# Patient Record
Sex: MALE | Race: WHITE | NOT HISPANIC OR LATINO | Employment: UNEMPLOYED | URBAN - NONMETROPOLITAN AREA
[De-identification: names, ages, dates, MRNs, and addresses within clinical notes are randomized per-mention and may not be internally consistent; named-entity substitution may affect disease eponyms.]

---

## 2017-02-23 ENCOUNTER — HOSPITAL ENCOUNTER (OUTPATIENT)
Dept: RADIOLOGY | Facility: OTHER | Age: 62
End: 2017-02-23
Attending: FAMILY MEDICINE

## 2017-02-23 ENCOUNTER — OFFICE VISIT - GICH (OUTPATIENT)
Dept: FAMILY MEDICINE | Facility: OTHER | Age: 62
End: 2017-02-23

## 2017-02-23 ENCOUNTER — HISTORY (OUTPATIENT)
Dept: FAMILY MEDICINE | Facility: OTHER | Age: 62
End: 2017-02-23

## 2017-02-23 DIAGNOSIS — M79.10 MYALGIA: ICD-10-CM

## 2017-02-24 ENCOUNTER — HOSPITAL ENCOUNTER (OUTPATIENT)
Dept: PHYSICAL THERAPY | Facility: OTHER | Age: 62
Setting detail: THERAPIES SERIES
End: 2017-02-24
Attending: FAMILY MEDICINE

## 2017-02-24 DIAGNOSIS — M79.10 MYALGIA: ICD-10-CM

## 2017-02-27 ENCOUNTER — HOSPITAL ENCOUNTER (OUTPATIENT)
Dept: PHYSICAL THERAPY | Facility: OTHER | Age: 62
Setting detail: THERAPIES SERIES
End: 2017-02-27
Attending: FAMILY MEDICINE

## 2017-03-01 ENCOUNTER — HOSPITAL ENCOUNTER (OUTPATIENT)
Dept: PHYSICAL THERAPY | Facility: OTHER | Age: 62
Setting detail: THERAPIES SERIES
End: 2017-03-01
Attending: FAMILY MEDICINE

## 2017-03-06 ENCOUNTER — HOSPITAL ENCOUNTER (OUTPATIENT)
Dept: PHYSICAL THERAPY | Facility: OTHER | Age: 62
Setting detail: THERAPIES SERIES
End: 2017-03-06
Attending: FAMILY MEDICINE

## 2017-03-08 ENCOUNTER — HOSPITAL ENCOUNTER (OUTPATIENT)
Dept: PHYSICAL THERAPY | Facility: OTHER | Age: 62
Setting detail: THERAPIES SERIES
End: 2017-03-08
Attending: FAMILY MEDICINE

## 2017-03-15 ENCOUNTER — HOSPITAL ENCOUNTER (OUTPATIENT)
Dept: PHYSICAL THERAPY | Facility: OTHER | Age: 62
Setting detail: THERAPIES SERIES
End: 2017-03-15

## 2017-03-22 ENCOUNTER — HOSPITAL ENCOUNTER (OUTPATIENT)
Dept: PHYSICAL THERAPY | Facility: OTHER | Age: 62
Setting detail: THERAPIES SERIES
End: 2017-03-22
Attending: FAMILY MEDICINE

## 2017-06-20 ENCOUNTER — HISTORY (OUTPATIENT)
Dept: FAMILY MEDICINE | Facility: OTHER | Age: 62
End: 2017-06-20

## 2017-06-20 ENCOUNTER — OFFICE VISIT - GICH (OUTPATIENT)
Dept: FAMILY MEDICINE | Facility: OTHER | Age: 62
End: 2017-06-20

## 2017-06-20 DIAGNOSIS — I73.9 PERIPHERAL VASCULAR DISEASE (H): ICD-10-CM

## 2017-06-20 LAB
ABSOLUTE BASOPHILS - HISTORICAL: 0.1 THOU/CU MM
ABSOLUTE EOSINOPHILS - HISTORICAL: 0.3 THOU/CU MM
ABSOLUTE IMMATURE GRANULOCYTES(METAS,MYELOS,PROS) - HISTORICAL: 0 THOU/CU MM
ABSOLUTE LYMPHOCYTES - HISTORICAL: 2.1 THOU/CU MM (ref 0.9–2.9)
ABSOLUTE MONOCYTES - HISTORICAL: 0.7 THOU/CU MM
ABSOLUTE NEUTROPHILS - HISTORICAL: 5 THOU/CU MM (ref 1.7–7)
ANION GAP - HISTORICAL: 9 (ref 5–18)
BASOPHILS # BLD AUTO: 0.9 %
BUN SERPL-MCNC: 14 MG/DL (ref 7–25)
BUN/CREAT RATIO - HISTORICAL: 15
CALCIUM SERPL-MCNC: 9.4 MG/DL (ref 8.6–10.3)
CHLORIDE SERPLBLD-SCNC: 100 MMOL/L (ref 98–107)
CO2 SERPL-SCNC: 25 MMOL/L (ref 21–31)
CREAT SERPL-MCNC: 0.91 MG/DL (ref 0.7–1.3)
EOSINOPHIL NFR BLD AUTO: 3.3 %
ERYTHROCYTE [DISTWIDTH] IN BLOOD BY AUTOMATED COUNT: 13.3 % (ref 11.5–15.5)
GFR IF NOT AFRICAN AMERICAN - HISTORICAL: >60 ML/MIN/1.73M2
GLUCOSE SERPL-MCNC: 85 MG/DL (ref 70–105)
HCT VFR BLD AUTO: 44.9 % (ref 37–53)
HEMOGLOBIN: 15.8 G/DL (ref 13.5–17.5)
IMMATURE GRANULOCYTES(METAS,MYELOS,PROS) - HISTORICAL: 0.4 %
LYMPHOCYTES NFR BLD AUTO: 26 % (ref 20–44)
MCH RBC QN AUTO: 32.1 PG (ref 26–34)
MCHC RBC AUTO-ENTMCNC: 35.2 G/DL (ref 32–36)
MCV RBC AUTO: 91 FL (ref 80–100)
MONOCYTES NFR BLD AUTO: 8.2 %
NEUTROPHILS NFR BLD AUTO: 61.2 % (ref 42–72)
PLATELET # BLD AUTO: 203 THOU/CU MM (ref 140–440)
PMV BLD: 9.2 FL (ref 6.5–11)
POTASSIUM SERPL-SCNC: 3.8 MMOL/L (ref 3.5–5.1)
RED BLOOD COUNT - HISTORICAL: 4.92 MIL/CU MM (ref 4.3–5.9)
SODIUM SERPL-SCNC: 134 MMOL/L (ref 133–143)
WHITE BLOOD COUNT - HISTORICAL: 8.2 THOU/CU MM (ref 4.5–11)

## 2017-06-21 ENCOUNTER — AMBULATORY - GICH (OUTPATIENT)
Dept: FAMILY MEDICINE | Facility: OTHER | Age: 62
End: 2017-06-21

## 2017-06-21 DIAGNOSIS — I73.9 PERIPHERAL VASCULAR DISEASE (H): ICD-10-CM

## 2017-06-23 ENCOUNTER — AMBULATORY - GICH (OUTPATIENT)
Dept: SCHEDULING | Facility: OTHER | Age: 62
End: 2017-06-23

## 2017-06-30 ENCOUNTER — HISTORY (OUTPATIENT)
Dept: FAMILY MEDICINE | Facility: OTHER | Age: 62
End: 2017-06-30

## 2017-06-30 ENCOUNTER — OFFICE VISIT - GICH (OUTPATIENT)
Dept: FAMILY MEDICINE | Facility: OTHER | Age: 62
End: 2017-06-30

## 2017-06-30 DIAGNOSIS — I74.3 EMBOLISM AND THROMBOSIS OF ARTERY OF LOWER EXTREMITY (H): ICD-10-CM

## 2017-06-30 DIAGNOSIS — M99.81 OTHER BIOMECHANICAL LESIONS OF CERVICAL REGION (CODE): ICD-10-CM

## 2017-12-27 NOTE — PROGRESS NOTES
Patient Information     Patient Name MRN Sex Modesto Salcido [id number] Male 1955      Progress Notes by Miguelangel Ghosh MD at 2017  2:45 PM     Author:  Miguelangel Ghosh MD Service:  (none) Author Type:  Physician     Filed:  2017  7:58 AM Encounter Date:  2017 Status:  Signed     :  Miguelangel Ghosh MD (Physician)            .

## 2017-12-27 NOTE — PROGRESS NOTES
"Patient Information     Patient Name MRN Sex Modesto Salcido [id number] Male 1955      Progress Notes by Ha Keating MD at 2017  9:15 AM     Author:  Ha Keating MD Service:  (none) Author Type:  Physician     Filed:  2017  9:38 AM Encounter Date:  2017 Status:  Signed     :  Ha Keating MD (Physician)            SUBJECTIVE:    Modesto Lehman is a 61 y.o. male who presents for follow up pad     HPI    He had a thrombosis in the right lower extremity previous bypass.  [place]  Had not taken his medications with him.  Was having increased pain with doing gardening once he got back home.  Right leg pain, identical to the last time he had an occlusion.  Had an evaluation, then an angiogram with 24 hours of clot dissolving. Is now on plavix for 3 months then aspirin daily.  Procedure was 1 week ago.  Right foot now is back to baseline.  No pain, is not cold.  He has again stopped smoking.    The back pain is about the same on lumbar area, but getting worse in the thoracic spine.  Has been seeing a chiropractor who \"put a rib back\" for him.  This helps a little.    No Known Allergies,   Current Outpatient Prescriptions on File Prior to Visit       Medication  Sig Dispense Refill     aspirin 81 mg tablet Take 81 mg by mouth once daily.       cyclobenzaprine (FLEXERIL) 10 mg tablet Take 1 tablet by mouth 3 times daily. 90 tablet 3     ibuprofen (ADVIL; MOTRIN) 800 mg tablet Take 1 tablet by mouth 3 times daily with meals. 90 tablet 11     simvastatin (ZOCOR) 40 mg tablet Take 1 tablet by mouth once daily. 90 tablet 3     No current facility-administered medications on file prior to visit.    ,   Also now on plavix 75 mg daily    Past Medical History:     Diagnosis  Date     Allergy to pain medication     No reported medication allergies       Anesthesia     No  problems with anaesthesia      Chronic low back pain  "    pain with bilateral leg and disc injuries.      Former smoker      quite 2 days ago      Hx pneumonia      Hyperlipidemia      Transfusion history     No blood transfusions      and   Past Surgical History:      Procedure  Laterality Date     ANESTHESIA ALERT      No  problems with anaesthesia       ANGIOPLASTY      Right leg stenting and bypass, Left also       PREMALIG/BENIGN SKIN LESION EXCISION       removed from chest       WISDOM TEETH EXTRACTION      recently removed         REVIEW OF SYSTEMS:  Review of Systems   Constitutional: Negative for chills and fever.   Respiratory: Negative for cough and shortness of breath.    Cardiovascular: Negative for chest pain, palpitations and leg swelling.   Musculoskeletal: Positive for back pain.   Neurological: Positive for tingling.       OBJECTIVE:  /80  Pulse 80  Temp 97.8  F (36.6  C) (Tympanic)  Wt 82.6 kg (182 lb)  BMI 26.06 kg/m2    EXAM:   Physical Exam   Constitutional: He is oriented to person, place, and time and well-developed, well-nourished, and in no distress. No distress.   HENT:   Head: Normocephalic and atraumatic.   Cardiovascular: Normal rate, regular rhythm and normal heart sounds.  Exam reveals no gallop and no friction rub.    No murmur heard.  Pulmonary/Chest: Effort normal. No respiratory distress. He has no wheezes. He has no rales.   Musculoskeletal:   palpable right posterior tibialis pulse.  Normal. Moderate ecchymosis at right medial epicondyle area.   Neurological: He is alert and oriented to person, place, and time.   Skin: He is not diaphoretic.   Psychiatric: Memory, affect and judgment normal.     No thoracic spine pain on palpation.  ASSESSMENT/PLAN:    ICD-10-CM    1. 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 Neural foraminal stenosis of cervical spine M99.81         Plan:  He is back to baseline and doing well regarding the thrombosis.  Suspect the prolonged car ride played a role as  well as not being on his medications.  Encouraged him to remain off the tobacco.  Regarding the back, the treatment for this is activity, tylenol as needed, intermittent chiro manipulations.    Ha Keating MD ....................  6/30/2017   9:37 AM

## 2017-12-29 NOTE — H&P
Patient Information     Patient Name MRN Sex Modesto Salcido [id number] Male 1955      H&P by Miguelangel Ghosh MD at 2017  2:45 PM     Author:  Miguelangel Ghosh MD Service:  (none) Author Type:  Physician     Filed:  2017  7:58 AM Encounter Date:  2017 Status:  Signed     :  Miguelangel Ghosh MD (Physician)            ----------------- PREOPERATIVE EXAM ------------------  2017    SUBJECTIVE:  Modesto Lehman is a 61 y.o. male here for preop.    I was asked to see Modesto Lehman by Dr. HAMM at [facility] for a preoperative history and physical.      Date of Surgery: 17  Type of Surgery: Vascular surgery RLE  Surgeon: Dr HAMM   Hospital:  [facility]    HPI:  Has history of PAD with previous surgery in lower extremities and now has recurrent blockage just found yesterday in left groin area. He has continued to smoke but states he is now done.   Patient has no personal nor FH of life threatening anesthesia complications. Patient denies unusual bleeding tendencies. No recent history of cough, fever,cold, sweats, chills.       Patient Active Problem List      Diagnosis Date Noted     Neural foraminal stenosis of cervical spine 2016     Plantar wart of left foot 2014     SINUSITIS, ACUTE 2012     TENDINITIS, ELBOW 2012     BAKER'S CYST 2012     PERIPHERAL VASCULAR DISEASE WITH CLAUDICATION 2011     BACK PAIN 2011     SPINAL STENOSIS, LUMBAR 2010     FH DIABETES      HYPERCHOLESTEROLEMIA 10/19/2010     COLONIC POLYPS, ADENOMATOUS 2010     TOBACCO USER 2009     OBESITY 2005     DEGENERATIVE DISC DISEASE, LUMBOSACRAL SPINE 1997     HYPERLIPIDEMIA, MIXED 1997       Past Medical History:     Diagnosis  Date     Allergy to pain medication     No reported medication allergies       Anesthesia     No  problems with anaesthesia      Chronic low back pain     pain with bilateral leg and disc  injuries.      Former smoker      quite 2 days ago      Hx pneumonia      Hyperlipidemia      Transfusion history     No blood transfusions         Past Surgical History:      Procedure  Laterality Date     ANESTHESIA ALERT      No  problems with anaesthesia       ANGIOPLASTY      Right leg stenting and bypass, Left also       PREMALIG/BENIGN SKIN LESION EXCISION       removed from chest       WISDOM TEETH EXTRACTION      recently removed         Current Outpatient Prescriptions       Medication  Sig Dispense Refill     aspirin 81 mg tablet Take 81 mg by mouth once daily.       cyclobenzaprine (FLEXERIL) 10 mg tablet Take 1 tablet by mouth 3 times daily. 90 tablet 3     ibuprofen (ADVIL; MOTRIN) 800 mg tablet Take 1 tablet by mouth 3 times daily with meals. 90 tablet 11     simvastatin (ZOCOR) 40 mg tablet Take 1 tablet by mouth once daily. 90 tablet 3     No current facility-administered medications for this visit.      Medications have been reviewed by me and are current to the best of my knowledge and ability.      Allergies:  No Known Allergies    Latex allergy  no    Td up to date:  2016    Family History       Problem   Relation Age of Onset     Diabetes  Mother      Heart Disease  Mother      Other  Father      COPD, CD       Alcohol/Drug  Other       No hx of chemical dependency.        Diabetes  Other      Heart Disease  Other      Heart Disease  Brother      Other  Brother      killed by drunk  at age 19.            Social History       Substance Use Topics         Smoking status:   Current Every Day Smoker     Packs/day:  0.25     Types:  Cigars     Start date:  11/4/2009     Smokeless tobacco:   Never Used      Comment: quitting today, 6/20/17      Alcohol use   No         ROS:    surgical:  patient denies previous complications from prior surgeries including but not limited to prolonged bleeding, anesthesia complications, dysrhythmias, surgical wound infections, or prolonged hospital stay.    Hx  "of blood transfusions:  NO        -------------------------------------------------------------    PHYSICAL EXAM:  /70  Pulse 79  Temp 97.4  F (36.3  C) (Temporal)  Ht 1.78 m (5' 10.08\")  Wt 83 kg (183 lb)  SpO2 96%  BMI 26.2 kg/m2    PHYSICAL EXAMINATION  EXAM:   General Appearance: Pleasant, alert, appropriate appearance for age. No acute distress  Head Exam: Normal. Normocephalic, atraumatic.  Ear Exam: Normal TM's bilaterally. Normal auditory canals and external ears. Non-tender.  OroPharynx Exam: Dental hygiene adequate. Normal buccal mucosa. Normal pharynx.  Neck Exam: Supple, no masses or nodes.  Chest/Respiratory Exam: Normal chest wall and respirations. Clear to auscultation.  Cardiovascular Exam: Regular rate and rhythm. S1, S2, no murmur, click, gallop, or rubs.  Gastrointestinal Exam: Soft, nontender, no abnormal masses or organomegaly.  Lymphatic Exam: Non-palpable nodes in neck, clavicular, axillary, or inguinal regions.  Foot Exam: has normal left DORSALIS PEDIS pulse but absent left and absent posterior tib pulses bilat  Neurologic Exam: Nonfocal;  normal gross motor movement, tone, and coordination. No tremor.  Psychiatric Exam: Alert and oriented, appropriate affect.      ASSESSMENT/PLAN:    ICD-10-CM    1. PAD (peripheral artery disease) () I73.9          LABS:   Normal CBC and bmp      EKG:  Normal sinus rhythm and nonspecific ST-T changes  ---------------------------------------------------------------    ASSESSEMNT AND PLAN:  1.  Preoperative history and physical   consults:  none    For above listed surgery and anesthesia:     - Patient is moderate   risk for perioperative complications.      PRE OP RECOMMENDATIONS:  Discontinue ASA 5 days prior to reduce bleeding risk and Discontinue NSAIDS 5 days prior to procedure to reduce bleeding risk      Miguelangel Ghosh MD, MD..................6/20/2017 2:45 PM          "

## 2017-12-30 NOTE — NURSING NOTE
Patient Information     Patient Name MRN Sex Modesto Salcido [id number] Male 1955      Nursing Note by Francesca Roach at 2017  9:15 AM     Author:  Francesca Roach Service:  (none) Author Type:  (none)     Filed:  2017  9:22 AM Encounter Date:  2017 Status:  Signed     :  Francesca Roach            Patient is here to follow up from recent hospital stay at [facility]  Francesca Roach LPN .........................2017  9:14 AM

## 2017-12-30 NOTE — NURSING NOTE
Patient Information     Patient Name MRN Sex Modesto Salcido [id number] Male 1955      Nursing Note by Freda Ledesma at 2017  2:45 PM     Author:  Freda Ledesma Service:  (none) Author Type:  (none)     Filed:  2017  2:43 PM Encounter Date:  2017 Status:  Signed     :  Freda Ledesma            This patient presents today for a Preoperative exam for this procedure: Right iliofemoral reconstruction   Date of Surgery:  and    Surgeon:  Dr. Kaur  Facility:  [facility]  Fax:  [phone]

## 2018-01-03 NOTE — PROGRESS NOTES
"Patient Information     Patient Name MRN Sex Modesto Salcido [id number] Male 1955      Progress Notes by Aries Quiros PT at 3/8/2017  9:48 AM     Author:  Aries Quiros PT Service:  (none) Author Type:  PT- Physical Therapist     Filed:  3/8/2017 10:32 AM Date of Service:  3/8/2017  9:48 AM Status:  Signed     :  Aries Quiros PT (PT- Physical Therapist)            [facility]  Outpatient PT - Daily Note    Date of Service: 3/8/2017     Visit 5/10    Patient Name: Modesto Lehman   YOB: 1955   Referring MD/Provider: Dr. Keating  Medical and Treatment Diagnosis: Pain of rhomboid muscle   PT Treatment Diagnosis: Pain, muscle tightness, thoracic segmental dysfunction postural dysfunction  Insurance: [employer]  Start of Service: 17  Certification Dates: Start of Service: 17  Medicare/MA Re-Cert Due: 17           Subjective      Patient reports he had a chiropractic adjustment which helped symptoms some.  States the muscles in the mid and upper back are sore today.          Rates pain: pain at rest 4/10.  Pain increases \"way past 10\".    Describes pain: sharp, achy, throbbing   Locates pain: right rhomboid     Current functional limitations: sleeping, reaching, shoveling, carrying wood, \"everything is limited\"    Prior Level:  Minimal to no difficulty completing the above functional activities.           Objective        Today's Intervention:      STM/MFR x 25 minutes to the right and left rhomboid, bilateral upper trapezius and levator scapulae.      IFC x 15 minutes, cross pattern of electrodes to the bilateral rhomboid region.  Intensity = 7 to 9      Home Exercise Program:            Assessment    Therapist Assessment / Clinical Impression:  Signs and symptoms consistent with rhomboid muscle strain and thoracic segmental dysfunction.  The patient is appropriate for physical therapy to address their pain, functional limitations, and physical " impairments.      Functional Impairment(s): See subjective on initial evaluation and Functional Assessment / Summary Report from [facility].    Physical Impairment(s):  Pain, muscle tightness, thoracic segmental dysfunction postural dysfunction    Patient Specific Functional and Pain Scales (PSFS):    Clinician Instructions: Complete after the history and before the exam.    Initial Assessment: We want to know what 3 activities in your life you are unable to perform, or are having the most difficulty performing, as a result of your chief problem. Please list and score at least 3 activities that you are unable to perform, or having the most difficulty performing, because of your chief problem.   Patient Specific Activity Scoring Scheme (score one number for each activity):   Activity Score (0-10)  0= Unable to perform activity  10= Able to perform activity at same level as before injury or problem   1. Sleeping  1/10   2.  Carrying wood for the fire place 0/10   3. Sweeping  0/10   4.     5.     Totals:  1/30 = 3% ability which relates to 97% impairment    Patient verbally states that they understand that the information they have provided above is current and complete to the best of their knowledge.    Patient Specific Functional Scale Modifier Scale Conversion: (patient's modifier that correlates with pt's score on PSFS): 1-CM (90% Impaired).      G codes and Modifier taken from pt completing a PSFS: completed at initial evaluation   Initial Primary G Code and Modifier:    Per the Patient's intake and/or assessment the Primary G Code is: Mobility .   The Patient's Impairment, Limitation or Restriction Modifier would be best described as: CM - 80% - 100% Impairment.     Goal Primary G Code and Modifier:    The Patient's G Code Goal would be: Mobility    The Patient's Impairment, Limitation or Restriction Modifier goal would be best described as: CK - 40% - 60% Impairment.         Goals:  Functional Short Term  Goals (4 weeks):   Repot 50% reduction in mid thoracic pain at rest  Report 25% improvement in sleep at night  Report ability to carry a small load of wood to his fireplace with 50% less pain.  Report 25% decrease in pain with sweeping    Functional Long Term Goals (8 weeks):   Develop independent management.  Repot 75% or greater reduction in mid thoracic pain at rest  Report 75% or greater improvement in sleep at night  Report ability to carry a small load of wood to his fireplace with 75% less pain.  Report 75% or greater decrease in pain with sweeping      Patient participated in goal selection and understand(s) the plan of care: Yes   Patient Potential for Achieving Desired Outcome: Fair      Response to Intervention:  Good response to treatment.            Plan    Treatment Plan:      Frequency:   16 visits    Duration of Treatment: 8 weeks    Planned Interventions:    Home Exercise Program development  Therapeutic Exercise (ROM & Strengthening)  Therapeutic Activities  Neuromuscular Re-education  Manual Therapy  Ultrasound  E-Stim  Gait Training  Hot Packs / Cold Pack Modalities  Vasopneumatic Compression with Cold Therapy ( Game Ready )      Plan for next visit:  Progress exercises as symptoms allow.  Manual therapy and modalities as indicated.

## 2018-01-03 NOTE — INITIAL ASSESSMENTS
"Patient Information     Patient Name MRN Sex Modesto Salcido [id number] Male 1955      Initial Assessments by Aries Quiros PT at 2017  1:14 PM     Author:  Aries Quiros PT Service:  (none) Author Type:  PT- Physical Therapist     Filed:  2017  2:44 PM Date of Service:  2017  1:14 PM Status:  Signed     :  Aries Quiros PT (PT- Physical Therapist)            [facility]  Outpatient PT - Initial Evaluation  Spine Eval    Date of Service: 2017     Visit 1/10    Patient Name: Modesto Lehman   YOB: 1955   Referring MD/Provider: Dr. Keating  Medical and Treatment Diagnosis: Pain of rhomboid muscle   PT Treatment Diagnosis: Pain, muscle tightness, thoracic segmental dysfunction postural dysfunction  Insurance: Crude Area  Start of Service: 17  Certification Dates: Start of Service: 17  Medicare/MA Re-Cert Due: 17     Precautions:  None   Cognition:  Oriented to Person, Place, and Time.     Were cultural / age or other special adaptations needed? No      Patient is a vulnerable adult: No      Patient is aware of diagnosis: Yes      Risks and benefits explained: Yes    Subjective      History:   Pain in the right rhomboid region has been getting worse for the past year. Pain will radiate around to the anterior ribcage.  He has difficulty sleeping due to pain.  He can only lie down for 15 minutes.  He sleeps 2-4 hours a night.  Pain limits his daily activity.  He reports pressure on the right rhomboids helps decrease pain some.  He has a history of thoracic back pain since he was a child when he fell off a roof and landed on the thoracic spine.  States in the past pain would flare up but hen get better.  Now pain persists and he cannot find a way to decrease pain. He also has a history of neck pain with a \"pinched nerve in the neck\"  He has tingling and numbness in the right hand.      Rates pain: pain at rest 5-6/10.  Pain " "increases \"way past 10\".    Describes pain: sharp, achy, throbbing   Locates pain: right rhomboid     Current functional limitations: sleeping, reaching, shoveling, carrying wood, \"everything is limited\"    Prior Level:  Minimal to no difficulty completing the above functional activities.     Past Medical History      Diagnosis   Date     Allergy to pain medication       No reported medication allergies       Anesthesia       No  problems with anaesthesia      Chronic low back pain       pain with bilateral leg and disc injuries.      Former smoker        quite 2 days ago      Hx pneumonia       Hyperlipidemia       Transfusion history       No blood transfusions       Past Surgical History       Procedure   Laterality Date     Premalig/benign skin lesion excision         removed from chest       [place] teeth extraction        recently removed       Anesthesia alert        No  problems with anaesthesia       Angioplasty        Right leg stenting and bypass, Left also           Occupation:  Retired    Previous Treatment:    Pain Meds / Anti-inflammatory Meds - Yes   Physical Therapy - 6 months ago  Injections - No   Surgery - No   Pain Clinic - No    Diagnostics:  Reviewed (see chart)   Current Medications:  Reviewed (see chart)    Drug Allergies:  Reviewed (see chart)  ?   Latex Allergy:  No         Objective    Items left blank indicate that the test was inappropriate or not meaningful at the time of evaluation and therefore not performed.      Palpation: Pain and muscle tightness noted over the right rhomboid.  Pain over the right T4 region. Decreased thoracic segmental mobility with P/A glide from T2-T6      Today's Intervention:      STM/MFR x 15 minutes to the right rhomboid region     IFC x 15 minutes, cross pattern of electrodes to the bilateral rhomboid region.        Home Exercise Program:            Assessment    Therapist Assessment / Clinical Impression:  Signs and symptoms consistent with rhomboid " muscle strain and thoracic segmental dysfunction.  The patient is appropriate for physical therapy to address their pain, functional limitations, and physical impairments.      Functional Impairment(s): See subjective on initial evaluation and Functional Assessment / Summary Report from [facility].    Physical Impairment(s):  Pain, muscle tightness, thoracic segmental dysfunction postural dysfunction    Patient Specific Functional and Pain Scales (PSFS):    Clinician Instructions: Complete after the history and before the exam.    Initial Assessment: We want to know what 3 activities in your life you are unable to perform, or are having the most difficulty performing, as a result of your chief problem. Please list and score at least 3 activities that you are unable to perform, or having the most difficulty performing, because of your chief problem.   Patient Specific Activity Scoring Scheme (score one number for each activity):   Activity Score (0-10)  0= Unable to perform activity  10= Able to perform activity at same level as before injury or problem   1. Sleeping  1/10   2.  Carrying wood for the fire place 0/10   3. Sweeping  0/10   4.     5.     Totals:  1/30 = 3% ability which relates to 97% impairment    Patient verbally states that they understand that the information they have provided above is current and complete to the best of their knowledge.    Patient Specific Functional Scale Modifier Scale Conversion: (patient's modifier that correlates with pt's score on PSFS): 1-CM (90% Impaired).    G codes and Modifier taken from pt completing a PSFS: completed at initial evaluation   Initial Primary G Code and Modifier:    Per the Patient's intake and/or assessment the Primary G Code is: Mobility .   The Patient's Impairment, Limitation or Restriction Modifier would be best described as: CM - 80% - 100% Impairment.     Goal Primary G Code and Modifier:    The Patient's G Code Goal would be: Mobility    The  Patient's Impairment, Limitation or Restriction Modifier goal would be best described as: CK - 40% - 60% Impairment.         Goals:  Functional Short Term Goals (4 weeks):   Repot 50% reduction in mid thoracic pain at rest  Report 25% improvement in sleep at night  Report ability to carry a small load of wood to his fireplace with 50% less pain.  Report 25% decrease in pain with sweeping    Functional Long Term Goals (8 weeks):   Develop independent management.  Repot 75% or greater reduction in mid thoracic pain at rest  Report 75% or greater improvement in sleep at night  Report ability to carry a small load of wood to his fireplace with 75% less pain.  Report 75% or greater decrease in pain with sweeping      Patient participated in goal selection and understand(s) the plan of care: Yes   Patient Potential for Achieving Desired Outcome: Fair      Response to Intervention:  Good response to treatment.      Plan    Treatment Plan:      Frequency:   16 visits    Duration of Treatment: 8 weeks    Planned Interventions:    Home Exercise Program development  Therapeutic Exercise (ROM & Strengthening)  Therapeutic Activities  Neuromuscular Re-education  Manual Therapy  Ultrasound  E-Stim  Gait Training  Hot Packs / Cold Pack Modalities  Vasopneumatic Compression with Cold Therapy ( Game Ready )      Plan for next visit:  Progress exercises as symptoms allow.  Manual therapy and modalities as indicated.     Thank you for your referral to [facility].  Please call with any questions, concerns or comments.  [phone]    The signature, of the referring medical provider, on this document indicates certification of the above prescribed plan of care and is medically necessary.

## 2018-01-03 NOTE — PROGRESS NOTES
"Patient Information     Patient Name MRN Sex Modesto Salcido [id number] Male 1955      Progress Notes by Aries Quiros PT at 3/1/2017  9:48 AM     Author:  Aries Quiros PT  Service:  (none) Author Type:  PT- Physical Therapist     Filed:  3/1/2017 10:26 AM  Date of Service:  3/1/2017  9:48 AM Status:  Addendum     :  Aries Quiros PT (PT- Physical Therapist)        Related Notes: Original Note by Aries Quiros PT (PT- Physical Therapist) filed at 3/1/2017 10:26 AM            [facility]  Outpatient PT - Daily Note    Date of Service: 3/1/2017     Visit 3/10    Patient Name: Modesto Lehman   YOB: 1955   Referring MD/Provider: Dr. Keating  Medical and Treatment Diagnosis: Pain of rhomboid muscle   PT Treatment Diagnosis: Pain, muscle tightness, thoracic segmental dysfunction postural dysfunction  Insurance: [facility]  Start of Service: 17  Certification Dates: Start of Service: 17  Medicare/[place] Re-Cert Due: 17         Subjective      Patient reports he has increased pain and difficulty sleeping the past few night due to pain.  He reports these episode are common and have been going on for a long time      Rates pain: pain at rest 4/10.  Pain increases \"way past 10\".    Describes pain: sharp, achy, throbbing   Locates pain: right rhomboid     Current functional limitations: sleeping, reaching, shoveling, carrying wood, \"everything is limited\"    Prior Level:  Minimal to no difficulty completing the above functional activities.           Objective          Palpation: Pain and muscle tightness noted over the right and left rhomboid, bilateral upper trapezius and levator scapulae.  Pain over the right T4 region. Decreased thoracic segmental mobility with P/A glide from T2-T6      Today's Intervention:      STM/MFR x 20 minutes to the right and left rhomboid, bilateral upper trapezius and levator scapulae.    IFC x 15 minutes, cross pattern of " electrodes to the bilateral rhomboid region.  Intensity = 7 to 8.4      Home Exercise Program:            Assessment    Therapist Assessment / Clinical Impression:  Signs and symptoms consistent with rhomboid muscle strain and thoracic segmental dysfunction.  The patient is appropriate for physical therapy to address their pain, functional limitations, and physical impairments.      Functional Impairment(s): See subjective on initial evaluation and Functional Assessment / Summary Report from [facility].    Physical Impairment(s):  Pain, muscle tightness, thoracic segmental dysfunction postural dysfunction    Patient Specific Functional and Pain Scales (PSFS):    Clinician Instructions: Complete after the history and before the exam.    Initial Assessment: We want to know what 3 activities in your life you are unable to perform, or are having the most difficulty performing, as a result of your chief problem. Please list and score at least 3 activities that you are unable to perform, or having the most difficulty performing, because of your chief problem.   Patient Specific Activity Scoring Scheme (score one number for each activity):   Activity Score (0-10)  0= Unable to perform activity  10= Able to perform activity at same level as before injury or problem   1. Sleeping  1/10   2.  Carrying wood for the fire place 0/10   3. Sweeping  0/10   4.     5.     Totals:  1/30 = 3% ability which relates to 97% impairment    Patient verbally states that they understand that the information they have provided above is current and complete to the best of their knowledge.    Patient Specific Functional Scale Modifier Scale Conversion: (patient's modifier that correlates with pt's score on PSFS): 1-CM (90% Impaired).      G codes and Modifier taken from pt completing a PSFS: completed at initial evaluation   Initial Primary G Code and Modifier:    Per the Patient's intake and/or assessment the Primary G Code is: Mobility .   The  Patient's Impairment, Limitation or Restriction Modifier would be best described as: CM - 80% - 100% Impairment.     Goal Primary G Code and Modifier:    The Patient's G Code Goal would be: Mobility    The Patient's Impairment, Limitation or Restriction Modifier goal would be best described as: CK - 40% - 60% Impairment.         Goals:  Functional Short Term Goals (4 weeks):   Repot 50% reduction in mid thoracic pain at rest  Report 25% improvement in sleep at night  Report ability to carry a small load of wood to his fireplace with 50% less pain.  Report 25% decrease in pain with sweeping    Functional Long Term Goals (8 weeks):   Develop independent management.  Repot 75% or greater reduction in mid thoracic pain at rest  Report 75% or greater improvement in sleep at night  Report ability to carry a small load of wood to his fireplace with 75% less pain.  Report 75% or greater decrease in pain with sweeping      Patient participated in goal selection and understand(s) the plan of care: Yes   Patient Potential for Achieving Desired Outcome: Fair      Response to Intervention:  Good response to treatment.            Plan    Treatment Plan:      Frequency:   16 visits    Duration of Treatment: 8 weeks    Planned Interventions:    Home Exercise Program development  Therapeutic Exercise (ROM & Strengthening)  Therapeutic Activities  Neuromuscular Re-education  Manual Therapy  Ultrasound  E-Stim  Gait Training  Hot Packs / Cold Pack Modalities  Vasopneumatic Compression with Cold Therapy ( Game Ready )      Plan for next visit:  Progress exercises as symptoms allow.  Manual therapy and modalities as indicated.

## 2018-01-03 NOTE — PROGRESS NOTES
"Patient Information     Patient Name MRN Sex Modesto Salcido [id number] Male 1955      Progress Notes by Aries Quiros PT at 3/6/2017  9:47 AM     Author:  Aries Quiros PT Service:  (none) Author Type:  PT- Physical Therapist     Filed:  3/6/2017 10:26 AM Date of Service:  3/6/2017  9:47 AM Status:  Signed     :  Aries Quiros PT (PT- Physical Therapist)            [facility]  Outpatient PT - Daily Note    Date of Service: 3/6/2017     Visit 4/10    Patient Name: Modesto Lehman   YOB: 1955   Referring MD/Provider: Dr. Keating  Medical and Treatment Diagnosis: Pain of rhomboid muscle   PT Treatment Diagnosis: Pain, muscle tightness, thoracic segmental dysfunction postural dysfunction  Insurance: [employer]  Start of Service: 17  Certification Dates: Start of Service: 17  Medicare/MA Re-Cert Due: 17         Subjective      Patient reports he has had increased symptoms since he slipped getting into his truck.  States he is having difficulty sleeping at night.  States he experiences flare ups like this at times.  Carrying bags of groceries into his home last night flared up symptoms significantly to the point where he had to rest before putting the groceries away.     Patient plans to set up some chiropractic treatments with Dr. Denver this week.       Rates pain: pain at rest 4/10.  Pain increases \"way past 10\".    Describes pain: sharp, achy, throbbing   Locates pain: right rhomboid     Current functional limitations: sleeping, reaching, shoveling, carrying wood, \"everything is limited\"    Prior Level:  Minimal to no difficulty completing the above functional activities.           Objective        Today's Intervention:      STM/MFR x 20 minutes to the right and left rhomboid, bilateral upper trapezius and levator scapulae.  Grade 3-4 central P/A mobs to T2-T8    IFC x 15 minutes, cross pattern of electrodes to the bilateral rhomboid region. "  Intensity = 7 to 9      Home Exercise Program:            Assessment    Therapist Assessment / Clinical Impression:  Signs and symptoms consistent with rhomboid muscle strain and thoracic segmental dysfunction.  The patient is appropriate for physical therapy to address their pain, functional limitations, and physical impairments.      Functional Impairment(s): See subjective on initial evaluation and Functional Assessment / Summary Report from [facility].    Physical Impairment(s):  Pain, muscle tightness, thoracic segmental dysfunction postural dysfunction    Patient Specific Functional and Pain Scales (PSFS):    Clinician Instructions: Complete after the history and before the exam.    Initial Assessment: We want to know what 3 activities in your life you are unable to perform, or are having the most difficulty performing, as a result of your chief problem. Please list and score at least 3 activities that you are unable to perform, or having the most difficulty performing, because of your chief problem.   Patient Specific Activity Scoring Scheme (score one number for each activity):   Activity Score (0-10)  0= Unable to perform activity  10= Able to perform activity at same level as before injury or problem   1. Sleeping  1/10   2.  Carrying wood for the fire place 0/10   3. Sweeping  0/10   4.     5.     Totals:  1/30 = 3% ability which relates to 97% impairment    Patient verbally states that they understand that the information they have provided above is current and complete to the best of their knowledge.    Patient Specific Functional Scale Modifier Scale Conversion: (patient's modifier that correlates with pt's score on PSFS): 1-CM (90% Impaired).      G codes and Modifier taken from pt completing a PSFS: completed at initial evaluation   Initial Primary G Code and Modifier:    Per the Patient's intake and/or assessment the Primary G Code is: Mobility .   The Patient's Impairment, Limitation or Restriction  Modifier would be best described as: CM - 80% - 100% Impairment.     Goal Primary G Code and Modifier:    The Patient's G Code Goal would be: Mobility    The Patient's Impairment, Limitation or Restriction Modifier goal would be best described as: CK - 40% - 60% Impairment.         Goals:  Functional Short Term Goals (4 weeks):   Repot 50% reduction in mid thoracic pain at rest  Report 25% improvement in sleep at night  Report ability to carry a small load of wood to his fireplace with 50% less pain.  Report 25% decrease in pain with sweeping    Functional Long Term Goals (8 weeks):   Develop independent management.  Repot 75% or greater reduction in mid thoracic pain at rest  Report 75% or greater improvement in sleep at night  Report ability to carry a small load of wood to his fireplace with 75% less pain.  Report 75% or greater decrease in pain with sweeping      Patient participated in goal selection and understand(s) the plan of care: Yes   Patient Potential for Achieving Desired Outcome: Fair      Response to Intervention:  Good response to treatment.            Plan    Treatment Plan:      Frequency:   16 visits    Duration of Treatment: 8 weeks    Planned Interventions:    Home Exercise Program development  Therapeutic Exercise (ROM & Strengthening)  Therapeutic Activities  Neuromuscular Re-education  Manual Therapy  Ultrasound  E-Stim  Gait Training  Hot Packs / Cold Pack Modalities  Vasopneumatic Compression with Cold Therapy ( Game Ready )      Plan for next visit:  Progress exercises as symptoms allow.  Manual therapy and modalities as indicated.

## 2018-01-03 NOTE — PROGRESS NOTES
"Patient Information     Patient Name MRN Sex Modesto Salcido [id number] Male 1955      Progress Notes by Aries Quiros PT at 3/22/2017  9:17 AM     Author:  Aries Quiros PT Service:  (none) Author Type:  PT- Physical Therapist     Filed:  3/22/2017  9:39 AM Date of Service:  3/22/2017  9:17 AM Status:  Signed     :  Aries Quiros PT (PT- Physical Therapist)            [facility]  Outpatient PT - Daily Note    Date of Service: 3/22/2017     Visit 7/10    Patient Name: Modesto Lehman   YOB: 1955   Referring MD/Provider: Dr. Keating  Medical and Treatment Diagnosis: Pain of rhomboid muscle   PT Treatment Diagnosis: Pain, muscle tightness, thoracic segmental dysfunction postural dysfunction  Insurance: [employer]  Start of Service: 17  Certification Dates: Start of Service: 17  Medicare/MA Re-Cert Due: 17           Subjective        Patient reports he had one decent night of sleep since last Friday.   He has a few nights that were bad due to pain.           Rates pain: pain at rest 5/10.  Pain increases to \"way past 10\".    Describes pain: sharp, achy, throbbing   Locates pain: right rhomboid     Current functional limitations: sleeping, reaching, shoveling, carrying wood, \"everything is limited\"    Prior Level:  Minimal to no difficulty completing the above functional activities.           Objective        Today's Intervention:      Exercise:  -sidelying reach and roll     STM/MFR x 15 minutes to the right and left rhomboid, bilateral upper trapezius and levator scapulae.      IFC x 15 minutes, cross pattern of electrodes to the bilateral rhomboid region.  Intensity = 9      Home Exercise Program:            Assessment    Therapist Assessment / Clinical Impression:  Signs and symptoms consistent with rhomboid muscle strain and thoracic segmental dysfunction.  The patient is appropriate for physical therapy to address their pain, functional " limitations, and physical impairments.      Functional Impairment(s): See subjective on initial evaluation and Functional Assessment / Summary Report from [facility].    Physical Impairment(s):  Pain, muscle tightness, thoracic segmental dysfunction postural dysfunction    Patient Specific Functional and Pain Scales (PSFS):    Clinician Instructions: Complete after the history and before the exam.    Initial Assessment: We want to know what 3 activities in your life you are unable to perform, or are having the most difficulty performing, as a result of your chief problem. Please list and score at least 3 activities that you are unable to perform, or having the most difficulty performing, because of your chief problem.   Patient Specific Activity Scoring Scheme (score one number for each activity):   Activity Score (0-10)  0= Unable to perform activity  10= Able to perform activity at same level as before injury or problem   1. Sleeping  1/10   2.  Carrying wood for the fire place 0/10   3. Sweeping  0/10   4.     5.     Totals:  1/30 = 3% ability which relates to 97% impairment    Patient verbally states that they understand that the information they have provided above is current and complete to the best of their knowledge.    Patient Specific Functional Scale Modifier Scale Conversion: (patient's modifier that correlates with pt's score on PSFS): 1-CM (90% Impaired).      G codes and Modifier taken from pt completing a PSFS: completed at initial evaluation   Initial Primary G Code and Modifier:    Per the Patient's intake and/or assessment the Primary G Code is: Mobility .   The Patient's Impairment, Limitation or Restriction Modifier would be best described as: CM - 80% - 100% Impairment.     Goal Primary G Code and Modifier:    The Patient's G Code Goal would be: Mobility    The Patient's Impairment, Limitation or Restriction Modifier goal would be best described as: CK - 40% - 60% Impairment.          Goals:  Functional Short Term Goals (4 weeks):   Repot 50% reduction in mid thoracic pain at rest  Report 25% improvement in sleep at night  Report ability to carry a small load of wood to his fireplace with 50% less pain.  Report 25% decrease in pain with sweeping    Functional Long Term Goals (8 weeks):   Develop independent management.  Repot 75% or greater reduction in mid thoracic pain at rest  Report 75% or greater improvement in sleep at night  Report ability to carry a small load of wood to his fireplace with 75% less pain.  Report 75% or greater decrease in pain with sweeping      Patient participated in goal selection and understand(s) the plan of care: Yes   Patient Potential for Achieving Desired Outcome: Fair      Response to Intervention:  Good response to treatment.            Plan    Treatment Plan:      Frequency:   16 visits    Duration of Treatment: 8 weeks    Planned Interventions:    Home Exercise Program development  Therapeutic Exercise (ROM & Strengthening)  Therapeutic Activities  Neuromuscular Re-education  Manual Therapy  Ultrasound  E-Stim  Gait Training  Hot Packs / Cold Pack Modalities  Vasopneumatic Compression with Cold Therapy ( Game Ready )      Plan for next visit:  Progress exercises as symptoms allow.  Manual therapy and modalities as indicated.

## 2018-01-03 NOTE — PROGRESS NOTES
"Patient Information     Patient Name MRN Sex Modesto Salcido [id number] Male 1955      Progress Notes by Ha Keating MD at 2017 10:30 AM     Author:  Ha Keating MD Service:  (none) Author Type:  Physician     Filed:  2017 11:18 AM Encounter Date:  2017 Status:  Signed     :  Ha Keating MD (Physician)            SUBJECTIVE:    Modesto Lehman is a 61 y.o. male who presents for back pain    HPI    Pain is getting worse.  Up 20 times a night.  At the most will sleep 2 hours at a time.  If he lays for more than 15 minutes, the pain will flare. A t times will be so severe he will \"go into a full blown panic\".  The Ibuprofen is giving his significant side effects so he has stopped.  Pain is along right medial scapula, radiates anteriorly into the chest at times.  Tried to shovel snow, had to rest every 5 minutes.  No longer notes hand and arm tingling.    No Known Allergies,   Current Outpatient Prescriptions on File Prior to Visit       Medication  Sig Dispense Refill     aspirin 81 mg tablet Take 81 mg by mouth once daily.       cyclobenzaprine (FLEXERIL) 10 mg tablet Take 1 tablet by mouth 3 times daily. 90 tablet 3     ibuprofen (ADVIL; MOTRIN) 800 mg tablet Take 1 tablet by mouth 3 times daily with meals. 90 tablet 11     simvastatin (ZOCOR) 40 mg tablet Take 1 tablet by mouth once daily. 90 tablet 3     No current facility-administered medications on file prior to visit.    ,   Past Medical History      Diagnosis   Date     Allergy to pain medication       No reported medication allergies       Anesthesia       No  problems with anaesthesia      Chronic low back pain       pain with bilateral leg and disc injuries.      Former smoker        quite 2 days ago      Hx pneumonia       Hyperlipidemia       Transfusion history       No blood transfusions      and   Past Surgical History       Procedure   Laterality Date     Premalig/benign skin lesion excision    "      removed from chest       [place] teeth extraction        recently removed       Anesthesia alert        No  problems with anaesthesia       Angioplasty        Right leg stenting and bypass, Left also         REVIEW OF SYSTEMS:  Review of Systems   Constitutional: Negative for chills and fever.   Respiratory: Negative for cough and shortness of breath.    Musculoskeletal: Positive for back pain. Negative for neck pain.   Neurological: Negative for tingling.       OBJECTIVE:  /68  Resp 16  Wt 80.9 kg (178 lb 6.4 oz)  BMI 25.6 kg/m2    EXAM:   Physical Exam   Constitutional: He is oriented to person, place, and time and well-developed, well-nourished, and in no distress. No distress.   Pulmonary/Chest: Effort normal. No respiratory distress. He has no wheezes. He has no rales.   Musculoskeletal:   t-spine is without scoliosis.  No midline tenderness on palpation.  Moderate spasm on right rhomboid, pain along medial scapular border.   Neurological: He is alert and oriented to person, place, and time.   Skin: He is not diaphoretic.     X-ray shows moderate to advanced ddd at the upper thoracic spine    ASSESSMENT/PLAN:    ICD-10-CM    1. Pain of rhomboid muscle M79.1 AMB CONSULT TO PHYSICAL THERAPY      amitriptyline (ELAVIL) 25 mg tablet        Plan:  I really would like him to do physical therapy.  He says moving his arms flares it and he is a bit afraid to do this.  Given the significant sleep troubles he has, will try Elavil at 25 mg at bedtime.  Follow up as needed.      Ha Keating MD ....................  2/23/2017   11:00 AM

## 2018-01-03 NOTE — PROGRESS NOTES
"Patient Information     Patient Name MRN Sex Modesto Salcido [id number] Male 1955      Progress Notes by Aries Quiros PT at 3/15/2017  8:12 AM     Author:  Aries Quiros PT Service:  (none) Author Type:  PT- Physical Therapist     Filed:  3/15/2017  8:49 AM Date of Service:  3/15/2017  8:12 AM Status:  Signed     :  Aries Quiros PT (PT- Physical Therapist)            [facility]  Outpatient PT - Daily Note    Date of Service: 3/15/2017     Visit 6/10    Patient Name: Modesto Lehman   YOB: 1955   Referring MD/Provider: Dr. Keating  Medical and Treatment Diagnosis: Pain of rhomboid muscle   PT Treatment Diagnosis: Pain, muscle tightness, thoracic segmental dysfunction postural dysfunction  Insurance: [employer]  Start of Service: 17  Certification Dates: Start of Service: 17  Medicare/[place] Re-Cert Due: 17           Subjective        Patient reports he has noted some improvement in symptoms.          Rates pain: pain at rest 4/10.  Pain increases to \"way past 10\".    Describes pain: sharp, achy, throbbing   Locates pain: right rhomboid     Current functional limitations: sleeping, reaching, shoveling, carrying wood, \"everything is limited\"    Prior Level:  Minimal to no difficulty completing the above functional activities.           Objective        Today's Intervention:      Exercise:  -sidelying reach and roll     STM/MFR x 25 minutes to the right and left rhomboid, bilateral upper trapezius and levator scapulae.      IFC x 15 minutes, cross pattern of electrodes to the bilateral rhomboid region.  Intensity = 7 to 9      Home Exercise Program:            Assessment    Therapist Assessment / Clinical Impression:  Signs and symptoms consistent with rhomboid muscle strain and thoracic segmental dysfunction.  The patient is appropriate for physical therapy to address their pain, functional limitations, and physical impairments.      Functional " Impairment(s): See subjective on initial evaluation and Functional Assessment / Summary Report from [facility].    Physical Impairment(s):  Pain, muscle tightness, thoracic segmental dysfunction postural dysfunction    Patient Specific Functional and Pain Scales (PSFS):    Clinician Instructions: Complete after the history and before the exam.    Initial Assessment: We want to know what 3 activities in your life you are unable to perform, or are having the most difficulty performing, as a result of your chief problem. Please list and score at least 3 activities that you are unable to perform, or having the most difficulty performing, because of your chief problem.   Patient Specific Activity Scoring Scheme (score one number for each activity):   Activity Score (0-10)  0= Unable to perform activity  10= Able to perform activity at same level as before injury or problem   1. Sleeping  1/10   2.  Carrying wood for the fire place 0/10   3. Sweeping  0/10   4.     5.     Totals:  1/30 = 3% ability which relates to 97% impairment    Patient verbally states that they understand that the information they have provided above is current and complete to the best of their knowledge.    Patient Specific Functional Scale Modifier Scale Conversion: (patient's modifier that correlates with pt's score on PSFS): 1-CM (90% Impaired).      G codes and Modifier taken from pt completing a PSFS: completed at initial evaluation   Initial Primary G Code and Modifier:    Per the Patient's intake and/or assessment the Primary G Code is: Mobility .   The Patient's Impairment, Limitation or Restriction Modifier would be best described as: CM - 80% - 100% Impairment.     Goal Primary G Code and Modifier:    The Patient's G Code Goal would be: Mobility    The Patient's Impairment, Limitation or Restriction Modifier goal would be best described as: CK - 40% - 60% Impairment.         Goals:  Functional Short Term Goals (4 weeks):   Repot 50%  reduction in mid thoracic pain at rest  Report 25% improvement in sleep at night  Report ability to carry a small load of wood to his fireplace with 50% less pain.  Report 25% decrease in pain with sweeping    Functional Long Term Goals (8 weeks):   Develop independent management.  Repot 75% or greater reduction in mid thoracic pain at rest  Report 75% or greater improvement in sleep at night  Report ability to carry a small load of wood to his fireplace with 75% less pain.  Report 75% or greater decrease in pain with sweeping      Patient participated in goal selection and understand(s) the plan of care: Yes   Patient Potential for Achieving Desired Outcome: Fair      Response to Intervention:  Good response to treatment.            Plan    Treatment Plan:      Frequency:   16 visits    Duration of Treatment: 8 weeks    Planned Interventions:    Home Exercise Program development  Therapeutic Exercise (ROM & Strengthening)  Therapeutic Activities  Neuromuscular Re-education  Manual Therapy  Ultrasound  E-Stim  Gait Training  Hot Packs / Cold Pack Modalities  Vasopneumatic Compression with Cold Therapy ( Game Ready )      Plan for next visit:  Progress exercises as symptoms allow.  Manual therapy and modalities as indicated.

## 2018-01-03 NOTE — PROGRESS NOTES
"Patient Information     Patient Name MRN Sex Modesto Salcido [id number] Male 1955      Progress Notes by Aries Quiros PT at 2017  9:49 AM     Author:  Aries Quiros PT Service:  (none) Author Type:  PT- Physical Therapist     Filed:  2017 10:18 AM Date of Service:  2017  9:49 AM Status:  Signed     :  Aries Quiros PT (PT- Physical Therapist)            [facility]  Outpatient PT - Daily Note    Date of Service: 2017     Visit 2/10    Patient Name: Modesto Lehman   YOB: 1955   Referring MD/Provider: Dr. Keating  Medical and Treatment Diagnosis: Pain of rhomboid muscle   PT Treatment Diagnosis: Pain, muscle tightness, thoracic segmental dysfunction postural dysfunction  Insurance: [employer]  Start of Service: 17  Certification Dates: Start of Service: 17  Medicare/MA Re-Cert Due: 17         Subjective      Patient reports benefit of last treatment session.  States pain was \"more mellow\" this past weekend.        Rates pain: pain at rest 4/10.  Pain increases \"way past 10\".    Describes pain: sharp, achy, throbbing   Locates pain: right rhomboid     Current functional limitations: sleeping, reaching, shoveling, carrying wood, \"everything is limited\"    Prior Level:  Minimal to no difficulty completing the above functional activities.           Objective          Palpation: Pain and muscle tightness noted over the right rhomboid.  Pain over the right T4 region. Decreased thoracic segmental mobility with P/A glide from T2-T6      Today's Intervention:      STM/MFR x 15 minutes to the right rhomboid region     IFC x 15 minutes, cross pattern of electrodes to the bilateral rhomboid region.  Intensity = 7 to 8.4      Home Exercise Program:            Assessment    Therapist Assessment / Clinical Impression:  Signs and symptoms consistent with rhomboid muscle strain and thoracic segmental dysfunction.  The patient is " appropriate for physical therapy to address their pain, functional limitations, and physical impairments.      Functional Impairment(s): See subjective on initial evaluation and Functional Assessment / Summary Report from [facility].    Physical Impairment(s):  Pain, muscle tightness, thoracic segmental dysfunction postural dysfunction    Patient Specific Functional and Pain Scales (PSFS):    Clinician Instructions: Complete after the history and before the exam.    Initial Assessment: We want to know what 3 activities in your life you are unable to perform, or are having the most difficulty performing, as a result of your chief problem. Please list and score at least 3 activities that you are unable to perform, or having the most difficulty performing, because of your chief problem.   Patient Specific Activity Scoring Scheme (score one number for each activity):   Activity Score (0-10)  0= Unable to perform activity  10= Able to perform activity at same level as before injury or problem   1. Sleeping  1/10   2.  Carrying wood for the fire place 0/10   3. Sweeping  0/10   4.     5.     Totals:  1/30 = 3% ability which relates to 97% impairment    Patient verbally states that they understand that the information they have provided above is current and complete to the best of their knowledge.    Patient Specific Functional Scale Modifier Scale Conversion: (patient's modifier that correlates with pt's score on PSFS): 1-CM (90% Impaired).      G codes and Modifier taken from pt completing a PSFS: completed at initial evaluation   Initial Primary G Code and Modifier:    Per the Patient's intake and/or assessment the Primary G Code is: Mobility .   The Patient's Impairment, Limitation or Restriction Modifier would be best described as: CM - 80% - 100% Impairment.     Goal Primary G Code and Modifier:    The Patient's G Code Goal would be: Mobility    The Patient's Impairment, Limitation or Restriction Modifier goal  would be best described as: CK - 40% - 60% Impairment.         Goals:  Functional Short Term Goals (4 weeks):   Repot 50% reduction in mid thoracic pain at rest  Report 25% improvement in sleep at night  Report ability to carry a small load of wood to his fireplace with 50% less pain.  Report 25% decrease in pain with sweeping    Functional Long Term Goals (8 weeks):   Develop independent management.  Repot 75% or greater reduction in mid thoracic pain at rest  Report 75% or greater improvement in sleep at night  Report ability to carry a small load of wood to his fireplace with 75% less pain.  Report 75% or greater decrease in pain with sweeping      Patient participated in goal selection and understand(s) the plan of care: Yes   Patient Potential for Achieving Desired Outcome: Fair      Response to Intervention:  Good response to treatment.            Plan    Treatment Plan:      Frequency:   16 visits    Duration of Treatment: 8 weeks    Planned Interventions:    Home Exercise Program development  Therapeutic Exercise (ROM & Strengthening)  Therapeutic Activities  Neuromuscular Re-education  Manual Therapy  Ultrasound  E-Stim  Gait Training  Hot Packs / Cold Pack Modalities  Vasopneumatic Compression with Cold Therapy ( Game Ready )      Plan for next visit:  Progress exercises as symptoms allow.  Manual therapy and modalities as indicated.

## 2018-01-05 ENCOUNTER — COMMUNICATION - GICH (OUTPATIENT)
Dept: FAMILY MEDICINE | Facility: OTHER | Age: 63
End: 2018-01-05

## 2018-01-05 DIAGNOSIS — M51.379 OTHER INTERVERTEBRAL DISC DEGENERATION, LUMBOSACRAL REGION: ICD-10-CM

## 2018-01-18 ENCOUNTER — COMMUNICATION - GICH (OUTPATIENT)
Dept: FAMILY MEDICINE | Facility: OTHER | Age: 63
End: 2018-01-18

## 2018-01-18 DIAGNOSIS — E78.2 MIXED HYPERLIPIDEMIA: ICD-10-CM

## 2018-01-22 ENCOUNTER — HISTORY (OUTPATIENT)
Dept: FAMILY MEDICINE | Facility: OTHER | Age: 63
End: 2018-01-22

## 2018-01-22 ENCOUNTER — COMMUNICATION - GICH (OUTPATIENT)
Dept: FAMILY MEDICINE | Facility: OTHER | Age: 63
End: 2018-01-22

## 2018-01-22 ENCOUNTER — OFFICE VISIT - GICH (OUTPATIENT)
Dept: FAMILY MEDICINE | Facility: OTHER | Age: 63
End: 2018-01-22

## 2018-01-22 ENCOUNTER — HOSPITAL ENCOUNTER (OUTPATIENT)
Dept: RADIOLOGY | Facility: OTHER | Age: 63
End: 2018-01-22
Attending: FAMILY MEDICINE

## 2018-01-22 ENCOUNTER — AMBULATORY - GICH (OUTPATIENT)
Dept: FAMILY MEDICINE | Facility: OTHER | Age: 63
End: 2018-01-22

## 2018-01-22 DIAGNOSIS — M79.605 PAIN OF LEFT LEG: ICD-10-CM

## 2018-01-26 VITALS
DIASTOLIC BLOOD PRESSURE: 68 MMHG | RESPIRATION RATE: 16 BRPM | WEIGHT: 178.4 LBS | BODY MASS INDEX: 25.6 KG/M2 | SYSTOLIC BLOOD PRESSURE: 126 MMHG

## 2018-01-26 VITALS
BODY MASS INDEX: 26.2 KG/M2 | BODY MASS INDEX: 26.11 KG/M2 | HEIGHT: 70 IN | TEMPERATURE: 97.4 F | DIASTOLIC BLOOD PRESSURE: 80 MMHG | TEMPERATURE: 97.8 F | OXYGEN SATURATION: 96 % | SYSTOLIC BLOOD PRESSURE: 122 MMHG | WEIGHT: 183 LBS | HEART RATE: 80 BPM | SYSTOLIC BLOOD PRESSURE: 130 MMHG | DIASTOLIC BLOOD PRESSURE: 70 MMHG | HEART RATE: 79 BPM | WEIGHT: 182 LBS

## 2018-01-30 ENCOUNTER — DOCUMENTATION ONLY (OUTPATIENT)
Dept: FAMILY MEDICINE | Facility: OTHER | Age: 63
End: 2018-01-30

## 2018-01-30 PROBLEM — Z83.3 FAMILY HISTORY OF DIABETES MELLITUS: Status: ACTIVE | Noted: 2018-01-30

## 2018-01-30 RX ORDER — IBUPROFEN 800 MG/1
800 TABLET, FILM COATED ORAL 3 TIMES DAILY PRN
COMMUNITY
Start: 2018-01-09 | End: 2019-12-05

## 2018-01-30 RX ORDER — CYCLOBENZAPRINE HCL 10 MG
10 TABLET ORAL 3 TIMES DAILY
COMMUNITY
Start: 2016-11-04 | End: 2019-12-05

## 2018-01-30 RX ORDER — SIMVASTATIN 40 MG
40 TABLET ORAL DAILY
COMMUNITY
Start: 2016-11-04 | End: 2018-07-25

## 2018-01-30 RX ORDER — CLOPIDOGREL BISULFATE 75 MG/1
75 TABLET ORAL DAILY
COMMUNITY
Start: 2017-06-25 | End: 2018-08-08

## 2018-02-06 ENCOUNTER — OFFICE VISIT - GICH (OUTPATIENT)
Dept: FAMILY MEDICINE | Facility: OTHER | Age: 63
End: 2018-02-06

## 2018-02-06 ENCOUNTER — HISTORY (OUTPATIENT)
Dept: FAMILY MEDICINE | Facility: OTHER | Age: 63
End: 2018-02-06

## 2018-02-06 ENCOUNTER — HOSPITAL ENCOUNTER (OUTPATIENT)
Dept: RADIOLOGY | Facility: OTHER | Age: 63
End: 2018-02-06
Attending: FAMILY MEDICINE

## 2018-02-06 DIAGNOSIS — M41.9 SCOLIOSIS: ICD-10-CM

## 2018-02-07 ENCOUNTER — HOSPITAL ENCOUNTER (OUTPATIENT)
Dept: RADIOLOGY | Facility: OTHER | Age: 63
End: 2018-02-07
Attending: FAMILY MEDICINE

## 2018-02-07 DIAGNOSIS — M41.9 SCOLIOSIS: ICD-10-CM

## 2018-02-09 VITALS
SYSTOLIC BLOOD PRESSURE: 126 MMHG | DIASTOLIC BLOOD PRESSURE: 80 MMHG | BODY MASS INDEX: 27.77 KG/M2 | WEIGHT: 194 LBS | HEART RATE: 76 BPM | HEIGHT: 70 IN

## 2018-02-09 VITALS
BODY MASS INDEX: 27.72 KG/M2 | RESPIRATION RATE: 16 BRPM | WEIGHT: 193.6 LBS | SYSTOLIC BLOOD PRESSURE: 124 MMHG | DIASTOLIC BLOOD PRESSURE: 70 MMHG

## 2018-02-12 NOTE — TELEPHONE ENCOUNTER
Patient Information     Patient Name MRN Sex Modesto Salcido [id number] Male 1955      Telephone Encounter by Rabia Watson RN at 2018  8:28 AM     Author:  Rabia Watson RN Service:  (none) Author Type:  NURS- Registered Nurse     Filed:  2018  8:32 AM Encounter Date:  2018 Status:  Signed     :  Rabia Watson RN (NURS- Registered Nurse)            ,  Pt is requesting a refill of ibuprofen last filled 2016.  Per last office visit 2017 pt was to use Tylenol as needed with no mention of ibuprofen.  Please advise or sign if appropriate.  Medication beny'd up per request.    Office visit in the past 12 months or per provider note.    Last visit with NANCY BAUER was on: 2017 in [facility]  Next visit with NANCY BAUER is on: No future appointment listed with this provider  Next visit with Family Practice is on: No future appointment listed in this department    Lab test requirements:  Annual creatinine.  CREATININE (mg/dL)    Date Value   2017 0.91       Max refill for 12 months from last office visit or per provider note.

## 2018-02-13 NOTE — NURSING NOTE
Patient Information     Patient Name MRN Sex Modesto Salcido [id number] Male 1955      Nursing Note by Karlie Chong at 2018  2:00 PM     Author:  Karlie Chong Service:  (none) Author Type:  (none)     Filed:  2018  2:14 PM Encounter Date:  2018 Status:  Signed     :  Karlie Chong            Patient has had left leg pain and bruising . Thinks it may be a vein.  Karlie Chong LPN ....................2018  1:55 PM

## 2018-02-13 NOTE — NURSING NOTE
Patient Information     Patient Name MRN Sex Modesto Salcido [id number] Male 1955      Nursing Note by Lucille Person at 2018  3:15 PM     Author:  Lucille Person Service:  (none) Author Type:  (none)     Filed:  2018  3:18 PM Encounter Date:  2018 Status:  Signed     :  Lucille Person            Coming in for f/u on his back, not sleeping for the last two hernández, would like to get a referral for the astrid Person ....................  2018   3:10 PM

## 2018-02-13 NOTE — TELEPHONE ENCOUNTER
Patient Information     Patient Name MRN Sex Modesto Salcido [id number] Male 1955      Telephone Encounter by Karlie Chong at 2018  9:00 AM     Author:  Karlie Chong Service:  (none) Author Type:  (none)     Filed:  2018  9:00 AM Encounter Date:  2018 Status:  Signed     :  Karlie Chong            Gave results .  Karlie Chong LPN ....................2018  9:00 AM
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
## 2018-02-13 NOTE — PROGRESS NOTES
Patient Information     Patient Name MRN Sex Modesto Salcido [id number] Male 1955      Progress Notes by Laurie Campos MD at 2018  2:00 PM     Author:  Laurie Campos MD Service:  (none) Author Type:  Physician     Filed:  2018  5:37 PM Encounter Date:  2018 Status:  Signed     :  Laurie Campos MD (Physician)            SUBJECTIVE:    Modesto Lehman is a 62 y.o. male who presents for left leg pain and bruising.  Has had bilateral bypasses.  Had a clot in an artery in his leg last summer.  Had this removed.  Quit smoking last summer.  Has noticed pain in his left medial knee level.  Also has noted some bruising in his left medial leg.  Doesn't remember hitting hit at all.  No swelling in his leg.      HPI  I personally reviewed medications/allergies/history listed below:      No Known Allergies,   Family History       Problem   Relation Age of Onset     Diabetes  Mother      Heart Disease  Mother      Other  Father      COPD, CD       Alcohol/Drug  Other       No hx of chemical dependency.        Diabetes  Other      Heart Disease  Other      Heart Disease  Brother      Other  Brother      killed by drunk  at age 19.     ,   Current Outpatient Prescriptions on File Prior to Visit       Medication  Sig Dispense Refill     aspirin 81 mg tablet Take 81 mg by mouth once daily.       clopidogrel (PLAVIX) 75 mg tablet Take 75 mg by mouth.       cyclobenzaprine (FLEXERIL) 10 mg tablet Take 1 tablet by mouth 3 times daily. 90 tablet 3     ibuprofen (ADVIL; MOTRIN) 800 mg tablet TAKE 1 TABLET BY MOUTH 3 TIMES DAILY WITH MEALS. 90 tablet 5     simvastatin (ZOCOR) 40 mg tablet TAKE 1 TABLET BY MOUTH ONCE DAILY. 90 tablet 1     No current facility-administered medications on file prior to visit.    ,   Past Medical History:     Diagnosis  Date     Allergy to pain medication     No reported medication allergies       Anesthesia     No  problems with  anaesthesia      Chronic low back pain     pain with bilateral leg and disc injuries.      Former smoker      quite 2 days ago      Hx pneumonia      Hyperlipidemia      Transfusion history     No blood transfusions     ,   Patient Active Problem List     Diagnosis  Code     SPINAL STENOSIS, LUMBAR M48.061     HYPERCHOLESTEROLEMIA E78.00     COLONIC POLYPS, ADENOMATOUS D12.6     DEGENERATIVE DISC DISEASE, LUMBOSACRAL SPINE M51.37     HYPERLIPIDEMIA, MIXED E78.2     TOBACCO USER F17.200     OBESITY E66.9     FH DIABETES Z83.3     PERIPHERAL VASCULAR DISEASE WITH CLAUDICATION I73.89     BACK PAIN M54.9     BAKER'S CYST M71.20     TENDINITIS, ELBOW M65.80     SINUSITIS, ACUTE J01.90     Plantar wart of left foot B07.0     Neural foraminal stenosis of cervical spine M99.81    and   Past Surgical History:      Procedure  Laterality Date     ANESTHESIA ALERT      No  problems with anaesthesia       ANGIOPLASTY      Right leg stenting and bypass, Left also       PREMALIG/BENIGN SKIN LESION EXCISION       removed from chest       WISDOM TEETH EXTRACTION      recently removed       Social History     Social History        Marital status:       Spouse name: N/A     Number of children:  N/A     Years of education:  N/A     Occupational History      Not on file.     Social History Main Topics         Smoking status:   Former Smoker     Packs/day:  0.25     Types:  Cigars     Start date:  11/4/2009     Smokeless tobacco:   Never Used      Comment: quit 6/20/17      Alcohol use   No     Drug use:   Not on file     Sexual activity:   Not on file     Other Topics  Concern     Not on file      Social History Narrative     Self employed as .   with 3 adult children.      No hx of chemical dependency.    Patient is a former smoker.     Alcohol Use - yes occ          REVIEW OF SYSTEMS:  Review of Systems   Respiratory: Negative for cough, shortness of breath and wheezing.    Cardiovascular: Positive for leg  "swelling. Negative for chest pain.   Musculoskeletal: Positive for back pain and neck pain.       OBJECTIVE:  /80 (Cuff Site: Right Arm, Position: Sitting, Cuff Size: Adult Large)  Pulse 76  Ht 1.778 m (5' 10\")  Wt 88 kg (194 lb)  BMI 27.84 kg/m2    EXAM:   Physical Exam   Constitutional: He is well-developed, well-nourished, and in no distress.   HENT:   Head: Normocephalic.   Eyes: Pupils are equal, round, and reactive to light.   Neck: Normal range of motion. Neck supple.   Cardiovascular: Normal rate, regular rhythm and normal heart sounds.    No murmur heard.  Pulmonary/Chest: Effort normal and breath sounds normal. No respiratory distress. He has no wheezes. He has no rales.   Musculoskeletal: He exhibits no edema.   Left lower extremity: Palpable dorsalis pedis and posterior tibialis pulses. He does have a bruise in his medial calf. He does have visible varicosities. There is no warmth. No appreciable edema of leg currently. Homans is negative. He does have scars on his leg from previous surgical procedures.   Psychiatric: Affect normal.   PHQ Depression Screen     Over the last 2 weeks, how often have you been bothered by any of the following problems?  1. Little interest or pleasure in doing things: 0 - Not at all  2. Feeling down, depressed, or hopeless: 0 - Not at all                                                 I personally reviewed results with patient as listed below:  Exam: US VENOUS LOWER EXTREMITY LEFT     History: Left leg pain     Technique: Routine ultrasound of the left lower extremity.     Findings: There is no deep venous thrombosis. Veins are normally compressible along their entire course and no filling defect is seen. Greater saphenous vein is surgically absent.     There is a history of arterial surgery for arterial occlusion.            Impression: No DVT.     Electronically Signed By: Marissa Palmer M.D. on 1/22/2018 3:26 PM    ASSESSMENT/PLAN:    ICD-10-CM    1. Left " leg pain M79.605 CANCELED: US VENOUS LOWER EXTREMITY LEFT      CANCELED: US VENOUS LOWER EXTREMITY LIMITED LEFT        Plan:    1. Venous ultrasound was negative for DVT. He does have a bruise on his medial leg. Suspect that he somehow bumped it to get this bruise, which is probably causing the pain. The bruise has drifted down his leg from where he first noticed it. Recommend symptomatic care at this time. Reassurance given. Follow-up as needed.  Laurie Campos MD

## 2018-02-13 NOTE — PROGRESS NOTES
Patient Information     Patient Name MRN Sex Modesto Salcido [id number] Male 1955      Progress Notes by Darleen Molina at 2018  1:53 PM     Author:  Darleen Molina Service:  (none) Author Type:  Other Clinical Staff     Filed:  2018  1:53 PM Date of Service:  2018  1:53 PM Status:  Signed     :  Darleen Molina (Other Clinical Staff)            Falls Risk Criteria:    Age 65 and older or under age 4        Sensory deficits    Poor vision    Use of ambulatory aides    Impaired judgment    Unable to walk independently    Meets High Risk criteria for falls:  no

## 2018-02-13 NOTE — TELEPHONE ENCOUNTER
Patient Information     Patient Name MRN Sex Modesto Salcido [id number] Male 1955      Telephone Encounter by Rabia Watson RN at 2018  4:47 PM     Author:  Rabia Watson RN Service:  (none) Author Type:  NURS- Registered Nurse     Filed:  2018  4:48 PM Encounter Date:  2018 Status:  Signed     :  Rabia Watson RN (NURS- Registered Nurse)            Statins    Office visit in the past 12 months.    Last visit with NANCY BAUER was on: 2017 in [facility]  Next visit with NANCY BAUER is on: 2018 in [facility]  Next visit with Family Practice is on: 2018 in [facility]    Lab testing requirements:  Lipids annually.  Repeat lipids 6-8 weeks after dosage or drug change.    Last Lipids:  Chol: 188    2016  T    2016  HDL:   40    2016  LDL:  68    2016  LDL DIRECT:  No results found in past 5 years    .    Concommitant use of fibrates and statins-If it is an addition to the medication list, review note and/or discuss with provider.  If already on medication list, refill.    Max refills 12 months from last office visit.

## 2018-06-22 ENCOUNTER — TRANSFERRED RECORDS (OUTPATIENT)
Dept: HEALTH INFORMATION MANAGEMENT | Facility: OTHER | Age: 63
End: 2018-06-22

## 2018-07-24 NOTE — PROGRESS NOTES
Patient Information     Patient Name  Modesto Lehman MRN  [id number] Sex  Male   1955      Letter by Miguelangel Ghosh MD at      Author:  Miguelangel Ghosh MD Service:  (none) Author Type:  (none)    Filed:   Encounter Date:  2017 Status:  (Other)           Modesto Lehman  [address]  [facility] [address]          2017    Dear Mr. Lehman:    Your preoperative labs were normal.  Miguelangel Ghosh MD ....................  2017   7:59 AM     Results for orders placed or performed in visit on 17       BASIC METABOLIC PANEL       Result  Value Ref Range Status    SODIUM 134 133 - 143 mmol/L Final    POTASSIUM 3.8 3.5 - 5.1 mmol/L Final    CHLORIDE 100 98 - 107 mmol/L Final    CO2,TOTAL 25 21 - 31 mmol/L Final    ANION GAP 9 5 - 18                 Final    GLUCOSE 85 70 - 105 mg/dL Final    CALCIUM 9.4 8.6 - 10.3 mg/dL Final    BUN 14 7 - 25 mg/dL Final    CREATININE 0.91 0.70 - 1.30 mg/dL Final    BUN/CREAT RATIO           15                 Final    GFR if African American >60 >60 ml/min/1.73m2 Final    GFR if not African American >60 >60 ml/min/1.73m2 Final   CBC WITH AUTO DIFFERENTIAL       Result  Value Ref Range Status    WHITE BLOOD COUNT         8.2 4.5 - 11.0 thou/cu mm Final    RED BLOOD COUNT           4.92 4.30 - 5.90 mil/cu mm Final    HEMOGLOBIN                15.8 13.5 - 17.5 g/dL Final    HEMATOCRIT                44.9 37.0 - 53.0 % Final    MCV                       91 80 - 100 fL Final    MCH                       32.1 26.0 - 34.0 pg Final    MCHC                      35.2 32.0 - 36.0 g/dL Final    RDW                       13.3 11.5 - 15.5 % Final    PLATELET COUNT            203 140 - 440 thou/cu mm Final    MPV                       9.2 6.5 - 11.0 fL Final    NEUTROPHILS               61.2 42.0 - 72.0 % Final    LYMPHOCYTES               26.0 20.0 - 44.0 % Final    MONOCYTES                 8.2 <12.0 % Final    EOSINOPHILS               3.3 <8.0 % Final     BASOPHILS                 0.9 <3.0 % Final    IMMATURE GRANULOCYTES(METAS,MYELOS,PROS) 0.4 % Final    ABSOLUTE NEUTROPHILS      5.0 1.7 - 7.0 thou/cu mm Final    ABSOLUTE LYMPHOCYTES      2.1 0.9 - 2.9 thou/cu mm Final    ABSOLUTE MONOCYTES        0.7 <0.9 thou/cu mm Final    ABSOLUTE EOSINOPHILS      0.3 <0.5 thou/cu mm Final    ABSOLUTE BASOPHILS        0.1 <0.3 thou/cu mm Final    ABSOLUTE IMMATURE GRANULOCYTES(METAS,MYELOS,PROS) 0.0 <=0.3 thou/cu mm Final

## 2018-07-25 DIAGNOSIS — E78.2 HYPERLIPIDEMIA, MIXED: Primary | ICD-10-CM

## 2018-07-25 NOTE — LETTER
July 27, 2018      Modesto Lehman  [address]   [facility] [address]        Dear Modesto,   This letter is to remind you that you are due for annual labs that relate to your medications.     A LIMITED refill of your requested medicaion has been called into your pharmacy. Additional refills require you to complete this appointment.    Please call the clinic at [phone] to schedule your lab only appointment.    If you should require additional refills before your scheduled appointment, please contact your pharmacy and we will refill your medication until the date of your appointment.      Thank you for choosing [facility] and [facility] for your health care needs.    Sincerely,    Refill RN  [facility]          Sincerely,        Ha Keating MD

## 2018-07-27 RX ORDER — SIMVASTATIN 40 MG
TABLET ORAL
Qty: 90 TABLET | Refills: 0 | Status: SHIPPED | OUTPATIENT
Start: 2018-07-27 | End: 2018-08-08

## 2018-07-27 NOTE — TELEPHONE ENCOUNTER
Medication is being filled for 1 time refill only due to:  Future labs ordered : Pt due for lipids.   .Letter sent  Rabia Watson RN.............................7/27/2018 12:37 PM

## 2018-08-08 ENCOUNTER — OFFICE VISIT (OUTPATIENT)
Dept: FAMILY MEDICINE | Facility: OTHER | Age: 63
End: 2018-08-08
Attending: FAMILY MEDICINE
Payer: COMMERCIAL

## 2018-08-08 VITALS
WEIGHT: 194 LBS | BODY MASS INDEX: 27.84 KG/M2 | DIASTOLIC BLOOD PRESSURE: 80 MMHG | SYSTOLIC BLOOD PRESSURE: 120 MMHG | HEART RATE: 76 BPM

## 2018-08-08 DIAGNOSIS — E78.2 HYPERLIPIDEMIA, MIXED: ICD-10-CM

## 2018-08-08 DIAGNOSIS — L23.7 CONTACT DERMATITIS DUE TO POISON IVY: ICD-10-CM

## 2018-08-08 DIAGNOSIS — I73.9 PERIPHERAL VASCULAR DISEASE (H): Primary | ICD-10-CM

## 2018-08-08 LAB
CHOLEST SERPL-MCNC: 141 MG/DL
HDLC SERPL-MCNC: 48 MG/DL (ref 23–92)
LDLC SERPL CALC-MCNC: 74 MG/DL
NONHDLC SERPL-MCNC: 93 MG/DL
TRIGL SERPL-MCNC: 94 MG/DL

## 2018-08-08 PROCEDURE — 36415 COLL VENOUS BLD VENIPUNCTURE: CPT | Performed by: FAMILY MEDICINE

## 2018-08-08 PROCEDURE — 80061 LIPID PANEL: CPT | Performed by: FAMILY MEDICINE

## 2018-08-08 PROCEDURE — 99214 OFFICE O/P EST MOD 30 MIN: CPT | Performed by: FAMILY MEDICINE

## 2018-08-08 RX ORDER — ATORVASTATIN CALCIUM 40 MG/1
40 TABLET, FILM COATED ORAL DAILY
Qty: 90 TABLET | Refills: 3 | Status: SHIPPED | OUTPATIENT
Start: 2018-08-08 | End: 2019-08-18

## 2018-08-08 RX ORDER — TRIAMCINOLONE ACETONIDE 1 MG/G
CREAM TOPICAL
Qty: 30 G | Refills: 3 | Status: SHIPPED | OUTPATIENT
Start: 2018-08-08 | End: 2019-12-05

## 2018-08-08 ASSESSMENT — ENCOUNTER SYMPTOMS
FEVER: 0
BACK PAIN: 1
FATIGUE: 0
SHORTNESS OF BREATH: 0

## 2018-08-08 NOTE — NURSING NOTE
"Chief Complaint   Patient presents with     Derm Problem     rash on legs     Results     labs done today       Initial /80  Pulse 76  Wt 194 lb (88 kg)  BMI 27.84 kg/m2 Estimated body mass index is 27.84 kg/(m^2) as calculated from the following:    Height as of 1/22/18: 5' 10\" (1.778 m).    Weight as of this encounter: 194 lb (88 kg).  Medication Reconciliation: complete    Ashlyn Barber LPN    "

## 2018-08-08 NOTE — MR AVS SNAPSHOT
After Visit Summary   8/8/2018    Modesto Lehman    MRN: [id number]           Patient Information     Date Of Birth          1955        Visit Information        Provider Department      8/8/2018 8:30 AM Ha Keating MD [facility]        Today's Diagnoses     Peripheral vascular disease (H)    -  1    Contact dermatitis due to poison ivy           Follow-ups after your visit        Who to contact     If you have questions or need follow up information about today's clinic visit or your schedule please contact [facility] directly at [phone].  Normal or non-critical lab and imaging results will be communicated to you by MyChart, letter or phone within 4 business days after the clinic has received the results. If you do not hear from us within 7 days, please contact the clinic through MyChart or phone. If you have a critical or abnormal lab result, we will notify you by phone as soon as possible.  Submit refill requests through [employer] or call your pharmacy and they will forward the refill request to us. Please allow 3 business days for your refill to be completed.          Additional Information About Your Visit        Care EveryWhere ID     This is your Care EveryWhere ID. This could be used by other organizations to access your [facility] medical records  [id number]        Your Vitals Were     Pulse BMI (Body Mass Index)                76 27.84 kg/m2           Blood Pressure from Last 3 Encounters:   08/08/18 120/80   02/06/18 124/70   01/22/18 126/80    Weight from Last 3 Encounters:   08/08/18 194 lb (88 kg)   02/06/18 193 lb 9.6 oz (87.8 kg)   01/22/18 194 lb (88 kg)              Today, you had the following     No orders found for display         Today's Medication Changes          These changes are accurate as of 8/8/18 11:59 PM.  If you have any questions, ask your nurse or doctor.               Start taking these medicines.         Dose/Directions    atorvastatin 40 MG tablet   Commonly known as:  LIPITOR   Used for:  Peripheral vascular disease (H)   Started by:  Ha Keating MD        Dose:  40 mg   Take 1 tablet (40 mg) by mouth daily   Quantity:  90 tablet   Refills:  3       triamcinolone 0.1 % cream   Commonly known as:  KENALOG   Used for:  Contact dermatitis due to poison ivy   Started by:  Ha Keating MD        Apply sparingly to affected area three times daily for 14 days.   Quantity:  30 g   Refills:  3         These medicines have changed or have updated prescriptions.        Dose/Directions    ibuprofen 800 MG tablet   Commonly known as:  ADVIL/MOTRIN   This may have changed:  Another medication with the same name was removed. Continue taking this medication, and follow the directions you see here.   Changed by:  Ha Keating MD        Dose:  800 mg   Take 800 mg by mouth 3 times daily as needed TAKE 1 TABLET BY MOUTH 3 TIMES DAILY WITH MEALS.   Refills:  0         Stop taking these medicines if you haven't already. Please contact your care team if you have questions.     clopidogrel 75 MG tablet   Commonly known as:  PLAVIX   Stopped by:  Ha Keating MD           simvastatin 40 MG tablet   Commonly known as:  ZOCOR   Stopped by:  Ha Keating MD                Where to get your medicines      These medications were sent to [facility] - [place], [place] - [address]  [address], [facility] [address]     Phone:  [phone]     atorvastatin 40 MG tablet    triamcinolone 0.1 % cream                Primary Care Provider Office Phone # Fax #    Ha Keating [phone]       [address] [facility] [address]        Equal Access to Services     [facility]: Hadii linda lee hadasho Soezequiel, waaxda luqadaha, qaybta kaalmacoy don, oj kinsey. So [facility] [phone].    ATENCIÓN: song Cedeno thompson  disposición servicios gratuitos de asistencia lingüística. Tejas zaragoza [phone].    We comply with applicable federal civil rights laws and [place] laws. We do not discriminate on the basis of race, color, national origin, age, disability, sex, sexual orientation, or gender identity.            Thank you!     Thank you for choosing [facility] AND [facility]  for your care. Our goal is always to provide you with excellent care. Hearing back from our patients is one way we can continue to improve our services. Please take a few minutes to complete the written survey that you may receive in the mail after your visit with us. Thank you!             Your Updated Medication List - Protect others around you: Learn how to safely use, store and throw away your medicines at www.disposemymeds.org.          This list is accurate as of 8/8/18 11:59 PM.  Always use your most recent med list.                   Brand Name Dispense Instructions for use Diagnosis    aspirin 81 MG tablet      Take 81 mg by mouth daily Take 81 mg by mouth once daily.        atorvastatin 40 MG tablet    LIPITOR    90 tablet    Take 1 tablet (40 mg) by mouth daily    Peripheral vascular disease (H)       cyclobenzaprine 10 MG tablet    FLEXERIL     Take 10 mg by mouth 3 times daily Take 1 tablet by mouth 3 times daily        ibuprofen 800 MG tablet    ADVIL/MOTRIN     Take 800 mg by mouth 3 times daily as needed TAKE 1 TABLET BY MOUTH 3 TIMES DAILY WITH MEALS.        triamcinolone 0.1 % cream    KENALOG    30 g    Apply sparingly to affected area three times daily for 14 days.    Contact dermatitis due to poison ivy

## 2018-08-08 NOTE — NURSING NOTE
Patient comes in to the clinic today to evaluate a rash on his legs and to follow up on his labs done this morning.

## 2018-12-28 ENCOUNTER — TRANSFERRED RECORDS (OUTPATIENT)
Dept: HEALTH INFORMATION MANAGEMENT | Facility: OTHER | Age: 63
End: 2018-12-28

## 2019-04-19 ENCOUNTER — OFFICE VISIT (OUTPATIENT)
Dept: FAMILY MEDICINE | Facility: OTHER | Age: 64
End: 2019-04-19
Attending: NURSE PRACTITIONER
Payer: COMMERCIAL

## 2019-04-19 ENCOUNTER — NURSE TRIAGE (OUTPATIENT)
Dept: FAMILY MEDICINE | Facility: OTHER | Age: 64
End: 2019-04-19

## 2019-04-19 VITALS
BODY MASS INDEX: 28.73 KG/M2 | SYSTOLIC BLOOD PRESSURE: 130 MMHG | RESPIRATION RATE: 22 BRPM | DIASTOLIC BLOOD PRESSURE: 80 MMHG | HEART RATE: 76 BPM | TEMPERATURE: 96.9 F | HEIGHT: 69 IN | WEIGHT: 194 LBS
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
## 2019-04-19 PROCEDURE — 99213 OFFICE O/P EST LOW 20 MIN: CPT | Performed by: NURSE PRACTITIONER

## 2019-04-19 PROCEDURE — G0463 HOSPITAL OUTPT CLINIC VISIT: HCPCS | Performed by: NURSE PRACTITIONER

## 2019-04-19 RX ORDER — DOXYCYCLINE HYCLATE 100 MG
200 TABLET ORAL DAILY
Qty: 2 TABLET | Refills: 0 | Status: SHIPPED | OUTPATIENT
Start: 2019-04-19 | End: 2019-04-20

## 2019-04-19 ASSESSMENT — PAIN SCALES - GENERAL: PAINLEVEL: SEVERE PAIN (6)

## 2019-04-19 ASSESSMENT — MIFFLIN-ST. JEOR: SCORE: 1667.42

## 2019-04-19 NOTE — NURSING NOTE
"Chief Complaint   Patient presents with     Insect Bites     left side of torso       Initial /80 (BP Location: Right arm, Patient Position: Sitting, Cuff Size: Adult Large)   Pulse 76   Temp 96.9  F (36.1  C) (Tympanic)   Resp 22   Ht 1.74 m (5' 8.5\")   Wt 88 kg (194 lb)   BMI 29.07 kg/m   Estimated body mass index is 29.07 kg/m  as calculated from the following:    Height as of this encounter: 1.74 m (5' 8.5\").    Weight as of this encounter: 88 kg (194 lb).  Medication Reconciliation: complete    Karlie Chong LPN  "

## 2019-04-19 NOTE — TELEPHONE ENCOUNTER
Pt c/o tick bite-embedded head    Pt noticed tick on left side of rib cage.  Attempted to remove this morning with a tweezers but states body of tick  from the head and pt states head is still embedded despite best efforts to remove with alcohol and tweezers.  Pt believes that tick is a deer tick by small size and color. Unsure of how long tick was attached.    Noted in [facility]-pt administered Td/Tdap 11/4/2016    Pt states that tick bite is encompassed by reddened area approx the size of a quarter and is sore to touch.  Pt denies headache, fever, red streaks at this time.    As there are no available appts in clinic at this time, advised pt to present to  for assessment. Pt verbalized understanding and in agreement with plan.    Bindu Porter RN  ....................  4/19/2019   12:52 PM      Reason for Disposition    Can't remove tick's head that was broken off in the skin (after trying Care Advice)    Protocols used: TICK BITE-ADULT-AH

## 2019-06-28 ENCOUNTER — TRANSFERRED RECORDS (OUTPATIENT)
Dept: HEALTH INFORMATION MANAGEMENT | Facility: OTHER | Age: 64
End: 2019-06-28

## 2019-08-18 DIAGNOSIS — I73.9 PERIPHERAL VASCULAR DISEASE (H): ICD-10-CM

## 2019-08-18 NOTE — LETTER
August 21, 2019      Modesto Lehman  [address]   [facility] [address]        A refill request was received from your pharmacy for atorvastatin.    Additional refills require an office visit with Dr. Keating for annual review and labs.    Please call [phone] to schedule appointment.      Sincerely,      Refill Nurse

## 2019-08-21 RX ORDER — ATORVASTATIN CALCIUM 40 MG/1
TABLET, FILM COATED ORAL
Qty: 90 TABLET | Refills: 0 | Status: SHIPPED | OUTPATIENT
Start: 2019-08-21 | End: 2019-12-05

## 2019-08-21 NOTE — TELEPHONE ENCOUNTER
Prescription approved per [facility].  LOV and labs 8/8/18    Patient due for annual review and labs.    Letter sent.    Raisa Rinaldi RN on 8/21/2019 at 2:32 PM

## 2019-12-05 ENCOUNTER — OFFICE VISIT (OUTPATIENT)
Dept: FAMILY MEDICINE | Facility: OTHER | Age: 64
End: 2019-12-05
Attending: FAMILY MEDICINE
Payer: COMMERCIAL

## 2019-12-05 VITALS
HEART RATE: 80 BPM | BODY MASS INDEX: 28.64 KG/M2 | SYSTOLIC BLOOD PRESSURE: 138 MMHG | DIASTOLIC BLOOD PRESSURE: 68 MMHG | HEIGHT: 69 IN | WEIGHT: 193.4 LBS | RESPIRATION RATE: 20 BRPM | TEMPERATURE: 97.5 F

## 2019-12-05 DIAGNOSIS — E78.00 HYPERCHOLESTEROLEMIA: Primary | ICD-10-CM

## 2019-12-05 DIAGNOSIS — Z12.5 SCREENING FOR PROSTATE CANCER: ICD-10-CM

## 2019-12-05 DIAGNOSIS — M48.061 SPINAL STENOSIS OF LUMBAR REGION WITHOUT NEUROGENIC CLAUDICATION: ICD-10-CM

## 2019-12-05 DIAGNOSIS — I73.9 PERIPHERAL VASCULAR DISEASE (H): ICD-10-CM

## 2019-12-05 DIAGNOSIS — M51.379 DEGENERATION OF LUMBAR OR LUMBOSACRAL INTERVERTEBRAL DISC: ICD-10-CM

## 2019-12-05 LAB
ANION GAP SERPL CALCULATED.3IONS-SCNC: 6 MMOL/L (ref 3–14)
BUN SERPL-MCNC: 15 MG/DL (ref 7–25)
CALCIUM SERPL-MCNC: 8.9 MG/DL (ref 8.6–10.3)
CHLORIDE SERPL-SCNC: 102 MMOL/L (ref 98–107)
CHOLEST SERPL-MCNC: 120 MG/DL
CO2 SERPL-SCNC: 29 MMOL/L (ref 21–31)
CREAT SERPL-MCNC: 0.94 MG/DL (ref 0.7–1.3)
GFR SERPL CREATININE-BSD FRML MDRD: 81 ML/MIN/{1.73_M2}
GLUCOSE SERPL-MCNC: 123 MG/DL (ref 70–105)
HDLC SERPL-MCNC: 42 MG/DL (ref 23–92)
LDLC SERPL CALC-MCNC: 54 MG/DL
NONHDLC SERPL-MCNC: 78 MG/DL
POTASSIUM SERPL-SCNC: 3.7 MMOL/L (ref 3.5–5.1)
PSA SERPL-ACNC: 1.26 NG/ML
SODIUM SERPL-SCNC: 137 MMOL/L (ref 134–144)
TRIGL SERPL-MCNC: 118 MG/DL

## 2019-12-05 PROCEDURE — G0103 PSA SCREENING: HCPCS | Mod: ZL | Performed by: FAMILY MEDICINE

## 2019-12-05 PROCEDURE — 80048 BASIC METABOLIC PNL TOTAL CA: CPT | Mod: ZL | Performed by: FAMILY MEDICINE

## 2019-12-05 PROCEDURE — 99214 OFFICE O/P EST MOD 30 MIN: CPT | Performed by: FAMILY MEDICINE

## 2019-12-05 PROCEDURE — 80061 LIPID PANEL: CPT | Mod: ZL | Performed by: FAMILY MEDICINE

## 2019-12-05 PROCEDURE — G0463 HOSPITAL OUTPT CLINIC VISIT: HCPCS

## 2019-12-05 PROCEDURE — 36415 COLL VENOUS BLD VENIPUNCTURE: CPT | Mod: ZL | Performed by: FAMILY MEDICINE

## 2019-12-05 RX ORDER — ATORVASTATIN CALCIUM 40 MG/1
40 TABLET, FILM COATED ORAL DAILY
Qty: 90 TABLET | Refills: 3 | Status: SHIPPED | OUTPATIENT
Start: 2019-12-05 | End: 2020-12-10

## 2019-12-05 RX ORDER — IBUPROFEN 800 MG/1
800 TABLET, FILM COATED ORAL 3 TIMES DAILY PRN
Qty: 100 TABLET | Refills: 5 | Status: SHIPPED | OUTPATIENT
Start: 2019-12-05 | End: 2023-02-27

## 2019-12-05 RX ORDER — CYCLOBENZAPRINE HCL 10 MG
10 TABLET ORAL 3 TIMES DAILY PRN
Qty: 30 TABLET | Refills: 1 | Status: SHIPPED | OUTPATIENT
Start: 2019-12-05 | End: 2023-02-27

## 2019-12-05 ASSESSMENT — ANXIETY QUESTIONNAIRES
5. BEING SO RESTLESS THAT IT IS HARD TO SIT STILL: NOT AT ALL
7. FEELING AFRAID AS IF SOMETHING AWFUL MIGHT HAPPEN: NOT AT ALL
1. FEELING NERVOUS, ANXIOUS, OR ON EDGE: NOT AT ALL
3. WORRYING TOO MUCH ABOUT DIFFERENT THINGS: NOT AT ALL
GAD7 TOTAL SCORE: 0
2. NOT BEING ABLE TO STOP OR CONTROL WORRYING: NOT AT ALL
6. BECOMING EASILY ANNOYED OR IRRITABLE: NOT AT ALL

## 2019-12-05 ASSESSMENT — PAIN SCALES - GENERAL: PAINLEVEL: MILD PAIN (3)

## 2019-12-05 ASSESSMENT — PATIENT HEALTH QUESTIONNAIRE - PHQ9
5. POOR APPETITE OR OVEREATING: NOT AT ALL
SUM OF ALL RESPONSES TO PHQ QUESTIONS 1-9: 0

## 2019-12-05 ASSESSMENT — MIFFLIN-ST. JEOR: SCORE: 1659.7

## 2019-12-05 NOTE — LETTER
December 7, 2019      Modesto Lehman     [facility] [address]        Dear ,    We are writing to inform you of your test results.    Your test results fall within the expected range(s) or remain unchanged from previous results.  Please continue with current treatment plan.    Resulted Orders   PSA Screen GH   Result Value Ref Range    PSA Screen 1.258 <3.100 ng/mL      Comment:      The DXI Access PSAS WHO assay is a two site immunoenzymatic assay. Assay   values obtained with different assay methods cannot be used interchangeably   due to differences in assay methods and reagent specificity.     Lipid Panel   Result Value Ref Range    Cholesterol 120 <200 mg/dL    Triglycerides 118 <150 mg/dL    HDL Cholesterol 42 23 - 92 mg/dL    LDL Cholesterol Calculated 54 <100 mg/dL      Comment:      Desirable:       <100 mg/dl    Non HDL Cholesterol 78 <130 mg/dL   Basic Metabolic Panel   Result Value Ref Range    Sodium 137 134 - 144 mmol/L    Potassium 3.7 3.5 - 5.1 mmol/L    Chloride 102 98 - 107 mmol/L    Carbon Dioxide 29 21 - 31 mmol/L    Anion Gap 6 3 - 14 mmol/L    Glucose 123 (H) 70 - 105 mg/dL    Urea Nitrogen 15 7 - 25 mg/dL    Creatinine 0.94 0.70 - 1.30 mg/dL    GFR Estimate 81 >60 mL/min/[1.73_m2]    GFR Estimate If Black >90 >60 mL/min/[1.73_m2]    Calcium 8.9 8.6 - 10.3 mg/dL       If you have any questions or concerns, please call the clinic at the number listed above.       Sincerely,        Suimt Montanez MD

## 2019-12-06 ASSESSMENT — ANXIETY QUESTIONNAIRES: GAD7 TOTAL SCORE: 0

## 2019-12-07 ASSESSMENT — ENCOUNTER SYMPTOMS
DIFFICULTY URINATING: 0
ARTHRALGIAS: 1
PSYCHIATRIC NEGATIVE: 1
BACK PAIN: 1
FEVER: 0
DIZZINESS: 0
CHILLS: 0
RESPIRATORY NEGATIVE: 1
EYES NEGATIVE: 1
FREQUENCY: 0

## 2019-12-07 NOTE — PROGRESS NOTES
SUBJECTIVE:   Modesto Lehman is a 64 year old male who presents to clinic today for the following health issues: Medication refill    Patient has a history of spinal stenosis and arrives here for medication refill.  Patient also is in need of Lipitor for high cholesterol.  He has been under good control in the past.  Also requests prostate cancer screening.  He is aware that there is a high risk of false positives.  Patient denies any significant weakness going down the lower extremities.  There is no changes in bowel or bladder habits.  Patient is up-to-date on his tetanus but is in need of shingles vaccination.  This was discussed with him.  Currently no other specific concerns.        Patient Active Problem List    Diagnosis Date Noted     Family history of diabetes mellitus 01/30/2018     Priority: Medium     Neural foraminal stenosis of cervical spine 05/24/2016     Priority: Medium     Plantar wart of left foot 04/02/2014     Priority: Medium     Other synovitis and tenosynovitis 08/02/2012     Priority: Medium     Denis's cyst 02/29/2012     Priority: Medium     Peripheral vascular disease (H) 12/05/2011     Priority: Medium     Backache 11/07/2011     Priority: Medium     Spinal stenosis, lumbar 12/21/2010     Priority: Medium     DJD (degenerative joint disease) 12/08/2010     Priority: Medium     Hypercholesterolemia 10/19/2010     Priority: Medium     Benign neoplasm of colon 09/06/2010     Priority: Medium     Tobacco user 09/28/2009     Priority: Medium     Obesity 11/02/2005     Priority: Medium     Degeneration of lumbar or lumbosacral intervertebral disc 12/16/1997     Priority: Medium     Past Medical History:   Diagnosis Date     Allergy status to analgesic agent     No reported medication allergies     Anesthesia of skin     No  problems with anaesthesia     History of recurrent pneumonia     No Comments Provided     Hyperlipidemia     No Comments Provided     Low back pain     pain with  "bilateral leg and disc injuries.     Personal history of nicotine dependence     quite 2 days ago     Personal history of other medical treatment (CODE)     No blood transfusions      Past Surgical History:   Procedure Laterality Date     ANGIOPLASTY      Right leg stenting and bypass, Left also     COLONOSCOPY  08/23/2010    Q 10yrs.     EXTRACTION(S) DENTAL      recently removed     OTHER SURGICAL HISTORY      PTM154,PREMALIG/BENIGN SKIN LESION EXCISION, removed from chest     OTHER SURGICAL HISTORY      208489,ANESTHESIA ALERT,No  problems with anaesthesia     Social History     Social History Narrative    Self employed as .   with 3 adult children.    No hx of chemical dependency.  Patient is a former smoker.   Alcohol Use - yes occ     Current Outpatient Medications   Medication Sig Dispense Refill     aspirin 81 MG tablet Take 81 mg by mouth daily Take 81 mg by mouth once daily.       atorvastatin (LIPITOR) 40 MG tablet Take 1 tablet (40 mg) by mouth daily 90 tablet 3     cyclobenzaprine (FLEXERIL) 10 MG tablet Take 1 tablet (10 mg) by mouth 3 times daily as needed for muscle spasms Take 1 tablet by mouth 3 times daily 30 tablet 1     ibuprofen (ADVIL/MOTRIN) 800 MG tablet Take 1 tablet (800 mg) by mouth 3 times daily as needed TAKE 1 TABLET BY MOUTH 3 TIMES DAILY WITH MEALS. 100 tablet 5     No Known Allergies    Review of Systems   Constitutional: Negative for chills and fever.   Eyes: Negative.    Respiratory: Negative.    Genitourinary: Negative.  Negative for difficulty urinating and frequency.   Musculoskeletal: Positive for arthralgias and back pain.   Neurological: Negative for dizziness.   Psychiatric/Behavioral: Negative.         OBJECTIVE:     /68   Pulse 80   Temp 97.5  F (36.4  C)   Resp 20   Ht 1.74 m (5' 8.5\")   Wt 87.7 kg (193 lb 6.4 oz)   BMI 28.98 kg/m    Body mass index is 28.98 kg/m .  Physical Exam    Diagnostic Test Results:  Results for orders placed or " performed in visit on 12/05/19   PSA Screen      Status: None   Result Value Ref Range    PSA Screen 1.258 <3.100 ng/mL   Lipid Panel     Status: None   Result Value Ref Range    Cholesterol 120 <200 mg/dL    Triglycerides 118 <150 mg/dL    HDL Cholesterol 42 23 - 92 mg/dL    LDL Cholesterol Calculated 54 <100 mg/dL    Non HDL Cholesterol 78 <130 mg/dL   Basic Metabolic Panel     Status: Abnormal   Result Value Ref Range    Sodium 137 134 - 144 mmol/L    Potassium 3.7 3.5 - 5.1 mmol/L    Chloride 102 98 - 107 mmol/L    Carbon Dioxide 29 21 - 31 mmol/L    Anion Gap 6 3 - 14 mmol/L    Glucose 123 (H) 70 - 105 mg/dL    Urea Nitrogen 15 7 - 25 mg/dL    Creatinine 0.94 0.70 - 1.30 mg/dL    GFR Estimate 81 >60 mL/min/[1.73_m2]    GFR Estimate If Black >90 >60 mL/min/[1.73_m2]    Calcium 8.9 8.6 - 10.3 mg/dL       ASSESSMENT/PLAN:             2. Hypercholesterolemia  Refill Lipitor.  Labs are satisfactory.  Continue on Lipitor.  - Basic Metabolic Panel; Future  - Lipid Panel; Future  - Lipid Panel  - Basic Metabolic Panel    3. Spinal stenosis of lumbar region without neurogenic claudication  Medication refilled.  Currently under good control with Advil as needed.  He reports he does not take it on a regular basis.  His renal function is satisfactory  - ibuprofen (ADVIL/MOTRIN) 800 MG tablet; Take 1 tablet (800 mg) by mouth 3 times daily as needed TAKE 1 TABLET BY MOUTH 3 TIMES DAILY WITH MEALS.  Dispense: 100 tablet; Refill: 5    4. Degeneration of lumbar or lumbosacral intervertebral disc  Refill.  Patient indicates muscle spasms occur about 2-3 times a month where he  - cyclobenzaprine (FLEXERIL) 10 MG tablet; Take 1 tablet (10 mg) by mouth 3 times daily as needed for muscle spasms Take 1 tablet by mouth 3 times daily  Dispense: 30 tablet; Refill: 1    5. Screening for prostate cancer  Satisfactory PSA.  Patient declined digital exam.  Labs will be sent.  - PSA Screen ; Future  - PSA Screen       Sumit Montanez,  MD [facility]

## 2020-06-26 ENCOUNTER — OFFICE VISIT (OUTPATIENT)
Dept: FAMILY MEDICINE | Facility: OTHER | Age: 65
End: 2020-06-26
Attending: PHYSICIAN ASSISTANT
Payer: COMMERCIAL

## 2020-06-26 VITALS
BODY MASS INDEX: 29.67 KG/M2 | OXYGEN SATURATION: 98 % | WEIGHT: 198 LBS | HEART RATE: 70 BPM | DIASTOLIC BLOOD PRESSURE: 70 MMHG | RESPIRATION RATE: 16 BRPM | TEMPERATURE: 98.4 F | SYSTOLIC BLOOD PRESSURE: 142 MMHG

## 2020-06-26 DIAGNOSIS — A69.20 LYME DISEASE: Primary | ICD-10-CM

## 2020-06-26 PROCEDURE — 99213 OFFICE O/P EST LOW 20 MIN: CPT | Performed by: PHYSICIAN ASSISTANT

## 2020-06-26 PROCEDURE — G0463 HOSPITAL OUTPT CLINIC VISIT: HCPCS

## 2020-06-26 RX ORDER — DOXYCYCLINE 100 MG/1
100 CAPSULE ORAL 2 TIMES DAILY
Qty: 42 CAPSULE | Refills: 0 | Status: SHIPPED | OUTPATIENT
Start: 2020-06-26 | End: 2020-07-13

## 2020-06-26 ASSESSMENT — PAIN SCALES - GENERAL: PAINLEVEL: MILD PAIN (3)

## 2020-06-26 NOTE — NURSING NOTE
Chief Complaint   Patient presents with     Derm Problem     right arm      Right arm redness. Warm to touch. Was out working in woods. Possible spider or wood tick bite.     Medication Reconciliation: complete    Brisa Chang LPN

## 2020-06-26 NOTE — PROGRESS NOTES
SUBJECTIVE:   Modesto Lehman is a 64 year old male here for evaluation of a possible tick bite as well as swelling erythema and warmth in the upper right arm and lower right abdomen..    HPI  Patient reports 2 to 3 days ago he noticed what appeared to be a boil with surrounding erythema under the elastic waistband in his lower right abdominal quadrant.  Within the last 48 hours he noticed increased right redness and a circular pattern in the lower right quadrant.  Patient states subsequently he noticed a swelling in a circular pattern under his right arm.  He also complains of general malaise, night sweats, and chills.  He denies any fevers or GI upset.  He has had doxycycline for Lyme disease in the past and tolerated the medication well.  No history of liver disease.  Range of motion of his neck is normal.    Allergies:  No Known Allergies    Review of Systems   As above otherwise ROS is unremarkable.     OBJECTIVE:   BP (!) 142/70 (BP Location: Right arm, Patient Position: Sitting, Cuff Size: Adult Regular)   Pulse 70   Temp 98.4  F (36.9  C) (Temporal)   Resp 16   Wt 89.8 kg (198 lb)   SpO2 98%   BMI 29.67 kg/m      Physical Exam  General Appearance: Pleasant, alert, appropriate appearance for age and circumstances, no acute distress  Head: Normocephalic, atraumatic  Eyes: PERRL, EOMI  Lungs: Normal chest wall and respirations. Clear to auscultation, no wheezes, rales, rhonchi, or stridor  Cardiovascular: Regular rate and rhythm. S1 and S2 audible, no murmurs, clicks, rubs, or gallops  Skin: homogenous erythematous patch in a circular pattern under the right arm as well as right lower abdominal quadrant.   Neurologic Exam: CN 2-12 grossly intact.  Normal gait.  Symmetric DTRs, no focal motor or sensory deficits. No tremor.  Psychiatric Exam: Alert and oriented, appropriate affect    ASSESSMENT/PLAN:     1. Lyme disease    - doxycycline 21 day course   - no history of hepatocellular disease  - he has  had this medication in the past without issue   - warned patient to use sun block or long sleeves during therapy     DEEP Lopez  [facility] AND [facility]    This document was prepared using voice generated software.  While every attempt was made for accuracy, grammatical errors may exist.

## 2020-07-03 ENCOUNTER — OFFICE VISIT (OUTPATIENT)
Dept: FAMILY MEDICINE | Facility: OTHER | Age: 65
End: 2020-07-03
Attending: FAMILY MEDICINE
Payer: MEDICARE

## 2020-07-03 ENCOUNTER — TELEPHONE (OUTPATIENT)
Dept: FAMILY MEDICINE | Facility: OTHER | Age: 65
End: 2020-07-03

## 2020-07-03 ENCOUNTER — TELEPHONE (OUTPATIENT)
Dept: FAMILY MEDICINE | Facility: OTHER | Age: 65
End: 2020-07-03
Payer: COMMERCIAL

## 2020-07-03 VITALS
RESPIRATION RATE: 18 BRPM | TEMPERATURE: 97.8 F | BODY MASS INDEX: 28.92 KG/M2 | HEART RATE: 94 BPM | WEIGHT: 195.25 LBS | HEIGHT: 69 IN | OXYGEN SATURATION: 96 % | DIASTOLIC BLOOD PRESSURE: 74 MMHG | SYSTOLIC BLOOD PRESSURE: 134 MMHG

## 2020-07-03 DIAGNOSIS — T78.3XXA ANGIOEDEMA, INITIAL ENCOUNTER: Primary | ICD-10-CM

## 2020-07-03 DIAGNOSIS — A69.20 LYME DISEASE: ICD-10-CM

## 2020-07-03 LAB
ALBUMIN SERPL-MCNC: 4.1 G/DL (ref 3.5–5.7)
ALP SERPL-CCNC: 96 U/L (ref 34–104)
ALT SERPL W P-5'-P-CCNC: 19 U/L (ref 7–52)
ANION GAP SERPL CALCULATED.3IONS-SCNC: 7 MMOL/L (ref 3–14)
AST SERPL W P-5'-P-CCNC: 17 U/L (ref 13–39)
BASOPHILS # BLD AUTO: 0 10E9/L (ref 0–0.2)
BASOPHILS NFR BLD AUTO: 0.3 %
BILIRUB SERPL-MCNC: 0.4 MG/DL (ref 0.3–1)
BUN SERPL-MCNC: 16 MG/DL (ref 7–25)
CALCIUM SERPL-MCNC: 9.5 MG/DL (ref 8.6–10.3)
CHLORIDE SERPL-SCNC: 102 MMOL/L (ref 98–107)
CO2 SERPL-SCNC: 28 MMOL/L (ref 21–31)
CREAT SERPL-MCNC: 0.9 MG/DL (ref 0.7–1.3)
DIFFERENTIAL METHOD BLD: ABNORMAL
EOSINOPHIL # BLD AUTO: 0.2 10E9/L (ref 0–0.7)
EOSINOPHIL NFR BLD AUTO: 1.8 %
ERYTHROCYTE [DISTWIDTH] IN BLOOD BY AUTOMATED COUNT: 13.7 % (ref 10–15)
GFR SERPL CREATININE-BSD FRML MDRD: 86 ML/MIN/{1.73_M2}
GLUCOSE SERPL-MCNC: 88 MG/DL (ref 70–105)
HCT VFR BLD AUTO: 45.1 % (ref 40–53)
HGB BLD-MCNC: 15.1 G/DL (ref 13.3–17.7)
IMM GRANULOCYTES # BLD: 0.1 10E9/L (ref 0–0.4)
IMM GRANULOCYTES NFR BLD: 0.5 %
LYMPHOCYTES # BLD AUTO: 2.7 10E9/L (ref 0.8–5.3)
LYMPHOCYTES NFR BLD AUTO: 23.2 %
MCH RBC QN AUTO: 30.3 PG (ref 26.5–33)
MCHC RBC AUTO-ENTMCNC: 33.5 G/DL (ref 31.5–36.5)
MCV RBC AUTO: 91 FL (ref 78–100)
MONOCYTES # BLD AUTO: 0.5 10E9/L (ref 0–1.3)
MONOCYTES NFR BLD AUTO: 4.6 %
NEUTROPHILS # BLD AUTO: 7.9 10E9/L (ref 1.6–8.3)
NEUTROPHILS NFR BLD AUTO: 69.6 %
PLATELET # BLD AUTO: 295 10E9/L (ref 150–450)
POTASSIUM SERPL-SCNC: 4.3 MMOL/L (ref 3.5–5.1)
PROT SERPL-MCNC: 7.3 G/DL (ref 6.4–8.9)
RBC # BLD AUTO: 4.98 10E12/L (ref 4.4–5.9)
SODIUM SERPL-SCNC: 137 MMOL/L (ref 134–144)
WBC # BLD AUTO: 11.4 10E9/L (ref 4–11)

## 2020-07-03 PROCEDURE — 99213 OFFICE O/P EST LOW 20 MIN: CPT | Performed by: FAMILY MEDICINE

## 2020-07-03 PROCEDURE — 86618 LYME DISEASE ANTIBODY: CPT | Mod: ZL | Performed by: FAMILY MEDICINE

## 2020-07-03 PROCEDURE — 36415 COLL VENOUS BLD VENIPUNCTURE: CPT | Mod: ZL | Performed by: FAMILY MEDICINE

## 2020-07-03 PROCEDURE — G0463 HOSPITAL OUTPT CLINIC VISIT: HCPCS

## 2020-07-03 PROCEDURE — 82785 ASSAY OF IGE: CPT | Mod: ZL | Performed by: FAMILY MEDICINE

## 2020-07-03 PROCEDURE — 86617 LYME DISEASE ANTIBODY: CPT | Mod: ZL | Performed by: FAMILY MEDICINE

## 2020-07-03 PROCEDURE — 85025 COMPLETE CBC W/AUTO DIFF WBC: CPT | Mod: ZL | Performed by: FAMILY MEDICINE

## 2020-07-03 PROCEDURE — 80053 COMPREHEN METABOLIC PANEL: CPT | Mod: ZL | Performed by: FAMILY MEDICINE

## 2020-07-03 RX ORDER — PREDNISONE 20 MG/1
20 TABLET ORAL 2 TIMES DAILY
Qty: 10 TABLET | Refills: 0 | Status: SHIPPED | OUTPATIENT
Start: 2020-07-03 | End: 2020-07-21

## 2020-07-03 ASSESSMENT — PAIN SCALES - GENERAL: PAINLEVEL: MODERATE PAIN (5)

## 2020-07-03 ASSESSMENT — MIFFLIN-ST. JEOR: SCORE: 1668.09

## 2020-07-03 NOTE — TELEPHONE ENCOUNTER
Patient was in 6/26 for allergic reaction - still having symptoms.  Please call to advise.   Francesca Laura on 7/3/2020 at 8:47 AM

## 2020-07-03 NOTE — NURSING NOTE
Patient presents to clinic for follow up with ongoing swelling episodes of bottom lip and right hand.  Medication Reconciliation: complete    Grace Virgen LPN

## 2020-07-03 NOTE — TELEPHONE ENCOUNTER
"Called patient. He describes waxing and waning \"hives\" responsive to benedryl.  I originally saw him for a bug bite on the lower right abdominal quadrant 1 week ago on 6/26/2020 that I thought to be due to a tick.  I put him on a 21-day course of doxycycline due to systemic signs of infection and as he presented with 2 locations of potential erythema migrans, one on the lower right quadrant of his abdomen as well as one in the right upper arm.  He has been taking the doxycycline as prescribed.  He states that the bug bite in the right lower abdominal quadrant has healed well and he has not had any fevers, chills, night sweats, increase in pain or redness of that area.  However, he has now noted what he thinks are hives under his arms bilaterally as well as a large increas in swelling in the left hand which both have since subsided.  Patient states that these hives come and go in about 24 hours as well as the hand swelling.  He describes his hives as red not raised not painful moderately itchy and approximately 4 inches in diameter.    He states he has had some lower lip swelling which is noticeable in the mirror however he denies any difficulty respirations or swelling of the tongue or increased mucus secretions.  He has not had any medication allergies in the past however I wonder if this is a allergy to doxycycline.  Currently advised continuing the doxycycline and monitoring of his symptoms.  Advised patient to report to the rapid clinic over the weekend if any symptoms worsen.  I will check up with him on Monday if I do not hear from him.     DEEP Garcia  Family Medicine  [facility]        "

## 2020-07-03 NOTE — TELEPHONE ENCOUNTER
Spoke to patient.  He saw GIL ADAME on 06/26/2020.  Patient states he is still experiencing hives and when he woke up yesterday morning his right hand was swollen.  Patient states that his night sweats and chills have subsided.  Rhina Erazo LPN 7/3/2020   9:15 AM

## 2020-07-03 NOTE — PROGRESS NOTES
Nursing Notes:   Grace Virgen LPN  7/3/2020  4:34 PM  Signed  Patient presents to clinic for follow up with ongoing swelling episodes of bottom lip and right hand.  Medication Reconciliation: complete    Grace Virgen LPN       SUBJECTIVE:   CC:  Modesto Lehman is a 64 year old male who presents to clinic today for the following health issues:  Lower lip swelling and right hand swelling.      HPI  Modesto Lehman is a 64 year old male who presents for evaluation of lower lip swelling and right hand swelling.    On 6/26 he came in for evaluation of hives, rash.  He was diagnosed with possible hives and lyme disease.  He was started on doxycycline then.  That night he had fever and chills too.      His lower lip swelled up around noontime today. He's a week into taking his medication.   He has taken 50mg of benadryl then and again at 1500.  He isn't coughing or sneezing.        He states he had hives last month when he came in.  He's wondering why these keep occurring.  Not accompanied by lwer respiratory symptoms.      Right wrist swelling occurred yesterday morning with waking up.       No Known Allergies  Current Outpatient Medications   Medication     aspirin 81 MG tablet     atorvastatin (LIPITOR) 40 MG tablet     cyclobenzaprine (FLEXERIL) 10 MG tablet     doxycycline hyclate (VIBRAMYCIN) 100 MG capsule     ibuprofen (ADVIL/MOTRIN) 800 MG tablet     predniSONE (DELTASONE) 20 MG tablet     amoxicillin (AMOXIL) 875 MG tablet     No current facility-administered medications for this visit.       Past Medical History:   Diagnosis Date     Allergy status to analgesic agent     No reported medication allergies     Anesthesia of skin     No  problems with anaesthesia     History of recurrent pneumonia     No Comments Provided     Hyperlipidemia     No Comments Provided     Low back pain     pain with bilateral leg and disc injuries.     Personal history of nicotine dependence     quite 2 days ago      "Personal history of other medical treatment (CODE)     No blood transfusions      Past Surgical History:   Procedure Laterality Date     ANGIOPLASTY      Right leg stenting and bypass, Left also     COLONOSCOPY  08/23/2010    Q 10yrs.     EXTRACTION(S) DENTAL      recently removed     OTHER SURGICAL HISTORY      [id number],PREMALIG/BENIGN SKIN LESION EXCISION, removed from chest     OTHER SURGICAL HISTORY      [id number],ANESTHESIA ALERT,No  problems with anaesthesia     Family History   Problem Relation Age of Onset     Diabetes Mother         Diabetes     Heart Disease Mother         Heart Disease     Other - See Comments Father         COPD, CD     Substance Abuse Other         Alcohol/Drug, No hx of chemical dependency.     Diabetes Other         Diabetes     Heart Disease Other         Heart Disease     Heart Disease Brother         Heart Disease     Other - See Comments Brother         killed by drunk  at age 19.       Review of Systems   Respiratory: Negative.    Musculoskeletal: Positive for arthralgias and joint swelling.        PHQ-2 Score:     PHQ-2 ( 1999 Pf[employer]) 7/3/2020 6/26/2020   Q1: Little interest or pleasure in doing things 0 0   Q2: Feeling down, depressed or hopeless 0 0   PHQ-2 Score 0 0         PHQ-9 SCORE 12/15/2015 11/4/2016 12/5/2019   PHQ-9 Total Score 21 4 0         OBJECTIVE:     /74 (BP Location: Right arm, Patient Position: Sitting, Cuff Size: Adult Regular)   Pulse 94   Temp 97.8  F (36.6  C) (Tympanic)   Resp 18   Ht 1.74 m (5' 8.5\")   Wt 88.6 kg (195 lb 4 oz)   SpO2 96%   BMI 29.26 kg/m    Body mass index is 29.26 kg/m .  Physical Exam  Vitals signs and nursing note reviewed.   Constitutional:       General: He is not in acute distress.  HENT:      Mouth/Throat:      Comments: Angioedema/swelling of lower lip   Musculoskeletal:      Comments: Right wrist swollen, no erythema, mild tenderness   Skin:     Comments: Resolving macules on his arms   Neurological:      Mental " Status: He is alert.          Results for orders placed or performed in visit on 07/03/20   Comprehensive metabolic panel     Status: None   Result Value Ref Range    Sodium 137 134 - 144 mmol/L    Potassium 4.3 3.5 - 5.1 mmol/L    Chloride 102 98 - 107 mmol/L    Carbon Dioxide 28 21 - 31 mmol/L    Anion Gap 7 3 - 14 mmol/L    Glucose 88 70 - 105 mg/dL    Urea Nitrogen 16 7 - 25 mg/dL    Creatinine 0.90 0.70 - 1.30 mg/dL    GFR Estimate 86 >60 mL/min/[1.73_m2]    GFR Estimate If Black >90 >60 mL/min/[1.73_m2]    Calcium 9.5 8.6 - 10.3 mg/dL    Bilirubin Total 0.4 0.3 - 1.0 mg/dL    Albumin 4.1 3.5 - 5.7 g/dL    Protein Total 7.3 6.4 - 8.9 g/dL    Alkaline Phosphatase 96 34 - 104 U/L    ALT 19 7 - 52 U/L    AST 17 13 - 39 U/L   IgE     Status: None   Result Value Ref Range    IGE 56 0 - 114 KIU/L   CBC with platelets differential     Status: Abnormal   Result Value Ref Range    WBC 11.4 (H) 4.0 - 11.0 10e9/L    RBC Count 4.98 4.4 - 5.9 10e12/L    Hemoglobin 15.1 13.3 - 17.7 g/dL    Hematocrit 45.1 40.0 - 53.0 %    MCV 91 78 - 100 fl    MCH 30.3 26.5 - 33.0 pg    MCHC 33.5 31.5 - 36.5 g/dL    RDW 13.7 10.0 - 15.0 %    Platelet Count 295 150 - 450 10e9/L    Diff Method Automated Method     % Neutrophils 69.6 %    % Lymphocytes 23.2 %    % Monocytes 4.6 %    % Eosinophils 1.8 %    % Basophils 0.3 %    % Immature Granulocytes 0.5 %    Absolute Neutrophil 7.9 1.6 - 8.3 10e9/L    Absolute Lymphocytes 2.7 0.8 - 5.3 10e9/L    Absolute Monocytes 0.5 0.0 - 1.3 10e9/L    Absolute Eosinophils 0.2 0.0 - 0.7 10e9/L    Absolute Basophils 0.0 0.0 - 0.2 10e9/L    Abs Immature Granulocytes 0.1 0 - 0.4 10e9/L   Lyme Disease Ab with reflex to WB Serum     Status: Abnormal   Result Value Ref Range    Lyme Disease Antibodies Serum 5.92 (H) 0.00 - 0.89   Lyme Conf IgG and IgM by Immunoblot     Status: Abnormal   Result Value Ref Range    Lyme Confirm IgG by Immunoblot Positive (A) Negative    Lyme Confirm IgM by Immunoblot Positive (A)  "Negative         ASSESSMENT/PLAN:       ICD-10-CM    1. Angioedema, initial encounter  T78.3XXA Lyme Disease Ab with reflex to WB Serum     CBC with platelets differential     IgE     Comprehensive metabolic panel     predniSONE (DELTASONE) 20 MG tablet     Comprehensive metabolic panel     IgE     CBC with platelets differential     Lyme Disease Ab with reflex to WB Serum     Lyme Conf IgG and IgM by Immunoblot   2. Lyme disease  A69.20 Lyme Disease Ab with reflex to WB Serum     Lyme Disease Ab with reflex to WB Serum            PLAN:  1.  I have personally reviewed the labs listed above.   2.  Patient's angioedema likely side effects of doxycycline.  With lymes, will change prescription to amoxicillin.  He will need follow up with his PCP at about 3 weeks into his illness.  2.  Uncertain if earlier episode of \"hives\" was hives vs lyme rash.    3.  For lip swelling/angioedema, start prednisone 20mg bid for 5 days.  If not improving, or any respiratory difficulty, should proceed to the ED for treatment and evaluation.        PAKO PALACIOS MD  [facility]    This note was created using voice recognition software and was screened for errors in transcription.    "

## 2020-07-03 NOTE — PATIENT INSTRUCTIONS
Stop doxycycline.    New antibiotics: amoxicillin     New prescription for this lip swelling: prednisone

## 2020-07-03 NOTE — TELEPHONE ENCOUNTER
I do spoke to the patient.  He states that his lower lip is 5 times larger than normal since I last spoke to him.  Advised him to report to the rapid clinic as soon as possible.  He states he can get here in 20 minutes.  He denies any shortness of breath or difficulty breathing or copious excretions.  I will speak with rapid clinic provider now.    DEEP Garcia  Family Medicine  [facility]

## 2020-07-04 ENCOUNTER — TELEPHONE (OUTPATIENT)
Dept: FAMILY MEDICINE | Facility: OTHER | Age: 65
End: 2020-07-04

## 2020-07-04 DIAGNOSIS — A69.20 LYME DISEASE: Primary | ICD-10-CM

## 2020-07-04 RX ORDER — AMOXICILLIN 875 MG
875 TABLET ORAL 2 TIMES DAILY
Qty: 42 TABLET | Refills: 0 | Status: SHIPPED | OUTPATIENT
Start: 2020-07-04 | End: 2020-07-13

## 2020-07-04 NOTE — TELEPHONE ENCOUNTER
Patient states that he did not receive a prescription for the Amoxicillin that he was told he was being switched to.   Mehreen Araiza LPN on 7/4/2020 at 12:29 PM

## 2020-07-05 LAB — B BURGDOR IGG+IGM SER QL: 5.92 (ref 0–0.89)

## 2020-07-06 ENCOUNTER — TELEPHONE (OUTPATIENT)
Dept: FAMILY MEDICINE | Facility: OTHER | Age: 65
End: 2020-07-06

## 2020-07-06 NOTE — TELEPHONE ENCOUNTER
Prescription was sent. Left message to call back if patient need's antigen else.     Gabrielle Salvador LPN.................. 7/6/2020 9:11 AM

## 2020-07-06 NOTE — TELEPHONE ENCOUNTER
See previous note. Prescription was sent, patient notified.   Gabrielle Salvador LPN.................. 7/6/2020 9:53 AM

## 2020-07-07 LAB
B BURGDOR IGG SER QL IB: POSITIVE
B BURGDOR IGM SER QL IB: POSITIVE
IGE SERPL-ACNC: 56 KIU/L (ref 0–114)

## 2020-07-08 ASSESSMENT — ENCOUNTER SYMPTOMS
JOINT SWELLING: 1
RESPIRATORY NEGATIVE: 1
ARTHRALGIAS: 1

## 2020-07-13 ENCOUNTER — HOSPITAL ENCOUNTER (EMERGENCY)
Facility: OTHER | Age: 65
Discharge: HOME | End: 2020-07-13
Attending: EMERGENCY MEDICINE | Admitting: EMERGENCY MEDICINE
Payer: MEDICARE

## 2020-07-13 ENCOUNTER — NURSE TRIAGE (OUTPATIENT)
Dept: FAMILY MEDICINE | Facility: OTHER | Age: 65
End: 2020-07-13

## 2020-07-13 VITALS
HEART RATE: 88 BPM | OXYGEN SATURATION: 96 % | RESPIRATION RATE: 13 BRPM | DIASTOLIC BLOOD PRESSURE: 89 MMHG | SYSTOLIC BLOOD PRESSURE: 164 MMHG

## 2020-07-13 DIAGNOSIS — T78.2XXA ANAPHYLAXIS, INITIAL ENCOUNTER: Primary | ICD-10-CM

## 2020-07-13 DIAGNOSIS — R22.0 TONGUE SWELLING: ICD-10-CM

## 2020-07-13 DIAGNOSIS — Z88.0 ALLERGY TO AMOXICILLIN: ICD-10-CM

## 2020-07-13 DIAGNOSIS — A69.20 LYME DISEASE: ICD-10-CM

## 2020-07-13 LAB
ANION GAP SERPL CALCULATED.3IONS-SCNC: 8 MMOL/L (ref 3–14)
BASOPHILS # BLD AUTO: 0 10E9/L (ref 0–0.2)
BASOPHILS NFR BLD AUTO: 0.2 %
BUN SERPL-MCNC: 14 MG/DL (ref 7–25)
CALCIUM SERPL-MCNC: 8.8 MG/DL (ref 8.6–10.3)
CHLORIDE SERPL-SCNC: 103 MMOL/L (ref 98–107)
CO2 SERPL-SCNC: 24 MMOL/L (ref 21–31)
CREAT SERPL-MCNC: 0.92 MG/DL (ref 0.7–1.3)
DIFFERENTIAL METHOD BLD: ABNORMAL
EOSINOPHIL # BLD AUTO: 0.2 10E9/L (ref 0–0.7)
EOSINOPHIL NFR BLD AUTO: 1.1 %
ERYTHROCYTE [DISTWIDTH] IN BLOOD BY AUTOMATED COUNT: 14.4 % (ref 10–15)
GFR SERPL CREATININE-BSD FRML MDRD: 83 ML/MIN/{1.73_M2}
GLUCOSE SERPL-MCNC: 106 MG/DL (ref 70–105)
HCT VFR BLD AUTO: 44.7 % (ref 40–53)
HGB BLD-MCNC: 15.4 G/DL (ref 13.3–17.7)
IMM GRANULOCYTES # BLD: 0.1 10E9/L (ref 0–0.4)
IMM GRANULOCYTES NFR BLD: 0.5 %
LYMPHOCYTES # BLD AUTO: 3 10E9/L (ref 0.8–5.3)
LYMPHOCYTES NFR BLD AUTO: 20 %
MCH RBC QN AUTO: 30.5 PG (ref 26.5–33)
MCHC RBC AUTO-ENTMCNC: 34.5 G/DL (ref 31.5–36.5)
MCV RBC AUTO: 89 FL (ref 78–100)
MONOCYTES # BLD AUTO: 0.8 10E9/L (ref 0–1.3)
MONOCYTES NFR BLD AUTO: 5.7 %
NEUTROPHILS # BLD AUTO: 10.7 10E9/L (ref 1.6–8.3)
NEUTROPHILS NFR BLD AUTO: 72.5 %
PLATELET # BLD AUTO: 239 10E9/L (ref 150–450)
POTASSIUM SERPL-SCNC: 3.4 MMOL/L (ref 3.5–5.1)
RBC # BLD AUTO: 5.05 10E12/L (ref 4.4–5.9)
SODIUM SERPL-SCNC: 135 MMOL/L (ref 134–144)
WBC # BLD AUTO: 14.7 10E9/L (ref 4–11)

## 2020-07-13 PROCEDURE — 25800030 ZZH RX IP 258 OP 636: Performed by: EMERGENCY MEDICINE

## 2020-07-13 PROCEDURE — 99291 CRITICAL CARE FIRST HOUR: CPT | Mod: Z6 | Performed by: EMERGENCY MEDICINE

## 2020-07-13 PROCEDURE — 36415 COLL VENOUS BLD VENIPUNCTURE: CPT | Performed by: EMERGENCY MEDICINE

## 2020-07-13 PROCEDURE — 80048 BASIC METABOLIC PNL TOTAL CA: CPT | Performed by: EMERGENCY MEDICINE

## 2020-07-13 PROCEDURE — 96375 TX/PRO/DX INJ NEW DRUG ADDON: CPT | Performed by: EMERGENCY MEDICINE

## 2020-07-13 PROCEDURE — 96374 THER/PROPH/DIAG INJ IV PUSH: CPT | Performed by: EMERGENCY MEDICINE

## 2020-07-13 PROCEDURE — 96372 THER/PROPH/DIAG INJ SC/IM: CPT | Mod: XU | Performed by: EMERGENCY MEDICINE

## 2020-07-13 PROCEDURE — 85025 COMPLETE CBC W/AUTO DIFF WBC: CPT | Performed by: EMERGENCY MEDICINE

## 2020-07-13 PROCEDURE — 25000128 H RX IP 250 OP 636: Performed by: EMERGENCY MEDICINE

## 2020-07-13 PROCEDURE — 99291 CRITICAL CARE FIRST HOUR: CPT | Mod: 25 | Performed by: EMERGENCY MEDICINE

## 2020-07-13 RX ORDER — PREDNISONE 20 MG/1
TABLET ORAL
Qty: 10 TABLET | Refills: 0 | Status: SHIPPED | OUTPATIENT
Start: 2020-07-13 | End: 2020-07-21

## 2020-07-13 RX ORDER — AZITHROMYCIN 500 MG/1
500 TABLET, FILM COATED ORAL DAILY
Qty: 7 TABLET | Refills: 0 | Status: SHIPPED | OUTPATIENT
Start: 2020-07-13 | End: 2020-07-21

## 2020-07-13 RX ORDER — EPINEPHRINE 1 MG/ML
0.3 INJECTION, SOLUTION, CONCENTRATE INTRAVENOUS ONCE
Status: DISCONTINUED | OUTPATIENT
Start: 2020-07-13 | End: 2020-07-13 | Stop reason: CLARIF

## 2020-07-13 RX ORDER — DIPHENHYDRAMINE HYDROCHLORIDE 50 MG/ML
50 INJECTION INTRAMUSCULAR; INTRAVENOUS ONCE
Status: COMPLETED | OUTPATIENT
Start: 2020-07-13 | End: 2020-07-13

## 2020-07-13 RX ORDER — EPINEPHRINE 0.3 MG/.3ML
0.3 INJECTION SUBCUTANEOUS
Qty: 1 EACH | Refills: 0 | Status: SHIPPED | OUTPATIENT
Start: 2020-07-13

## 2020-07-13 RX ORDER — DIPHENHYDRAMINE HCL 50 MG
50 CAPSULE ORAL EVERY 6 HOURS PRN
Qty: 30 CAPSULE | Refills: 0 | Status: SHIPPED | OUTPATIENT
Start: 2020-07-13 | End: 2023-02-27

## 2020-07-13 RX ORDER — EPINEPHRINE 0.3 MG/.3ML
0.3 INJECTION SUBCUTANEOUS
Qty: 2 EACH | Refills: 0 | Status: SHIPPED | OUTPATIENT
Start: 2020-07-13 | End: 2020-07-13

## 2020-07-13 RX ORDER — EPINEPHRINE 1 MG/ML
0.3 INJECTION, SOLUTION, CONCENTRATE INTRAVENOUS ONCE
Status: COMPLETED | OUTPATIENT
Start: 2020-07-13 | End: 2020-07-13

## 2020-07-13 RX ORDER — METHYLPREDNISOLONE SODIUM SUCCINATE 125 MG/2ML
125 INJECTION, POWDER, LYOPHILIZED, FOR SOLUTION INTRAMUSCULAR; INTRAVENOUS ONCE
Status: COMPLETED | OUTPATIENT
Start: 2020-07-13 | End: 2020-07-13

## 2020-07-13 RX ORDER — AZITHROMYCIN 500 MG/5ML
500 INJECTION, POWDER, LYOPHILIZED, FOR SOLUTION INTRAVENOUS ONCE
Status: COMPLETED | OUTPATIENT
Start: 2020-07-13 | End: 2020-07-13

## 2020-07-13 RX ADMIN — SODIUM CHLORIDE 1000 ML: 9 INJECTION, SOLUTION INTRAVENOUS at 09:56

## 2020-07-13 RX ADMIN — EPINEPHRINE 0.3 MG: 1 INJECTION INTRAMUSCULAR; INTRAVENOUS; SUBCUTANEOUS at 09:43

## 2020-07-13 RX ADMIN — DIPHENHYDRAMINE HYDROCHLORIDE 50 MG: 50 INJECTION, SOLUTION INTRAMUSCULAR; INTRAVENOUS at 09:53

## 2020-07-13 RX ADMIN — AZITHROMYCIN MONOHYDRATE 500 MG: 500 INJECTION, POWDER, LYOPHILIZED, FOR SOLUTION INTRAVENOUS at 11:03

## 2020-07-13 RX ADMIN — METHYLPREDNISOLONE SODIUM SUCCINATE 125 MG: 125 INJECTION, POWDER, FOR SOLUTION INTRAMUSCULAR; INTRAVENOUS at 09:53

## 2020-07-13 NOTE — DISCHARGE INSTRUCTIONS
If you start to feeling symptomatic again with shortness of breath, tongue swelling, trouble breathing, or lightheaded, immediately administer your EpiPen per the instructions and call 911.     Take your prednisone and azithromycin as prescribed.     Follow up with your primary care physician this week.

## 2020-07-13 NOTE — ED AVS SNAPSHOT
[facility]  [address]  [place] [address]  Phone:  [phone]  Fax:  [phone]                                    Modesto Lehman   MRN: [id number]    Department:  [facility]   Date of Visit:  7/13/2020           After Visit Summary Signature Page    I have received my discharge instructions, and my questions have been answered. I have discussed any challenges I see with this plan with the nurse or doctor.    ..........................................................................................................................................  Patient/Patient Representative Signature      ..........................................................................................................................................  Patient Representative Print Name and Relationship to Patient    ..................................................               ................................................  Date                                   Time    ..........................................................................................................................................  Reviewed by Signature/Title    ...................................................              ..............................................  Date                                               Time          22EPIC Rev 08/18

## 2020-07-13 NOTE — ED TRIAGE NOTES
Patient presents to the ED with complaints of a medication reaction.  Patient states he was started on doxycycline after having a tick bite which caused hives and oral swelling.  Patient states he was then switched to amoxacillin approximately a week ago.  Patient states he noticed oral swelling beginning at 0600 as well as lymph node swelling.  Patient states swelling is better now

## 2020-07-13 NOTE — ED NOTES
[facility]  Sedation/Analgesia History & Physical    Pt Name: Tanner Singletary  MRN: [id number]  YOB: 1950  Provider Performing Procedure: Lima Major MD  Primary Care Physician: Bret Silveira MD    PRE-PROCEDURE   DNR-CCA/DNR-CC []Yes [x]No  Brief History/Pre-Procedure Diagnosis: Pelvic fluid collection, severe left hip pain          MEDICAL HISTORY  []CAD/Valve  []Liver Disease  []Lung Disease []Diabetes  []Hypertension []Renal Disease  []Additional information:       has a past medical history of BPH (benign prostatic hyperplasia), CAD (coronary artery disease), CVA (cerebral vascular accident) ([facility] [address] FH: heart disease, Hyperlipidemia, Hypertension, and Obesity. SURGICAL HISTORY   has a past surgical history that includes Coronary artery bypass graft; Carotid endarterectomy (2 08 2011); hernia repair; cardiovascular stress test (1 05 2011); transthoracic echocardiogram (12 21 2010); Diagnostic Cardiac Cath Lab Procedure (3 24 2011); Cardiac valve replacement (April 2011); Prostatectomy (N/A, 3/20/2019); Colonoscopy; and vascular surgery.   Additional information:       ALLERGIES   Allergies as of 06/18/2019    (No Known Allergies)     Additional information:       MEDICATIONS   Coumadin Use Last 5 Days [x]No []Yes  Antiplatelet drug therapy use last 5 days  [x]No []Yes  Other anticoagulant use last 5 days  [x]No []Yes    Current Facility-Administered Medications:     0.9 % sodium chloride infusion, , Intravenous, Continuous, Luis Bennett MD, Stopped at 06/18/19 1754    apixaban (ELIQUIS) tablet 5 mg, 5 mg, Oral, BID, Luis Bennett MD    [START ON 6/19/2019] aspirin EC tablet 81 mg, 81 mg, Oral, Daily, Mehran Downs MD    atorvastatin (LIPITOR) tablet 20 mg, 20 mg, Oral, Nightly, Mehran Downs MD    famotidine (PEPCID) tablet 20 mg, 20 mg, Oral, BID, Luis Bennett MD    [START ON 6/19/2019] metoprolol succinate (TOPROL XL) extended release tablet 50 mg, 50 mg, Oral, abx infusing, no needs   Daily, Ceci Anne MD    sodium chloride flush 0.9 % injection 10 mL, 10 mL, Intravenous, 2 times per day, Ceci Anne MD    sodium chloride flush 0.9 % injection 10 mL, 10 mL, Intravenous, PRN, Ceci Anne MD    magnesium hydroxide (MILK OF MAGNESIA) 400 MG/5ML suspension 30 mL, 30 mL, Oral, Daily PRN, Ceci Anne MD    ondansetron [facility]) injection 4 mg, 4 mg, Intravenous, Q6H PRN, Ceci Anne MD    oxyCODONE-acetaminophen (PERCOCET) 5-325 MG per tablet 1 tablet, 1 tablet, Oral, Q6H PRN, Ceci Anne MD    morphine (PF) injection 2 mg, 2 mg, Intravenous, Q4H PRN, Ceci Anne MD, 2 mg at 06/18/19 1703    piperacillin-tazobactam (ZOSYN) 3.375 g in dextrose 5 % 50 mL IVPB extended infusion (mini-bag), 3.375 g, Intravenous, Q8H, Mehran Hoyos MD    vancomycin (VANCOCIN) intermittent dosing (placeholder), , Other, RX Placeholder, Ceci Anne MD    [START ON 6/19/2019] vancomycin (VANCOCIN) 1,750 mg in dextrose 5 % 500 mL IVPB, 15 mg/kg, Intravenous, Q12H, Ceci Anne MD  Prior to Admission medications    Medication Sig Start Date End Date Taking? Authorizing Provider   famotidine (PEPCID) 20 MG tablet Take 1 tablet by mouth 2 times daily 6/14/19  Yes Saniya Mccormick MD   ondansetron [facility]) 4 MG tablet Take 1 tablet by mouth daily as needed for Nausea or Vomiting 6/14/19  Yes Saniya Mccormick MD   naproxen (NAPROSYN) 500 MG tablet Take 1 tablet by mouth 2 times daily  Patient taking differently: Take 500 mg by mouth 2 times daily as needed  6/11/19  Yes Walter Mistry DO   apixaban (ELIQUIS) 5 MG TABS tablet Take 1 tablet by mouth 2 times daily 10/11/18  Yes Niharika Aguilar MD   atorvastatin (LIPITOR) 20 MG tablet TAKE 1 TABLET BY MOUTH DAILY 10/11/18  Yes Niharika Aguilar MD   metoprolol succinate (TOPROL XL) 50 MG extended release tablet TAKE 1 TABLET BY MOUTH EVERY DAY 10/11/18  Yes Niharika Aguilar MD   aspirin 81 MG tablet Take 81 mg by mouth daily.    Yes Historical Provider, MD ferrous sulfate 325 (65 FE) MG tablet Take 1 tablet by mouth daily (with breakfast). 5/20/14  Yes Juan Leong MD   Krill Oil 500 MG CAPS Take  by mouth daily. Yes Historical Provider, MD   Coenzyme Q10 (CO Q 10 PO) Take  by mouth daily. Yes Historical Provider, MD     Additional information:       VITAL SIGNS   Vitals:    06/18/19 2030   BP: 139/69   Pulse: 88   Resp: 20   Temp:    SpO2: 97%       PHYSICAL:   Heart:  [x]Regular rate and rhythm  []Other:    Lungs:  [x]Clear    []Other:    Abdomen: [x]Soft    []Other:    Mental Status: [x]Alert & Oriented  []Other:      PLANNED PROCEDURE   []Biospy []Arteriogram              [x]Drainage   []Mediport Insertion  []Fistulogram []IV access       []Vertebroplasty / Augmentation  []IVC filter []Dialysis catheter []Biliary drainage  []Other: []CAPD Catheter []Nephrostomy Tube / Stent  SEDATION  Planned agent:[x]Midazolam []Meperidine [x]Sublimaze []Dilaudid []Morphine     []Diazepam  []Other:     ASA Classification:  []1 [x]2 []3 []4 []5  Class 1: A normal healthy patient  Class 2: Pt with mild to moderate systemic disease  Class 3: Severe systemic disease or disturbance  Class 4: Severe systemic disorders that are already life threatening. Class 5: Moribund pt with little chances of survival, for more than 24 hours. Mallampati I Airway Classification:   []1 [x]2 []3 []4    [x]Pre-procedure diagnostic studies complete and results available. Comment:    [x]Previous sedation/anesthesia experiences assessed. Comment:    [x]The patient is an appropriate candidate to undergo the planned procedure sedation and anesthesia. (Refer to nursing sedation/analgesia documentation record)  [x]Formulation and discussion of sedation/procedure plan, risks, and expectations with patient and/or responsible adult completed. [x]Patient examined immediately prior to the procedure.  (Refer to nursing sedation/analgesia documentation record)    Yadi Roper MD  Electronically signed 6/18/2019 at 8:31 PM

## 2020-07-13 NOTE — TELEPHONE ENCOUNTER
"Patient calling and states woke up this morning with a swollen tongue.   was on Doxycyline a couple weeks ago for lymes  and after a week of being on it his hands and lips swelled up .   now has been on amoxicillin for about a week now and woke up with tongue swollen mainly right side about 3 times the size as the other.  Patient  took a dose of Benadryl this morning and denies any trouble breathing or swallowing at this time.  Patient advised to present to ED and wife to drive.  Patient verbalized understanding.    Raisa Rinaldi RN on 7/13/2020 at 7:51 AM      Reason for Disposition    All other adults with swollen tongue   (Exception: tongue swelling is a recurrent problem AND NO swelling at present)    Additional Information    Negative: Started suddenly after sting from bee, wasp, or yellow jacket    Negative: Started suddenly after taking a medicine or allergic food (e.g., nuts)    Negative: Wheezing, stridor, hoarseness, or difficulty breathing    Negative: Facial swelling    Negative: Neck swelling    Negative: Can't swallow normal secretions (e.g., drooling or spitting)    Negative: Taking an ACE Inhibitor medication  (e.g., benazepril/LOTENSIN, captopril/CAPOTEN, enalapril/VASOTEC, lisinopril/ZESTRIL)    Negative: Sounds like a life-threatening emergency to the triager    Negative: Followed a tongue injury    Negative: Pain in tongue, mouth, or tooth    Answer Assessment - Initial Assessment Questions  1. ONSET: \"When did the swelling start?\" (e.g., minutes, hours, days)      This morning   2. LOCATION: \"What part of the tongue is swollen?\"      Whole right side of tongue  3. SEVERITY: \"How swollen is it?\"      Triple size  4. CAUSE: \"What do you think is causing the tongue swelling?\" (e.g., hx of angioedema, allergies)      antibiotic  5. RECURRENT SYMPTOM: \"Have you had tongue swelling before?\" If so, ask: \"When was the last time?\" \"What happened that time?\"      About 2 weeks, hands " "last time  6. OTHER SYMPTOMS: \"Do you have any other symptoms?\" (e.g., difficulty breathing, facial swelling)     Lips swelling  7. PREGNANCY: \"Is there any chance you are pregnant?\" \"When was your last menstrual period?\"      n/a    Protocols used: TONGUE SWELLING-A-AH      "

## 2020-07-13 NOTE — ED PROVIDER NOTES
Emergency Department Stabilization Note  : 1955 Age: 64 year old Sex: male MRN: [id number]    Chief Complaint   Patient presents with     Medication Reaction     Oral Swelling       Medical Decision Making / Stabilization Course   64 year old male presenting with severe tongue swelling likely secondary to allergic reaction of amoxicillin.  The amoxicillin was immediately discontinued in his chart and he was administered 0.3 mg IM epinephrine with significant improvement in his sensation according to the patient.  His sats were normal, and he was able to make a high-pitched sound and was able to talk more clearly and not have obvious evidence of impending respiratory obstruction or failure.  He was given Solu-Medrol, Benadryl, fluids as well.  His BMP, CBC within normal limits as expected.  After period of observation of 3 hours, the patient had significant provement in his symptoms and did not require any more epinephrine.  He was never hypotensive in the emergency department.  Approximately 1 hour prior to the discharge, after discussion with pharmacy, I gave him an IV trial of azithromycin as that is third line and although not as effective, it is in a different class than the medication amoxicillin he had a reaction to.  He tolerated this medication well.  He was discharged on a prescription of azithromycin, Benadryl as needed, prednisone, epinephrine as needed.  He was given strict return precautions, and he will return the emergency department if things get worse and he will follow-up with his primary care provider in a couple of days for assessment.  Speaking normally, tongue swelling significantly improved on discharge. Educated on the second reaction risk of anaphylaxis.     Final diagnoses:   Anaphylaxis, initial encounter   Tongue swelling   Allergy to amoxicillin   Lyme disease       Brain Pena MD  2020   Department of Emergency Medicine  [facility] and  Gricelda Salvador is a 64 year old male who presents at  9:42 AM with tongue swelling and shortness of breath since 6:00 this morning, 2 hours prior to arrival.  He was diagnosed with Lyme disease 1 week ago and started on doxycycline, however, he developed hives to that medication.  Therefore, he was switched to amoxicillin yesterday, taking his first dose at 10 PM yesterday.  He woke up at 6 AM with severe tongue swelling, difficulty breathing, and neck discomfort.  He came in a couple of hours later with minimal improvement.  No chest pain, syncope, some nausea but no vomiting.     Additional history limited secondary to the patient's critical illness.     Review of Systems:  ROS unable to be fully completed due to the patient being critically ill.     Objective   BP (!) 164/89   Pulse 88   Resp 13   SpO2 96%     Physical Exam:  General: Awake, alert, in no acute distress.  Head: Normocephalic, atraumatic.  Eyes: Conjugate gaze.  ENT: Moist membranes, significant R sided tongue swelling. Mallampati Score of II, able to phonate properly. No neck swelling, but some anterior LAD. No wheezing.  Chest/Respiratory: Equal chest rise. No respiratory distress.   Cardiovascular: Peripheral pulses present.  Abdominal: Soft, non-distended, non-tender.  Extremities: No obvious deformity.  Neurological: GCS 15, moving all extremities without gross deficit.  Skin: Warm, no rashes, lesions, or bruising.   Psychiatric: Appropriate affect.     Procedures / Critical Care   Procedures    Critical Care Time: I have personally provided 35 minutes of critical care time exclusive of time spent on separately billable procedures, treating other patients, or teaching time. Time includes obtaining history, discussion with patient and/or family, examinations, review of medical records, cardiac monitoring, monitoring for potential decompensation, discussion with consultants (if applicable) and admission/transfer. This  critical care time was performed to assess and manage the high probability of imminent deterioration that could result in shock, multisystem organ failure, and death.          Medical/Surgical History:  Past Medical History:   Diagnosis Date     Allergy status to analgesic agent     No reported medication allergies     Anesthesia of skin     No  problems with anaesthesia     History of recurrent pneumonia     No Comments Provided     Hyperlipidemia     No Comments Provided     Low back pain     pain with bilateral leg and disc injuries.     Personal history of nicotine dependence     quite 2 days ago     Personal history of other medical treatment (CODE)     No blood transfusions     Past Surgical History:   Procedure Laterality Date     ANGIOPLASTY      Right leg stenting and bypass, Left also     COLONOSCOPY  08/23/2010    Q 10yrs.     EXTRACTION(S) DENTAL      recently removed     OTHER SURGICAL HISTORY      [id number],PREMALIG/BENIGN SKIN LESION EXCISION, removed from chest     OTHER SURGICAL HISTORY      208489,ANESTHESIA ALERT,No  problems with anaesthesia       Medications:  No current facility-administered medications for this encounter.      Current Outpatient Medications   Medication     azithromycin (ZITHROMAX) 500 MG tablet     diphenhydrAMINE (BENADRYL) 50 MG capsule     EPINEPHrine (ANY BX GENERIC EQUIV) 0.3 MG/0.3ML injection 2-pack     predniSONE (DELTASONE) 20 MG tablet     aspirin 81 MG tablet     atorvastatin (LIPITOR) 40 MG tablet     cyclobenzaprine (FLEXERIL) 10 MG tablet     ibuprofen (ADVIL/MOTRIN) 800 MG tablet       Allergies:  Amoxicillin    Relevant labs, images, EKGs, Epic and outside hospital (if applicable) charts were reviewed. The findings, diagnosis, plan, and need for follow up were discussed with the patient/family. Nursing notes were reviewed. I have reviewed the Medications, Allergies, Past Medical and Surgical History, and Social History in the Epic System and with family.        Brain Pena MD  07/13/20 2172

## 2020-07-13 NOTE — ED NOTES
PIV abx started, patient informed to notify staff right away if he feels any symptoms of an allergic reaction, patient and wife verbalized understanding

## 2020-07-21 ENCOUNTER — OFFICE VISIT (OUTPATIENT)
Dept: FAMILY MEDICINE | Facility: OTHER | Age: 65
End: 2020-07-21
Attending: FAMILY MEDICINE
Payer: COMMERCIAL

## 2020-07-21 VITALS
WEIGHT: 198.8 LBS | BODY MASS INDEX: 29.79 KG/M2 | RESPIRATION RATE: 16 BRPM | SYSTOLIC BLOOD PRESSURE: 114 MMHG | TEMPERATURE: 97.9 F | DIASTOLIC BLOOD PRESSURE: 60 MMHG | HEART RATE: 68 BPM

## 2020-07-21 DIAGNOSIS — A69.20 LYME DISEASE: Primary | ICD-10-CM

## 2020-07-21 PROCEDURE — 99213 OFFICE O/P EST LOW 20 MIN: CPT | Performed by: FAMILY MEDICINE

## 2020-07-21 PROCEDURE — G0463 HOSPITAL OUTPT CLINIC VISIT: HCPCS

## 2020-07-21 ASSESSMENT — PAIN SCALES - GENERAL: PAINLEVEL: MODERATE PAIN (4)

## 2020-07-21 NOTE — NURSING NOTE
Patient here for tick bite, he was treated 03  1/2  Weeks with antibiotics of which he had a reaction to Amoxicillin and Doxycycline.  Medication Reconciliation: complete.    Mis Mar LPN  7/21/2020 4:09 PM

## 2020-07-22 ASSESSMENT — ENCOUNTER SYMPTOMS
PSYCHIATRIC NEGATIVE: 1
DIZZINESS: 0
EYES NEGATIVE: 1
FEVER: 0
RESPIRATORY NEGATIVE: 1
CHILLS: 0
ACTIVITY CHANGE: 1
FATIGUE: 1

## 2020-07-22 NOTE — PROGRESS NOTES
SUBJECTIVE:   Modesto Lehman is a 64 year old male who presents to clinic today for the following health issues: Follow-up for tick bite    Patient arrives here for follow-up tick bite.  He was seen about 3 to 4 weeks ago because of a tick bite.  Labs are drawn at the time showing positive IgM and IgG.  Was initially started on doxycycline for 7 days but reports side effects.  This was subsequently changed to amoxicillin x1 week and again reported side effects.  Patient had swelling both of the tongue and lips with both medications.  He then was subsequently switched to Zithromax.  His symptoms included a rash.  Tiredness.  Fevers chills.  He reports he is improving.  This is been about 3-1/2 weeks since his first visit.  Patient arrives here for follow-up lab test to confirm the Lyme disease is gone.        Patient Active Problem List    Diagnosis Date Noted     Family history of diabetes mellitus 01/30/2018     Priority: Medium     Neural foraminal stenosis of cervical spine 05/24/2016     Priority: Medium     Plantar wart of left foot 04/02/2014     Priority: Medium     Other synovitis and tenosynovitis 08/02/2012     Priority: Medium     Denis's cyst 02/29/2012     Priority: Medium     Peripheral vascular disease (H) 12/05/2011     Priority: Medium     Backache 11/07/2011     Priority: Medium     Spinal stenosis, lumbar 12/21/2010     Priority: Medium     DJD (degenerative joint disease) 12/08/2010     Priority: Medium     Hypercholesterolemia 10/19/2010     Priority: Medium     Benign neoplasm of colon 09/06/2010     Priority: Medium     Tobacco user 09/28/2009     Priority: Medium     Obesity 11/02/2005     Priority: Medium     Degeneration of lumbar or lumbosacral intervertebral disc 12/16/1997     Priority: Medium     Past Medical History:   Diagnosis Date     Allergy status to analgesic agent     No reported medication allergies     Anesthesia of skin     No  problems with anaesthesia     History of  recurrent pneumonia     No Comments Provided     Hyperlipidemia     No Comments Provided     Low back pain     pain with bilateral leg and disc injuries.     Personal history of nicotine dependence     quite 2 days ago     Personal history of other medical treatment (CODE)     No blood transfusions      Past Surgical History:   Procedure Laterality Date     ANGIOPLASTY      Right leg stenting and bypass, Left also     COLONOSCOPY  08/23/2010    Q 10yrs.     EXTRACTION(S) DENTAL      recently removed     OTHER SURGICAL HISTORY      [id number],PREMALIG/BENIGN SKIN LESION EXCISION, removed from chest     OTHER SURGICAL HISTORY      [id number],ANESTHESIA ALERT,No  problems with anaesthesia     Allergies   Allergen Reactions     Amoxicillin Swelling     Severe tongue and throat swelling noted on ED visit 7/13/2020. Required epinephrine, steroids.     Doxycycline Swelling     Hand swelling  , hives and welts       Review of Systems   Constitutional: Positive for activity change and fatigue. Negative for chills and fever.   HENT: Negative for congestion.    Eyes: Negative.    Respiratory: Negative.    Genitourinary: Negative.    Neurological: Negative for dizziness.   Psychiatric/Behavioral: Negative.         OBJECTIVE:     /60   Pulse 68   Temp 97.9  F (36.6  C)   Resp 16   Wt 90.2 kg (198 lb 12.8 oz)   BMI 29.79 kg/m    Body mass index is 29.79 kg/m .  Physical Exam  Constitutional:       Appearance: Normal appearance.   HENT:      Head: Normocephalic.      Nose: Nose normal.   Cardiovascular:      Rate and Rhythm: Normal rate.   Pulmonary:      Effort: Pulmonary effort is normal.   Musculoskeletal: Normal range of motion.   Neurological:      Mental Status: He is alert.   Psychiatric:         Mood and Affect: Mood normal.         Thought Content: Thought content normal.         Diagnostic Test Results:  none     ASSESSMENT/PLAN:         1. Lyme disease  Follow-up.  I advised the patient is likely too early for  laboratory testing.  Treatment results is more clinical than laboratory.  As long as he is improving we will continue to monitor.  He reports his last dose of Zithromax was yesterday.        Sumit Montanez MD  [facility]

## 2020-07-31 ENCOUNTER — HOSPITAL ENCOUNTER (EMERGENCY)
Facility: OTHER | Age: 65
Discharge: HOME | End: 2020-07-31
Attending: FAMILY MEDICINE
Payer: MEDICARE

## 2020-07-31 VITALS
WEIGHT: 198 LBS | SYSTOLIC BLOOD PRESSURE: 143 MMHG | TEMPERATURE: 98.8 F | HEART RATE: 80 BPM | RESPIRATION RATE: 16 BRPM | OXYGEN SATURATION: 96 % | DIASTOLIC BLOOD PRESSURE: 86 MMHG | BODY MASS INDEX: 29.67 KG/M2

## 2020-07-31 DIAGNOSIS — G51.0 BELL'S PALSY: ICD-10-CM

## 2020-07-31 DIAGNOSIS — A69.20 LYME DISEASE: ICD-10-CM

## 2020-07-31 LAB
ANION GAP SERPL CALCULATED.3IONS-SCNC: 8 MMOL/L (ref 3–14)
BASOPHILS # BLD AUTO: 0 10E9/L (ref 0–0.2)
BASOPHILS NFR BLD AUTO: 0.2 %
BUN SERPL-MCNC: 9 MG/DL (ref 7–25)
CALCIUM SERPL-MCNC: 8.2 MG/DL (ref 8.6–10.3)
CHLORIDE SERPL-SCNC: 109 MMOL/L (ref 98–107)
CO2 SERPL-SCNC: 23 MMOL/L (ref 21–31)
CREAT SERPL-MCNC: 0.8 MG/DL (ref 0.7–1.3)
CRP SERPL-MCNC: 0.7 MG/L
DIFFERENTIAL METHOD BLD: ABNORMAL
EOSINOPHIL # BLD AUTO: 0.1 10E9/L (ref 0–0.7)
EOSINOPHIL NFR BLD AUTO: 0.8 %
ERYTHROCYTE [DISTWIDTH] IN BLOOD BY AUTOMATED COUNT: 14.5 % (ref 10–15)
ERYTHROCYTE [SEDIMENTATION RATE] IN BLOOD BY WESTERGREN METHOD: 25 MM/H (ref 1–10)
GFR SERPL CREATININE-BSD FRML MDRD: >90 ML/MIN/{1.73_M2}
GLUCOSE SERPL-MCNC: 165 MG/DL (ref 70–105)
HCT VFR BLD AUTO: 41.4 % (ref 40–53)
HGB BLD-MCNC: 14.2 G/DL (ref 13.3–17.7)
IMM GRANULOCYTES # BLD: 0.1 10E9/L (ref 0–0.4)
IMM GRANULOCYTES NFR BLD: 0.5 %
LYMPHOCYTES # BLD AUTO: 1 10E9/L (ref 0.8–5.3)
LYMPHOCYTES NFR BLD AUTO: 9 %
MCH RBC QN AUTO: 30.8 PG (ref 26.5–33)
MCHC RBC AUTO-ENTMCNC: 34.3 G/DL (ref 31.5–36.5)
MCV RBC AUTO: 90 FL (ref 78–100)
MONOCYTES # BLD AUTO: 0.2 10E9/L (ref 0–1.3)
MONOCYTES NFR BLD AUTO: 1.7 %
NEUTROPHILS # BLD AUTO: 9.3 10E9/L (ref 1.6–8.3)
NEUTROPHILS NFR BLD AUTO: 87.8 %
PLATELET # BLD AUTO: 250 10E9/L (ref 150–450)
POTASSIUM SERPL-SCNC: 3.4 MMOL/L (ref 3.5–5.1)
RBC # BLD AUTO: 4.61 10E12/L (ref 4.4–5.9)
SODIUM SERPL-SCNC: 140 MMOL/L (ref 134–144)
WBC # BLD AUTO: 10.6 10E9/L (ref 4–11)

## 2020-07-31 PROCEDURE — 85025 COMPLETE CBC W/AUTO DIFF WBC: CPT | Performed by: FAMILY MEDICINE

## 2020-07-31 PROCEDURE — 25000128 H RX IP 250 OP 636: Performed by: FAMILY MEDICINE

## 2020-07-31 PROCEDURE — 86140 C-REACTIVE PROTEIN: CPT | Performed by: FAMILY MEDICINE

## 2020-07-31 PROCEDURE — 99285 EMERGENCY DEPT VISIT HI MDM: CPT | Mod: 25 | Performed by: FAMILY MEDICINE

## 2020-07-31 PROCEDURE — 85652 RBC SED RATE AUTOMATED: CPT | Performed by: FAMILY MEDICINE

## 2020-07-31 PROCEDURE — 36415 COLL VENOUS BLD VENIPUNCTURE: CPT | Performed by: FAMILY MEDICINE

## 2020-07-31 PROCEDURE — 80048 BASIC METABOLIC PNL TOTAL CA: CPT | Performed by: FAMILY MEDICINE

## 2020-07-31 PROCEDURE — 93010 ELECTROCARDIOGRAM REPORT: CPT | Performed by: INTERNAL MEDICINE

## 2020-07-31 PROCEDURE — 96365 THER/PROPH/DIAG IV INF INIT: CPT | Performed by: FAMILY MEDICINE

## 2020-07-31 PROCEDURE — 93005 ELECTROCARDIOGRAM TRACING: CPT | Performed by: FAMILY MEDICINE

## 2020-07-31 PROCEDURE — 96375 TX/PRO/DX INJ NEW DRUG ADDON: CPT | Performed by: FAMILY MEDICINE

## 2020-07-31 PROCEDURE — 96367 TX/PROPH/DG ADDL SEQ IV INF: CPT | Performed by: FAMILY MEDICINE

## 2020-07-31 PROCEDURE — 25800030 ZZH RX IP 258 OP 636: Performed by: FAMILY MEDICINE

## 2020-07-31 PROCEDURE — 99284 EMERGENCY DEPT VISIT MOD MDM: CPT | Mod: Z6 | Performed by: FAMILY MEDICINE

## 2020-07-31 PROCEDURE — 96372 THER/PROPH/DIAG INJ SC/IM: CPT | Mod: XU | Performed by: FAMILY MEDICINE

## 2020-07-31 RX ORDER — METHYLPREDNISOLONE SODIUM SUCCINATE 125 MG/2ML
125 INJECTION, POWDER, LYOPHILIZED, FOR SOLUTION INTRAMUSCULAR; INTRAVENOUS ONCE
Status: COMPLETED | OUTPATIENT
Start: 2020-07-31 | End: 2020-07-31

## 2020-07-31 RX ORDER — SODIUM CHLORIDE 9 MG/ML
INJECTION, SOLUTION INTRAVENOUS CONTINUOUS
Status: DISCONTINUED | OUTPATIENT
Start: 2020-07-31 | End: 2020-07-31 | Stop reason: HOSPADM

## 2020-07-31 RX ORDER — CEFTRIAXONE SODIUM 2 G/50ML
2 INJECTION, SOLUTION INTRAVENOUS ONCE
Status: COMPLETED | OUTPATIENT
Start: 2020-07-31 | End: 2020-07-31

## 2020-07-31 RX ORDER — EPINEPHRINE 1 MG/ML
0.3 INJECTION, SOLUTION, CONCENTRATE INTRAVENOUS ONCE
Status: COMPLETED | OUTPATIENT
Start: 2020-07-31 | End: 2020-07-31

## 2020-07-31 RX ORDER — DIPHENHYDRAMINE HYDROCHLORIDE 50 MG/ML
25 INJECTION INTRAMUSCULAR; INTRAVENOUS ONCE
Status: COMPLETED | OUTPATIENT
Start: 2020-07-31 | End: 2020-07-31

## 2020-07-31 RX ORDER — CEFTRIAXONE SODIUM 2 G/50ML
2 INJECTION, SOLUTION INTRAVENOUS ONCE
Status: CANCELLED | OUTPATIENT
Start: 2020-07-31

## 2020-07-31 RX ADMIN — EPINEPHRINE 0.3 MG: 1 INJECTION INTRAMUSCULAR; INTRAVENOUS; SUBCUTANEOUS at 09:40

## 2020-07-31 RX ADMIN — FAMOTIDINE 20 MG: 20 INJECTION, SOLUTION INTRAVENOUS at 09:40

## 2020-07-31 RX ADMIN — CEFTRIAXONE SODIUM 2 G: 2 INJECTION, SOLUTION INTRAVENOUS at 11:23

## 2020-07-31 RX ADMIN — METHYLPREDNISOLONE SODIUM SUCCINATE 125 MG: 125 INJECTION, POWDER, FOR SOLUTION INTRAMUSCULAR; INTRAVENOUS at 09:40

## 2020-07-31 RX ADMIN — SODIUM CHLORIDE 1000 ML: 9 INJECTION, SOLUTION INTRAVENOUS at 09:40

## 2020-07-31 RX ADMIN — DIPHENHYDRAMINE HYDROCHLORIDE 25 MG: 50 INJECTION, SOLUTION INTRAMUSCULAR; INTRAVENOUS at 10:43

## 2020-07-31 NOTE — ED PROVIDER NOTES
History     Chief Complaint   Patient presents with     Oral Swelling     HPI  Modesto Lehman is a 64 year old male who presents to ER with concern of allergic reaction . Yesterday afternoon patient started to feel slight swelling left side of lower lip  This AM woke up with Tongue swelling , lip swelling, left facial swelling. No difficulty swallowing NO sob. No stridor . Had large hive on stomach day prior .   This is patients 4th occurrence each time thought secondary to antibiotics   Allergies:  Allergies   Allergen Reactions     Amoxicillin Swelling     Severe tongue and throat swelling noted on ED visit 7/13/2020. Required epinephrine, steroids.     Doxycycline Swelling     Hand swelling  , hives and welts       Problem List:    Patient Active Problem List    Diagnosis Date Noted     Family history of diabetes mellitus 01/30/2018     Priority: Medium     Neural foraminal stenosis of cervical spine 05/24/2016     Priority: Medium     Plantar wart of left foot 04/02/2014     Priority: Medium     Other synovitis and tenosynovitis 08/02/2012     Priority: Medium     Denis's cyst 02/29/2012     Priority: Medium     Peripheral vascular disease (H) 12/05/2011     Priority: Medium     Backache 11/07/2011     Priority: Medium     Spinal stenosis, lumbar 12/21/2010     Priority: Medium     DJD (degenerative joint disease) 12/08/2010     Priority: Medium     Hypercholesterolemia 10/19/2010     Priority: Medium     Benign neoplasm of colon 09/06/2010     Priority: Medium     Tobacco user 09/28/2009     Priority: Medium     Obesity 11/02/2005     Priority: Medium     Degeneration of lumbar or lumbosacral intervertebral disc 12/16/1997     Priority: Medium        Past Medical History:    Past Medical History:   Diagnosis Date     Allergy status to analgesic agent      Anesthesia of skin      History of recurrent pneumonia      Hyperlipidemia      Low back pain      Personal history of nicotine dependence      Personal  history of other medical treatment (CODE)        Past Surgical History:    Past Surgical History:   Procedure Laterality Date     ANGIOPLASTY      Right leg stenting and bypass, Left also     COLONOSCOPY  08/23/2010    Q 10yrs.     EXTRACTION(S) DENTAL      recently removed     OTHER SURGICAL HISTORY      [id number],PREMALIG/BENIGN SKIN LESION EXCISION, removed from chest     OTHER SURGICAL HISTORY      [id number],ANESTHESIA ALERT,No  problems with anaesthesia       Family History:    Family History   Problem Relation Age of Onset     Diabetes Mother         Diabetes     Heart Disease Mother         Heart Disease     Other - See Comments Father         COPD, CD     Substance Abuse Other         Alcohol/Drug, No hx of chemical dependency.     Diabetes Other         Diabetes     Heart Disease Other         Heart Disease     Heart Disease Brother         Heart Disease     Other - See Comments Brother         killed by drunk  at age 19.       Social History:  Marital Status:   [2]  Social History     Tobacco Use     Smoking status: Former Smoker     Packs/day: 0.25     Types: Cigars     Start date: 11/4/2009     Smokeless tobacco: Never Used     Tobacco comment: Quit smoking: quit 6/20/17   Substance Use Topics     Alcohol use: Yes     Drug use: Never     Types: Other        Medications:    aspirin 81 MG tablet  atorvastatin (LIPITOR) 40 MG tablet  cyclobenzaprine (FLEXERIL) 10 MG tablet  diphenhydrAMINE (BENADRYL) 50 MG capsule  EPINEPHrine (ANY BX GENERIC EQUIV) 0.3 MG/0.3ML injection 2-pack  ibuprofen (ADVIL/MOTRIN) 800 MG tablet          Review of Systems   HENT: Positive for facial swelling.         Lip swelling , tongue swelling    Eyes: Negative.         Left lid droop    Respiratory: Negative.    Cardiovascular: Negative.    Gastrointestinal: Negative.    Genitourinary: Negative.    Musculoskeletal: Positive for arthralgias.   Neurological: Negative.    Hematological: Negative.    All other systems reviewed  and are negative.      Physical Exam   BP: 136/81  Pulse: 97  Temp: 98.8  F (37.1  C)  Resp: 16  Weight: 89.8 kg (198 lb)  SpO2: 96 %      Physical Exam  Vitals signs and nursing note reviewed.   Constitutional:       Comments: Lip swellling  ,left facial swelling    HENT:      Right Ear: Tympanic membrane normal.      Left Ear: Tympanic membrane normal.      Mouth/Throat:      Comments: Uvula midline no edema  NO obstruction  Eyes:      Pupils: Pupils are equal, round, and reactive to light.   Neck:      Musculoskeletal: Normal range of motion and neck supple.   Cardiovascular:      Rate and Rhythm: Normal rate and regular rhythm.      Pulses: Normal pulses.   Pulmonary:      Effort: Pulmonary effort is normal.   Musculoskeletal: Normal range of motion.   Lymphadenopathy:      Cervical: Cervical adenopathy present.   Skin:     General: Skin is warm and dry.      Findings: No rash.   Neurological:      Mental Status: He is alert.         ED Course        Procedures          Patient presents to ER for evaluation of lip and tongue swelling . Patient triaged to exam room Medical records reviewed History and exam complete . Patient without stridor or other evidence of respiratory distress Peripheral IV inserted, epi , benaddryl , famotidine solumedrol given. Labs and diagnostics ordered. Patient with marked improvement in facial swelling with ER interventions and no signs suggestive of acute anaphylaxis. Patient also noted to have left lid lag. Suspect symptoms not allergic reaction to antibiotcs but likely secondary to lymes disease Patient given 2 grams of Rocephin in ER without significant reaction . Pateint discussed with DR Watson hospitaists. Will arrange outpatient Rocephin over weekend and patient will follow up with his primary care provider next week for further recommendations. Patient discharged from ER in stable condition  (A69.20) Lyme disease  Comment:   Plan: DISCONTINUED: sodium chloride (PF) 0.9% PF          flush 3-20 mL, DISCONTINUED: cefTRIAXone IN D5W        (ROCEPHIN) intermittent infusion 2 g      (G51.0) Bell's palsy                 No results found for this or any previous visit (from the past 24 hour(s)).    Medications   0.9% sodium chloride BOLUS (1,000 mLs Intravenous New Bag 7/31/20 0940)     Followed by   sodium chloride 0.9% infusion (has no administration in time range)   famotidine (PEPCID) infusion 20 mg (20 mg Intravenous New Bag 7/31/20 0940)   methylPREDNISolone sodium succinate (solu-MEDROL) injection 125 mg (125 mg Intravenous Given 7/31/20 0940)   EPINEPHrine PF (ADRENALIN) injection 0.3 mg (0.3 mg Intramuscular Given 7/31/20 0940)   diphenhydrAMINE (BENADRYL) injection 25 mg (25 mg Intravenous Given 7/31/20 1043)       Assessments & Plan (with Medical Decision Making)     I have reviewed the nursing notes.    I have reviewed the findings, diagnosis, plan and need for follow up with the patient.      New Prescriptions    No medications on file       Final diagnoses:   None     (A69.20) Lyme disease  Comment:   Plan: DISCONTINUED: sodium chloride (PF) 0.9% PF         flush 3-20 mL, DISCONTINUED: cefTRIAXone IN D5W        (ROCEPHIN) intermittent infusion 2 g         (G51.0) Bell's palsy  C      7/31/2020   [facility]     Niki Lange MD  08/05/20 3136

## 2020-07-31 NOTE — ED TRIAGE NOTES
Pt here with with family, pt reports lip and cheek swelling, pt reports a similar reaction 2 weeks ago, pt denies any SOB or any trouble with swallowing, VSS, pt brought back into Er to be evaluated, pt took 2 benadryl at 0530, pt reports that they thought his reaction was to amoxicillin and doxcycline in the past, pt was being treated for a deer tick bite

## 2020-07-31 NOTE — ED AVS SNAPSHOT
[facility]  [address]  [place] [address]  Phone:  [phone]  Fax:  [phone]                                    Modesto Lehman   MRN: [id number]    Department:  [facility]   Date of Visit:  7/31/2020           After Visit Summary Signature Page    I have received my discharge instructions, and my questions have been answered. I have discussed any challenges I see with this plan with the nurse or doctor.    ..........................................................................................................................................  Patient/Patient Representative Signature      ..........................................................................................................................................  Patient Representative Print Name and Relationship to Patient    ..................................................               ................................................  Date                                   Time    ..........................................................................................................................................  Reviewed by Signature/Title    ...................................................              ..............................................  Date                                               Time          22EPIC Rev 08/18

## 2020-07-31 NOTE — DISCHARGE INSTRUCTIONS
Come to ER to register for your antibiotic infusions at 230 in the afternoon Saturday and Sunday. Your infusion on Monday will be in the infusion center and you will receive instructions for that on Sunday . Follow up with Dr Dexter Valdivia at your scheduled appointment on Monday

## 2020-08-01 ENCOUNTER — HOSPITAL ENCOUNTER (OUTPATIENT)
Dept: INFUSION THERAPY | Facility: OTHER | Age: 65
Discharge: HOME | End: 2020-08-01
Attending: FAMILY MEDICINE | Admitting: FAMILY MEDICINE
Payer: MEDICARE

## 2020-08-01 VITALS
OXYGEN SATURATION: 96 % | TEMPERATURE: 98 F | RESPIRATION RATE: 20 BRPM | SYSTOLIC BLOOD PRESSURE: 135 MMHG | DIASTOLIC BLOOD PRESSURE: 67 MMHG | HEART RATE: 84 BPM

## 2020-08-01 DIAGNOSIS — A69.20 LYME DISEASE: Primary | ICD-10-CM

## 2020-08-01 PROCEDURE — 25000128 H RX IP 250 OP 636: Performed by: FAMILY MEDICINE

## 2020-08-01 RX ORDER — CEFTRIAXONE SODIUM 2 G/50ML
2 INJECTION, SOLUTION INTRAVENOUS ONCE
Status: COMPLETED | OUTPATIENT
Start: 2020-08-01 | End: 2020-08-01

## 2020-08-01 RX ORDER — CEFTRIAXONE SODIUM 2 G/50ML
2 INJECTION, SOLUTION INTRAVENOUS ONCE
Status: CANCELLED | OUTPATIENT
Start: 2020-08-02

## 2020-08-01 RX ADMIN — CEFTRIAXONE SODIUM 2 G: 2 INJECTION, SOLUTION INTRAVENOUS at 15:39

## 2020-08-01 NOTE — PROGRESS NOTES
Nursing Notes:   Gabrielle Salvador LPN  8/3/2020 10:29 AM  Sign at exiting of workspace  Patient presents to the clinic today for a follow up from the emergency room.  Med rec complete.  Gabrielle Salvador LPN.................. 8/3/2020 10:28 AM      SUBJECTIVE:   CC:  Modesto Lehman is a 64 year old male who presents to clinic today for the following health issues:  ER follow up of lyme disease and allergic responses.    HPI  Modesto Lehman is a 64 year old male who presents for ER follow up of lyme disease and allergic reactions.  He had come in due to possible reaction to his antibiotics.  He was diagnosed with Lyme disease the end of June.  First with the doxycycline and then amoxicillin and zithromax.  Three days after he was off of antibiotics, his face swelled up on the left cheek and lips.  The last time this happened, his lymph nodes in his neck swelled up too.  At other times when he has had lip swelling, these are swollen to the point of causing his lips to split.  When he was on the different oral antibiotics, he would get today 7 with each of them and then have lip and facial swelling.  At one point, the concern for Lyme induced Bell's palsy.  He says he may have a little bit of an eyelid droop but does not notice other motor dysfunction in his face.  With some of his other initial Lyme symptoms, swelling of wrist joints, those have resolved.  He is now on daily Rocephin infusions.  He had 3 full weeks of antibiotics, one week of each one.        Allergies   Allergen Reactions     Amoxicillin Swelling     Severe tongue and throat swelling noted on ED visit 7/13/2020. Required epinephrine, steroids.     Doxycycline Swelling     Hand swelling  , hives and welts     Current Outpatient Medications   Medication     aspirin 81 MG tablet     atorvastatin (LIPITOR) 40 MG tablet     cyclobenzaprine (FLEXERIL) 10 MG tablet     diphenhydrAMINE (BENADRYL) 50 MG capsule     EPINEPHrine (ANY BX GENERIC EQUIV)  0.3 MG/0.3ML injection 2-pack     ibuprofen (ADVIL/MOTRIN) 800 MG tablet     No current facility-administered medications for this visit.      Facility-Administered Medications Ordered in Other Visits   Medication     sodium chloride (PF) 0.9% PF flush 3-20 mL      Past Medical History:   Diagnosis Date     Allergy status to analgesic agent     No reported medication allergies     Anesthesia of skin     No  problems with anaesthesia     History of recurrent pneumonia     No Comments Provided     Hyperlipidemia     No Comments Provided     Low back pain     pain with bilateral leg and disc injuries.     Personal history of nicotine dependence     quite 2 days ago     Personal history of other medical treatment (CODE)     No blood transfusions      Past Surgical History:   Procedure Laterality Date     ANGIOPLASTY      Right leg stenting and bypass, Left also     COLONOSCOPY  08/23/2010    Q 10yrs.     EXTRACTION(S) DENTAL      recently removed     OTHER SURGICAL HISTORY      [id number],PREMALIG/BENIGN SKIN LESION EXCISION, removed from chest     OTHER SURGICAL HISTORY      [id number],ANESTHESIA ALERT,No  problems with anaesthesia     Family History   Problem Relation Age of Onset     Diabetes Mother         Diabetes     Heart Disease Mother         Heart Disease     Other - See Comments Father         COPD, CD     Substance Abuse Other         Alcohol/Drug, No hx of chemical dependency.     Diabetes Other         Diabetes     Heart Disease Other         Heart Disease     Heart Disease Brother         Heart Disease     Other - See Comments Brother         killed by drunk  at age 19.       Review of Systems   Constitutional: Negative for fever.   Respiratory: Negative for wheezing.    Musculoskeletal:        Chronic back pain        PHQ-2 Score:     PHQ-2 ( 1999 Pf[employer]) 8/3/2020 7/21/2020   Q1: Little interest or pleasure in doing things 0 0   Q2: Feeling down, depressed or hopeless 0 0   PHQ-2 Score 0 0         PHQ-9  "SCORE 12/15/2015 11/4/2016 12/5/2019   PHQ-9 Total Score 21 4 0         OBJECTIVE:     /76   Pulse 86   Temp 96.8  F (36  C) (Tympanic)   Resp 20   Ht 1.74 m (5' 8.5\")   Wt 88.9 kg (196 lb)   SpO2 96%   BMI 29.37 kg/m    Body mass index is 29.37 kg/m .  Physical Exam  Vitals signs and nursing note reviewed.   Constitutional:       Appearance: Normal appearance.   HENT:      Head:      Comments: Mild left lid lag but symmetric movement of forehead and cheeks.  He is able to close both eyes tightly.  Cardiovascular:      Rate and Rhythm: Normal rate and regular rhythm.   Pulmonary:      Effort: Pulmonary effort is normal.      Breath sounds: Normal breath sounds. No wheezing.   Skin:     Comments: Some generalized redness across his face, this is both sides   Neurological:      Mental Status: He is alert.          No results found for any visits on 08/03/20.      ASSESSMENT/PLAN:       ICD-10-CM    1. Lyme disease  A69.20 Lyme Disease Ab with reflex to WB Serum     Lyme Disease Ab with reflex to WB Serum   2. Angio-edema, subsequent encounter  T78.3XXD             PLAN:  1.  Patient's facial swelling symptoms have resolved.  Uncertain if this is primarily related to antibiotics or if this is more unusual Lyme disease presentation.  Discussion regarding follow-up Lyme disease antibody testing.  Discussed with him that the IgG level should remain positive, but if IgM levels are now negative, consider discontinuation of antibiotics.  If he continues to have fluctuating symptoms, in particular concern for antibiotic-induced complications, infectious disease consultation could be considered.      PAKO PALACIOS MD  [facility]    This note was created using voice recognition software and was screened for errors in transcription.    "

## 2020-08-02 ENCOUNTER — HOSPITAL ENCOUNTER (OUTPATIENT)
Dept: INFUSION THERAPY | Facility: OTHER | Age: 65
Discharge: HOME | End: 2020-08-02
Attending: FAMILY MEDICINE | Admitting: FAMILY MEDICINE
Payer: MEDICARE

## 2020-08-02 VITALS
HEART RATE: 72 BPM | RESPIRATION RATE: 18 BRPM | SYSTOLIC BLOOD PRESSURE: 122 MMHG | OXYGEN SATURATION: 96 % | DIASTOLIC BLOOD PRESSURE: 70 MMHG | TEMPERATURE: 98.4 F

## 2020-08-02 DIAGNOSIS — A69.20 LYME DISEASE: Primary | ICD-10-CM

## 2020-08-02 PROCEDURE — 25000128 H RX IP 250 OP 636: Performed by: FAMILY MEDICINE

## 2020-08-02 PROCEDURE — 96365 THER/PROPH/DIAG IV INF INIT: CPT

## 2020-08-02 RX ORDER — CEFTRIAXONE SODIUM 2 G/50ML
2 INJECTION, SOLUTION INTRAVENOUS ONCE
Status: COMPLETED | OUTPATIENT
Start: 2020-08-02 | End: 2020-08-02

## 2020-08-02 RX ORDER — CEFTRIAXONE SODIUM 2 G/50ML
2 INJECTION, SOLUTION INTRAVENOUS ONCE
Status: CANCELLED | OUTPATIENT
Start: 2020-08-03

## 2020-08-02 RX ADMIN — CEFTRIAXONE SODIUM 2 G: 2 INJECTION, SOLUTION INTRAVENOUS at 14:49

## 2020-08-03 ENCOUNTER — HOSPITAL ENCOUNTER (OUTPATIENT)
Dept: INFUSION THERAPY | Facility: OTHER | Age: 65
End: 2020-08-03
Attending: FAMILY MEDICINE
Payer: MEDICARE

## 2020-08-03 ENCOUNTER — OFFICE VISIT (OUTPATIENT)
Dept: FAMILY MEDICINE | Facility: OTHER | Age: 65
End: 2020-08-03
Attending: FAMILY MEDICINE
Payer: MEDICARE

## 2020-08-03 VITALS
BODY MASS INDEX: 29.03 KG/M2 | SYSTOLIC BLOOD PRESSURE: 118 MMHG | RESPIRATION RATE: 20 BRPM | OXYGEN SATURATION: 96 % | HEART RATE: 86 BPM | HEIGHT: 69 IN | WEIGHT: 196 LBS | DIASTOLIC BLOOD PRESSURE: 76 MMHG | TEMPERATURE: 96.8 F

## 2020-08-03 VITALS
SYSTOLIC BLOOD PRESSURE: 127 MMHG | RESPIRATION RATE: 18 BRPM | DIASTOLIC BLOOD PRESSURE: 84 MMHG | HEART RATE: 89 BPM | TEMPERATURE: 96.4 F

## 2020-08-03 DIAGNOSIS — T78.3XXD ANGIO-EDEMA, SUBSEQUENT ENCOUNTER: ICD-10-CM

## 2020-08-03 DIAGNOSIS — A69.20 LYME DISEASE: Primary | ICD-10-CM

## 2020-08-03 PROCEDURE — 86618 LYME DISEASE ANTIBODY: CPT | Mod: ZL | Performed by: FAMILY MEDICINE

## 2020-08-03 PROCEDURE — 25000128 H RX IP 250 OP 636: Performed by: FAMILY MEDICINE

## 2020-08-03 PROCEDURE — 99213 OFFICE O/P EST LOW 20 MIN: CPT | Performed by: FAMILY MEDICINE

## 2020-08-03 PROCEDURE — 86617 LYME DISEASE ANTIBODY: CPT | Mod: ZL | Performed by: FAMILY MEDICINE

## 2020-08-03 PROCEDURE — 36415 COLL VENOUS BLD VENIPUNCTURE: CPT | Mod: ZL | Performed by: FAMILY MEDICINE

## 2020-08-03 PROCEDURE — G0463 HOSPITAL OUTPT CLINIC VISIT: HCPCS | Mod: 25

## 2020-08-03 PROCEDURE — 86617 LYME DISEASE ANTIBODY: CPT | Mod: ZL,91 | Performed by: FAMILY MEDICINE

## 2020-08-03 PROCEDURE — G0463 HOSPITAL OUTPT CLINIC VISIT: HCPCS

## 2020-08-03 PROCEDURE — 96365 THER/PROPH/DIAG IV INF INIT: CPT

## 2020-08-03 RX ORDER — CEFTRIAXONE SODIUM 2 G/50ML
2 INJECTION, SOLUTION INTRAVENOUS ONCE
Status: COMPLETED | OUTPATIENT
Start: 2020-08-03 | End: 2020-08-03

## 2020-08-03 RX ORDER — CEFTRIAXONE SODIUM 2 G/50ML
2 INJECTION, SOLUTION INTRAVENOUS ONCE
Status: CANCELLED | OUTPATIENT
Start: 2020-08-04

## 2020-08-03 RX ADMIN — CEFTRIAXONE SODIUM 2 G: 2 INJECTION, SOLUTION INTRAVENOUS at 11:22

## 2020-08-03 ASSESSMENT — ENCOUNTER SYMPTOMS
FEVER: 0
WHEEZING: 0

## 2020-08-03 ASSESSMENT — PAIN SCALES - GENERAL: PAINLEVEL: MODERATE PAIN (5)

## 2020-08-03 ASSESSMENT — MIFFLIN-ST. JEOR: SCORE: 1671.49

## 2020-08-03 NOTE — NURSING NOTE
Patient presents to the clinic today for a follow up from the emergency room.  Med rec complete.  Gabrielle Salvador LPN.................. 8/3/2020 10:28 AM

## 2020-08-03 NOTE — PROGRESS NOTES
Infusion Nursing Note:  Modesto Lehman presents today for daily antibiotics.    Patient seen by provider today: Yes: Dr. Dexter Herrera   present during visit today: Not Applicable.    Note: N/A.    Intravenous Access:  Peripheral IV placed to right forearm with brisk blood return    Treatment Conditions:  Not Applicable.      Post Infusion Assessment:  Patient tolerated infusion without incident.   IV access removed.  Call placed to MD to determine POC with length of antibiotics    Discharge Plan:   Patient and/or family verbalized understanding of discharge instructions and all questions answered.  Copy of AVS reviewed with patient and/or family.  Patient will return 8/4 for next appointment.  Patient discharged in stable condition accompanied by: self and will return tomorrow.    Angela Weber RN

## 2020-08-04 ENCOUNTER — HOSPITAL ENCOUNTER (OUTPATIENT)
Dept: INFUSION THERAPY | Facility: OTHER | Age: 65
Discharge: HOME | End: 2020-08-04
Attending: FAMILY MEDICINE | Admitting: FAMILY MEDICINE
Payer: MEDICARE

## 2020-08-04 VITALS — TEMPERATURE: 97.5 F | SYSTOLIC BLOOD PRESSURE: 145 MMHG | DIASTOLIC BLOOD PRESSURE: 78 MMHG | HEART RATE: 16 BPM

## 2020-08-04 DIAGNOSIS — A69.20 LYME DISEASE: Primary | ICD-10-CM

## 2020-08-04 LAB
B BURGDOR IGG+IGM SER QL: 4.52 (ref 0–0.89)
INTERPRETATION ECG - MUSE: NORMAL

## 2020-08-04 PROCEDURE — 25000128 H RX IP 250 OP 636: Performed by: FAMILY MEDICINE

## 2020-08-04 PROCEDURE — 96365 THER/PROPH/DIAG IV INF INIT: CPT

## 2020-08-04 RX ORDER — CEFTRIAXONE SODIUM 2 G/50ML
2 INJECTION, SOLUTION INTRAVENOUS ONCE
Status: COMPLETED | OUTPATIENT
Start: 2020-08-04 | End: 2020-08-04

## 2020-08-04 RX ORDER — CEFTRIAXONE SODIUM 2 G/50ML
2 INJECTION, SOLUTION INTRAVENOUS ONCE
Status: CANCELLED | OUTPATIENT
Start: 2020-08-05

## 2020-08-04 RX ADMIN — CEFTRIAXONE SODIUM 2 G: 2 INJECTION, SOLUTION INTRAVENOUS at 14:48

## 2020-08-04 NOTE — PROGRESS NOTES
Infusion Nursing Note:  Modesto Lehman presents today for ceftiaxone.    Patient seen by provider today: No   present during visit today: Not Applicable.    Note: N/A.    Intravenous Access:  Peripheral IV placed.    Treatment Conditions:  Not Applicable.      Post Infusion Assessment:  Patient tolerated infusion without incident.  Blood return noted pre and post infusion.  Site patent and intact, free from redness, edema or discomfort.  No evidence of extravasations.  Access discontinued per protocol.       Discharge Plan:   Discharge instructions reviewed with: Patient.  Patient and/or family verbalized understanding of discharge instructions and all questions answered.  Patient discharged in stable condition accompanied by: self.  Departure Mode: Ambulatory.    Caroline Black RN

## 2020-08-05 ENCOUNTER — HOSPITAL ENCOUNTER (OUTPATIENT)
Dept: INFUSION THERAPY | Facility: OTHER | Age: 65
Discharge: HOME | End: 2020-08-05
Attending: FAMILY MEDICINE | Admitting: FAMILY MEDICINE
Payer: MEDICARE

## 2020-08-05 VITALS — SYSTOLIC BLOOD PRESSURE: 123 MMHG | TEMPERATURE: 97.9 F | RESPIRATION RATE: 16 BRPM | DIASTOLIC BLOOD PRESSURE: 70 MMHG

## 2020-08-05 DIAGNOSIS — A69.20 LYME DISEASE: Primary | ICD-10-CM

## 2020-08-05 PROCEDURE — 96365 THER/PROPH/DIAG IV INF INIT: CPT

## 2020-08-05 PROCEDURE — 25000128 H RX IP 250 OP 636: Performed by: FAMILY MEDICINE

## 2020-08-05 RX ORDER — CEFTRIAXONE SODIUM 2 G/50ML
2 INJECTION, SOLUTION INTRAVENOUS ONCE
Status: CANCELLED | OUTPATIENT
Start: 2020-08-06

## 2020-08-05 RX ORDER — CEFTRIAXONE SODIUM 2 G/50ML
2 INJECTION, SOLUTION INTRAVENOUS ONCE
Status: COMPLETED | OUTPATIENT
Start: 2020-08-05 | End: 2020-08-05

## 2020-08-05 RX ADMIN — CEFTRIAXONE SODIUM 2 G: 2 INJECTION, SOLUTION INTRAVENOUS at 14:35

## 2020-08-05 ASSESSMENT — ENCOUNTER SYMPTOMS
HEMATOLOGIC/LYMPHATIC NEGATIVE: 1
FACIAL SWELLING: 1
NEUROLOGICAL NEGATIVE: 1
ARTHRALGIAS: 1
RESPIRATORY NEGATIVE: 1
CARDIOVASCULAR NEGATIVE: 1
EYES NEGATIVE: 1
GASTROINTESTINAL NEGATIVE: 1

## 2020-08-05 NOTE — PROGRESS NOTES
Infusion Nursing Note:  Modesto Lehman presents today for Ceftriaxone # 5.    Patient seen by provider today: No   present during visit today: Not Applicable.    Note: N/A.    Intravenous Access:  Peripheral IV placed.    Treatment Conditions:  Not Applicable.    Post Infusion Assessment:  Patient tolerated infusion without incident.  Blood return noted pre and post infusion.  Site patent and intact, free from redness, edema or discomfort.  No evidence of extravasations.  Access discontinued per protocol.     Discharge Plan:   Patient discharged in stable condition accompanied by: self.  Departure Mode: Ambulatory.  Will return tomorrow 8/6/20.    Brenda J. Goodell, RN

## 2020-08-05 NOTE — ADDENDUM NOTE
Encounter addended by: Goodell, Brenda J., RN on: 8/5/2020 3:27 PM   Actions taken: Chief Complaint modified, Allergies reviewed, Order Reconciliation Section accessed, Medication List reviewed

## 2020-08-06 ENCOUNTER — HOSPITAL ENCOUNTER (OUTPATIENT)
Dept: INFUSION THERAPY | Facility: OTHER | Age: 65
Discharge: HOME | End: 2020-08-06
Attending: FAMILY MEDICINE | Admitting: FAMILY MEDICINE
Payer: MEDICARE

## 2020-08-06 VITALS — TEMPERATURE: 98.8 F | RESPIRATION RATE: 16 BRPM

## 2020-08-06 DIAGNOSIS — A69.20 LYME DISEASE: Primary | ICD-10-CM

## 2020-08-06 LAB
B BURGDOR IGG SER QL IB: POSITIVE
B BURGDOR IGM SER QL IB: NEGATIVE

## 2020-08-06 PROCEDURE — 96360 HYDRATION IV INFUSION INIT: CPT

## 2020-08-06 PROCEDURE — 25000128 H RX IP 250 OP 636: Performed by: FAMILY MEDICINE

## 2020-08-06 RX ORDER — CEFTRIAXONE SODIUM 2 G/50ML
2 INJECTION, SOLUTION INTRAVENOUS ONCE
Status: CANCELLED | OUTPATIENT
Start: 2020-08-07

## 2020-08-06 RX ORDER — CEFTRIAXONE SODIUM 2 G/50ML
2 INJECTION, SOLUTION INTRAVENOUS ONCE
Status: COMPLETED | OUTPATIENT
Start: 2020-08-06 | End: 2020-08-06

## 2020-08-06 RX ADMIN — CEFTRIAXONE SODIUM 2 G: 2 INJECTION, SOLUTION INTRAVENOUS at 14:35

## 2020-08-06 NOTE — PROGRESS NOTES
Infusion Nursing Note:  Modesto Lehman presents today for antibiotics.    Patient seen by provider today: No   present during visit today: Not Applicable.    Note: N/A.    Intravenous Access:  Peripheral IV placed.    Treatment Conditions:  Not Applicable.      Post Infusion Assessment:  Patient tolerated injection without incident.  Blood return noted pre and post infusion.  Site patent and intact, free from redness, edema or discomfort.  No evidence of extravasations.  Access discontinued per protocol.  MD called during infusion to say patient could stop his antibiotics.       Discharge Plan:   Discharge instructions reviewed with: Patient.  Patient and/or family verbalized understanding of discharge instructions and all questions answered.  Patient discharged in stable condition accompanied by: self.  Departure Mode: Ambulatory.    Caroline Black RN

## 2020-12-10 DIAGNOSIS — I73.9 PERIPHERAL VASCULAR DISEASE (H): ICD-10-CM

## 2020-12-10 RX ORDER — ATORVASTATIN CALCIUM 40 MG/1
TABLET, FILM COATED ORAL
Qty: 90 TABLET | Refills: 3 | Status: SHIPPED | OUTPATIENT
Start: 2020-12-10 | End: 2021-12-20

## 2020-12-10 NOTE — TELEPHONE ENCOUNTER
[facility] sent Rx request for the following:      ATORVASTATIN 40MG TABLETS  Sig: TAKE 1 TABLET(40 MG) BY MOUTH DAILY      Last Prescription Date:   12/5/2019  Last Fill Qty/Refills:         90, R-3    Last Office Visit:              8/3/2020   Future Office visit:           none    Prescription refilled per RN Medication Refill Policy.................... Tray Ordoñez RN ....................  12/10/2020   9:45 AM

## 2021-04-09 ENCOUNTER — PATIENT OUTREACH (OUTPATIENT)
Dept: FAMILY MEDICINE | Facility: OTHER | Age: 66
End: 2021-04-09

## 2021-04-09 NOTE — LETTER
April 9, 2021      [place] KHAN Dominik  [address]   [facility] [place] [address]      Your healthcare team cares about your health. To provide you with the best care,   we have reviewed your chart and based on our findings, we see that you are due to:     - COLON CANCER SCREENING:  Call the clinic to schedule your colonoscopy or Cologuard test.  - ANNUAL WELLNESS FOLLOW UP:   Schedule an Annual Medicare Wellness Exam. This can be done by in person visit or virtual video visit.     If you have already completed these items, please contact the clinic via phone or   [facility] so your care team can review and update your records. Thank you for   choosing [facility] for your healthcare needs. For any questions,   concerns, or to schedule an appointment please contact the clinic.       Healthy Regards,      Your [facility] Care Team

## 2021-04-09 NOTE — TELEPHONE ENCOUNTER
Patient Quality Outreach      Summary:    Patient has the following on his problem list/HM: None    Patient is due/failing the following:   Cologuard and Annual wellness, date due: 2021    Type of outreach:    Sent letter.    Questions for provider review:    None                                                                                                                                     Brisa Lovett CMA on 4/9/2021 at 1:53 PM

## 2021-04-15 ENCOUNTER — NURSE TRIAGE (OUTPATIENT)
Dept: FAMILY MEDICINE | Facility: OTHER | Age: 66
End: 2021-04-15

## 2021-04-15 NOTE — TELEPHONE ENCOUNTER
"S-(situation): Reaction following #2 Covid Vaccine. ED vs clinic visit.    B-(background): 65 year old, Pt called, reports symptoms started following the first day after receiving vaccine.     A-(assessment):   2nd Covid vaccine on Friday 4/9/2021  Positive: mild Headache since day after, for most of the week.   Left arm, site of vaccine- arm swelling, redness. \"haven't looked at it today though\".   Today, tongue swelling, \"feels like a welt or blister on the side of my tongue.\"   \"I don't feel my self.\"     DENIES: SOB, CP, difficulty swallowing, slurred speech. Fever.       R-(recommendations): per protocol, ED or clinic with PCP approval. Encouraged ED eval.   Pt states he is taking Benadryl 50 mg every 6 hours since this last week. Last dose this morning was 0345am. Due next at 10am. Pt states the Benadryl has seemed to help, taking since symptoms started. Pt  hoping for a refill of Benadryl. Pt referenced last summer had Lyme's and was given rx for Benadryl due to med reactions. .         verified # [phone]        Additional Information    Negative: Difficulty with breathing or swallowing starts within 2 hours after injection    Negative: Difficult to awaken or acting confused (e.g., disoriented, slurred speech)    Negative: Unresponsive, passed out, or very weak    Negative: Sounds like a life-threatening emergency to the triager    Answer Assessment - Initial Assessment Questions  1. SYMPTOMS: \"What is the main symptom?\" (e.g., redness, swelling, pain)       Left arm, site of shot, swollen. Was red and \"buring\" improved.  Today, tongue swelling, feels like tongue is blistered, really sore. /sore by nose.   2. ONSET: \"When was the vaccine (shot) given?\" \"How much later did the   begin?\" (e.g., hours, days ago)       Vaccine was 4/9/21 days ago, HA started first day after.    3. SEVERITY: \"How bad is it?\"       HA- bothersome.        Tongue- might be coming down, taking benadryl.   4. FEVER: \"Is there a " "fever?\" If so, ask: \"What is it, how was it measured, and when did it start?\"       none  5. IMMUNIZATIONS GIVEN: \"What shots have you recently received?\"      Covid, 2nd vaccine.   6. PAST REACTIONS: \"Have you reacted to immunizations before?\" If so, ask: \"What happened?\"      none  7. OTHER SYMPTOMS: \"Do you have any other symptoms?\"    Protocols used: IMMUNIZATION [id number]    Edith Fleming RN ,....................  4/15/2021   9:40 AM    "

## 2021-10-05 ENCOUNTER — TRANSFERRED RECORDS (OUTPATIENT)
Dept: HEALTH INFORMATION MANAGEMENT | Facility: OTHER | Age: 66
End: 2021-10-05

## 2021-12-01 ENCOUNTER — OFFICE VISIT (OUTPATIENT)
Dept: FAMILY MEDICINE | Facility: OTHER | Age: 66
End: 2021-12-01
Attending: FAMILY MEDICINE
Payer: MEDICARE

## 2021-12-01 VITALS
DIASTOLIC BLOOD PRESSURE: 74 MMHG | BODY MASS INDEX: 26.05 KG/M2 | TEMPERATURE: 97.6 F | WEIGHT: 182 LBS | HEART RATE: 99 BPM | RESPIRATION RATE: 16 BRPM | OXYGEN SATURATION: 98 % | SYSTOLIC BLOOD PRESSURE: 128 MMHG | HEIGHT: 70 IN

## 2021-12-01 DIAGNOSIS — Z23 NEED FOR INFLUENZA VACCINATION: ICD-10-CM

## 2021-12-01 DIAGNOSIS — H25.9 AGE-RELATED CATARACT OF BOTH EYES, UNSPECIFIED AGE-RELATED CATARACT TYPE: Primary | ICD-10-CM

## 2021-12-01 DIAGNOSIS — R79.9 ABNORMAL FINDING OF BLOOD CHEMISTRY, UNSPECIFIED: ICD-10-CM

## 2021-12-01 DIAGNOSIS — I73.9 PERIPHERAL VASCULAR DISEASE (H): ICD-10-CM

## 2021-12-01 DIAGNOSIS — Z12.11 COLON CANCER SCREENING: ICD-10-CM

## 2021-12-01 LAB
ALBUMIN SERPL-MCNC: 4.4 G/DL (ref 3.5–5.7)
ALP SERPL-CCNC: 85 U/L (ref 34–104)
ALT SERPL W P-5'-P-CCNC: 22 U/L (ref 7–52)
ANION GAP SERPL CALCULATED.3IONS-SCNC: 6 MMOL/L (ref 3–14)
AST SERPL W P-5'-P-CCNC: 21 U/L (ref 13–39)
BILIRUB SERPL-MCNC: 0.4 MG/DL (ref 0.3–1)
BUN SERPL-MCNC: 14 MG/DL (ref 7–25)
CALCIUM SERPL-MCNC: 9.3 MG/DL (ref 8.6–10.3)
CHLORIDE BLD-SCNC: 100 MMOL/L (ref 98–107)
CHOLEST SERPL-MCNC: 116 MG/DL
CO2 SERPL-SCNC: 31 MMOL/L (ref 21–31)
CREAT SERPL-MCNC: 0.98 MG/DL (ref 0.7–1.3)
FASTING STATUS PATIENT QL REPORTED: NORMAL
GFR SERPL CREATININE-BSD FRML MDRD: 81 ML/MIN/1.73M2
GLUCOSE BLD-MCNC: 87 MG/DL (ref 70–105)
HDLC SERPL-MCNC: 43 MG/DL (ref 23–92)
LDLC SERPL CALC-MCNC: 46 MG/DL
NONHDLC SERPL-MCNC: 73 MG/DL
POTASSIUM BLD-SCNC: 4.1 MMOL/L (ref 3.5–5.1)
PROT SERPL-MCNC: 6.9 G/DL (ref 6.4–8.9)
SODIUM SERPL-SCNC: 137 MMOL/L (ref 134–144)
TRIGL SERPL-MCNC: 134 MG/DL

## 2021-12-01 PROCEDURE — 36415 COLL VENOUS BLD VENIPUNCTURE: CPT | Mod: ZL | Performed by: FAMILY MEDICINE

## 2021-12-01 PROCEDURE — G0463 HOSPITAL OUTPT CLINIC VISIT: HCPCS | Mod: 25

## 2021-12-01 PROCEDURE — 80061 LIPID PANEL: CPT | Mod: ZL | Performed by: FAMILY MEDICINE

## 2021-12-01 PROCEDURE — 90682 RIV4 VACC RECOMBINANT DNA IM: CPT

## 2021-12-01 PROCEDURE — 99213 OFFICE O/P EST LOW 20 MIN: CPT | Performed by: FAMILY MEDICINE

## 2021-12-01 PROCEDURE — 82040 ASSAY OF SERUM ALBUMIN: CPT | Mod: ZL | Performed by: FAMILY MEDICINE

## 2021-12-01 RX ORDER — KETOROLAC TROMETHAMINE 5 MG/ML
SOLUTION OPHTHALMIC
COMMUNITY
Start: 2021-11-15 | End: 2022-02-17

## 2021-12-01 RX ORDER — MOXIFLOXACIN 5 MG/ML
SOLUTION/ DROPS OPHTHALMIC
COMMUNITY
Start: 2021-11-16 | End: 2022-02-17

## 2021-12-01 RX ORDER — PREDNISOLONE ACETATE 10 MG/ML
SUSPENSION/ DROPS OPHTHALMIC
COMMUNITY
Start: 2021-11-15 | End: 2022-02-17

## 2021-12-01 ASSESSMENT — ANXIETY QUESTIONNAIRES
7. FEELING AFRAID AS IF SOMETHING AWFUL MIGHT HAPPEN: NOT AT ALL
IF YOU CHECKED OFF ANY PROBLEMS ON THIS QUESTIONNAIRE, HOW DIFFICULT HAVE THESE PROBLEMS MADE IT FOR YOU TO DO YOUR WORK, TAKE CARE OF THINGS AT HOME, OR GET ALONG WITH OTHER PEOPLE: NOT DIFFICULT AT ALL
1. FEELING NERVOUS, ANXIOUS, OR ON EDGE: NOT AT ALL
2. NOT BEING ABLE TO STOP OR CONTROL WORRYING: NOT AT ALL
6. BECOMING EASILY ANNOYED OR IRRITABLE: NOT AT ALL
GAD7 TOTAL SCORE: 0
5. BEING SO RESTLESS THAT IT IS HARD TO SIT STILL: NOT AT ALL
3. WORRYING TOO MUCH ABOUT DIFFERENT THINGS: NOT AT ALL

## 2021-12-01 ASSESSMENT — MIFFLIN-ST. JEOR: SCORE: 1617.83

## 2021-12-01 ASSESSMENT — PAIN SCALES - GENERAL: PAINLEVEL: MILD PAIN (3)

## 2021-12-01 ASSESSMENT — PATIENT HEALTH QUESTIONNAIRE - PHQ9: 5. POOR APPETITE OR OVEREATING: NOT AT ALL

## 2021-12-01 NOTE — LETTER
December 2, 2021      Modesto Lehman     [facility] [address]        Dear ,    We are writing to inform you of your test results.    Your test results fall within the expected range(s) or remain unchanged from previous results.  Please continue with current treatment plan.    Resulted Orders   Lipid Panel   Result Value Ref Range    Cholesterol 116 <200 mg/dL    Triglycerides 134 <150 mg/dL    Direct Measure HDL 43 23 - 92 mg/dL    LDL Cholesterol Calculated 46 <=100 mg/dL    Non HDL Cholesterol 73 <130 mg/dL    Patient Fasting > 8hrs? Unknown     Narrative    Cholesterol  Desirable:  <200 mg/dL    Triglycerides  Normal:  Less than 150 mg/dL  Borderline High:  150-199 mg/dL  High:  200-499 mg/dL  Very High:  Greater than or equal to 500 mg/dL    Direct Measure HDL  Female:  Greater than or equal to 50 mg/dL   Male:  Greater than or equal to 40 mg/dL    LDL Cholesterol  Desirable:  <100mg/dL  Above Desirable:  100-129 mg/dL   Borderline High:  130-159 mg/dL   High:  160-189 mg/dL   Very High:  >= 190 mg/dL    Non HDL Cholesterol  Desirable:  130 mg/dL  Above Desirable:  130-159 mg/dL  Borderline High:  160-189 mg/dL  High:  190-219 mg/dL  Very High:  Greater than or equal to 220 mg/dL   Comprehensive Metabolic Panel   Result Value Ref Range    Sodium 137 134 - 144 mmol/L    Potassium 4.1 3.5 - 5.1 mmol/L    Chloride 100 98 - 107 mmol/L    Carbon Dioxide (CO2) 31 21 - 31 mmol/L    Anion Gap 6 3 - 14 mmol/L    Urea Nitrogen 14 7 - 25 mg/dL    Creatinine 0.98 0.70 - 1.30 mg/dL    Calcium 9.3 8.6 - 10.3 mg/dL    Glucose 87 70 - 105 mg/dL    Alkaline Phosphatase 85 34 - 104 U/L    AST 21 13 - 39 U/L    ALT 22 7 - 52 U/L    Protein Total 6.9 6.4 - 8.9 g/dL    Albumin 4.4 3.5 - 5.7 g/dL    Bilirubin Total 0.4 0.3 - 1.0 mg/dL    GFR Estimate 81 >60 mL/min/1.73m2      Comment:      As of July 11, 2021, eGFR is calculated by the CKD-EPI creatinine equation, without race adjustment. eGFR can be  influenced by muscle mass, exercise, and diet. The reported eGFR is an estimation only and is only applicable if the renal function is stable.       If you have any questions or concerns, please call the clinic at the number listed above.       Sincerely,      Ha Keating MD

## 2021-12-01 NOTE — PROGRESS NOTES
.pre  [facility]  [address]  [place] [address]  Phone: [phone]  Fax: [phone]  Primary Provider: Ha Keating  Pre-op Performing Provider: HA KEATING      PREOPERATIVE EVALUATION:  Today's date: 12/1/2021    Modesto Lehman is a 66 year old male who presents for a preoperative evaluation.    Surgical Information:  Surgery/Procedure: bilateral cataract  Surgery Location: [place]  Surgeon: BOLIVAR  Surgery Date: 12/15/21 AND 1/4/22  Time of Surgery: unknown  Where patient plans to recover: At home with family  Fax number for surgical facility:     Type of Anesthesia Anticipated: Local    Assessment & Plan     The proposed surgical procedure is considered LOW risk.    Problem List Items Addressed This Visit        Circulatory    Peripheral vascular disease (H)    Relevant Orders    Lipid Panel (Completed)    Comprehensive Metabolic Panel (Completed)      Other Visit Diagnoses     Age-related cataract of both eyes, unspecified age-related cataract type    -  Primary    Colon cancer screening        Relevant Orders    Adult Gastro Ref - Procedure Only    Abnormal finding of blood chemistry, unspecified         Relevant Orders    Lipid Panel (Completed)    Need for influenza vaccination        Relevant Orders    FLU SHOT 50 - 64 YEARS (FLUBLOK) (Completed)                       RECOMMENDATION:  APPROVAL GIVEN to proceed with proposed procedure, without further diagnostic evaluation.                      Subjective     HPI related to upcoming procedure: progressive loss of vision over the last few years.  Lately has a hard time seeing at night especially. Has mostly avoided driving now.      Preop Questions 12/1/2021   1. Have you ever had a heart attack or stroke? No   2. Have you ever had surgery on your heart or blood vessels, such as a stent placement, a coronary artery bypass, or surgery on an artery in your head, neck, heart, or legs? YES -     3. Do you have  chest pain with activity? No   4. Do you have a history of  heart failure? No   5. Do you currently have a cold, bronchitis or symptoms of other infection? No   6. Do you have a cough, shortness of breath, or wheezing? No   7. Do you or anyone in your family have previous history of blood clots? No   8. Do you or does anyone in your family have a serious bleeding problem such as prolonged bleeding following surgeries or cuts? No   9. Have you ever had problems with anemia or been told to take iron pills? No   10. Have you had any abnormal blood loss such as black, tarry or bloody stools? No   11. Have you ever had a blood transfusion? YES -      11a. Have you ever had a transfusion reaction? No   12. Are you willing to have a blood transfusion if it is medically needed before, during, or after your surgery? Yes   13. Have you or any of your relatives ever had problems with anesthesia? No   14. Do you have sleep apnea, excessive snoring or daytime drowsiness? No   15. Do you have any artifical heart valves or other implanted medical devices like a pacemaker, defibrillator, or continuous glucose monitor? No   16. Do you have artificial joints? No   17. Are you allergic to latex? No       Health Care Directive:  Patient does not have a Health Care Directive or Living Will: Discussed advance care planning with patient; information given to patient to review.    Preoperative Review of :   reviewed - no record of controlled substances prescribed.          Review of Systems  Constitutional, neuro, ENT, endocrine, pulmonary, cardiac, gastrointestinal, genitourinary, musculoskeletal, integument and psychiatric systems are negative, except as otherwise noted.    Patient Active Problem List    Diagnosis Date Noted     Lyme disease 07/31/2020     Priority: Medium     Family history of diabetes mellitus 01/30/2018     Priority: Medium     Neural foraminal stenosis of cervical spine 05/24/2016     Priority: Medium      Plantar wart of left foot 04/02/2014     Priority: Medium     Other synovitis and tenosynovitis 08/02/2012     Priority: Medium     Denis's cyst 02/29/2012     Priority: Medium     Peripheral vascular disease (H) 12/05/2011     Priority: Medium     Backache 11/07/2011     Priority: Medium     Spinal stenosis, lumbar 12/21/2010     Priority: Medium     DJD (degenerative joint disease) 12/08/2010     Priority: Medium     Hypercholesterolemia 10/19/2010     Priority: Medium     Benign neoplasm of colon 09/06/2010     Priority: Medium     Tobacco user 09/28/2009     Priority: Medium     Obesity 11/02/2005     Priority: Medium     Degeneration of lumbar or lumbosacral intervertebral disc 12/16/1997     Priority: Medium      Past Medical History:   Diagnosis Date     Allergy status to analgesic agent     No reported medication allergies     Anesthesia of skin     No  problems with anaesthesia     History of recurrent pneumonia     No Comments Provided     Hyperlipidemia     No Comments Provided     Low back pain     pain with bilateral leg and disc injuries.     Personal history of nicotine dependence     quite 2 days ago     Personal history of other medical treatment (CODE)     No blood transfusions     Past Surgical History:   Procedure Laterality Date     ANGIOPLASTY      Right leg stenting and bypass, Left also     COLONOSCOPY  08/23/2010    Q 10yrs.     EXTRACTION(S) DENTAL      recently removed     OTHER SURGICAL HISTORY      [id number],PREMALIG/BENIGN SKIN LESION EXCISION, removed from chest     OTHER SURGICAL HISTORY      [id number],ANESTHESIA ALERT,No  problems with anaesthesia     Current Outpatient Medications   Medication Sig Dispense Refill     aspirin 81 MG tablet Take 81 mg by mouth daily Take 81 mg by mouth once daily.       atorvastatin (LIPITOR) 40 MG tablet TAKE 1 TABLET(40 MG) BY MOUTH DAILY 90 tablet 3     cyclobenzaprine (FLEXERIL) 10 MG tablet Take 1 tablet (10 mg) by mouth 3 times daily as needed for  "muscle spasms Take 1 tablet by mouth 3 times daily 30 tablet 1     diphenhydrAMINE (BENADRYL) 50 MG capsule Take 1 capsule (50 mg) by mouth every 6 hours as needed for itching or allergies 30 capsule 0     EPINEPHrine (ANY BX GENERIC EQUIV) 0.3 MG/0.3ML injection 2-pack Inject 0.3 mLs (0.3 mg) into the muscle once as needed for anaphylaxis 1 each 0     ibuprofen (ADVIL/MOTRIN) 800 MG tablet Take 1 tablet (800 mg) by mouth 3 times daily as needed TAKE 1 TABLET BY MOUTH 3 TIMES DAILY WITH MEALS. 100 tablet 5     ketorolac (ACULAR) 0.5 % ophthalmic solution        moxifloxacin (VIGAMOX) 0.5 % ophthalmic solution INSTILL 1 DROP IN LEFT EYE FOUR TIMES DAILY AS DIRECTED. START 3 DAYS BEFORE SURGERY AND CONTINUE FOR 1 WEEK AFTER SURGERY       prednisoLONE acetate (PRED FORTE) 1 % ophthalmic suspension          Allergies   Allergen Reactions     Amoxicillin Swelling     Severe tongue and throat swelling noted on ED visit 7/13/2020. Required epinephrine, steroids.     Doxycycline Swelling     Hand swelling  , hives and welts        Social History     Tobacco Use     Smoking status: Current Some Day Smoker     Packs/day: 0.25     Types: Cigars     Start date: 11/4/2009     Smokeless tobacco: Never Used     Tobacco comment: Quit smoking: quit 6/20/17   Substance Use Topics     Alcohol use: Yes       History   Drug Use Unknown         Objective     /74   Pulse 99   Temp 97.6  F (36.4  C)   Resp 16   Ht 1.772 m (5' 9.75\")   Wt 82.6 kg (182 lb)   SpO2 98%   BMI 26.30 kg/m      Physical Exam    GENERAL APPEARANCE: healthy, alert and no distress     EYES: EOMI,  PERRL     HENT: ear canals and TM's normal and nose and mouth without ulcers or lesions     NECK: no adenopathy, no asymmetry, masses, or scars and thyroid normal to palpation     RESP: lungs clear to auscultation - no rales, rhonchi or wheezes     CV: regular rates and rhythm, normal S1 S2, no S3 or S4 and no murmur, click or rub     ABDOMEN:  soft, nontender, " no HSM or masses and bowel sounds normal     MS: extremities normal- no gross deformities noted, no evidence of inflammation in joints, FROM in all extremities.     SKIN: no suspicious lesions or rashes     NEURO: Normal strength and tone, sensory exam grossly normal, mentation intact and speech normal     PSYCH: mentation appears normal. and affect normal/bright     LYMPHATICS: No cervical adenopathy    Recent Labs   Lab Test 07/31/20  1120 07/13/20  0950   HGB 14.2 15.4    239    135   POTASSIUM 3.4* 3.4*   CR 0.80 0.92      Results for orders placed or performed in visit on 12/01/21   Lipid Panel     Status: None   Result Value Ref Range    Cholesterol 116 <200 mg/dL    Triglycerides 134 <150 mg/dL    Direct Measure HDL 43 23 - 92 mg/dL    LDL Cholesterol Calculated 46 <=100 mg/dL    Non HDL Cholesterol 73 <130 mg/dL    Patient Fasting > 8hrs? Unknown     Narrative    Cholesterol  Desirable:  <200 mg/dL    Triglycerides  Normal:  Less than 150 mg/dL  Borderline High:  150-199 mg/dL  High:  200-499 mg/dL  Very High:  Greater than or equal to 500 mg/dL    Direct Measure HDL  Female:  Greater than or equal to 50 mg/dL   Male:  Greater than or equal to 40 mg/dL    LDL Cholesterol  Desirable:  <100mg/dL  Above Desirable:  100-129 mg/dL   Borderline High:  130-159 mg/dL   High:  160-189 mg/dL   Very High:  >= 190 mg/dL    Non HDL Cholesterol  Desirable:  130 mg/dL  Above Desirable:  130-159 mg/dL  Borderline High:  160-189 mg/dL  High:  190-219 mg/dL  Very High:  Greater than or equal to 220 mg/dL   Comprehensive Metabolic Panel     Status: Normal   Result Value Ref Range    Sodium 137 134 - 144 mmol/L    Potassium 4.1 3.5 - 5.1 mmol/L    Chloride 100 98 - 107 mmol/L    Carbon Dioxide (CO2) 31 21 - 31 mmol/L    Anion Gap 6 3 - 14 mmol/L    Urea Nitrogen 14 7 - 25 mg/dL    Creatinine 0.98 0.70 - 1.30 mg/dL    Calcium 9.3 8.6 - 10.3 mg/dL    Glucose 87 70 - 105 mg/dL    Alkaline Phosphatase 85 34 - 104 U/L     AST 21 13 - 39 U/L    ALT 22 7 - 52 U/L    Protein Total 6.9 6.4 - 8.9 g/dL    Albumin 4.4 3.5 - 5.7 g/dL    Bilirubin Total 0.4 0.3 - 1.0 mg/dL    GFR Estimate 81 >60 mL/min/1.73m2       Diagnostics:  No labs were ordered during this visit.   No EKG required for low risk surgery (cataract, skin procedure, breast biopsy, etc).    Revised Cardiac Risk Index (RCRI):  The patient has the following serious cardiovascular risks for perioperative complications:   - No serious cardiac risks = 0 points     RCRI Interpretation: 0 points: Class I (very low risk - 0.4% complication rate)           Signed Electronically by: Ha Keating MD  Copy of this evaluation report is provided to requesting physician.

## 2021-12-01 NOTE — NURSING NOTE
"Coming in for a pre-op    Chief Complaint   Patient presents with     Pre-Op Exam     cataract       Initial /74   Pulse 99   Temp 97.6  F (36.4  C)   Resp 16   Ht 1.772 m (5' 9.75\")   Wt 82.6 kg (182 lb)   SpO2 98%   BMI 26.30 kg/m   Estimated body mass index is 26.3 kg/m  as calculated from the following:    Height as of this encounter: 1.772 m (5' 9.75\").    Weight as of this encounter: 82.6 kg (182 lb).  Medication Reconciliation: complete.  FOOD SECURITY SCREENING QUESTIONS  Hunger Vital Signs:  Within the past 12 months we worried whether our food would run out before we got money to buy more. Never  Within the past 12 months the food we bought just didn't last and we didn't have money to get more. Never  Lucille Person LPN 12/1/2021 11:06 AM      Lucille Person LPN  "

## 2021-12-02 ASSESSMENT — ANXIETY QUESTIONNAIRES: GAD7 TOTAL SCORE: 0

## 2021-12-17 DIAGNOSIS — Z12.11 ENCOUNTER FOR SCREENING COLONOSCOPY: Primary | ICD-10-CM

## 2021-12-17 DIAGNOSIS — I73.9 PERIPHERAL VASCULAR DISEASE (H): ICD-10-CM

## 2021-12-17 NOTE — TELEPHONE ENCOUNTER
Screening Questions for the Scheduling of Screening Colonoscopies   (If Colonoscopy is diagnostic, Provider should review the chart before scheduling.)  Are you younger than 50 or older than 80?  NO  Do you take aspirin or fish oil?  NO (if yes, tell patient to stop 1 week prior to Colonoscopy)  Do you take warfarin (Coumadin), clopidogrel (Plavix), apixaban (Eliquis), dabigatram (Pradaxa), rivaroxaban (Xarelto) or any blood thinner? NO  Do you use oxygen at home?  NO  Do you have kidney disease? NO  Are you on dialysis? NO  Have you had a stroke or heart attack in the last year? NO  Have you had a stent in your heart or any blood vessel in the last year? NO, BUT STENTS HAD BEEN PLACED BEEN YEARS AGO AND HE IS NOT SURE WHERE  Have you had a transplant of any organ? NO  Have you had a colonoscopy or upper endoscopy (EGD) before? YES         When?  8/23/2010  Date of scheduled Colonoscopy. 02/24/2022  Provider DR. MANDUJANO  [facility]

## 2021-12-20 RX ORDER — ATORVASTATIN CALCIUM 40 MG/1
TABLET, FILM COATED ORAL
Qty: 90 TABLET | Refills: 3 | Status: SHIPPED | OUTPATIENT
Start: 2021-12-20 | End: 2023-02-24

## 2021-12-20 RX ORDER — BISACODYL 5 MG/1
TABLET, DELAYED RELEASE ORAL
Qty: 2 TABLET | Refills: 0 | Status: SHIPPED | OUTPATIENT
Start: 2021-12-20 | End: 2023-02-27

## 2021-12-20 RX ORDER — POLYETHYLENE GLYCOL 3350, SODIUM CHLORIDE, SODIUM BICARBONATE, POTASSIUM CHLORIDE 420; 11.2; 5.72; 1.48 G/4L; G/4L; G/4L; G/4L
4000 POWDER, FOR SOLUTION ORAL ONCE
Qty: 4000 ML | Refills: 0 | Status: SHIPPED | OUTPATIENT
Start: 2021-12-20 | End: 2021-12-20

## 2021-12-20 NOTE — TELEPHONE ENCOUNTER
"[facility] sent Rx request for the following:   atorvastatin (LIPITOR) 40 MG tablet  SigTAKE 1 TABLET(40 MG) BY MOUTH DAILY    Last Prescription Date:   12/10/2020  Last Fill Qty/Refills:         90, R-3    Last Office Visit:              12/01/2021 ([facility])   Future Office visit:           None noted   Statins Protocol Passed - 12/17/2021 10:34 AM        Passed - LDL on file in past 12 months     Recent Labs   Lab Test 12/01/21  1138   LDL 46             Passed - No abnormal creatine kinase in past 12 months     No lab results found.             Passed - Recent (12 mo) or future (30 days) visit within the authorizing provider's specialty     Patient has had an office visit with the authorizing provider or a provider within the authorizing providers department within the previous 12 mos or has a future within next 30 days. See \"Patient Info\" tab in inbasket, or \"Choose Columns\" in Meds & Orders section of the refill encounter.              Passed - Medication is active on med list        Passed - Patient is age 18 or older         Prescription approved per [facility] Refill Protocol.  Amber Dozier RN ....................  12/20/2021   2:06 PM        "

## 2022-02-20 ENCOUNTER — ALLIED HEALTH/NURSE VISIT (OUTPATIENT)
Dept: FAMILY MEDICINE | Facility: OTHER | Age: 67
End: 2022-02-20
Attending: FAMILY MEDICINE
Payer: MEDICARE

## 2022-02-20 DIAGNOSIS — Z20.822 COVID-19 RULED OUT: Primary | ICD-10-CM

## 2022-02-20 DIAGNOSIS — Z12.11 ENCOUNTER FOR SCREENING COLONOSCOPY: ICD-10-CM

## 2022-02-20 PROCEDURE — U0005 INFEC AGEN DETEC AMPLI PROBE: HCPCS | Mod: ZL

## 2022-02-20 PROCEDURE — C9803 HOPD COVID-19 SPEC COLLECT: HCPCS

## 2022-02-20 NOTE — PROGRESS NOTES
Patient here for Covid Testing. Pre op 2/24/2022 [facility].    Marina Delaney MA on 2/20/2022 at 8:08 AM

## 2022-02-21 LAB — SARS-COV-2 RNA RESP QL NAA+PROBE: NEGATIVE

## 2022-02-24 ENCOUNTER — ANESTHESIA EVENT (OUTPATIENT)
Dept: SURGERY | Facility: OTHER | Age: 67
End: 2022-02-24
Payer: MEDICARE

## 2022-02-24 ENCOUNTER — HOSPITAL ENCOUNTER (OUTPATIENT)
Facility: OTHER | Age: 67
Discharge: HOME | End: 2022-02-24
Attending: SURGERY | Admitting: SURGERY
Payer: MEDICARE

## 2022-02-24 ENCOUNTER — ANESTHESIA (OUTPATIENT)
Dept: SURGERY | Facility: OTHER | Age: 67
End: 2022-02-24
Payer: MEDICARE

## 2022-02-24 VITALS
SYSTOLIC BLOOD PRESSURE: 148 MMHG | OXYGEN SATURATION: 100 % | RESPIRATION RATE: 16 BRPM | TEMPERATURE: 97 F | WEIGHT: 182 LBS | HEIGHT: 69 IN | HEART RATE: 71 BPM | BODY MASS INDEX: 26.96 KG/M2 | DIASTOLIC BLOOD PRESSURE: 86 MMHG

## 2022-02-24 PROCEDURE — 45385 COLONOSCOPY W/LESION REMOVAL: CPT | Mod: PT | Performed by: SURGERY

## 2022-02-24 PROCEDURE — 999N000010 HC STATISTIC ANES STAT CODE-CRNA PER MINUTE: Performed by: SURGERY

## 2022-02-24 PROCEDURE — 45385 COLONOSCOPY W/LESION REMOVAL: CPT | Performed by: NURSE ANESTHETIST, CERTIFIED REGISTERED

## 2022-02-24 PROCEDURE — 250N000009 HC RX 250: Performed by: NURSE ANESTHETIST, CERTIFIED REGISTERED

## 2022-02-24 PROCEDURE — 250N000011 HC RX IP 250 OP 636: Performed by: NURSE ANESTHETIST, CERTIFIED REGISTERED

## 2022-02-24 PROCEDURE — 258N000003 HC RX IP 258 OP 636: Performed by: SURGERY

## 2022-02-24 PROCEDURE — 88305 TISSUE EXAM BY PATHOLOGIST: CPT

## 2022-02-24 PROCEDURE — 258N000003 HC RX IP 258 OP 636: Performed by: NURSE ANESTHETIST, CERTIFIED REGISTERED

## 2022-02-24 PROCEDURE — 45380 COLONOSCOPY AND BIOPSY: CPT | Performed by: SURGERY

## 2022-02-24 RX ORDER — NALOXONE HYDROCHLORIDE 0.4 MG/ML
0.4 INJECTION, SOLUTION INTRAMUSCULAR; INTRAVENOUS; SUBCUTANEOUS
Status: DISCONTINUED | OUTPATIENT
Start: 2022-02-24 | End: 2022-02-24 | Stop reason: HOSPADM

## 2022-02-24 RX ORDER — PROPOFOL 10 MG/ML
INJECTION, EMULSION INTRAVENOUS PRN
Status: DISCONTINUED | OUTPATIENT
Start: 2022-02-24 | End: 2022-02-24

## 2022-02-24 RX ORDER — NALOXONE HYDROCHLORIDE 0.4 MG/ML
0.2 INJECTION, SOLUTION INTRAMUSCULAR; INTRAVENOUS; SUBCUTANEOUS
Status: DISCONTINUED | OUTPATIENT
Start: 2022-02-24 | End: 2022-02-24 | Stop reason: HOSPADM

## 2022-02-24 RX ORDER — LIDOCAINE HYDROCHLORIDE 20 MG/ML
INJECTION, SOLUTION INFILTRATION; PERINEURAL PRN
Status: DISCONTINUED | OUTPATIENT
Start: 2022-02-24 | End: 2022-02-24

## 2022-02-24 RX ORDER — SODIUM CHLORIDE, SODIUM LACTATE, POTASSIUM CHLORIDE, CALCIUM CHLORIDE 600; 310; 30; 20 MG/100ML; MG/100ML; MG/100ML; MG/100ML
INJECTION, SOLUTION INTRAVENOUS CONTINUOUS PRN
Status: DISCONTINUED | OUTPATIENT
Start: 2022-02-24 | End: 2022-02-24

## 2022-02-24 RX ORDER — LIDOCAINE 40 MG/G
CREAM TOPICAL
Status: DISCONTINUED | OUTPATIENT
Start: 2022-02-24 | End: 2022-02-24 | Stop reason: HOSPADM

## 2022-02-24 RX ORDER — FLUMAZENIL 0.1 MG/ML
0.2 INJECTION, SOLUTION INTRAVENOUS
Status: DISCONTINUED | OUTPATIENT
Start: 2022-02-24 | End: 2022-02-24 | Stop reason: HOSPADM

## 2022-02-24 RX ORDER — SODIUM CHLORIDE, SODIUM LACTATE, POTASSIUM CHLORIDE, CALCIUM CHLORIDE 600; 310; 30; 20 MG/100ML; MG/100ML; MG/100ML; MG/100ML
INJECTION, SOLUTION INTRAVENOUS CONTINUOUS
Status: DISCONTINUED | OUTPATIENT
Start: 2022-02-24 | End: 2022-02-24 | Stop reason: HOSPADM

## 2022-02-24 RX ORDER — PROPOFOL 10 MG/ML
INJECTION, EMULSION INTRAVENOUS CONTINUOUS PRN
Status: DISCONTINUED | OUTPATIENT
Start: 2022-02-24 | End: 2022-02-24

## 2022-02-24 RX ADMIN — SODIUM CHLORIDE, SODIUM LACTATE, POTASSIUM CHLORIDE, AND CALCIUM CHLORIDE: 600; 310; 30; 20 INJECTION, SOLUTION INTRAVENOUS at 07:30

## 2022-02-24 RX ADMIN — SODIUM CHLORIDE, POTASSIUM CHLORIDE, SODIUM LACTATE AND CALCIUM CHLORIDE: 600; 310; 30; 20 INJECTION, SOLUTION INTRAVENOUS at 07:19

## 2022-02-24 RX ADMIN — PROPOFOL 100 MG: 10 INJECTION, EMULSION INTRAVENOUS at 07:32

## 2022-02-24 RX ADMIN — PROPOFOL 200 MCG/KG/MIN: 10 INJECTION, EMULSION INTRAVENOUS at 07:32

## 2022-02-24 RX ADMIN — LIDOCAINE HYDROCHLORIDE 100 MG: 20 INJECTION, SOLUTION INFILTRATION; PERINEURAL at 07:32

## 2022-02-24 ASSESSMENT — LIFESTYLE VARIABLES: TOBACCO_USE: 1

## 2022-02-24 NOTE — OR NURSING
Patient given discharge instructions with no questions regarding instructions. Leonel score 19. Pain level 5/10to back, gas discomfort to abdomen, no number.  Discharged from unit via ambulatory. Patient discharged to home.

## 2022-02-24 NOTE — ANESTHESIA CARE TRANSFER NOTE
Patient: Modesto Lehman    Procedure: Procedure(s):  COLONOSCOPY, WITH POLYPECTOMY       Diagnosis: Screening for colon cancer [Z12.11]  Diagnosis Additional Information: No value filed.    Anesthesia Type:   General     Note:    Oropharynx: oropharynx clear of all foreign objects and spontaneously breathing  Level of Consciousness: drowsy  Oxygen Supplementation: nasal cannula  Level of Supplemental Oxygen (L/min / FiO2): 2  Independent Airway: airway patency satisfactory and stable  Dentition: dentition unchanged  Vital Signs Stable: post-procedure vital signs reviewed and stable  Report to RN Given: handoff report given  Patient transferred to: Phase II    Handoff Report: Identifed the Patient, Identified the Reponsible Provider, Reviewed the pertinent medical history, Discussed the surgical course, Reviewed Intra-OP anesthesia mangement and issues during anesthesia, Set expectations for post-procedure period and Allowed opportunity for questions and acknowledgement of understanding      Vitals:  Vitals Value Taken Time   BP     Temp     Pulse     Resp     SpO2         Electronically Signed By: Jerrell Basurto CRNA, APRN CRNA  February 24, 2022  8:23 AM

## 2022-02-24 NOTE — ANESTHESIA POSTPROCEDURE EVALUATION
Patient: Modesto Lehman    Procedure: Procedure(s):  COLONOSCOPY, WITH POLYPECTOMY       Anesthesia Type:  General    Note:  Disposition: Outpatient   Postop Pain Control: Uneventful            Sign Out: Well controlled pain   PONV: No   Neuro/Psych: Uneventful            Sign Out: Acceptable/Baseline neuro status   Airway/Respiratory: Uneventful            Sign Out: Acceptable/Baseline resp. status   CV/Hemodynamics: Uneventful            Sign Out: Acceptable CV status; No obvious hypovolemia; No obvious fluid overload   Other NRE: NONE   DID A NON-ROUTINE EVENT OCCUR? No           Last vitals:  Vitals Value Taken Time   /86 02/24/22 0825   Temp     Pulse 70 02/24/22 0825   Resp 16 02/24/22 0825   SpO2 100 % 02/24/22 0829   Vitals shown include unvalidated device data.    Electronically Signed By: Jerrell Basurto CRNA, APRN CRNA  February 24, 2022  8:30 AM

## 2022-02-24 NOTE — ANESTHESIA PREPROCEDURE EVALUATION
Anesthesia Pre-Procedure Evaluation    Patient: Modesto Lehman   MRN: [id number] : 1955        Procedure : Procedure(s):  COLONOSCOPY          Past Medical History:   Diagnosis Date     Allergy status to analgesic agent     No reported medication allergies     Anesthesia of skin     No  problems with anaesthesia     History of recurrent pneumonia     No Comments Provided     Hyperlipidemia     No Comments Provided     Low back pain     pain with bilateral leg and disc injuries.     Personal history of nicotine dependence     quite 2 days ago     Personal history of other medical treatment (CODE)     No blood transfusions      Past Surgical History:   Procedure Laterality Date     ANGIOPLASTY      Right leg stenting and bypass, Left also     COLONOSCOPY  2010    Q 10yrs.     EXTRACTION(S) DENTAL      recently removed     OTHER SURGICAL HISTORY      [id number],PREMALIG/BENIGN SKIN LESION EXCISION, removed from chest     OTHER SURGICAL HISTORY      2084,ANESTHESIA ALERT,No  problems with anaesthesia      Allergies   Allergen Reactions     Amoxicillin Swelling     Severe tongue and throat swelling noted on ED visit 2020. Required epinephrine, steroids.     Doxycycline Swelling     Hand swelling  , hives and welts      Social History     Tobacco Use     Smoking status: Current Some Day Smoker     Packs/day: 0.25     Types: Cigars     Start date: 2009     Smokeless tobacco: Never Used     Tobacco comment: Quit smoking: quit 19   Substance Use Topics     Alcohol use: Yes      Wt Readings from Last 1 Encounters:   21 82.6 kg (182 lb)        Anesthesia Evaluation   Pt has had prior anesthetic. Type: General and MAC.    No history of anesthetic complications       ROS/MED HX  ENT/Pulmonary:     (+) tobacco use, Current use,     Neurologic: Comment: Lyme disease      Cardiovascular: Comment: S/p bilat LE angioplasty with bypass grafts    (+) Dyslipidemia -Peripheral Vascular  Disease----Previous cardiac testing   Echo: Date: Results:    Stress Test: Date: Results:    ECG Reviewed: Date: 7/31/20 Results:  NSR  Cath: Date: Results:      METS/Exercise Tolerance: >4 METS    Hematologic:  - neg hematologic  ROS     Musculoskeletal: Comment: Low back pain  Spinal stenosis, lumbar      GI/Hepatic:  - neg GI/hepatic ROS     Renal/Genitourinary:  - neg Renal ROS     Endo:     (+) Obesity,     Psychiatric/Substance Use:  - neg psychiatric ROS     Infectious Disease:       Malignancy:  - neg malignancy ROS     Other:  - neg other ROS          Physical Exam    Airway        Mallampati: II   TM distance: > 3 FB   Neck ROM: full   Mouth opening: > 3 cm    Respiratory Devices and Support         Dental  no notable dental history         Cardiovascular   cardiovascular exam normal       Rhythm and rate: regular and normal     Pulmonary   pulmonary exam normal        breath sounds clear to auscultation           OUTSIDE LABS:  CBC:   Lab Results   Component Value Date    WBC 10.6 07/31/2020    WBC 14.7 (H) 07/13/2020    HGB 14.2 07/31/2020    HGB 15.4 07/13/2020    HCT 41.4 07/31/2020    HCT 44.7 07/13/2020     07/31/2020     07/13/2020     BMP:   Lab Results   Component Value Date     12/01/2021     07/31/2020    POTASSIUM 4.1 12/01/2021    POTASSIUM 3.4 (L) 07/31/2020    CHLORIDE 100 12/01/2021    CHLORIDE 109 (H) 07/31/2020    CO2 31 12/01/2021    CO2 23 07/31/2020    BUN 14 12/01/2021    BUN 9 07/31/2020    CR 0.98 12/01/2021    CR 0.80 07/31/2020    GLC 87 12/01/2021     (H) 07/31/2020     COAGS: No results found for: PTT, INR, FIBR  POC: No results found for: BGM, HCG, HCGS  HEPATIC:   Lab Results   Component Value Date    ALBUMIN 4.4 12/01/2021    PROTTOTAL 6.9 12/01/2021    ALT 22 12/01/2021    AST 21 12/01/2021    ALKPHOS 85 12/01/2021    BILITOTAL 0.4 12/01/2021     OTHER:   Lab Results   Component Value Date    DANE 9.3 12/01/2021    CRP 0.7 (H) 07/31/2020     SED 25 (H) 07/31/2020       Anesthesia Plan    ASA Status:  3   NPO Status:  NPO Appropriate    Anesthesia Type: General.     - Airway: Native airway   Induction: Propofol.   Maintenance: TIVA.        Consents    Anesthesia Plan(s) and associated risks, benefits, and realistic alternatives discussed. Questions answered and patient/representative(s) expressed understanding.     - Discussed: Risks, Benefits and Alternatives for BOTH SEDATION and the PROCEDURE were discussed     - Discussed with:  Patient      - Extended Intubation/Ventilatory Support Discussed: No.      - Patient is DNR/DNI Status: No    Use of blood products discussed: No .     Postoperative Care            Comments:                Jerrell Basurto CRNA, APRN CRNA

## 2022-02-24 NOTE — DISCHARGE INSTRUCTIONS
[facility]  Adult Discharge Orders & Instructions      For 24 hours after surgery:  1. Get plenty of rest.  A responsible adult must stay with you for at least 24 hours after you leave the hospital.   2. You may feel lightheaded.  IF so, sit for a few minutes before standing.  Have someone help you get up.   3. You may have a slight fever. Call the doctor if your fever is over 101 F (38.3 C) (taken under the tongue) or lasts longer than 24 hours.  4. You may have a dry mouth, a sore throat, muscle aches or trouble sleeping.  These should go away after 24 hours.  5. Do not make important or legal decisions.  6.   Do not drive or use heavy equipment.  If you have weakness or tingling, don't drive or use heavy equipment until this feeling goes away.                                                                                                                                                                         To contact a doctor, call    [phone]______________

## 2022-02-24 NOTE — H&P
PRE-PROCEDURE NOTE    CHIEFCOMPLAINT / REASON FOR PROCEDURE:  Screening for polyps and colorectal cancer.    PERTINENT HISTORY   Patient is due for colonoscopy. Previous colonoscopy 2010 . No family history of colon polyps or colon cancer.    Past Medical History:   Diagnosis Date     Allergy status to analgesic agent     No reported medication allergies     Anesthesia of skin     No  problems with anaesthesia     History of recurrent pneumonia     No Comments Provided     Hyperlipidemia     No Comments Provided     Low back pain     pain with bilateral leg and disc injuries.     Personal history of nicotine dependence     quite 2 days ago     Personal history of other medical treatment (CODE)     No blood transfusions       Past Surgical History:   Procedure Laterality Date     ANGIOPLASTY      Right leg stenting and bypass, Left also     COLONOSCOPY  08/23/2010    Q 10yrs.     EXTRACTION(S) DENTAL      recently removed     OTHER SURGICAL HISTORY      SPI572,PREMALIG/BENIGN SKIN LESION EXCISION, removed from chest     OTHER SURGICAL HISTORY      208489,ANESTHESIA ALERT,No  problems with anaesthesia         Other:  None  Bleeding tendencies: No     Relevant Family History:  None     Relevant Social History:  None     10 point ROS of systems including Constitutional, Eyes, Respiratory, Cardiovascular, Gastroenterology, Genitourinary, Integumentary, Muscularskeletal, Psychiatric were all negative except for pertinent positives noted in my HPI.      ALLERGIES/SENSITIVITIES:   Allergies   Allergen Reactions     Amoxicillin Swelling     Severe tongue and throat swelling noted on ED visit 7/13/2020. Required epinephrine, steroids.     Doxycycline Swelling     Hand swelling  , hives and welts        CURRENT MEDICATIONS:    No current facility-administered medications on file prior to encounter.  atorvastatin (LIPITOR) 40 MG tablet, TAKE 1 TABLET(40 MG) BY MOUTH DAILY  bisacodyl (DULCOLAX) 5 MG EC tablet, Take as directed  by colonoscopy prep instructions  cyclobenzaprine (FLEXERIL) 10 MG tablet, Take 1 tablet (10 mg) by mouth 3 times daily as needed for muscle spasms Take 1 tablet by mouth 3 times daily  diphenhydrAMINE (BENADRYL) 50 MG capsule, Take 1 capsule (50 mg) by mouth every 6 hours as needed for itching or allergies  EPINEPHrine (ANY BX GENERIC EQUIV) 0.3 MG/0.3ML injection 2-pack, Inject 0.3 mLs (0.3 mg) into the muscle once as needed for anaphylaxis  ibuprofen (ADVIL/MOTRIN) 800 MG tablet, Take 1 tablet (800 mg) by mouth 3 times daily as needed TAKE 1 TABLET BY MOUTH 3 TIMES DAILY WITH MEALS.  aspirin 81 MG tablet, Take 81 mg by mouth daily Take 81 mg by mouth once daily.            PRE-SEDATION ASSESSMENT:    LUNGS:  CTA B/L, no wheezing or crackles.  Heart & CV:  RRR no murmur.  Intact distal pulses, good cap refill.    Comment(s):      IMPRESSION: 66 year old male in need of screening colonoscopy.    PLAN:  I discussed screening colonoscopy with the patient. Anesthesia coverage requested.    Pako Seay MD    2/24/2022 7:11 AM

## 2022-02-24 NOTE — OP NOTE
PROCEDURE NOTE    SURGEON:Pako Seay MD    PRE-OP DIAGNOSIS:  Screening Colonoscopy      POST-OP DIAGNOSIS: Colon polyps    PROCEDURE: Colonoscopy with cold snare polypectomy    SPECIMEN:    ID Type Source Tests Collected by Time Destination   1 : polyps Polyp Large Intestine, Colon, Ascending SURGICAL PATHOLOGY EXAM Pako Seay MD 2/24/2022  7:46 AM    2 : polyps Polyp Large Intestine, Colon, Sigmoid SURGICAL PATHOLOGY EXAM Pako Seay MD 2/24/2022  8:12 AM           ANESTHESIA:  Monitor Anesthesia Care CRNA Independent: Jerrell Basurto APRN CRNA   Coverage requested    ESTIMATED BLOOD LOSS: none    COMPLICATIONS:  None    INDICATION FOR THE PROCEDURE: The patient is a 66 year old male. The patient presents with need for screening. I explained to the patient the risks, benefits and alternatives to screening colonoscopy for evaluating for cancer or polyps. We discussed the risks including bleeding, perforation, potential inability to reach the cecum and the risks of sedation. The patient's questions were answered and the patient wished to proceed. Informed consent paperwork was completed.    PROCEDURE: The patient was taken to the endoscopy suite. Appropriate monitors were attached. The patient was placed in the left lateral decubitus position. Timeout was performed confirming the patient's identity and procedure to be performed.  After appropriate sedation was confirmed, digital rectal exam was performed.  There was normal tone and no gross abnormality was noted.  The lubricated colonoscope was introduced into the anus the colon was insufflated with air. The prep quality was adequate. Under direct visualization the scope was advanced to the cecum. The mucosa of the colon was inspected while withdrawing the scope.  In the ascending colon a 12 mm polyp was removed with hot snare.  This defect was clipped.  3 other small polyps that measured 4 to 6 mm were removed.  One other larger polyp that measured  8 mm was removed with cold snare.  The transverse colon and descending colon are unremarkable.  We then proceeded to remove five  2 to 4 mm polyps from the sigmoid colon and rectum.  The scope was retroflexed in the rectum and the anorectal junction was inspected. No abnormalities were noted. The scope was returned to aneutral position and the colon was decompressed. The scope was removed. The patient tolerated the procedure with no immediately apparent complication. The patient was taken to recovery in stable condition.    FOLLOW UP: RECOMMEND high fiber diet, will call with pathology results.  Likely 3-year follow-up due to advanced adenoma in the ascending colon.    Pako Seay MD on 2/24/2022 at 8:19 AM

## 2022-02-28 LAB
PATH REPORT.COMMENTS IMP SPEC: NORMAL
PATH REPORT.FINAL DX SPEC: NORMAL
PATH REPORT.RELEVANT HX SPEC: NORMAL
PHOTO IMAGE: NORMAL

## 2023-02-23 DIAGNOSIS — I73.9 PERIPHERAL VASCULAR DISEASE (H): ICD-10-CM

## 2023-02-24 RX ORDER — ATORVASTATIN CALCIUM 40 MG/1
TABLET, FILM COATED ORAL
Qty: 90 TABLET | Refills: 3 | Status: SHIPPED | OUTPATIENT
Start: 2023-02-24 | End: 2024-02-16

## 2023-02-24 NOTE — TELEPHONE ENCOUNTER
"[id number]  sent Rx request for the following:      Requested Prescriptions   Pending Prescriptions Disp Refills     atorvastatin (LIPITOR) 40 MG tablet [Pharmacy Med Name: ATORVASTATIN 40MG TABLETS] 90 tablet 3     Sig: TAKE 1 TABLET(40 MG) BY MOUTH DAILY       Statins Protocol Failed - 2/23/2023 10:27 AM        Failed - LDL on file in past 12 months     Recent Labs   Lab Test 12/01/21  1138   LDL 46             Failed - Recent (12 mo) or future (30 days) visit within the authorizing provider's specialty     Patient has had an office visit with the authorizing provider or a provider within the authorizing providers department within the previous 12 mos or has a future within next 30 days. See \"Patient Info\" tab in inbasket, or \"Choose Columns\" in Meds & Orders section of the refill encounter.           Last Prescription Date:   12/20/21  Last Fill Qty/Refills:         90, R-3  Last Office Visit:              12/01/21   Future Office visit:           None    Patient due for an appointment.     Isis Mar RN on 2/24/2023 at 12:15 PM      "

## 2023-02-27 ENCOUNTER — OFFICE VISIT (OUTPATIENT)
Dept: FAMILY MEDICINE | Facility: OTHER | Age: 68
End: 2023-02-27
Attending: NURSE PRACTITIONER
Payer: COMMERCIAL

## 2023-02-27 VITALS
SYSTOLIC BLOOD PRESSURE: 142 MMHG | OXYGEN SATURATION: 97 % | DIASTOLIC BLOOD PRESSURE: 78 MMHG | TEMPERATURE: 98.2 F | RESPIRATION RATE: 20 BRPM | HEIGHT: 69 IN | HEART RATE: 84 BPM | WEIGHT: 182.6 LBS | BODY MASS INDEX: 27.05 KG/M2

## 2023-02-27 DIAGNOSIS — M51.379 DEGENERATION OF LUMBAR OR LUMBOSACRAL INTERVERTEBRAL DISC: ICD-10-CM

## 2023-02-27 DIAGNOSIS — R07.9 CHEST PAIN, UNSPECIFIED TYPE: Primary | ICD-10-CM

## 2023-02-27 DIAGNOSIS — M48.061 SPINAL STENOSIS OF LUMBAR REGION WITHOUT NEUROGENIC CLAUDICATION: ICD-10-CM

## 2023-02-27 DIAGNOSIS — M51.34 DDD (DEGENERATIVE DISC DISEASE), THORACIC: ICD-10-CM

## 2023-02-27 DIAGNOSIS — R07.9 CHEST PAIN, EXERTIONAL: ICD-10-CM

## 2023-02-27 LAB
ALBUMIN SERPL BCG-MCNC: 4.4 G/DL (ref 3.5–5.2)
ALP SERPL-CCNC: 87 U/L (ref 40–129)
ALT SERPL W P-5'-P-CCNC: 29 U/L (ref 10–50)
ANION GAP SERPL CALCULATED.3IONS-SCNC: 7 MMOL/L (ref 7–15)
AST SERPL W P-5'-P-CCNC: 24 U/L (ref 10–50)
BASOPHILS # BLD AUTO: 0.1 10E3/UL (ref 0–0.2)
BASOPHILS NFR BLD AUTO: 1 %
BILIRUB SERPL-MCNC: 0.3 MG/DL
BUN SERPL-MCNC: 15.1 MG/DL (ref 8–23)
CALCIUM SERPL-MCNC: 9.1 MG/DL (ref 8.8–10.2)
CHLORIDE SERPL-SCNC: 102 MMOL/L (ref 98–107)
CREAT SERPL-MCNC: 0.9 MG/DL (ref 0.67–1.17)
DEPRECATED HCO3 PLAS-SCNC: 29 MMOL/L (ref 22–29)
EOSINOPHIL # BLD AUTO: 0.2 10E3/UL (ref 0–0.7)
EOSINOPHIL NFR BLD AUTO: 3 %
ERYTHROCYTE [DISTWIDTH] IN BLOOD BY AUTOMATED COUNT: 13.7 % (ref 10–15)
GFR SERPL CREATININE-BSD FRML MDRD: >90 ML/MIN/1.73M2
GLUCOSE SERPL-MCNC: 96 MG/DL (ref 70–99)
HCT VFR BLD AUTO: 44.9 % (ref 40–53)
HGB BLD-MCNC: 15.6 G/DL (ref 13.3–17.7)
IMM GRANULOCYTES # BLD: 0 10E3/UL
IMM GRANULOCYTES NFR BLD: 0 %
LYMPHOCYTES # BLD AUTO: 2.5 10E3/UL (ref 0.8–5.3)
LYMPHOCYTES NFR BLD AUTO: 33 %
MCH RBC QN AUTO: 31.3 PG (ref 26.5–33)
MCHC RBC AUTO-ENTMCNC: 34.7 G/DL (ref 31.5–36.5)
MCV RBC AUTO: 90 FL (ref 78–100)
MONOCYTES # BLD AUTO: 0.7 10E3/UL (ref 0–1.3)
MONOCYTES NFR BLD AUTO: 9 %
NEUTROPHILS # BLD AUTO: 4 10E3/UL (ref 1.6–8.3)
NEUTROPHILS NFR BLD AUTO: 54 %
NRBC # BLD AUTO: 0 10E3/UL
NRBC BLD AUTO-RTO: 0 /100
PLATELET # BLD AUTO: 164 10E3/UL (ref 150–450)
POTASSIUM SERPL-SCNC: 4.3 MMOL/L (ref 3.4–5.3)
PROT SERPL-MCNC: 7.5 G/DL (ref 6.4–8.3)
RBC # BLD AUTO: 4.99 10E6/UL (ref 4.4–5.9)
SODIUM SERPL-SCNC: 138 MMOL/L (ref 136–145)
TROPONIN T SERPL HS-MCNC: 9 NG/L
WBC # BLD AUTO: 7.5 10E3/UL (ref 4–11)

## 2023-02-27 PROCEDURE — 80053 COMPREHEN METABOLIC PANEL: CPT | Mod: ZL | Performed by: NURSE PRACTITIONER

## 2023-02-27 PROCEDURE — 93010 ELECTROCARDIOGRAM REPORT: CPT | Performed by: INTERNAL MEDICINE

## 2023-02-27 PROCEDURE — 85014 HEMATOCRIT: CPT | Mod: ZL | Performed by: NURSE PRACTITIONER

## 2023-02-27 PROCEDURE — 85041 AUTOMATED RBC COUNT: CPT | Mod: ZL | Performed by: NURSE PRACTITIONER

## 2023-02-27 PROCEDURE — 93005 ELECTROCARDIOGRAM TRACING: CPT | Performed by: NURSE PRACTITIONER

## 2023-02-27 PROCEDURE — 99214 OFFICE O/P EST MOD 30 MIN: CPT | Performed by: NURSE PRACTITIONER

## 2023-02-27 PROCEDURE — 36415 COLL VENOUS BLD VENIPUNCTURE: CPT | Mod: ZL | Performed by: NURSE PRACTITIONER

## 2023-02-27 PROCEDURE — 84484 ASSAY OF TROPONIN QUANT: CPT | Mod: ZL | Performed by: NURSE PRACTITIONER

## 2023-02-27 PROCEDURE — G0463 HOSPITAL OUTPT CLINIC VISIT: HCPCS

## 2023-02-27 RX ORDER — CYCLOBENZAPRINE HCL 10 MG
10 TABLET ORAL 3 TIMES DAILY PRN
Qty: 30 TABLET | Refills: 1 | Status: SHIPPED | OUTPATIENT
Start: 2023-02-27 | End: 2023-04-13

## 2023-02-27 RX ORDER — IBUPROFEN 800 MG/1
800 TABLET, FILM COATED ORAL 3 TIMES DAILY PRN
Qty: 100 TABLET | Refills: 5 | Status: ON HOLD | OUTPATIENT
Start: 2023-02-27 | End: 2024-07-31

## 2023-02-27 ASSESSMENT — PAIN SCALES - GENERAL: PAINLEVEL: SEVERE PAIN (7)

## 2023-02-27 NOTE — NURSING NOTE
Patient presents today for chronic back pain. Patient complains his pain has worsened over the last 6 weeks.    Medication Reconciliation Complete    Miriam Auguste LPN  2/27/2023 11:08 AM

## 2023-02-28 LAB
ATRIAL RATE - MUSE: 73 BPM
DIASTOLIC BLOOD PRESSURE - MUSE: NORMAL MMHG
INTERPRETATION ECG - MUSE: NORMAL
P AXIS - MUSE: 56 DEGREES
PR INTERVAL - MUSE: 166 MS
QRS DURATION - MUSE: 104 MS
QT - MUSE: 398 MS
QTC - MUSE: 438 MS
R AXIS - MUSE: 47 DEGREES
SYSTOLIC BLOOD PRESSURE - MUSE: NORMAL MMHG
T AXIS - MUSE: 29 DEGREES
VENTRICULAR RATE- MUSE: 73 BPM

## 2023-02-28 ASSESSMENT — ENCOUNTER SYMPTOMS: BACK PAIN: 1
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
## 2023-03-01 ENCOUNTER — TELEPHONE (OUTPATIENT)
Dept: CARDIOLOGY | Facility: OTHER | Age: 68
End: 2023-03-01
Payer: COMMERCIAL

## 2023-03-01 NOTE — TELEPHONE ENCOUNTER
Patient verified .  Reminder call for stress test with instructions given.  Patient stated he thinks he will be able to do treadmill as he reports more of his back pain is in his upper back.  Instructed on TM speed and incline.   Patient verbalized understanding.

## 2023-03-03 ENCOUNTER — TELEPHONE (OUTPATIENT)
Dept: CARDIOLOGY | Facility: OTHER | Age: 68
End: 2023-03-03
Payer: COMMERCIAL

## 2023-03-03 NOTE — TELEPHONE ENCOUNTER
Patient verified . Called to notify stress test changed, instructions given. Emphasis on no caffeine 12 hours prior to the test.  Patient verbalized understanding.

## 2023-03-03 NOTE — TELEPHONE ENCOUNTER
Message left patient to return call to [phone] regarding stress test being changed from GXT to NM treadmill.

## 2023-03-14 ENCOUNTER — HOSPITAL ENCOUNTER (OUTPATIENT)
Dept: MRI IMAGING | Facility: OTHER | Age: 68
Discharge: HOME | End: 2023-03-14
Attending: NURSE PRACTITIONER | Admitting: NURSE PRACTITIONER
Payer: MEDICARE

## 2023-03-14 DIAGNOSIS — R07.9 CHEST PAIN, UNSPECIFIED TYPE: ICD-10-CM

## 2023-03-14 DIAGNOSIS — M51.34 DDD (DEGENERATIVE DISC DISEASE), THORACIC: ICD-10-CM

## 2023-03-14 PROCEDURE — G1010 CDSM STANSON: HCPCS

## 2023-04-13 ENCOUNTER — OFFICE VISIT (OUTPATIENT)
Dept: FAMILY MEDICINE | Facility: OTHER | Age: 68
End: 2023-04-13
Attending: FAMILY MEDICINE
Payer: MEDICARE

## 2023-04-13 VITALS
WEIGHT: 179.8 LBS | OXYGEN SATURATION: 99 % | SYSTOLIC BLOOD PRESSURE: 128 MMHG | TEMPERATURE: 97 F | HEART RATE: 80 BPM | RESPIRATION RATE: 16 BRPM | BODY MASS INDEX: 26.55 KG/M2 | DIASTOLIC BLOOD PRESSURE: 82 MMHG

## 2023-04-13 DIAGNOSIS — M54.6 CHRONIC MIDLINE THORACIC BACK PAIN: Primary | ICD-10-CM

## 2023-04-13 DIAGNOSIS — G89.29 CHRONIC MIDLINE THORACIC BACK PAIN: Primary | ICD-10-CM

## 2023-04-13 LAB — CREAT UR-MCNC: 35 MG/DL

## 2023-04-13 PROCEDURE — 99214 OFFICE O/P EST MOD 30 MIN: CPT | Performed by: FAMILY MEDICINE

## 2023-04-13 PROCEDURE — 80363 OPIOIDS & OPIATE ANALOGS 3/4: CPT | Mod: ZL | Performed by: FAMILY MEDICINE

## 2023-04-13 PROCEDURE — G0463 HOSPITAL OUTPT CLINIC VISIT: HCPCS

## 2023-04-13 PROCEDURE — 80355 GABAPENTIN NON-BLOOD: CPT | Mod: ZL | Performed by: FAMILY MEDICINE

## 2023-04-13 RX ORDER — OXYCODONE AND ACETAMINOPHEN 5; 325 MG/1; MG/1
1 TABLET ORAL EVERY 6 HOURS PRN
Qty: 30 TABLET | Refills: 0 | Status: SHIPPED | OUTPATIENT
Start: 2023-04-13 | End: 2024-02-16

## 2023-04-13 ASSESSMENT — ENCOUNTER SYMPTOMS: BACK PAIN: 1

## 2023-04-13 ASSESSMENT — PAIN SCALES - GENERAL: PAINLEVEL: MODERATE PAIN (5)

## 2023-04-13 NOTE — NURSING NOTE
"Chief Complaint   Patient presents with     Back Pain     Between shoulder blades     Patient is here for upper back pain. He states he knows it is arthritis. Wondering about other options other than Ibuprofen and Cyclobenzaprine.     Initial /82   Pulse 80   Temp 97  F (36.1  C) (Tympanic)   Resp 16   Wt 81.6 kg (179 lb 12.8 oz)   SpO2 99%   BMI 26.55 kg/m   Estimated body mass index is 26.55 kg/m  as calculated from the following:    Height as of 2/27/23: 1.753 m (5' 9\").    Weight as of this encounter: 81.6 kg (179 lb 12.8 oz).  Medication Reconciliation: complete    Brisa Lovett CMA       FOOD SECURITY SCREENING QUESTIONS:    The next two questions are to help us understand your food security.  If you are feeling you need any assistance in this area, we have resources available to support you today.    Hunger Vital Signs:  Within the past 12 months we worried whether our food would run out before we got money to buy more. Never  Within the past 12 months the food we bought just didn't last and we didn't have money to get more. Never  Brisa Lovett CMA,LPN on 4/13/2023 at 10:38 AM      "

## 2023-04-13 NOTE — LETTER
Modesto Lehman  [address]   [facility] [address]    4/17/2023      Dear Mr. Lehman,      I wanted to let you know about your recent labs.  Your urine drug screen returned normal as expected.      Please contact us at [phone] with any questions or concerns that you have.    I have attached your lab results for your records.        Sincerely,         Laurie Campos MD

## 2023-04-13 NOTE — LETTER
Opioid / Opioid Plus Controlled Substance Agreement    This is an agreement between you and your provider about the safe and appropriate use of controlled substance/opioids prescribed by your care team. Controlled substances are medicines that can cause physical and mental dependence (abuse).    There are strict laws about having and using these medicines. We here at [facility] are committing to working with you in your efforts to get better. To support you in this work, we ll help you schedule regular office appointments for medicine refills. If we must cancel or change your appointment for any reason, we ll make sure you have enough medicine to last until your next appointment.     As a Provider, I will:  Listen carefully to your concerns and treat you with respect.   Recommend a treatment plan that I believe is in your best interest. This plan may involve therapies other than opioid pain medication.   Talk with you often about the possible benefits, and the risk of harm of any medicine that we prescribe for you.   Provide a plan on how to taper (discontinue or go off) using this medicine if the decision is made to stop its use.    As a Patient, I understand that opioid(s):   Are a controlled substance prescribed by my care team to help me function or work and manage my condition(s).   Are strong medicines and can cause serious side effects such as:  Drowsiness, which can seriously affect my driving ability  A lower breathing rate, enough to cause death  Harm to my thinking ability   Depression   Abuse of and addiction to this medicine  Need to be taken exactly as prescribed. Combining opioids with certain medicines or chemicals (such as illegal drugs, sedatives, sleeping pills, and benzodiazepines) can be dangerous or even fatal. If I stop opioids suddenly, I may have severe withdrawal symptoms.  Do not work for all types of pain nor for all patients. If they re not helpful, I may be asked to stop  them.        The risks, benefits and side effects of these medicine(s) were explained to me. I agree that:  I will take part in other treatments as advised by my care team. This may be psychiatry or counseling, physical therapy, behavioral therapy, group treatment or a referral to a specialist.     I will keep all my appointments. I understand that this is part of the monitoring of opioids. My care team may require an office visit for EVERY opioid/controlled substance refill. If I miss appointments or don t follow instructions, my care team may stop my medicine.    I will take my medicines as prescribed. I will not change the dose or schedule unless my care team tells me to. There will be no refills if I run out early.     I may be asked to come to the clinic and complete a urine drug test or complete a pill count at any time. If I don t give a urine sample or participate in a pill count, the care team may stop my medicine.    I will only receive prescriptions from this clinic for chronic pain. If I am treated by another provider for acute pain issues, I will tell them that I am taking opioid pain medication for chronic pain and that I have a treatment agreement with this provider. I will inform my [facility] care team within one business day if I am given a prescription for any pain medication by another healthcare provider. My [facility] care team can contact other providers and pharmacists about my use of any medicines.    It is up to me to make sure that I don t run out of my medicines on weekends or holidays. If my care team is willing to refill my opioid prescription without a visit, I must request refills only during office hours. Refills may take up to 3 business days to process. I will use one pharmacy to fill all my opioid and other controlled substance prescriptions. I will notify the clinic about any changes to my insurance or medication availability.    I am responsible for my  prescriptions. If the medicine/prescription is lost, stolen or destroyed, it will not be replaced. I also agree not to share controlled substance medicines with anyone.    I am aware I should not use any illegal or recreational drugs. I agree not to drink alcohol unless my care team says I can.       If I enroll in the [place] [facility] program, I will tell my care team prior to my next refill.     I will tell my care team right away if I become pregnant, have a new medical problem treated outside of my regular clinic, or have a change in my medications.    I understand that this medicine can affect my thinking, judgment and reaction time. Alcohol and drugs affect the brain and body, which can affect the safety of my driving. Being under the influence of alcohol or drugs can affect my decision-making, behaviors, personal safety, and the safety of others. Driving while impaired (DWI) can occur if a person is driving, operating, or in physical control of a car, motorcycle, boat, snowmobile, ATV, motorbike, off-road vehicle, or any other motor vehicle ([place] Statute 169A.20). I understand the risk if I choose to drive or operate any vehicle or machinery.    I understand that if I do not follow any of the conditions above, my prescriptions or treatment may be stopped or changed.          Opioids  What You Need to Know    What are opioids?   Opioids are pain medicines that must be prescribed by a doctor. They are also known as narcotics.     Examples are:   morphine (MS Contin, Allison)  oxycodone (Oxycontin)  oxycodone and acetaminophen (Percocet)  hydrocodone and acetaminophen (Vicodin, Norco)   fentanyl patch (Duragesic)   hydromorphone (Dilaudid)   methadone  codeine (Tylenol #3)     What do opioids do well?   Opioids are best for severe short-term pain such as after a surgery or injury. They may work well for cancer pain. They may help some people with long-lasting (chronic) pain.     What do opioids NOT do  well?   Opioids never get rid of pain entirely, and they don t work well for most patients with chronic pain. Opioids don t reduce swelling, one of the causes of pain.                                    Other ways to manage chronic pain and improve function include:     Treat the health problem that may be causing pain  Anti-inflammation medicines, which reduce swelling and tenderness, such as ibuprofen (Advil, Motrin) or naproxen (Aleve)  Acetaminophen (Tylenol)  Antidepressants and anti-seizure medicines, especially for nerve pain  Topical treatments such as patches or creams  Injections or nerve blocks  Chiropractic or osteopathic treatment  Acupuncture, massage, deep breathing, meditation, visual imagery, aromatherapy  Use heat or ice at the pain site  Physical therapy   Exercise  Stop smoking  Take part in therapy       Risks and side effects     Talk to your doctor before you start or decide to keep taking opioids. Possible side effects include:    Lowering your breathing rate enough to cause death  Overdose, including death, especially if taking higher than prescribed doses  Worse depression symptoms; less pleasure in things you usually enjoy  Feeling tired or sluggish  Slower thoughts or cloudy thinking  Being more sensitive to pain over time; pain is harder to control  Trouble sleeping or restless sleep  Changes in hormone levels (for example, less testosterone)  Changes in sex drive or ability to have sex  Constipation  Unsafe driving  Itching and sweating  Dizziness  Nausea, throwing up and dry mouth    What else should I know about opioids?    Opioids may lead to dependence, tolerance, or addiction.    Dependence means that if you stop or reduce the medicine too quickly, you will have withdrawal symptoms. These include loose poop (diarrhea), jitters, flu-like symptoms, nervousness and tremors. Dependence is not the same as addiction.                     Tolerance means needing higher doses over time to  get the same effect. This may increase the chance of serious side effects.    Addiction is when people improperly use a substance that harms their body, their mind or their relations with others. Use of opiates can cause a relapse of addiction if you have a history of drug or alcohol abuse.    People who have used opioids for a long time may have a lower quality of life, worse depression, higher levels of pain and more visits to doctors.    You can overdose on opioids. Take these steps to lower your risk of overdose:    Recognize the signs:  Signs of overdose include decrease or loss of consciousness (blackout), slowed breathing, trouble waking up and blue lips. If someone is worried about overdose, they should call 911.    Talk to your doctor about Narcan (naloxone).   If you are at risk for overdose, you may be given a prescription for Narcan. This medicine very quickly reverses the effects of opioids.   If you overdose, a friend or family member can give you Narcan while waiting for the ambulance. They need to know the signs of overdose and how to give Narcan.     Don't use alcohol or street drugs.   Taking them with opioids can cause death.    Do not take any of these medicines unless your doctor says it s OK. Taking these with opioids can cause death:  Benzodiazepines, such as lorazepam (Ativan), alprazolam (Xanax) or diazepam (Valium)  Muscle relaxers, such as cyclobenzaprine (Flexeril)  Sleeping pills like zolpidem (Ambien)   Other opioids      How to keep you and other people safe while taking opioids:    Never share your opioids with others.  Opioid medicines are regulated by the Drug Enforcement Agency (MONO). Selling or sharing medications is a criminal act.    2. Be sure to store opioids in a secure place, locked up if possible. Young children can easily swallow them and overdose.    3. When you are traveling with your medicines, keep them in the original bottles. If you use a pill box, be sure you also  carry a copy of your medicine list from your clinic or pharmacy.    4. Safe disposal of opioids    Most pharmacies have places to get rid of medicine, called disposal kiosks. Medicine disposal options are also available in every [facility]. Search your county and  medication disposal  to find more options. You can find more details at:  https://www.pca.[facility].[email or web address]./living-green/managing-unwanted-medications     I agree that my provider, clinic care team, and pharmacy may work with any city, state or federal law enforcement agency that investigates the misuse, sale, or other diversion of my controlled medicine. I will allow my provider to discuss my care with, or share a copy of, this agreement with any other treating provider, pharmacy or emergency room where I receive care.    I have read this agreement and have asked questions about anything I did not understand.    _______________________________________________________  Patient Signature - Modesto Lehman _____________________                   Date     _______________________________________________________  Provider Signature - Laurie Campos MD   _____________________                   Date     _______________________________________________________  Witness Signature (required if provider not present while patient signing)   _____________________                   Date

## 2023-04-13 NOTE — PROGRESS NOTES
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  In grade school, he had fallen off of a roof a couple of times.  Hurts to move, walk more than 2 blocks.  Stress sometimes brings it on.  He feels that this is from his spine.  Had recently been with her hospital with his wife when she had a kidney transplant.  He really didn't sleep for 4 nights.  His back pain is not any different, it hurst him all the time.  If he presses on a hard area on the wall on his back, gives him some relief.  Feels knots in his back.  He has tried muscle relaxers and ibuprofen in the past.  No radicular pain in legs or weakness.  Has tried Physical Therapy in the past, which has not helped him.  He is very active.  He has tried muscle relaxers.  He has a hard time sleeping due to the pain in his back.  He has been using flexeril, but not getting consistent relief.  He often cannot sleep because of this pain.  He had an MRI of his thoracic spine on 3/14/23.  It showed mild multilevel spondylosis without high grade spinal canal or foraminal narrowing.  No lower extremity weakness.  He has been referred for a stress test, but is adamant that this is not due to his heart.  This has been going on for many years and feels that it is an issue with his back itself.  He has been frustrated coming here many times and never has anything offered that actually improves his symptoms.  He has pain directly over his thoracic spine, but also has some pain in the muscles of his upper back as well.        4/13/2023    10:32 AM   Additional Questions   Roomed by CLAUDIA Ledezma   Accompanied by Self     Back Pain     History of Present Illness       Back Pain:  He presents for follow up of back pain. Patient's back pain is a chronic problem.  Location of back pain:  Right middle of back, right upper back, right side of neck and right  shoulder  Description of back pain: fullness, gnawing, sharp, shooting and stabbing  Back pain spreads: right shoulder, left shoulder, right side of neck and left side of neck    Since patient first noticed back pain, pain is: rapidly worsening  Does back pain interfere with his job:  Not applicable      He eats 0-1 servings of fruits and vegetables daily.He consumes 0 sweetened beverage(s) daily.He exercises with enough effort to increase his heart rate 30 to 60 minutes per day.  He exercises with enough effort to increase his heart rate 4 days per week.   He is taking medications regularly.               Review of Systems   Musculoskeletal: Positive for back pain.      Constitutional, HEENT, cardiovascular, pulmonary, GI, , musculoskeletal, neuro, skin, endocrine and psych systems are negative, except as otherwise noted.      Objective    /82   Pulse 80   Temp 97  F (36.1  C) (Tympanic)   Resp 16   Wt 81.6 kg (179 lb 12.8 oz)   SpO2 99%   BMI 26.55 kg/m    Body mass index is 26.55 kg/m .  Physical Exam  Constitutional:       Appearance: Normal appearance.   HENT:      Head: Normocephalic.   Eyes:      Extraocular Movements: Extraocular movements intact.      Pupils: Pupils are equal, round, and reactive to light.   Cardiovascular:      Rate and Rhythm: Normal rate and regular rhythm.      Heart sounds: Normal heart sounds. No murmur heard.  Pulmonary:      Effort: Pulmonary effort is normal.      Breath sounds: Normal breath sounds. No wheezing, rhonchi or rales.   Musculoskeletal:      Cervical back: Normal range of motion and neck supple.      Comments: Pain over the mid thoracic spinous processes and also in the paraspinous muscles to the right of his spine.   Neurological:      Mental Status: He is alert.   Psychiatric:         Mood and Affect: Mood normal.         Behavior: Behavior normal.              Assessment & Plan     ICD-10-CM    1. Chronic midline thoracic back pain  M54.6 Radiology  Referral    G89.29 tiZANidine (ZANAFLEX) 4 MG tablet     Drug Confirmation Panel Urine with Creat     oxyCODONE-acetaminophen (PERCOCET) 5-325 MG tablet     Drug Confirmation Panel Urine with Creat        1.  Referred to radiology to discuss whether there are any spine injections or trigger point injections that might improve his pain.  Trial of tizanidine instead of flexeril to see if this provides any additional relief.  He is given a prescription for percocet 5/325 mg #30, which he plans to just use when he is in extreme pain and nothing else is working.  Urine drug screen and contract completed.  Discussed that he needs to be seen in person for refill of this medication and that there is addictive potential.    PDMP Review       Value Time User    State PDMP site checked  Yes 4/13/2023  5:08 PM Laurie Campos MD             Encouraged regular exercise.     30 minutes spent in review of chart, interviewing patient, examining patient, discussing plan, reviewing results and completing note.       No follow-ups on file.    Laurie Campos MD  [facility]

## 2024-01-10 ENCOUNTER — PATIENT OUTREACH (OUTPATIENT)
Dept: FAMILY MEDICINE | Facility: OTHER | Age: 69
End: 2024-01-10
Payer: COMMERCIAL

## 2024-01-10 NOTE — TELEPHONE ENCOUNTER
Patient Quality Outreach    Patient is due for the following:   Physical Annual Wellness Visit    Next Steps:   Schedule a Annual Wellness Visit    Type of outreach:    Message sent to outreach to schedule annual wellness visit.    Questions for provider review:    None           Ruby Coppola RN

## 2024-02-16 ENCOUNTER — OFFICE VISIT (OUTPATIENT)
Dept: FAMILY MEDICINE | Facility: OTHER | Age: 69
End: 2024-02-16
Attending: FAMILY MEDICINE
Payer: MEDICARE

## 2024-02-16 VITALS
OXYGEN SATURATION: 97 % | BODY MASS INDEX: 26.03 KG/M2 | RESPIRATION RATE: 16 BRPM | HEIGHT: 70 IN | HEART RATE: 100 BPM | WEIGHT: 181.8 LBS | DIASTOLIC BLOOD PRESSURE: 86 MMHG | TEMPERATURE: 97.5 F | SYSTOLIC BLOOD PRESSURE: 126 MMHG

## 2024-02-16 DIAGNOSIS — Z29.11 NEED FOR VACCINATION AGAINST RESPIRATORY SYNCYTIAL VIRUS: ICD-10-CM

## 2024-02-16 DIAGNOSIS — I73.9 PERIPHERAL VASCULAR DISEASE (H): ICD-10-CM

## 2024-02-16 DIAGNOSIS — Z13.6 SCREENING FOR AAA (ABDOMINAL AORTIC ANEURYSM): ICD-10-CM

## 2024-02-16 DIAGNOSIS — M47.814 SPONDYLOSIS OF THORACIC REGION WITHOUT MYELOPATHY OR RADICULOPATHY: ICD-10-CM

## 2024-02-16 DIAGNOSIS — R07.9 EXERTIONAL CHEST PAIN: ICD-10-CM

## 2024-02-16 DIAGNOSIS — E78.00 HYPERCHOLESTEROLEMIA: ICD-10-CM

## 2024-02-16 DIAGNOSIS — Z00.00 ENCOUNTER FOR MEDICARE ANNUAL WELLNESS EXAM: Primary | ICD-10-CM

## 2024-02-16 DIAGNOSIS — Z87.891 HISTORY OF SMOKING: ICD-10-CM

## 2024-02-16 DIAGNOSIS — Z12.5 SCREENING FOR PROSTATE CANCER: ICD-10-CM

## 2024-02-16 DIAGNOSIS — Z11.59 NEED FOR HEPATITIS C SCREENING TEST: ICD-10-CM

## 2024-02-16 DIAGNOSIS — Z23 NEED FOR SHINGLES VACCINE: ICD-10-CM

## 2024-02-16 LAB
ALBUMIN SERPL BCG-MCNC: 4.7 G/DL (ref 3.5–5.2)
ALP SERPL-CCNC: 108 U/L (ref 40–150)
ALT SERPL W P-5'-P-CCNC: 28 U/L (ref 0–70)
ANION GAP SERPL CALCULATED.3IONS-SCNC: 11 MMOL/L (ref 7–15)
AST SERPL W P-5'-P-CCNC: 27 U/L (ref 0–45)
BILIRUB SERPL-MCNC: 0.4 MG/DL
BUN SERPL-MCNC: 12.4 MG/DL (ref 8–23)
CALCIUM SERPL-MCNC: 9.6 MG/DL (ref 8.8–10.2)
CHLORIDE SERPL-SCNC: 101 MMOL/L (ref 98–107)
CHOLEST SERPL-MCNC: 120 MG/DL
CREAT SERPL-MCNC: 0.79 MG/DL (ref 0.67–1.17)
DEPRECATED HCO3 PLAS-SCNC: 27 MMOL/L (ref 22–29)
EGFRCR SERPLBLD CKD-EPI 2021: >90 ML/MIN/1.73M2
FASTING STATUS PATIENT QL REPORTED: NORMAL
GLUCOSE SERPL-MCNC: 118 MG/DL (ref 70–99)
HCV AB SERPL QL IA: NONREACTIVE
HDLC SERPL-MCNC: 59 MG/DL
LDLC SERPL CALC-MCNC: 42 MG/DL
NONHDLC SERPL-MCNC: 61 MG/DL
POTASSIUM SERPL-SCNC: 4.4 MMOL/L (ref 3.4–5.3)
PROT SERPL-MCNC: 8 G/DL (ref 6.4–8.3)
PSA SERPL DL<=0.01 NG/ML-MCNC: 3.03 NG/ML (ref 0–4.5)
SODIUM SERPL-SCNC: 139 MMOL/L (ref 135–145)
TRIGL SERPL-MCNC: 94 MG/DL

## 2024-02-16 PROCEDURE — 93005 ELECTROCARDIOGRAM TRACING: CPT | Performed by: FAMILY MEDICINE

## 2024-02-16 PROCEDURE — G0463 HOSPITAL OUTPT CLINIC VISIT: HCPCS | Mod: 25

## 2024-02-16 PROCEDURE — 80061 LIPID PANEL: CPT | Mod: ZL | Performed by: FAMILY MEDICINE

## 2024-02-16 PROCEDURE — 84155 ASSAY OF PROTEIN SERUM: CPT | Mod: ZL | Performed by: FAMILY MEDICINE

## 2024-02-16 PROCEDURE — 99214 OFFICE O/P EST MOD 30 MIN: CPT | Mod: 25 | Performed by: FAMILY MEDICINE

## 2024-02-16 PROCEDURE — G0009 ADMIN PNEUMOCOCCAL VACCINE: HCPCS

## 2024-02-16 PROCEDURE — G0008 ADMIN INFLUENZA VIRUS VAC: HCPCS

## 2024-02-16 PROCEDURE — 86803 HEPATITIS C AB TEST: CPT | Mod: ZL | Performed by: FAMILY MEDICINE

## 2024-02-16 PROCEDURE — G0103 PSA SCREENING: HCPCS | Mod: ZL | Performed by: FAMILY MEDICINE

## 2024-02-16 PROCEDURE — G0439 PPPS, SUBSEQ VISIT: HCPCS | Performed by: FAMILY MEDICINE

## 2024-02-16 PROCEDURE — 90677 PCV20 VACCINE IM: CPT

## 2024-02-16 PROCEDURE — 36415 COLL VENOUS BLD VENIPUNCTURE: CPT | Mod: ZL | Performed by: FAMILY MEDICINE

## 2024-02-16 PROCEDURE — 90480 ADMN SARSCOV2 VAC 1/ONLY CMP: CPT

## 2024-02-16 RX ORDER — RESPIRATORY SYNCYTIAL VIRUS VACCINE 120MCG/0.5
0.5 KIT INTRAMUSCULAR ONCE
Qty: 1 EACH | Refills: 0 | Status: SHIPPED | OUTPATIENT
Start: 2024-02-16 | End: 2024-02-16

## 2024-02-16 RX ORDER — GABAPENTIN 100 MG/1
100 CAPSULE ORAL 3 TIMES DAILY
Qty: 270 CAPSULE | Refills: 4 | Status: SHIPPED | OUTPATIENT
Start: 2024-02-16 | End: 2024-07-17

## 2024-02-16 RX ORDER — ATORVASTATIN CALCIUM 40 MG/1
40 TABLET, FILM COATED ORAL DAILY
Qty: 90 TABLET | Refills: 4 | Status: SHIPPED | OUTPATIENT
Start: 2024-02-16 | End: 2024-04-25

## 2024-02-16 SDOH — HEALTH STABILITY: PHYSICAL HEALTH: ON AVERAGE, HOW MANY DAYS PER WEEK DO YOU ENGAGE IN MODERATE TO STRENUOUS EXERCISE (LIKE A BRISK WALK)?: 7 DAYS

## 2024-02-16 SDOH — HEALTH STABILITY: PHYSICAL HEALTH: ON AVERAGE, HOW MANY MINUTES DO YOU ENGAGE IN EXERCISE AT THIS LEVEL?: 60 MIN

## 2024-02-16 ASSESSMENT — PAIN SCALES - GENERAL: PAINLEVEL: MODERATE PAIN (4)

## 2024-02-16 NOTE — LETTER
February 16, 2024      Modesto Lehman     [facility] [id number]        Dear ,    We are writing to inform you of your test results.    Your test results fall within the expected range(s) or remain unchanged from previous results.  Please continue with current treatment plan.    Resulted Orders   Lipid Profile   Result Value Ref Range    Cholesterol 120 <200 mg/dL    Triglycerides 94 <150 mg/dL    Direct Measure HDL 59 >=40 mg/dL    LDL Cholesterol Calculated 42 <=100 mg/dL    Non HDL Cholesterol 61 <130 mg/dL    Patient Fasting > 8hrs? Unknown     Narrative    Cholesterol  Desirable:  <200 mg/dL    Triglycerides  Normal:  Less than 150 mg/dL  Borderline High:  150-199 mg/dL  High:  200-499 mg/dL  Very High:  Greater than or equal to 500 mg/dL    Direct Measure HDL  Female:  Greater than or equal to 50 mg/dL   Male:  Greater than or equal to 40 mg/dL    LDL Cholesterol  Desirable:  <100mg/dL  Above Desirable:  100-129 mg/dL   Borderline High:  130-159 mg/dL   High:  160-189 mg/dL   Very High:  >= 190 mg/dL    Non HDL Cholesterol  Desirable:  130 mg/dL  Above Desirable:  130-159 mg/dL  Borderline High:  160-189 mg/dL  High:  190-219 mg/dL  Very High:  Greater than or equal to 220 mg/dL   PSA Screen GH   Result Value Ref Range    Prostate Specific Antigen Screen 3.03 0.00 - 4.50 ng/mL    Narrative    This result is obtained using the Roche Elecsys total PSA method on the gracie [id number] immunoassay analyzer. Results obtained with different assay methods or kits cannot be used interchangeably.   Comprehensive Metabolic Panel   Result Value Ref Range    Sodium 139 135 - 145 mmol/L      Comment:      Reference intervals for this test were updated on 09/26/2023 to more accurately reflect our healthy population. There may be differences in the flagging of prior results with similar values performed with this method. Interpretation of those prior results can be made in the context of the updated reference  intervals.     Potassium 4.4 3.4 - 5.3 mmol/L    Carbon Dioxide (CO2) 27 22 - 29 mmol/L    Anion Gap 11 7 - 15 mmol/L    Urea Nitrogen 12.4 8.0 - 23.0 mg/dL    Creatinine 0.79 0.67 - 1.17 mg/dL    GFR Estimate >90 >60 mL/min/1.73m2    Calcium 9.6 8.8 - 10.2 mg/dL    Chloride 101 98 - 107 mmol/L    Glucose 118 (H) 70 - 99 mg/dL    Alkaline Phosphatase 108 40 - 150 U/L      Comment:      Reference intervals for this test were updated on 11/14/2023 to more accurately reflect our healthy population. There may be differences in the flagging of prior results with similar values performed with this method. Interpretation of those prior results can be made in the context of the updated reference intervals.    AST 27 0 - 45 U/L      Comment:      Reference intervals for this test were updated on 6/12/2023 to more accurately reflect our healthy population. There may be differences in the flagging of prior results with similar values performed with this method. Interpretation of those prior results can be made in the context of the updated reference intervals.    ALT 28 0 - 70 U/L      Comment:      Reference intervals for this test were updated on 6/12/2023 to more accurately reflect our healthy population. There may be differences in the flagging of prior results with similar values performed with this method. Interpretation of those prior results can be made in the context of the updated reference intervals.      Protein Total 8.0 6.4 - 8.3 g/dL    Albumin 4.7 3.5 - 5.2 g/dL    Bilirubin Total 0.4 <=1.2 mg/dL       If you have any questions or concerns, please call the clinic at the number listed above.       Sincerely,      Ha Keating MD

## 2024-02-16 NOTE — PATIENT INSTRUCTIONS
Preventive Care Advice   This is general advice given by our system to help you stay healthy. However, your care team may have specific advice just for you. Please talk to your care team about your preventive care needs.  Nutrition  Eat 5 or more servings of fruits and vegetables each day.  Try wheat bread, brown rice and whole grain pasta (instead of white bread, rice, and pasta).  Get enough calcium and vitamin D. Check the label on foods and aim for 100% of the RDA (recommended daily allowance).  Lifestyle  Exercise at least 150 minutes each week  (30 minutes a day, 5 days a week).  Do muscle strengthening activities 2 days a week. These help control your weight and prevent disease.  No smoking.  Wear sunscreen to prevent skin cancer.  Have a dental exam and cleaning every 6 months.  Yearly exams  See your health care team every year to talk about:  Any changes in your health.  Any medicines your care team has prescribed.  Preventive care, family planning, and ways to prevent chronic diseases.  Shots (vaccines)   HPV shots (up to age 26), if you've never had them before.  Hepatitis B shots (up to age 59), if you've never had them before.  COVID-19 shot: Get this shot when it's due.  Flu shot: Get a flu shot every year.  Tetanus shot: Get a tetanus shot every 10 years.  Pneumococcal, hepatitis A, and RSV shots: Ask your care team if you need these based on your risk.  Shingles shot (for age 50 and up)  General health tests  Diabetes screening:  Starting at age 35, Get screened for diabetes at least every 3 years.  If you are younger than age 35, ask your care team if you should be screened for diabetes.  Cholesterol test: At age 39, start having a cholesterol test every 5 years, or more often if advised.  Bone density scan (DEXA): At age 50, ask your care team if you should have this scan for osteoporosis (brittle bones).  Hepatitis C: Get tested at least once in your life.  STIs (sexually transmitted  infections)  Before age 24: Ask your care team if you should be screened for STIs.  After age 24: Get screened for STIs if you're at risk. You are at risk for STIs (including HIV) if:  You are sexually active with more than one person.  You don't use condoms every time.  You or a partner was diagnosed with a sexually transmitted infection.  If you are at risk for HIV, ask about PrEP medicine to prevent HIV.  Get tested for HIV at least once in your life, whether you are at risk for HIV or not.  Cancer screening tests  Cervical cancer screening: If you have a cervix, begin getting regular cervical cancer screening tests starting at age 21.  Breast cancer scan (mammogram): If you've ever had breasts, begin having regular mammograms starting at age 40. This is a scan to check for breast cancer.  Colon cancer screening: It is important to start screening for colon cancer at age 45.  Have a colonoscopy test every 10 years (or more often if you're at risk) Or, ask your provider about stool tests like a FIT test every year or Cologuard test every 3 years.  To learn more about your testing options, visit:   https://[email or web address]/073574.pdf.  For help making a decision, visit:   https://bit.ly/lr34828.  Prostate cancer screening test: If you have a prostate, ask your care team if a prostate cancer screening test (PSA) at age 55 is right for you.  Lung cancer screening: If you are a current or former smoker ages 50 to 80, ask your care team if ongoing lung cancer screenings are right for you.  For informational purposes only. Not to replace the advice of your health care provider. Copyright   2023 [facility]. All rights reserved. Clinically reviewed by the [facility]. [employer] [id number] - REV 01/24.    Learning About Stress  What is stress?     Stress is your body's response to a hard situation. Your body can have a physical, emotional, or mental response. Stress is a fact of life for  most people, and it affects everyone differently. What causes stress for you may not be stressful for someone else.  A lot of things can cause stress. You may feel stress when you go on a job interview, take a test, or run a race. This kind of short-term stress is normal and even useful. It can help you if you need to work hard or react quickly. For example, stress can help you finish an important job on time.  Long-term stress is caused by ongoing stressful situations or events. Examples of long-term stress include long-term health problems, ongoing problems at work, or conflicts in your family. Long-term stress can harm your health.  How does stress affect your health?  When you are stressed, your body responds as though you are in danger. It makes hormones that speed up your heart, make you breathe faster, and give you a burst of energy. This is called the fight-or-flight stress response. If the stress is over quickly, your body goes back to normal and no harm is done.  But if stress happens too often or lasts too long, it can have bad effects. Long-term stress can make you more likely to get sick, and it can make symptoms of some diseases worse. If you tense up when you are stressed, you may develop neck, shoulder, or low back pain. Stress is linked to high blood pressure and heart disease.  Stress also harms your emotional health. It can make you loza, tense, or depressed. Your relationships may suffer, and you may not do well at work or school.  What can you do to manage stress?  You can try these things to help manage stress:   Do something active. Exercise or activity can help reduce stress. Walking is a great way to get started. Even everyday activities such as housecleaning or yard work can help.  Try yoga or etelvina chi. These techniques combine exercise and meditation. You may need some training at first to learn them.  Do something you enjoy. For example, listen to music or go to a movie. Practice your  "hobby or do volunteer work.  Meditate. This can help you relax, because you are not worrying about what happened before or what may happen in the future.  Do guided imagery. Imagine yourself in any setting that helps you feel calm. You can use online videos, books, or a teacher to guide you.  Do breathing exercises. For example:  From a standing position, bend forward from the waist with your knees slightly bent. Let your arms dangle close to the floor.  Breathe in slowly and deeply as you return to a standing position. Roll up slowly and lift your head last.  Hold your breath for just a few seconds in the standing position.  Breathe out slowly and bend forward from the waist.  Let your feelings out. Talk, laugh, cry, and express anger when you need to. Talking with supportive friends or family, a counselor, or a wilver leader about your feelings is a healthy way to relieve stress. Avoid discussing your feelings with people who make you feel worse.  Write. It may help to write about things that are bothering you. This helps you find out how much stress you feel and what is causing it. When you know this, you can find better ways to cope.  What can you do to prevent stress?  You might try some of these things to help prevent stress:  Manage your time. This helps you find time to do the things you want and need to do.  Get enough sleep. Your body recovers from the stresses of the day while you are sleeping.  Get support. Your family, friends, and community can make a difference in how you experience stress.  Limit your news feed. Avoid or limit time on social media or news that may make you feel stressed.  Do something active. Exercise or activity can help reduce stress. Walking is a great way to get started.  Where can you learn more?  Go to https://www.healthwise.net/patiented  Enter N032 in the search box to learn more about \"Learning About Stress.\"  Current as of: February 26, 2023               Content Version: " 13.8    0626-2113 [employer].   Care instructions adapted under license by your healthcare professional. If you have questions about a medical condition or this instruction, always ask your healthcare professional. [employer] disclaims any warranty or liability for your use of this information.      Learning About Sleeping Well  What does sleeping well mean?     Sleeping well means getting enough sleep to feel good and stay healthy. How much sleep is enough varies among people.  The number of hours you sleep and how you feel when you wake up are both important. If you do not feel refreshed, you probably need more sleep. Another sign of not getting enough sleep is feeling tired during the day.  Experts recommend that adults get at least 7 or more hours of sleep per day. Children and older adults need more sleep.  Why is getting enough sleep important?  Getting enough quality sleep is a basic part of good health. When your sleep suffers, your physical health, mood, and your thoughts can suffer too. You may find yourself feeling more grumpy or stressed. Not getting enough sleep also can lead to serious problems, including injury, accidents, anxiety, and depression.  What might cause poor sleeping?  Many things can cause sleep problems, including:  Changes to your sleep schedule.  Stress. Stress can be caused by fear about a single event, such as giving a speech. Or you may have ongoing stress, such as worry about work or school.  Depression, anxiety, and other mental or emotional conditions.  Changes in your sleep habits or surroundings. This includes changes that happen where you sleep, such as noise, light, or sleeping in a different bed. It also includes changes in your sleep pattern, such as having jet lag or working a late shift.  Health problems, such as pain, breathing problems, and restless legs syndrome.  Lack of regular exercise.  Using alcohol, nicotine, or caffeine before  "bed.  How can you help yourself?  Here are some tips that may help you sleep more soundly and wake up feeling more refreshed.  Your sleeping area   Use your bedroom only for sleeping and sex. A bit of light reading may help you fall asleep. But if it doesn't, do your reading elsewhere in the house. Try not to use your TV, computer, smartphone, or tablet while you are in bed.  Be sure your bed is big enough to stretch out comfortably, especially if you have a sleep partner.  Keep your bedroom quiet, dark, and cool. Use curtains, blinds, or a sleep mask to block out light. To block out noise, use earplugs, soothing music, or a \"white noise\" machine.  Your evening and bedtime routine   Create a relaxing bedtime routine. You might want to take a warm shower or bath, or listen to soothing music.  Go to bed at the same time every night. And get up at the same time every morning, even if you feel tired.  What to avoid   Limit caffeine (coffee, tea, caffeinated sodas) during the day, and don't have any for at least 6 hours before bedtime.  Avoid drinking alcohol before bedtime. Alcohol can cause you to wake up more often during the night.  Try not to smoke or use tobacco, especially in the evening. Nicotine can keep you awake.  Limit naps during the day, especially close to bedtime.  Avoid lying in bed awake for too long. If you can't fall asleep or if you wake up in the middle of the night and can't get back to sleep within about 20 minutes, get out of bed and go to another room until you feel sleepy.  Avoid taking medicine right before bed that may keep you awake or make you feel hyper or energized. Your doctor can tell you if your medicine may do this and if you can take it earlier in the day.  If you can't sleep   Imagine yourself in a peaceful, pleasant scene. Focus on the details and feelings of being in a place that is relaxing.  Get up and do a quiet or boring activity until you feel sleepy.  Avoid drinking any " "liquids before going to bed to help prevent waking up often to use the bathroom.  Where can you learn more?  Go to https://www.Terarecon.net/patiented  Enter J942 in the search box to learn more about \"Learning About Sleeping Well.\"  Current as of: July 11, 2023               Content Version: 13.8    1449-6624 [employer].   Care instructions adapted under license by your healthcare professional. If you have questions about a medical condition or this instruction, always ask your healthcare professional. [employer] disclaims any warranty or liability for your use of this information.      Learning About Alcohol Use Disorder  What is alcohol use disorder?  Alcohol use disorder means that a person drinks alcohol even though it causes harm to themselves or others. It can range from mild to severe. The more symptoms of this disorder you have, the more severe it may be. People who have it may find it hard to control their use of alcohol.  People who have this disorder may argue with others about how much they're drinking. Their job may be affected because of drinking. They may drink when it's dangerous or illegal, such as when they drive. They also may have a strong need, or craving, to drink. They may feel like they must drink just to get by. Their drinking may increase their risk of getting hurt or being in a car crash.  Over time, drinking too much alcohol may cause health problems. These may include high blood pressure, liver problems, or problems with digestion.  What are the symptoms?  Maybe you've wondered about your alcohol habits or how to tell if your drinking is becoming a problem.  Here are some of the symptoms of alcohol use disorder. You may have it if you have two or more of the following symptoms:  You drink larger amounts of alcohol than you ever meant to. Or you've been drinking for a longer time than you ever meant to.  You can't cut down or control your use. Or you " constantly wish you could cut down.  You spend a lot of time getting or drinking alcohol or recovering from its effects.  You have strong cravings for alcohol.  You can no longer do your main jobs at work, at school, or at home.  You keep drinking alcohol, even though your use hurts your relationships.  You have stopped doing important activities because of your alcohol use.  You drink alcohol in situations where doing so is dangerous.  You keep drinking alcohol even though you know it's causing health problems.  You need more and more alcohol to get the same effect, or you get less effect from the same amount over time. This is called tolerance.  You have uncomfortable symptoms when you stop drinking alcohol or use less. This is called withdrawal.  Alcohol use disorder can range from mild to severe. The more symptoms you have, the more severe the disorder may be.  You might not realize that your drinking is a problem. You might not drink large amounts when you drink. Or you might go for days or weeks between drinking episodes. But even if you don't drink very often, your drinking could still be harmful and put you at risk.  How is alcohol use disorder treated?  Getting help is up to you. But you don't have to do it alone. There are many people and kinds of treatments that can help.  Treatment for alcohol use disorder can include:  Group therapy, one or more types of counseling, and alcohol education.  Medicines that help to:  Reduce withdrawal symptoms and help you safely stop drinking.  Reduce cravings for alcohol.  Support groups. These groups include Alcoholics Anonymous and [facility] (Self-Management and Recovery Training).  Some people are able to stop or cut back on drinking with help from a counselor. People who have moderate to severe alcohol use disorder may need medical treatment. They may need to stay in a hospital or treatment center.  You may have a treatment team to help you. This team may  "include a psychologist or psychiatrist, counselors, doctors, social workers, nurses, and a . A  helps plan and manage your treatment.  Follow-up care is a key part of your treatment and safety. Be sure to make and go to all appointments, and call your doctor if you are having problems. It's also a good idea to know your test results and keep a list of the medicines you take.  Where can you learn more?  Go to https://www.Hybrigenics.net/patiented  Enter H758 in the search box to learn more about \"Learning About Alcohol Use Disorder.\"  Current as of: March 21, 2023               Content Version: 13.8    3947-5717 [employer].   Care instructions adapted under license by your healthcare professional. If you have questions about a medical condition or this instruction, always ask your healthcare professional. [employer] disclaims any warranty or liability for your use of this information.      Substance Use Disorder: Care Instructions  Overview     You can improve your life and health by stopping your use of alcohol or drugs. When you don't drink or use drugs, you may feel and sleep better. You may get along better with your family, friends, and coworkers. There are medicines and programs that can help with substance use disorder.  How can you care for yourself at home?  Here are some ways to help you stay sober and prevent relapse.  If you have been given medicine to help keep you sober or reduce your cravings, be sure to take it exactly as prescribed.  Talk to your doctor about programs that can help you stop using drugs or drinking alcohol.  Do not keep alcohol or drugs in your home.  Plan ahead. Think about what you'll say if other people ask you to drink or use drugs. Try not to spend time with people who drink or use drugs.  Use the time and money spent on drinking or drugs to do something that's important to you.  Preventing a relapse  Have a plan to deal with " relapse. Learn to recognize changes in your thinking that lead you to drink or use drugs. Get help before you start to drink or use drugs again.  Try to stay away from situations, friends, or places that may lead you to drink or use drugs.  If you feel the need to drink alcohol or use drugs again, seek help right away. Call a trusted friend or family member. Some people get support from organizations such as Narcotics Anonymous or [employer] or from treatment facilities.  If you relapse, get help as soon as you can. Some people make a plan with another person that outlines what they want that person to do for them if they relapse. The plan usually includes how to handle the relapse and who to notify in case of relapse.  Don't give up. Remember that a relapse doesn't mean that you have failed. Use the experience to learn the triggers that lead you to drink or use drugs. Then quit again. Recovery is a lifelong process. Many people have several relapses before they are able to quit for good.  Follow-up care is a key part of your treatment and safety. Be sure to make and go to all appointments, and call your doctor if you are having problems. It's also a good idea to know your test results and keep a list of the medicines you take.  When should you call for help?   Call 911  anytime you think you may need emergency care. For example, call if you or someone else:    Has overdosed or has withdrawal signs. Be sure to tell the emergency workers that you are or someone else is using or trying to quit using drugs. Overdose or withdrawal signs may include:  Losing consciousness.  Seizure.  Seeing or hearing things that aren't there (hallucinations).     Is thinking or talking about suicide or harming others.   Where to get help 24 hours a day, 7 days a week   If you or someone you know talks about suicide, self-harm, a mental health crisis, a substance use crisis, or any other kind of emotional distress, get help right  "away. You can:    Call the Suicide and Crisis Lifeline at 988.     Call 8-965-425-TALK (1-345.195.4108).     Text HOME to 163097 to access the Crisis Text Line.   Consider saving these numbers in your phone.  Go to [email or web address] for more information or to chat online.  Call your doctor now or seek immediate medical care if:    You are having withdrawal symptoms. These may include nausea or vomiting, sweating, shakiness, and anxiety.   Watch closely for changes in your health, and be sure to contact your doctor if:    You have a relapse.     You need more help or support to stop.   Where can you learn more?  Go to https://www.Instant BioScan.net/patiented  Enter H573 in the search box to learn more about \"Substance Use Disorder: Care Instructions.\"  Current as of: March 21, 2023               Content Version: 13.8    7549-0570 [employer].   Care instructions adapted under license by your healthcare professional. If you have questions about a medical condition or this instruction, always ask your healthcare professional. [employer] disclaims any warranty or liability for your use of this information.      Chronic Pain: Care Instructions  Your Care Instructions     Chronic pain is pain that lasts a long time (months or even years) and may or may not have a clear cause. It is different from acute pain, which usually does have a clear cause--like an injury or illness--and gets better over time. Chronic pain:  Lasts over time but may vary from day to day.  Does not go away despite efforts to end it.  May disrupt your sleep and lead to fatigue.  May cause depression or anxiety.  May make your muscles tense, causing more pain.  Can disrupt your work, hobbies, home life, and relationships with friends and family.  Chronic pain is a very real condition. It is not just in your head. Treatment can help and usually includes several methods used together, such as medicines, physical therapy, exercise, and " other treatments. Learning how to relax and changing negative thought patterns can also help you cope.  Chronic pain is complex. Taking an active role in your treatment will help you better manage your pain. Tell your doctor if you have trouble dealing with your pain. You may have to try several things before you find what works best for you.  Follow-up care is a key part of your treatment and safety. Be sure to make and go to all appointments, and call your doctor if you are having problems. It's also a good idea to know your test results and keep a list of the medicines you take.  How can you care for yourself at home?  Pace yourself. Break up large jobs into smaller tasks. Save harder tasks for days when you have less pain, or go back and forth between hard tasks and easier ones. Take rest breaks.  Relax, and reduce stress. Relaxation techniques such as deep breathing or meditation can help.  Keep moving. Gentle, daily exercise can help reduce pain over the long run. Try low- or no-impact exercises such as walking, swimming, and stationary biking. Do stretches to stay flexible.  Try heat, cold packs, and massage.  Get enough sleep. Chronic pain can make you tired and drain your energy. Talk with your doctor if you have trouble sleeping because of pain.  Think positive. Your thoughts can affect your pain level. Do things that you enjoy to distract yourself when you have pain instead of focusing on the pain. See a movie, read a book, listen to music, or spend time with a friend.  If you think you are depressed, talk to your doctor about treatment.  Keep a daily pain diary. Record how your moods, thoughts, sleep patterns, activities, and medicine affect your pain. You may find that your pain is worse during or after certain activities or when you are feeling a certain emotion. Having a record of your pain can help you and your doctor find the best ways to treat your pain.  Take pain medicines exactly as  "directed.  If the doctor gave you a prescription medicine for pain, take it as prescribed.  If you are not taking a prescription pain medicine, ask your doctor if you can take an over-the-counter medicine.  Reducing constipation caused by pain medicine  Talk to your doctor about a laxative. If a laxative doesn't work, your doctor may suggest a prescription medicine.  Include fruits, vegetables, beans, and whole grains in your diet each day. These foods are high in fiber.  If your doctor recommends it, get more exercise. Walking is a good choice. Bit by bit, increase the amount you walk every day. Try for at least 30 minutes on most days of the week.  Schedule time each day for a bowel movement. A daily routine may help. Take your time and do not strain when having a bowel movement.  When should you call for help?   Call your doctor now or seek immediate medical care if:    Your pain gets worse or is out of control.     You feel down or blue, or you do not enjoy things like you once did. You may be depressed, which is common in people with chronic pain. Depression can be treated.     You have vomiting or cramps for more than 2 hours.   Watch closely for changes in your health, and be sure to contact your doctor if:    You cannot sleep because of pain.     You are very worried or anxious about your pain.     You have trouble taking your pain medicine.     You have any concerns about your pain medicine.     You have trouble with bowel movements, such as:  No bowel movement in 3 days.  Blood in the anal area, in your stool, or on the toilet paper.  Diarrhea for more than 24 hours.   Where can you learn more?  Go to https://www.ivi.ru.net/patiented  Enter N004 in the search box to learn more about \"Chronic Pain: Care Instructions.\"  Current as of: July 11, 2023               Content Version: 13.8    5964-2213 [employer].   Care instructions adapted under license by your healthcare professional. If you " have questions about a medical condition or this instruction, always ask your healthcare professional. [employer] disclaims any warranty or liability for your use of this information.

## 2024-02-16 NOTE — NURSING NOTE
"Chief Complaint   Patient presents with    Medicare Visit       Initial /86   Pulse 100   Temp 97.5  F (36.4  C) (Tympanic)   Resp 16   Ht 1.778 m (5' 10\")   Wt 82.5 kg (181 lb 12.8 oz)   SpO2 97%   BMI 26.09 kg/m   Estimated body mass index is 26.09 kg/m  as calculated from the following:    Height as of this encounter: 1.778 m (5' 10\").    Weight as of this encounter: 82.5 kg (181 lb 12.8 oz).  Medication Reconciliation: complete          "

## 2024-02-16 NOTE — PROGRESS NOTES
Preventive Care Visit  [facility]  Ha Keating MD, Family Medicine  Feb 16, 2024    Assessment & Plan     (Z00.00) Encounter for Medicare annual wellness exam  (primary encounter diagnosis)  Comment:    Plan:      (Z23) Need for shingles vaccine  Comment: at pharm  Plan: zoster vaccine recombinant adjuvanted         (SHINGRIX) injection             (Z29.11) Need for vaccination against respiratory syncytial virus  Comment:    Plan: respiratory syncytial virus vaccine, bivalent         (ABRYSVO) injection             (Z11.59) Need for hepatitis C screening test  Comment:    Plan: Hepatitis C Screen Reflex to HCV RNA Quant and         Genotype             (E78.00) Hypercholesterolemia  Comment:    Plan: Lipid Profile, Comprehensive Metabolic Panel             (I73.9) Peripheral vascular disease (H24)  Comment: no current symptoms, lifelong statin. 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 See below   Plan: gabapentin (NEURONTIN) 100 MG capsule             (R07.9) Exertional chest pain  Comment: he has known PAD, and smokes. Now with some exertional chest pain radiating into back. Given he has not wanted or followed up on the stress testing, I was able to talk him into a CT angio. Follow up based on these results.   Plan: EKG  "12-lead, tracing only (Same Day), CT         Angiogram coronary artery                     Nicotine/Tobacco Cessation  He reports that he has been smoking cigars and cigarettes. He started smoking about 14 years ago. He has been smoking an average of 0.3 packs per day. He has never used smokeless tobacco.  Nicotine/Tobacco Cessation Plan  Information offered: Patient not interested at this time      BMI  Estimated body mass index is 26.09 kg/m  as calculated from the following:    Height as of this encounter: 1.778 m (5' 10\").    Weight as of this encounter: 82.5 kg (181 lb 12.8 oz).       Counseling  Appropriate preventive services were discussed with this patient, including applicable screening as appropriate for fall prevention, nutrition, physical activity, Tobacco-use cessation, weight loss and cognition.  Checklist reviewing preventive services available has been given to the patient.  Reviewed patient's diet, addressing concerns and/or questions.   Discussed possible causes of fatigue. The patient reports drinking more than one alcoholic drink per day and sometimes engages in binge or excessive drinking. The patient was counseled and given information about possible harmful effects of excessive alcohol intake as well as where to get help for alcohol problems. I have reviewed Opioid Use Disorder and Substance Use Disorder risk factors and made any needed referrals.             No follow-ups on file.    Lola Salvador is a 68 year old, presenting for the following:  Medicare Visit        2/16/2024     8:52 AM   Additional Questions   Roomed by NAIN Chisholm   Accompanied by Self         2/16/2024     8:52 AM   Patient Reported Additional Medications   Patient reports taking the following new medications N/A         Health Care Directive  Patient does not have a Health Care Directive or Living Will: Discussed advance care planning with patient; however, patient declined at this time.    HPI  Lots of stress " "with wife's health problems. She has had a renal transplant with complications. Is getting some right sided upper t spine pain, worse with lifting and carrying things. Had an MRI of this 1 year ago, with multilevel thoracic spondylosis noted. Got flexeril and percocet then, took just one and had significant gi side effects so stopped. Would like \"nerve pills\" but not anything specific. Has never had neurontin.     He has known PAD, but also getting right sided chest pain with exertion, that he feels is coming from his back. Says when he has complained about this in the past he has only had cardiac testing and not really back treatments and feels like nobody has been listening. He had a stress test ordered 1 year ago, and says today he does not want to proceed with any cardiac testing at all. Some right sided chest pain with exertion as well.                   2/16/2024   General Health   How would you rate your overall physical health? (!) FAIR   Feel stress (tense, anxious, or unable to sleep) Very much   (!) STRESS CONCERN      2/16/2024   Nutrition   Diet: Regular (no restrictions)         2/16/2024   Exercise   Days per week of moderate/strenous exercise 7 days   Average minutes spent exercising at this level 60 min         2/16/2024   Social Factors   Worry food won't last until get money to buy more No   Food not last or not have enough money for food? No   Do you have housing?  Yes   Are you worried about losing your housing? No   Lack of transportation? No   Unable to get utilities (heat,electricity)? No         2/16/2024   Fall Risk   Fallen 2 or more times in the past year? No   Trouble with walking or balance? No          2/16/2024   Activities of Daily Living- Home Safety   Needs help with the following daily activites None of the above   Safety concerns in the home None of the above         2/16/2024   Dental   Dentist two times every year? Yes         2/16/2024   Hearing Screening   Hearing concerns? " None of the above         2/16/2024   Driving Risk Screening   Patient/family members have concerns about driving No         2/16/2024   General Alertness/Fatigue Screening   Have you been more tired than usual lately? (!) YES         2/16/2024   Urinary Incontinence Screening   Bothered by leaking urine in past 6 months No          No data to display                  Today's PHQ-2 Score:       2/16/2024     9:01 AM   PHQ-2 ( 1999 [employer])   Q1: Little interest or pleasure in doing things 0   Q2: Feeling down, depressed or hopeless 0   PHQ-2 Score 0   Q1: Little interest or pleasure in doing things Not at all   Q2: Feeling down, depressed or hopeless Not at all   PHQ-2 Score 0           2/16/2024   Substance Use   Alcohol more than 3/day or more than 7/wk Yes   How often do you have a drink containing alcohol 4 or more times a week   How many alcohol drinks on typical day 1 or 2   How often do you have 5+ drinks at one occasion Never   Audit 2/3 Score 0   How often not able to stop drinking once started Never   How often failed to do what normally expected Never   How often needed first drink in am after a heavy drinking session Never   How often feeling of guilt or remorse after drinking Never   How often unable to remember what happened the night before Never   Have you or someone else been injured because of your drinking No   Has anyone been concerned or suggested you cut down on drinking No   TOTAL SCORE - AUDIT 4   Do you have a current opioid prescription? (!) YES   How severe/bad is pain from 1 to 10? 4/10   Do you use any other substances recreationally? (!) CANNABIS PRODUCTS               Social History     Tobacco Use    Smoking status: Every Day     Packs/day: .25     Types: Cigars, Cigarettes     Start date: 11/4/2009    Smokeless tobacco: Never    Tobacco comments:     Quit smoking: quit 6/20/17 (cigarettes); smokes cigars 3 or 4 per day   Vaping Use    Vaping Use: Never used   Substance Use Topics     "Alcohol use: Yes    Drug use: Never       Last PSA: No results found for: \"PSA\"  The ASCVD Risk score (Halley DK, et al., 2019) failed to calculate for the following reasons:    The valid total cholesterol range is 130 to 320 mg/dL            Reviewed and updated as needed this visit by Provider                    Past Medical History:   Diagnosis Date    Allergy status to analgesic agent     No reported medication allergies    Anesthesia of skin     No  problems with anaesthesia    History of recurrent pneumonia     No Comments Provided    Hyperlipidemia     No Comments Provided    Low back pain     pain with bilateral leg and disc injuries.    Personal history of nicotine dependence     Personal history of other medical treatment (CODE)     No blood transfusions     Past Surgical History:   Procedure Laterality Date    ANGIOPLASTY      Right leg stenting and bypass, Left also    COLONOSCOPY  08/23/2010    Q 10yrs.    COLONOSCOPY N/A 02/24/2022    6 tubular adenomas, 5 year follow up, 2/24/2027    EXTRACTION(S) DENTAL      recently removed    OTHER SURGICAL HISTORY      [id number],PREMALIG/BENIGN SKIN LESION EXCISION, removed from chest    OTHER SURGICAL HISTORY      [id number],ANESTHESIA ALERT,No  problems with anaesthesia     Current Outpatient Medications   Medication Sig Dispense Refill    atorvastatin (LIPITOR) 40 MG tablet TAKE 1 TABLET(40 MG) BY MOUTH DAILY 90 tablet 3    EPINEPHrine (ANY BX GENERIC EQUIV) 0.3 MG/0.3ML injection 2-pack Inject 0.3 mLs (0.3 mg) into the muscle once as needed for anaphylaxis 1 each 0    ibuprofen (ADVIL/MOTRIN) 800 MG tablet Take 1 tablet (800 mg) by mouth 3 times daily as needed TAKE 1 TABLET BY MOUTH 3 TIMES DAILY WITH MEALS. 100 tablet 5    tiZANidine (ZANAFLEX) 4 MG tablet Take 1 tablet (4 mg) by mouth 3 times daily 90 tablet 11    aspirin 81 MG tablet Take 81 mg by mouth daily Take 81 mg by mouth once daily. (Patient not taking: Reported on 2/16/2024)       Allergies   Allergen " "Reactions    Amoxicillin Swelling     Severe tongue and throat swelling noted on ED visit 7/13/2020. Required epinephrine, steroids.    Doxycycline Swelling     Hand swelling  , hives and welts     Current providers sharing in care for this patient include:  Patient Care Team:  Ha Keating MD as PCP - General (Family Practice)  Laurie Campos MD as Assigned PCP    The following health maintenance items are reviewed in Epic and correct as of today:  Health Maintenance   Topic Date Due    Pneumococcal Vaccine: 65+ Years (1 of 2 - PCV) Never done    HEPATITIS C SCREENING  Never done    ZOSTER IMMUNIZATION (1 of 2) Never done    LUNG CANCER SCREENING  Never done    RSV VACCINE (Pregnancy & 60+) (1 - 1-dose 60+ series) Never done    MEDICARE ANNUAL WELLNESS VISIT  Never done    AORTIC ANEURYSM SCREENING (SYSTEM ASSIGNED)  Never done    LIPID  12/01/2022    ADVANCE CARE PLANNING  08/08/2023    INFLUENZA VACCINE (1) 09/01/2023    COVID-19 Vaccine (5 - 2023-24 season) 09/01/2023    NICOTINE/TOBACCO CESSATION COUNSELING Q 1 YR  02/27/2024    FALL RISK ASSESSMENT  02/16/2025    GLUCOSE  02/27/2026    DTAP/TDAP/TD IMMUNIZATION (2 - Td or Tdap) 11/04/2026    COLORECTAL CANCER SCREENING  02/24/2027    PHQ-2 (once per calendar year)  Completed    IPV IMMUNIZATION  Aged Out    HPV IMMUNIZATION  Aged Out    MENINGITIS IMMUNIZATION  Aged Out    RSV MONOCLONAL ANTIBODY  Aged Out            Objective    Exam  /86   Pulse 100   Temp 97.5  F (36.4  C) (Tympanic)   Resp 16   Ht 1.778 m (5' 10\")   Wt 82.5 kg (181 lb 12.8 oz)   SpO2 97%   BMI 26.09 kg/m     Estimated body mass index is 26.09 kg/m  as calculated from the following:    Height as of this encounter: 1.778 m (5' 10\").    Weight as of this encounter: 82.5 kg (181 lb 12.8 oz).    Physical Exam  GENERAL: alert and no distress  EYES: Eyes grossly normal to inspection, PERRL and conjunctivae and sclerae normal  HENT: ear canals and TM's normal, nose and " mouth without ulcers or lesions  NECK: no adenopathy, no asymmetry, masses, or scars  RESP: lungs clear to auscultation - no rales, rhonchi or wheezes  CV: regular rate and rhythm, normal S1 S2, no S3 or S4, no murmur, click or rub, no peripheral edema  ABDOMEN: soft, nontender, no hepatosplenomegaly, no masses and bowel sounds normal  MS: no gross musculoskeletal defects noted, no edema  SKIN: no suspicious lesions or rashes  NEURO: Normal strength and tone, mentation intact and speech normal  PSYCH: mentation appears normal, affect normal/bright        2/16/2024   Mini Cog   Clock Draw Score 2 Normal   3 Item Recall 2 objects recalled   Mini Cog Total Score 4       Results for orders placed or performed in visit on 02/16/24   Lipid Profile     Status: None   Result Value Ref Range    Cholesterol 120 <200 mg/dL    Triglycerides 94 <150 mg/dL    Direct Measure HDL 59 >=40 mg/dL    LDL Cholesterol Calculated 42 <=100 mg/dL    Non HDL Cholesterol 61 <130 mg/dL    Patient Fasting > 8hrs? Unknown     Narrative    Cholesterol  Desirable:  <200 mg/dL    Triglycerides  Normal:  Less than 150 mg/dL  Borderline High:  150-199 mg/dL  High:  200-499 mg/dL  Very High:  Greater than or equal to 500 mg/dL    Direct Measure HDL  Female:  Greater than or equal to 50 mg/dL   Male:  Greater than or equal to 40 mg/dL    LDL Cholesterol  Desirable:  <100mg/dL  Above Desirable:  100-129 mg/dL   Borderline High:  130-159 mg/dL   High:  160-189 mg/dL   Very High:  >= 190 mg/dL    Non HDL Cholesterol  Desirable:  130 mg/dL  Above Desirable:  130-159 mg/dL  Borderline High:  160-189 mg/dL  High:  190-219 mg/dL  Very High:  Greater than or equal to 220 mg/dL   PSA Screen GH     Status: Normal   Result Value Ref Range    Prostate Specific Antigen Screen 3.03 0.00 - 4.50 ng/mL    Narrative    This result is obtained using the Roche Elecsys total PSA method on the gracie [id number] immunoassay analyzer. Results obtained with different assay methods or  kits cannot be used interchangeably.   Comprehensive Metabolic Panel     Status: Abnormal   Result Value Ref Range    Sodium 139 135 - 145 mmol/L    Potassium 4.4 3.4 - 5.3 mmol/L    Carbon Dioxide (CO2) 27 22 - 29 mmol/L    Anion Gap 11 7 - 15 mmol/L    Urea Nitrogen 12.4 8.0 - 23.0 mg/dL    Creatinine 0.79 0.67 - 1.17 mg/dL    GFR Estimate >90 >60 mL/min/1.73m2    Calcium 9.6 8.8 - 10.2 mg/dL    Chloride 101 98 - 107 mmol/L    Glucose 118 (H) 70 - 99 mg/dL    Alkaline Phosphatase 108 40 - 150 U/L    AST 27 0 - 45 U/L    ALT 28 0 - 70 U/L    Protein Total 8.0 6.4 - 8.3 g/dL    Albumin 4.7 3.5 - 5.2 g/dL    Bilirubin Total 0.4 <=1.2 mg/dL          Signed Electronically by: Ha Keating MD

## 2024-02-21 ENCOUNTER — PATIENT OUTREACH (OUTPATIENT)
Dept: GASTROENTEROLOGY | Facility: CLINIC | Age: 69
End: 2024-02-21
Payer: COMMERCIAL

## 2024-03-01 ENCOUNTER — TELEPHONE (OUTPATIENT)
Dept: CARDIOLOGY | Facility: OTHER | Age: 69
End: 2024-03-01
Payer: COMMERCIAL

## 2024-03-05 ENCOUNTER — HOSPITAL ENCOUNTER (OUTPATIENT)
Dept: CT IMAGING | Facility: OTHER | Age: 69
Discharge: HOME | End: 2024-03-05
Attending: FAMILY MEDICINE | Admitting: FAMILY MEDICINE
Payer: MEDICARE

## 2024-03-05 VITALS
RESPIRATION RATE: 7 BRPM | HEART RATE: 70 BPM | SYSTOLIC BLOOD PRESSURE: 126 MMHG | OXYGEN SATURATION: 97 % | DIASTOLIC BLOOD PRESSURE: 79 MMHG

## 2024-03-05 DIAGNOSIS — R07.9 EXERTIONAL CHEST PAIN: ICD-10-CM

## 2024-03-05 PROCEDURE — 75571 CT HRT W/O DYE W/CA TEST: CPT | Mod: ME,GZ

## 2024-03-05 PROCEDURE — 250N000013 HC RX MED GY IP 250 OP 250 PS 637: Performed by: FAMILY MEDICINE

## 2024-03-05 RX ORDER — METHYLPREDNISOLONE SODIUM SUCCINATE 125 MG/2ML
125 INJECTION, POWDER, LYOPHILIZED, FOR SOLUTION INTRAMUSCULAR; INTRAVENOUS
Status: DISCONTINUED | OUTPATIENT
Start: 2024-03-05 | End: 2024-03-06 | Stop reason: HOSPADM

## 2024-03-05 RX ORDER — IOPAMIDOL 755 MG/ML
100 INJECTION, SOLUTION INTRAVASCULAR ONCE
Status: DISCONTINUED | OUTPATIENT
Start: 2024-03-05 | End: 2024-03-06 | Stop reason: HOSPADM

## 2024-03-05 RX ORDER — DIPHENHYDRAMINE HCL 25 MG
25 CAPSULE ORAL
Status: DISCONTINUED | OUTPATIENT
Start: 2024-03-05 | End: 2024-03-06 | Stop reason: HOSPADM

## 2024-03-05 RX ORDER — METOPROLOL TARTRATE 25 MG/1
25-100 TABLET, FILM COATED ORAL
Status: COMPLETED | OUTPATIENT
Start: 2024-03-05 | End: 2024-03-05

## 2024-03-05 RX ORDER — DIPHENHYDRAMINE HYDROCHLORIDE 50 MG/ML
25-50 INJECTION INTRAMUSCULAR; INTRAVENOUS
Status: DISCONTINUED | OUTPATIENT
Start: 2024-03-05 | End: 2024-03-06 | Stop reason: HOSPADM

## 2024-03-05 RX ORDER — NITROGLYCERIN 0.4 MG/1
0.4 TABLET SUBLINGUAL
Status: DISCONTINUED | OUTPATIENT
Start: 2024-03-05 | End: 2024-03-06 | Stop reason: HOSPADM

## 2024-03-05 RX ORDER — METOPROLOL TARTRATE 1 MG/ML
5-15 INJECTION, SOLUTION INTRAVENOUS
Status: DISCONTINUED | OUTPATIENT
Start: 2024-03-05 | End: 2024-03-06 | Stop reason: HOSPADM

## 2024-03-05 RX ORDER — ONDANSETRON 2 MG/ML
4 INJECTION INTRAMUSCULAR; INTRAVENOUS
Status: DISCONTINUED | OUTPATIENT
Start: 2024-03-05 | End: 2024-03-06 | Stop reason: HOSPADM

## 2024-03-05 RX ORDER — ACYCLOVIR 200 MG/1
0-1 CAPSULE ORAL
Status: DISCONTINUED | OUTPATIENT
Start: 2024-03-05 | End: 2024-03-06 | Stop reason: HOSPADM

## 2024-03-05 RX ADMIN — METOPROLOL TARTRATE 100 MG: 100 TABLET, FILM COATED ORAL at 12:03

## 2024-03-05 NOTE — PROGRESS NOTES
11:55:   Patient arrived for a CCTA. Diagnosis of exertional chest pain. They were connected to a cardiac monitor and vital signs were obtained. The patient's heart rate was 81 . Medications and allergies were reviewed. Administered 100 mg metoprolol po per procedure. The procedure was explained, and the patient was instructed to rest and relax, as much as possible. A saline lock was established. The patient's heart rate 68. The patient was offered the restroom, and then they were wheeled to CT in a wheelchair.  The cardiac monitor was placed there, and the patient was found to have an elevated calcium score.  The radiologist was contacted and the reading radiologist cancelled the CCTA.  The patient was instructed that the ordering MD will call with results in one to two days with test results and that the test was cancelled due to elevated calcium score. The patient left ambulatory in stable condition.

## 2024-03-05 NOTE — LETTER
2024      Modesto Lehman     [facility] [id number]        Dear ,    We are writing to inform you of your test results.    This shows significant cholesterol buildup. I have made arrangements for you to see a cardiologist.     Resulted Orders   CT Coronary Calcium Scan    Narrative    Exam:  CT CALCIUM SCREENING    Exam reason: Exertional chest pain    Technique: A ECG synchronized prospectively triggered noncontrast CT  of the heart was performed. Coronary calcification was analyzed using  [employer] CT calcium scoring software.     This CT was performed using one or more of the following dose  reduction techniques: automated exposure control, adjustment of the mA  and/or kV according to patient size, and/or use of iterative  reconstruction technique.    Comparison:  None.    FINDINGS:    CORONARY CALCIUM SCORE:    Left main: 88.5  Left anterior descendin.4  Left circumflex: 685.3  Right coronary artery: 256.9  Posterior descending artery: 0.0    TOTAL: 2015.1    The estimated probability of a non-zero calcium score for a white Male  of age 66 years is 78%. The observed calcium score is at the 96th  percentile for subjects of the same age, gender, and race/ethnicity  who are free of clinical cardiovascular disease and treated diabetes.    CARDIAC STRUCTURE AND FUNCTION:     The heart, thoracic aorta and main pulmonary artery are within normal  limits in size.    OTHER CHEST FINDINGS:      No consolidation or pulmonary mass. There is dependent atelectasis. No  evidence of adenopathy. No suspicious osseous lesion.      Impression    IMPRESSION:      Total calcium score of 2015.1. The observed calcium score is at the  96th percentile for subjects of the same age, gender, and  race/ethnicity who are free of clinical cardiovascular disease and  treated diabetes.    ANIKA BHAT MD         SYSTEM ID:  [id number]       If you have any questions or concerns, please call the  clinic at the number listed above.       Sincerely,      Ha Keating MD

## 2024-03-05 NOTE — DISCHARGE INSTRUCTIONS
IV Contrast- Discharge Instructions After Your CT Scan      The IV contrast you received today will be filtered from your bloodstream by your kidneys during the next 24 hours and pass from the body in urine.  You will not be aware of this process and your urine will not change in color.  To help this process you should drink at least 4 additional glasses of water or juice today.  This reduces stress on your kidneys.    Most contrast reactions are immediate.  Should you develop symptoms of concern after discharge, contact the department at the number below.  After hours you should contact your personal physician.  If you develop breathing distress or wheezing, call 911.

## 2024-03-05 NOTE — PROGRESS NOTES
UPDATE: CCTA CANCELLED DUE TO CALCIUM SCORE OVER 2000 (PER RADIOLOGIST). CONTRAST NOT GIVEN.    1.  Has the patient had a previous reaction to IV contrast? No    2.  Does the patient have kidney disease? No    3.  Is the patient on dialysis? No    If YES to any of these questions, exam will be reviewed with a Radiologist before administering contrast.  IV Contrast- Discharge Instructions After Your CT Scan      The IV contrast you received today will be filtered from your bloodstream by your kidneys during the next 24 hours and pass from the body in urine.  You will not be aware of this process and your urine will not change in color.  To help this process you should drink at least 4 additional glasses of water or juice today.  This reduces stress on your kidneys.    Most contrast reactions are immediate.  Should you develop symptoms of concern after discharge, contact the department at the number below.  After hours you should contact your personal physician.  If you develop breathing distress or wheezing, call 911.

## 2024-03-06 DIAGNOSIS — R94.39 POSITIVE CARDIAC STRESS TEST: Primary | ICD-10-CM

## 2024-03-08 ENCOUNTER — HOSPITAL ENCOUNTER (OUTPATIENT)
Dept: ULTRASOUND IMAGING | Facility: OTHER | Age: 69
Discharge: HOME | End: 2024-03-08
Attending: FAMILY MEDICINE | Admitting: FAMILY MEDICINE
Payer: MEDICARE

## 2024-03-08 DIAGNOSIS — Z13.6 SCREENING FOR AAA (ABDOMINAL AORTIC ANEURYSM): ICD-10-CM

## 2024-03-08 DIAGNOSIS — Z87.891 HISTORY OF SMOKING: ICD-10-CM

## 2024-03-08 PROCEDURE — 76706 US ABDL AORTA SCREEN AAA: CPT

## 2024-04-11 ENCOUNTER — OFFICE VISIT (OUTPATIENT)
Dept: CARDIOLOGY | Facility: OTHER | Age: 69
End: 2024-04-11
Attending: INTERNAL MEDICINE
Payer: COMMERCIAL

## 2024-04-11 VITALS
SYSTOLIC BLOOD PRESSURE: 130 MMHG | OXYGEN SATURATION: 98 % | DIASTOLIC BLOOD PRESSURE: 80 MMHG | WEIGHT: 182 LBS | HEART RATE: 86 BPM | BODY MASS INDEX: 26.05 KG/M2 | RESPIRATION RATE: 16 BRPM | HEIGHT: 70 IN | TEMPERATURE: 98 F

## 2024-04-11 DIAGNOSIS — I25.118 CORONARY ARTERY DISEASE OF NATIVE ARTERY OF NATIVE HEART WITH STABLE ANGINA PECTORIS (H): ICD-10-CM

## 2024-04-11 DIAGNOSIS — R09.89 BRUIT: ICD-10-CM

## 2024-04-11 DIAGNOSIS — E78.00 HYPERCHOLESTEROLEMIA: ICD-10-CM

## 2024-04-11 DIAGNOSIS — Z86.19 HX OF LYME DISEASE: ICD-10-CM

## 2024-04-11 DIAGNOSIS — I73.9 PAD (PERIPHERAL ARTERY DISEASE) (H): ICD-10-CM

## 2024-04-11 DIAGNOSIS — R94.39 POSITIVE CARDIAC STRESS TEST: ICD-10-CM

## 2024-04-11 DIAGNOSIS — R93.1 ELEVATED CORONARY ARTERY CALCIUM SCORE: Primary | ICD-10-CM

## 2024-04-11 LAB
ATRIAL RATE - MUSE: 69 BPM
DIASTOLIC BLOOD PRESSURE - MUSE: NORMAL MMHG
INTERPRETATION ECG - MUSE: NORMAL
P AXIS - MUSE: 49 DEGREES
PR INTERVAL - MUSE: 172 MS
QRS DURATION - MUSE: 114 MS
QT - MUSE: 412 MS
QTC - MUSE: 441 MS
R AXIS - MUSE: -40 DEGREES
SYSTOLIC BLOOD PRESSURE - MUSE: NORMAL MMHG
T AXIS - MUSE: 83 DEGREES
VENTRICULAR RATE- MUSE: 69 BPM

## 2024-04-11 PROCEDURE — 93005 ELECTROCARDIOGRAM TRACING: CPT | Performed by: INTERNAL MEDICINE

## 2024-04-11 PROCEDURE — 93010 ELECTROCARDIOGRAM REPORT: CPT | Performed by: INTERNAL MEDICINE

## 2024-04-11 PROCEDURE — G0463 HOSPITAL OUTPT CLINIC VISIT: HCPCS

## 2024-04-11 PROCEDURE — 99203 OFFICE O/P NEW LOW 30 MIN: CPT | Performed by: INTERNAL MEDICINE

## 2024-04-11 RX ORDER — ASPIRIN 81 MG/1
81 TABLET, CHEWABLE ORAL DAILY
Qty: 90 TABLET | Refills: 3 | Status: ON HOLD | OUTPATIENT
Start: 2024-04-11 | End: 2024-07-31

## 2024-04-11 RX ORDER — NITROGLYCERIN 0.4 MG/1
TABLET SUBLINGUAL
Qty: 25 TABLET | Refills: 3 | Status: SHIPPED | OUTPATIENT
Start: 2024-04-11

## 2024-04-11 ASSESSMENT — PAIN SCALES - GENERAL: PAINLEVEL: MILD PAIN (3)

## 2024-04-11 NOTE — NURSING NOTE
"Patient comes in for consult for abnormal tests.    Chief Complaint   Patient presents with    Consult For     Abnormal stress test       Initial /80 (BP Location: Right arm, Patient Position: Sitting, Cuff Size: Adult Large)   Pulse 86   Temp 98  F (36.7  C) (Temporal)   Resp 16   Ht 1.77 m (5' 9.69\")   Wt 82.6 kg (182 lb)   SpO2 98%   BMI 26.35 kg/m   Estimated body mass index is 26.35 kg/m  as calculated from the following:    Height as of this encounter: 1.77 m (5' 9.69\").    Weight as of this encounter: 82.6 kg (182 lb).  Meds Reconciled: complete  Pt is not on Aspirin  Pt is on a Statin  PHQ and/or ELSA reviewed. Pt referred to PCP/MH Provider as appropriate.    Fanny Olguin LPN      "

## 2024-04-11 NOTE — PROGRESS NOTES
[facility] HEART CARE   CARDIOLOGY CONSULT     Modesto Lehman   1955  [id number]    Ha Keating     Chief Complaint   Patient presents with    Consult For     Abnormal test          HPI:   Mr. Lehman is a 68-year-old gentleman who is being seen by cardiology for chest discomfort and a severely elevated coronary calcium score.  He has been referred to cardiology for concerns of coronary artery disease.    Modesto describes multiple back injuries dating back to third grade.  In the third grade, he was working on a roof.  I believe he fell twice landing on his back.  Once to the ground.  The other was hitting a metal pole.  About a year ago, he rolled his 4 gómez but was not injured by the machine.  He jumped out of the way.  But when trying to operate the machine, he states it was very heavy.  When he went to pick it up, this exacerbated his back discomfort.  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  He has been concerned that his intrascapular discomfort is related to his back.      He describes substernal, right precordial, neck, and intrascapular discomfort which has been exacerbated with activity.  Even with emotional stress, it will exacerbate his symptoms.  He describes sharp but also heavy discomfort to his chest.  With that, he is diaphoretic.  It limits his activities.  He smokes [employer] sweets on average 3-5 a day.  He has been smoking since age 16.  He did quit for a while starting up once again 10-15 years ago.  His wife has had a lot of medical issues and ultimately had a renal transplant.  He states that the stress of her health has caused him to continue to smoke.  He does have a history of PAD with bilateral femoropopliteal.  He has not been taking aspirin regularly.  He does take his statin but has never been given SL nitro.  Patient has been convinced that his substernal and  intrascapular discomfort has been related to arthritis.    He had an MRI of his thoracic spine on 3/14/2023 showing mild progression of multivessel thoracic spondylosis with no high-grade spinal canal or foraminal narrowing.    Patient has been hesitant to have stress testing.  Underwent CT coronary for calcium on 3/5/2024.  Was to be a CTA of coronaries but changed to CT coronary for calcium because of a calcium score of 2015.1.  Calcium score was 96 percentile for same age, gender, race, and ethnicity      IMAGING RESULTS:   US abdominal aorta on 3/8/2024:  No abdominal aortic aneurysm.    CT scan for calcium on 3/5/2024:  Left main: 88.5  Left anterior descendin.4  Left circumflex: 685.3  Right coronary artery: 256.9  Posterior descending artery: 0.0  TOTAL: 2015.1     The estimated probability of a non-zero calcium score for a white Male  of age 68 years is 78%. The observed calcium score is at the 96th  percentile for subjects of the same age, gender, and race/ethnicity  who are free of clinical cardiovascular disease and treated diabetes.  The heart, thoracic aorta and main pulmonary artery are within normal  limits in size.    CT angio abdomen on 2023 [facility]:  1. Patent right common iliac to common femoral artery stent graft.   2. Patent bilateral femoral to popliteal artery bypass grafts without evidence of graft stenosis or aneurysmal dilatation.   3. 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   4. Three-vessel runoff to the left foot with diffuse calcified atherosclerosis and small vessel lumens in the 3 lower leg arteries.     Stress echo on 2013:  1. Normal echocardiographic portion of the stress echocardiogram with  ejection fraction increasing from 60% pre-exercise to 80% immediately  post-exercise with no new regional wall motion abnormalities.  2. Screening color and spectral Doppler echocardiogram was not performed.  3. For details of  the stress EKG portion of the examination, please see  Dr. Acosta's report.      CURRENT MEDICATIONS:   Prior to Admission medications    Medication Sig Start Date End Date Taking? Authorizing Provider   aspirin 81 MG tablet Take 81 mg by mouth daily Take 81 mg by mouth once daily.  Patient not taking: Reported on 2/16/2024    Reported, Patient   atorvastatin (LIPITOR) 40 MG tablet Take 1 tablet (40 mg) by mouth daily 2/16/24   Ha Keating MD   EPINEPHrine (ANY BX GENERIC EQUIV) 0.3 MG/0.3ML injection 2-pack Inject 0.3 mLs (0.3 mg) into the muscle once as needed for anaphylaxis 7/13/20   Brain Pena MD   gabapentin (NEURONTIN) 100 MG capsule Take 1 capsule (100 mg) by mouth 3 times daily 2/16/24   Ha Keating MD   ibuprofen (ADVIL/MOTRIN) 800 MG tablet Take 1 tablet (800 mg) by mouth 3 times daily as needed TAKE 1 TABLET BY MOUTH 3 TIMES DAILY WITH MEALS. 2/27/23   Hoa Seay APRN CNP   tiZANidine (ZANAFLEX) 4 MG tablet Take 1 tablet (4 mg) by mouth 3 times daily 4/13/23   Laurie Campos MD       ALLERGIES:   Allergies   Allergen Reactions    Amoxicillin Swelling     Severe tongue and throat swelling noted on ED visit 7/13/2020. Required epinephrine, steroids.    Doxycycline Swelling     Hand swelling  , hives and welts        PAST MEDICAL HISTORY:   Past Medical History:   Diagnosis Date    Allergy status to analgesic agent     No reported medication allergies    Anesthesia of skin     No  problems with anaesthesia    History of recurrent pneumonia     No Comments Provided    Hyperlipidemia     No Comments Provided    Low back pain     pain with bilateral leg and disc injuries.    Personal history of nicotine dependence     Personal history of other medical treatment (CODE)     No blood transfusions        PAST SURGICAL HISTORY:   Past Surgical History:   Procedure Laterality Date    ANGIOPLASTY      Right leg stenting and bypass, Left also    COLONOSCOPY  08/23/2010    Q 10yrs.     COLONOSCOPY N/A 02/24/2022    6 tubular adenomas, 5 year follow up, 2/24/2027    EXTRACTION(S) DENTAL      recently removed    OTHER SURGICAL HISTORY      [id number],PREMALIG/BENIGN SKIN LESION EXCISION, removed from chest    OTHER SURGICAL HISTORY      [id number],ANESTHESIA ALERT,No  problems with anaesthesia        FAMILY HISTORY:   Family History   Problem Relation Age of Onset    Diabetes Mother         Diabetes    Heart Disease Mother         Heart Disease    Other - See Comments Father         COPD, CD    Substance Abuse Other         Alcohol/Drug, No hx of chemical dependency.    Diabetes Other         Diabetes    Heart Disease Other         Heart Disease    Heart Disease Brother         Heart Disease    Other - See Comments Brother         killed by drunk  at age 19.        SOCIAL HISTORY:   Social History     Socioeconomic History    Marital status:    Tobacco Use    Smoking status: Every Day     Current packs/day: 0.25     Average packs/day: 0.3 packs/day for 14.4 years (3.6 ttl pk-yrs)     Types: Cigars, Cigarettes     Start date: 11/4/2009    Smokeless tobacco: Never    Tobacco comments:     Quit smoking: quit 6/20/17 (cigarettes); smokes cigars 3 or 4 per day   Vaping Use    Vaping status: Never Used   Substance and Sexual Activity    Alcohol use: Yes    Drug use: Never    Sexual activity: Not Currently   Social History Narrative    Self employed as .   with 3 adult children.    No hx of chemical dependency.  Patient is a former smoker.   Alcohol Use - yes occ    Wife - Imelda Lehman     Social Determinants of Health     Financial Resource Strain: Low Risk  (2/16/2024)    Financial Resource Strain     Within the past 12 months, have you or your family members you live with been unable to get utilities (heat, electricity) when it was really needed?: No   Food Insecurity: Low Risk  (2/16/2024)    Food Insecurity     Within the past 12 months, did you worry that your food would run out  before you got money to buy more?: No     Within the past 12 months, did the food you bought just not last and you didn t have money to get more?: No   Transportation Needs: Low Risk  (2/16/2024)    Transportation Needs     Within the past 12 months, has lack of transportation kept you from medical appointments, getting your medicines, non-medical meetings or appointments, work, or from getting things that you need?: No   Physical Activity: Sufficiently Active (2/16/2024)    Exercise Vital Sign     Days of Exercise per Week: 7 days     Minutes of Exercise per Session: 60 min   Stress: Stress Concern Present (2/16/2024)    Citizen of [place] [employer] - Occupational Stress Questionnaire     Feeling of Stress : Very much   Housing Stability: Low Risk  (2/16/2024)    Housing Stability     Do you have housing? : Yes     Are you worried about losing your housing?: No          ROS:   CONSTITUTIONAL: No weight loss, fever, chills, weakness or fatigue.   CARDIOVASCULAR: Admits to chest pain with chest pressure and chest discomfort. No palpitations or lower extremity edema.   RESPIRATORY: Admits to shortness of breath with dyspnea upon exertion, no cough or sputum production.   NEUROLOGICAL: No headache, lightheadedness, dizziness, syncope, ataxia or weakness.   HEMATOLOGIC: No anemia, bleeding or bruising.         PHYSICAL EXAM:   GENERAL: The patient is a well-developed, well-nourished, in no apparent distress. Alert and oriented x3.   HEART: Regular rate and rhythm, S1S2 present without murmur, rub or gallop.   LUNGS: Respirations regular and unlabored. Clear to auscultation.   EXTREMITIES: No peripheral edema present.   SKIN: No jaundice. No rashes or visible skin lesions present.        LAB RESULTS:   Office Visit on 02/16/2024   Component Date Value Ref Range Status    Hepatitis C Antibody 02/16/2024 Nonreactive  Nonreactive Final    Cholesterol 02/16/2024 120  <200 mg/dL Final    Triglycerides 02/16/2024  94  <150 mg/dL Final    Direct Measure HDL 02/16/2024 59  >=40 mg/dL Final    LDL Cholesterol Calculated 02/16/2024 42  <=100 mg/dL Final    Non HDL Cholesterol 02/16/2024 61  <130 mg/dL Final    Patient Fasting > 8hrs? 02/16/2024 Unknown   Final    Prostate Specific Antigen Screen 02/16/2024 3.03  0.00 - 4.50 ng/mL Final    Sodium 02/16/2024 139  135 - 145 mmol/L Final    Potassium 02/16/2024 4.4  3.4 - 5.3 mmol/L Final    Carbon Dioxide (CO2) 02/16/2024 27  22 - 29 mmol/L Final    Anion Gap 02/16/2024 11  7 - 15 mmol/L Final    Urea Nitrogen 02/16/2024 12.4  8.0 - 23.0 mg/dL Final    Creatinine 02/16/2024 0.79  0.67 - 1.17 mg/dL Final    GFR Estimate 02/16/2024 >90  >60 mL/min/1.73m2 Final    Calcium 02/16/2024 9.6  8.8 - 10.2 mg/dL Final    Chloride 02/16/2024 101  98 - 107 mmol/L Final    Glucose 02/16/2024 118 (H)  70 - 99 mg/dL Final    Alkaline Phosphatase 02/16/2024 108  40 - 150 U/L Final    AST 02/16/2024 27  0 - 45 U/L Final    ALT 02/16/2024 28  0 - 70 U/L Final    Protein Total 02/16/2024 8.0  6.4 - 8.3 g/dL Final    Albumin 02/16/2024 4.7  3.5 - 5.2 g/dL Final    Bilirubin Total 02/16/2024 0.4  <=1.2 mg/dL Final          ASSESSMENT:       ICD-10-CM    1. Elevated coronary artery calcium score  R93.1 nitroGLYcerin (NITROSTAT) 0.4 MG sublingual tablet     aspirin (ASA) 81 MG chewable tablet     Case Request Cath Lab: Coronary Angiogram    At 2015.1 on 3/5/2024      2. Positive cardiac stress test on 3/5/2024  R94.39 Adult Cardiology Eval  Referral     EKG 12-lead, tracing only     nitroGLYcerin (NITROSTAT) 0.4 MG sublingual tablet     aspirin (ASA) 81 MG chewable tablet     Case Request Cath Lab: Coronary Angiogram      3. Coronary artery disease of native artery of native heart with stable angina pectoris (H24)  I25.118 nitroGLYcerin (NITROSTAT) 0.4 MG sublingual tablet     aspirin (ASA) 81 MG chewable tablet     Case Request Cath Lab: Coronary Angiogram      4. Hypercholesterolemia  E78.00        5. PAD (peripheral artery disease) (H24)  I73.9       6. Hx of Lyme disease on 8/3/2020  Z86.19       7. Bruit  R09.89 US Carotid Bilateral            PLAN:   1.  Stable angina: Discussed the CTA coronaries changed to CT scan for calcium because of his severely elevated coronary calcium score at 2,015.  He believed his substernal and intrascapular discomfort is related to arthritis.  Discussed it is likely his symptoms are coming he describes tightness/sharp discomfort radiating between his shoulder blades exacerbated by minimal activity.  He gets extremely diaphoretic.  This has been going on for multiple years, up to 6 years.  He is a longtime smoker with hyperlipidemia.  I believe he is at very high risk for severe coronary artery disease.  This was explained.  Risk and benefits were discussed.  This includes but not limited to infection, bleeding, perforation, stroke, death, extended hospital stay, and bruising.  With understanding, patient was agreeable to have a cardiac catheterization.  He has not been taking aspirin 81 mg daily.  He will be given aspirin 81 mg daily.  Will continue on atorvastatin 40 mg once a day.  Will also be given SL nitro as needed for chest pain.  Will follow-up after completion of his cardiac catheterization.  2.  Carotid artery disease: Concern for.  Plan for an ultrasound of carotid arteries.  3.  PAD: History of bilateral popliteal bypass.  Continue aspirin, Lipitor, and activity.  Not having symptoms.  He is seeing vascular through [facility].  Will consider referral to [facility] in the future.  4.  Tobacco abuse: Encouraged to quit.  Patient states he will quit.  5.  Follow-up after completion of cardiac catheterization.    Total time spent on day of visit, including review of tests, obtaining/reviewing separately obtained history, ordering medications/tests/procedures, communicating with PCP/consultants, and documenting in electronic medical record: 35 minutes.           Thank you for allowing me to participate in the care of your patient. Please do not hesitate to contact me if you have any questions.     Omega Chandler, DO

## 2024-04-11 NOTE — NURSING NOTE
Per Omega Chandler, , pt to have COR Angiogram at [facility]. Reviewed allergies and medications.     -Patient will start on Aspirin 81 mg daily, including day of procedure.     -Patient will HOLD ibuprofen starting now.  Patient states he hasn't taken it in the last week.    Angiogram teaching done using the instructions provided by the [facility].  Instructions reviewed, questions answered.  Patient verbalizes understanding. Now scheduled for 4/15/24 with arrival at 10:30AM.  Patient is agreeable to plan.  Carlie Lehman RN......April 11, 2024...10:00 AM

## 2024-04-11 NOTE — PATIENT INSTRUCTIONS
Pre-procedure instructions - Coronary Angiogram  Patient Education    Your arrival time is 4/15/24 at 10:30AM.  Location is [address] Waiting Room  Please plan on being at the hospital all day.  At any time, emergencies and/or urgent cases may come up which could delay the start of your procedure.    Pre-procedure instructions - Coronary Angiogram  Shower in the evening before or the morning of the procedure  No solid food for 8 hours prior and nothing to drink 2 hours prior to arrival time  You can take your morning medications (except for diabetic and blood thinners) with sips of water.  Take 81 mg of Aspirin once daily even on the the morning of procedure.  HOLD your ibuprofen starting now.   You will need to arrange a ride to drop you off and pick you up, as you will be unable to drive home.  Prior to discharge you may be required to lay flat for approximately 2-4 hours in the recovery unit to ensure proper clotting of the artery. You will need a responsible adult to stay with you for 24 hours post-procedure.              Diabetic Medication Instructions  Hold oral diabetic medication in morning of your procedure and for 48 hours after IV contrast is given  Typical instructions for insulin diabetic medication holding are below. However, please reach out to your Primary Care Provider or Endocrinologist for specific instructions  DO NOT take any oral diabetic medication, short-acting diabetes medications/insulin, humalog or regular insulin the morning of your test  Take   dose of long-acting insulin (Lantus, Levemir) the day of your test  Remember to bring your glucometer and insulin with you to take after your test if needed  GLP-1 Agonists Instructions  DO NOT take injectable GLP-1 agonists semaglutide (Ozempic, Wegovy), dulaglutide (Trulicity), exenatide ER (Bydureon), tirzepatide (Mounjaro), or oral semaglutide  (Rybelsus) for 7 days prior your procedure  Hold once daily injectable GLP-1 agonists exenatide (Byetta), liraglutide (Saxenda, Victoza), lixisenatide (Soligua) the day before and day of your procedure                  Anticoagulation Medication Instructions   NA    You will need to follow up with one of our cardiology APPs 1-2 weeks after your procedure. If you need help scheduling or rescheduling your appointment, please call [phone]

## 2024-04-12 DIAGNOSIS — I73.9 PAD (PERIPHERAL ARTERY DISEASE) (H): ICD-10-CM

## 2024-04-12 DIAGNOSIS — R93.1 ELEVATED CORONARY ARTERY CALCIUM SCORE: ICD-10-CM

## 2024-04-12 DIAGNOSIS — I25.118 CORONARY ARTERY DISEASE OF NATIVE ARTERY OF NATIVE HEART WITH STABLE ANGINA PECTORIS (H): Primary | ICD-10-CM

## 2024-04-12 DIAGNOSIS — R94.39 POSITIVE CARDIAC STRESS TEST: ICD-10-CM

## 2024-04-12 RX ORDER — POTASSIUM CHLORIDE 1500 MG/1
40 TABLET, EXTENDED RELEASE ORAL
Status: CANCELLED | OUTPATIENT
Start: 2024-04-12

## 2024-04-12 RX ORDER — SODIUM CHLORIDE 9 MG/ML
INJECTION, SOLUTION INTRAVENOUS CONTINUOUS
Status: CANCELLED | OUTPATIENT
Start: 2024-04-12

## 2024-04-12 RX ORDER — POTASSIUM CHLORIDE 1500 MG/1
20 TABLET, EXTENDED RELEASE ORAL
Status: CANCELLED | OUTPATIENT
Start: 2024-04-12

## 2024-04-12 RX ORDER — LIDOCAINE 40 MG/G
CREAM TOPICAL
Status: CANCELLED | OUTPATIENT
Start: 2024-04-12

## 2024-04-15 ENCOUNTER — HOSPITAL ENCOUNTER (OUTPATIENT)
Facility: CLINIC | Age: 69
Discharge: HOME | End: 2024-04-15
Attending: INTERNAL MEDICINE | Admitting: INTERNAL MEDICINE
Payer: MEDICARE

## 2024-04-15 ENCOUNTER — APPOINTMENT (OUTPATIENT)
Dept: MEDSURG UNIT | Facility: CLINIC | Age: 69
End: 2024-04-15
Attending: INTERNAL MEDICINE
Payer: MEDICARE

## 2024-04-15 ENCOUNTER — APPOINTMENT (OUTPATIENT)
Dept: LAB | Facility: CLINIC | Age: 69
End: 2024-04-15
Attending: INTERNAL MEDICINE
Payer: MEDICARE

## 2024-04-15 VITALS
WEIGHT: 176.59 LBS | OXYGEN SATURATION: 94 % | RESPIRATION RATE: 13 BRPM | SYSTOLIC BLOOD PRESSURE: 132 MMHG | DIASTOLIC BLOOD PRESSURE: 93 MMHG | BODY MASS INDEX: 25.57 KG/M2 | TEMPERATURE: 97.7 F | HEART RATE: 75 BPM

## 2024-04-15 DIAGNOSIS — R94.39 POSITIVE CARDIAC STRESS TEST: ICD-10-CM

## 2024-04-15 DIAGNOSIS — I25.118 CORONARY ARTERY DISEASE OF NATIVE ARTERY OF NATIVE HEART WITH STABLE ANGINA PECTORIS (H): ICD-10-CM

## 2024-04-15 DIAGNOSIS — R93.1 ELEVATED CORONARY ARTERY CALCIUM SCORE: ICD-10-CM

## 2024-04-15 DIAGNOSIS — I73.9 PAD (PERIPHERAL ARTERY DISEASE) (H): ICD-10-CM

## 2024-04-15 LAB
ANION GAP SERPL CALCULATED.3IONS-SCNC: 11 MMOL/L (ref 7–15)
BUN SERPL-MCNC: 13.2 MG/DL (ref 8–23)
CALCIUM SERPL-MCNC: 9.2 MG/DL (ref 8.8–10.2)
CHLORIDE SERPL-SCNC: 102 MMOL/L (ref 98–107)
CREAT SERPL-MCNC: 0.86 MG/DL (ref 0.67–1.17)
DEPRECATED HCO3 PLAS-SCNC: 24 MMOL/L (ref 22–29)
EGFRCR SERPLBLD CKD-EPI 2021: >90 ML/MIN/1.73M2
ERYTHROCYTE [DISTWIDTH] IN BLOOD BY AUTOMATED COUNT: 14.1 % (ref 10–15)
GLUCOSE SERPL-MCNC: 111 MG/DL (ref 70–99)
HCT VFR BLD AUTO: 49.9 % (ref 40–53)
HGB BLD-MCNC: 16.8 G/DL (ref 13.3–17.7)
MCH RBC QN AUTO: 31.3 PG (ref 26.5–33)
MCHC RBC AUTO-ENTMCNC: 33.7 G/DL (ref 31.5–36.5)
MCV RBC AUTO: 93 FL (ref 78–100)
PLATELET # BLD AUTO: 204 10E3/UL (ref 150–450)
POTASSIUM SERPL-SCNC: 4.8 MMOL/L (ref 3.4–5.3)
RBC # BLD AUTO: 5.36 10E6/UL (ref 4.4–5.9)
SODIUM SERPL-SCNC: 137 MMOL/L (ref 135–145)
WBC # BLD AUTO: 7.2 10E3/UL (ref 4–11)

## 2024-04-15 PROCEDURE — 36415 COLL VENOUS BLD VENIPUNCTURE: CPT | Performed by: INTERNAL MEDICINE

## 2024-04-15 PROCEDURE — 250N000011 HC RX IP 250 OP 636: Performed by: INTERNAL MEDICINE

## 2024-04-15 PROCEDURE — 93454 CORONARY ARTERY ANGIO S&I: CPT | Mod: 26 | Performed by: INTERNAL MEDICINE

## 2024-04-15 PROCEDURE — 258N000003 HC RX IP 258 OP 636: Performed by: INTERNAL MEDICINE

## 2024-04-15 PROCEDURE — 250N000013 HC RX MED GY IP 250 OP 250 PS 637

## 2024-04-15 PROCEDURE — C1894 INTRO/SHEATH, NON-LASER: HCPCS | Performed by: INTERNAL MEDICINE

## 2024-04-15 PROCEDURE — 80048 BASIC METABOLIC PNL TOTAL CA: CPT | Performed by: INTERNAL MEDICINE

## 2024-04-15 PROCEDURE — C1887 CATHETER, GUIDING: HCPCS | Performed by: INTERNAL MEDICINE

## 2024-04-15 PROCEDURE — 250N000009 HC RX 250: Performed by: INTERNAL MEDICINE

## 2024-04-15 PROCEDURE — 99152 MOD SED SAME PHYS/QHP 5/>YRS: CPT | Mod: GC | Performed by: INTERNAL MEDICINE

## 2024-04-15 PROCEDURE — 999N000132 HC STATISTIC PP CARE STAGE 1

## 2024-04-15 PROCEDURE — 93454 CORONARY ARTERY ANGIO S&I: CPT | Performed by: INTERNAL MEDICINE

## 2024-04-15 PROCEDURE — 99152 MOD SED SAME PHYS/QHP 5/>YRS: CPT | Performed by: INTERNAL MEDICINE

## 2024-04-15 PROCEDURE — 999N000134 HC STATISTIC PP CARE STAGE 3

## 2024-04-15 PROCEDURE — 85027 COMPLETE CBC AUTOMATED: CPT | Performed by: INTERNAL MEDICINE

## 2024-04-15 PROCEDURE — 272N000001 HC OR GENERAL SUPPLY STERILE: Performed by: INTERNAL MEDICINE

## 2024-04-15 RX ORDER — FENTANYL CITRATE 50 UG/ML
INJECTION, SOLUTION INTRAMUSCULAR; INTRAVENOUS
Status: DISCONTINUED | OUTPATIENT
Start: 2024-04-15 | End: 2024-04-15 | Stop reason: HOSPADM

## 2024-04-15 RX ORDER — NALOXONE HYDROCHLORIDE 0.4 MG/ML
0.4 INJECTION, SOLUTION INTRAMUSCULAR; INTRAVENOUS; SUBCUTANEOUS
Status: DISCONTINUED | OUTPATIENT
Start: 2024-04-15 | End: 2024-04-15 | Stop reason: HOSPADM

## 2024-04-15 RX ORDER — NICARDIPINE HYDROCHLORIDE 2.5 MG/ML
INJECTION INTRAVENOUS
Status: DISCONTINUED | OUTPATIENT
Start: 2024-04-15 | End: 2024-04-15 | Stop reason: HOSPADM

## 2024-04-15 RX ORDER — LIDOCAINE 40 MG/G
CREAM TOPICAL
Status: DISCONTINUED | OUTPATIENT
Start: 2024-04-15 | End: 2024-04-15 | Stop reason: HOSPADM

## 2024-04-15 RX ORDER — POTASSIUM CHLORIDE 750 MG/1
20 TABLET, EXTENDED RELEASE ORAL
Status: DISCONTINUED | OUTPATIENT
Start: 2024-04-15 | End: 2024-04-15 | Stop reason: HOSPADM

## 2024-04-15 RX ORDER — NALOXONE HYDROCHLORIDE 0.4 MG/ML
0.2 INJECTION, SOLUTION INTRAMUSCULAR; INTRAVENOUS; SUBCUTANEOUS
Status: DISCONTINUED | OUTPATIENT
Start: 2024-04-15 | End: 2024-04-15 | Stop reason: HOSPADM

## 2024-04-15 RX ORDER — IOPAMIDOL 755 MG/ML
INJECTION, SOLUTION INTRAVASCULAR
Status: DISCONTINUED | OUTPATIENT
Start: 2024-04-15 | End: 2024-04-15 | Stop reason: HOSPADM

## 2024-04-15 RX ORDER — SODIUM CHLORIDE 9 MG/ML
INJECTION, SOLUTION INTRAVENOUS CONTINUOUS
Status: DISCONTINUED | OUTPATIENT
Start: 2024-04-15 | End: 2024-04-15 | Stop reason: HOSPADM

## 2024-04-15 RX ORDER — ASPIRIN 81 MG/1
243 TABLET, CHEWABLE ORAL ONCE
Status: COMPLETED | OUTPATIENT
Start: 2024-04-15 | End: 2024-04-15

## 2024-04-15 RX ORDER — HEPARIN SODIUM 1000 [USP'U]/ML
INJECTION, SOLUTION INTRAVENOUS; SUBCUTANEOUS
Status: DISCONTINUED | OUTPATIENT
Start: 2024-04-15 | End: 2024-04-15 | Stop reason: HOSPADM

## 2024-04-15 RX ORDER — POTASSIUM CHLORIDE 750 MG/1
40 TABLET, EXTENDED RELEASE ORAL
Status: DISCONTINUED | OUTPATIENT
Start: 2024-04-15 | End: 2024-04-15 | Stop reason: HOSPADM

## 2024-04-15 RX ORDER — FLUMAZENIL 0.1 MG/ML
0.2 INJECTION, SOLUTION INTRAVENOUS
Status: DISCONTINUED | OUTPATIENT
Start: 2024-04-15 | End: 2024-04-15 | Stop reason: HOSPADM

## 2024-04-15 RX ORDER — NITROGLYCERIN 5 MG/ML
VIAL (ML) INTRAVENOUS
Status: DISCONTINUED | OUTPATIENT
Start: 2024-04-15 | End: 2024-04-15 | Stop reason: HOSPADM

## 2024-04-15 RX ORDER — ACETAMINOPHEN 325 MG/1
650 TABLET ORAL EVERY 4 HOURS PRN
Status: DISCONTINUED | OUTPATIENT
Start: 2024-04-15 | End: 2024-04-15 | Stop reason: HOSPADM

## 2024-04-15 RX ORDER — ATROPINE SULFATE 0.1 MG/ML
0.5 INJECTION INTRAVENOUS
Status: DISCONTINUED | OUTPATIENT
Start: 2024-04-15 | End: 2024-04-15 | Stop reason: HOSPADM

## 2024-04-15 RX ORDER — FENTANYL CITRATE 50 UG/ML
25 INJECTION, SOLUTION INTRAMUSCULAR; INTRAVENOUS
Status: DISCONTINUED | OUTPATIENT
Start: 2024-04-15 | End: 2024-04-15 | Stop reason: HOSPADM

## 2024-04-15 RX ADMIN — ASPIRIN 81 MG CHEWABLE TABLET 243 MG: 81 TABLET CHEWABLE at 11:56

## 2024-04-15 RX ADMIN — SODIUM CHLORIDE: 9 INJECTION, SOLUTION INTRAVENOUS at 11:56

## 2024-04-15 ASSESSMENT — ACTIVITIES OF DAILY LIVING (ADL)
ADLS_ACUITY_SCORE: 35

## 2024-04-15 NOTE — PROGRESS NOTES
BP (!) 132/93   Pulse 75   Temp 97.7  F (36.5  C) (Oral)   Resp 13   Wt 80.1 kg (176 lb 9.4 oz)   SpO2 94%   BMI 25.57 kg/m    Condition is stable s/p cors. Vital Signs are stable/WNL. Right radial site clean, dry and intact, cms intact.  Discharge instructions reviewed with patient and questions answered. Patient verbalizes understanding. IV removed. Pain under control. Patient is ambulating and voiding spontaneously. Patient is tolerating regular diet and denies any N/V. Patient to be discharged to home via family. Patient has all belongings.

## 2024-04-15 NOTE — PROGRESS NOTES
Pt prepped for [facility]. Family is in gold waiting room and will be ride home. Pulses marked, groins and wrist prepped. Asprin 243 given as he took 81mg this morning. PIV infusing per MAR. Consent has been signed. Labs resulted.

## 2024-04-15 NOTE — PRE-PROCEDURE
GENERAL PRE-PROCEDURE:   Procedure:  Coronary angiogram with possible intervention  Date/Time:  4/15/2024 11:44 AM    Written consent obtained?: Yes    Risks and benefits: Risks, benefits and alternatives were discussed    DC Plan: Appropriate discharge home plan in place for patients who are going home after procedure   Consent given by:  Patient  Patient states understanding of procedure being performed: Yes    Patient's understanding of procedure matches consent: Yes    Procedure consent matches procedure scheduled: Yes    Expected level of sedation:  Moderate  Appropriately NPO:  Yes  ASA Class:  2  Mallampati  :  Grade 1- soft palate, uvula, tonsillar pillars, and posterior pharyngeal wall visible  Lungs:  Lungs clear with good breath sounds bilaterally  Heart:  Normal heart sounds and rate  History & Physical reviewed:  History and physical reviewed and no updates needed  Statement of review:  I have reviewed the lab findings, diagnostic data, medications, and the plan for sedation

## 2024-04-15 NOTE — CONSULTS
Patient's information sent to [facility] . She will contact patient tomorrow with information about clinic consultation with a surgeon.     Bridget Holt PA-C  Cardiothoracic Surgery  Pager [phone]  2:07 PM on 4/15/2024

## 2024-04-15 NOTE — PROGRESS NOTES
D/I/A: Pt roomed on 2A in room 13.  Arrived via litter and accompanied by staff RN On/Off: On monitor.  VSSA.  Rhythm upon arrival SR, 70s on monitor.  Denies pain or sob.  Reviewed activity restrictions and when to notify RN, ie-changes to breathing or increased chest pressure or chest pain.  CCL access:  R radial. Wean TR at 1420  P: Continue to monitor status.  Discharge to home once meeting criteria.

## 2024-04-15 NOTE — Clinical Note
dry, intact, no bleeding and no hematoma. R Radial artery sheath removed, TR-Band applied with 13 ml of air. Arm board

## 2024-04-15 NOTE — DISCHARGE INSTRUCTIONS
Going Home after an Angioplasty (Cardiac)        After you go home:  Have an adult stay with you for 24 hours.  Drink plenty of fluids.  You may eat your normal diet, unless your doctor tells you otherwise.  For 24 hours:  - Relax and take it easy.  - Do NOT smoke.  - Do NOT make any important or legal decisions.  - Do NOT drive or operate machines at home or at work.  - Do NOT drink alcohol.    Remove the Band-Aid after 24 hours. If there is minor oozing, apply another Band-aid and remove it after 12 hours.  For 2 days, do NOT have sex or do any heavy exercise.  Do NOT take a bath, or use a hot tub or pool for at least 3 days. You may shower.    Care of wrist or arm site  It is normal to have soreness at the puncture site and mild tingling in your hand for up to 3 days.  For 2 days, do not use your hand or arm to support your weight (such as rising from a chair) or bend your wrist (such as lifting a garage door).  For 2 days, do not lift more than 5 pounds or exercise your arm (tennis, golf or bowling).      If you start bleeding from the site in your arm:  Sit down and press firmly on the site with your fingers for 10 minutes. Call your doctor as soon as you can.  If the bleeding stops, sit still and keep your wrist straight for 2 hours.    Call your doctor if:  You have a large or growing hard lump around the site.    The site is red, swollen, hot or tender.  Blood or fluid is draining from the site.  You have chills or a fever greater than 101 F (38 C).  Your leg or arm turns bluish, feels numb or cool.  You have hives, a rash or unusual itching.  Call 911 right away if you have:   Bleeding that does not stop.   Heavy bleeding.    [facility] [place]:  [phone] (7 days a week)

## 2024-04-16 ENCOUNTER — TELEPHONE (OUTPATIENT)
Dept: CARDIOLOGY | Facility: CLINIC | Age: 69
End: 2024-04-16
Payer: COMMERCIAL

## 2024-04-16 DIAGNOSIS — I25.118 CORONARY ARTERY DISEASE OF NATIVE ARTERY OF NATIVE HEART WITH STABLE ANGINA PECTORIS (H): Primary | ICD-10-CM

## 2024-04-16 LAB
ATRIAL RATE - MUSE: 70 BPM
DIASTOLIC BLOOD PRESSURE - MUSE: NORMAL MMHG
INTERPRETATION ECG - MUSE: NORMAL
P AXIS - MUSE: 48 DEGREES
PR INTERVAL - MUSE: 160 MS
QRS DURATION - MUSE: 110 MS
QT - MUSE: 410 MS
QTC - MUSE: 442 MS
R AXIS - MUSE: -34 DEGREES
SYSTOLIC BLOOD PRESSURE - MUSE: NORMAL MMHG
T AXIS - MUSE: 53 DEGREES
VENTRICULAR RATE- MUSE: 70 BPM

## 2024-04-16 NOTE — TELEPHONE ENCOUNTER
Spoke with pt and scheduled with Dr Mason. Went over time and location. Sending visit guide to pt. Pt agreed to the plan

## 2024-04-16 NOTE — PROGRESS NOTES
SUBJECTIVE:   Modesto Lehman is a 62 year old male who presents to clinic today for the following health issues:    HPI  Rash on both calves and follow up on labs.  He had lipids drawn this morning and wants to know results.  Started tumeric, just a few days ago and getting some pain relief in his spine from this.  Does not get raymon left sided chest pain, but will get right sided chest pain when he gets flares of the thoracic back pain.  No shortness of breath.  Heating pads help on the back.  Is at risk for cad, negative stress test 5 years ago.  Known PAD.  zocor now for over 1 year for the PAD.    Wears shorts all summer.  Redness in both posterior calves just above the sock line.  Was itchy only if he touched it.  Has had this for about 2 months.  Wondering if it is from the meds combined with the sun.  Is possible he walked through poison ivy as well.  History as a child of severe reactions.      Patient Active Problem List    Diagnosis Date Noted     Family history of diabetes mellitus 01/30/2018     Priority: Medium     Neural foraminal stenosis of cervical spine 05/24/2016     Priority: Medium     Plantar wart of left foot 04/02/2014     Priority: Medium     Sinusitis, acute 08/16/2012     Priority: Medium     Other synovitis and tenosynovitis 08/02/2012     Priority: Medium     Denis's cyst 02/29/2012     Priority: Medium     Peripheral vascular disease (H) 12/05/2011     Priority: Medium     Backache 11/07/2011     Priority: Medium     Spinal stenosis, lumbar 12/21/2010     Priority: Medium     DJD (degenerative joint disease) 12/08/2010     Priority: Medium     Hypercholesterolemia 10/19/2010     Priority: Medium     Benign neoplasm of colon 09/06/2010     Priority: Medium     Tobacco user 09/28/2009     Priority: Medium     Obesity 11/02/2005     Priority: Medium     Degeneration of lumbar or lumbosacral intervertebral disc 12/16/1997     Priority: Medium     Hyperlipidemia, mixed 12/16/1997      Priority: Medium     Past Surgical History:   Procedure Laterality Date     ANGIOPLASTY      Right leg stenting and bypass, Left also     EXTRACTION(S) DENTAL      recently removed     OTHER SURGICAL HISTORY      [id number],PREMALIG/BENIGN SKIN LESION EXCISION, removed from chest     OTHER SURGICAL HISTORY      [id number],ANESTHESIA ALERT,No  problems with anaesthesia     Social History   Substance Use Topics     Smoking status: Former Smoker     Packs/day: 0.25     Types: Cigars     Start date: 11/4/2009     Smokeless tobacco: Never Used      Comment: Quit smoking: quit 6/20/17     Alcohol use No     Social History     Social History Narrative    Self employed as .   with 3 adult children.    No hx of chemical dependency.  Patient is a former smoker.   Alcohol Use - yes occ     Current Outpatient Prescriptions   Medication Sig Dispense Refill     aspirin 81 MG tablet Take 81 mg by mouth daily Take 81 mg by mouth once daily.       clopidogrel (PLAVIX) 75 MG tablet Take 75 mg by mouth daily Take 75 mg by mouth.       cyclobenzaprine (FLEXERIL) 10 MG tablet Take 10 mg by mouth 3 times daily Take 1 tablet by mouth 3 times daily       ibuprofen (ADVIL/MOTRIN) 800 MG tablet Take 800 mg by mouth 3 times daily as needed TAKE 1 TABLET BY MOUTH 3 TIMES DAILY WITH MEALS.       simvastatin (ZOCOR) 40 MG tablet TAKE 1 TABLET BY MOUTH ONCE DAILY. 90 tablet 0     Not on File    Review of Systems   Constitutional: Negative for fatigue and fever.   Respiratory: Negative for shortness of breath.    Cardiovascular: Negative for chest pain.   Musculoskeletal: Positive for back pain.   Skin: Positive for rash.        OBJECTIVE:     /80  Pulse 76  Wt 194 lb (88 kg)  BMI 27.84 kg/m2  Body mass index is 27.84 kg/(m^2).  Physical Exam   Constitutional: He is oriented to person, place, and time. He appears well-developed. No distress.   Cardiovascular: Normal rate, regular rhythm and normal heart sounds.  Exam reveals no  gallop and no friction rub.    No murmur heard.  Pulmonary/Chest: Effort normal. No respiratory distress. He has no wheezes. He has no rales.   Neurological: He is alert and oriented to person, place, and time.   Skin: Skin is warm and dry. He is not diaphoretic.   Posterior legs with mild diffuse erythema, no excoriations and no papules.  Rash stops ad sock line.       Diagnostic Test Results:  Results for orders placed or performed in visit on 08/08/18   Lipid Profile   Result Value Ref Range    Cholesterol 141 <200 mg/dL    Triglycerides 94 <150 mg/dL    HDL Cholesterol 48 23 - 92 mg/dL    LDL Cholesterol Calculated 74 <100 mg/dL    Non HDL Cholesterol 93 <130 mg/dL       ASSESSMENT/PLAN:         (I73.9) Peripheral vascular disease (H)  (primary encounter diagnosis)  Comment: stable  Plan: atorvastatin (LIPITOR) 40 MG tablet        He is tolerating the statins, so will increase to high intensity statins (lipitor or crestor).  Stop plavix now, has been on it for 1 year and says he is bleeding too easily.  Asa only.      (L23.7) Contact dermatitis due to poison ivy  Comment: new  Plan: triamcinolone (KENALOG) 0.1 % cream        Follow up as needed         Ha Keating MD  [facility] AND [facility]     yes

## 2024-04-18 ENCOUNTER — TELEPHONE (OUTPATIENT)
Dept: CARDIOLOGY | Facility: OTHER | Age: 69
End: 2024-04-18

## 2024-04-18 ENCOUNTER — OFFICE VISIT (OUTPATIENT)
Dept: CARDIOLOGY | Facility: CLINIC | Age: 69
End: 2024-04-18
Attending: THORACIC SURGERY (CARDIOTHORACIC VASCULAR SURGERY)
Payer: COMMERCIAL

## 2024-04-18 VITALS
BODY MASS INDEX: 26.1 KG/M2 | OXYGEN SATURATION: 96 % | SYSTOLIC BLOOD PRESSURE: 114 MMHG | HEART RATE: 76 BPM | WEIGHT: 180.3 LBS | DIASTOLIC BLOOD PRESSURE: 67 MMHG

## 2024-04-18 DIAGNOSIS — R07.9 CHEST PAIN, UNSPECIFIED TYPE: ICD-10-CM

## 2024-04-18 DIAGNOSIS — I25.118 CORONARY ARTERY DISEASE OF NATIVE ARTERY OF NATIVE HEART WITH STABLE ANGINA PECTORIS (H): Primary | ICD-10-CM

## 2024-04-18 DIAGNOSIS — R79.89 OTHER SPECIFIED ABNORMAL FINDINGS OF BLOOD CHEMISTRY: ICD-10-CM

## 2024-04-18 DIAGNOSIS — R09.89 OTHER SPECIFIED SYMPTOMS AND SIGNS INVOLVING THE CIRCULATORY AND RESPIRATORY SYSTEMS: ICD-10-CM

## 2024-04-18 PROCEDURE — 99204 OFFICE O/P NEW MOD 45 MIN: CPT | Performed by: THORACIC SURGERY (CARDIOTHORACIC VASCULAR SURGERY)

## 2024-04-18 PROCEDURE — G0463 HOSPITAL OUTPT CLINIC VISIT: HCPCS | Performed by: THORACIC SURGERY (CARDIOTHORACIC VASCULAR SURGERY)

## 2024-04-18 ASSESSMENT — PAIN SCALES - GENERAL: PAINLEVEL: NO PAIN (0)

## 2024-04-18 NOTE — TELEPHONE ENCOUNTER
Patient had a coronary angiogram at the [facility] and didn't help and is seeing 2 more providers at the [facility] today 04/18/24.    Please call patient back   Thank you   Christine Jimenez on 4/18/2024 at 9:39 AM

## 2024-04-18 NOTE — NURSING NOTE
Chief Complaint   Patient presents with    New Patient     New CV Sx        Vitals were taken, medications reconciled.    Raya Peralta, Facilitator   2:55 PM

## 2024-04-18 NOTE — PROGRESS NOTES
[facility] CARDIOTHORACIC SURGERY CONSULT  Patient Name: Modesto Lehman  Medical Record Number: [id number]  YOB: 1955  Room Number: Room/bed info not found  Referring Physician: Dr. Chandler    CC: Coronary artery disease    History of Present Illness: Modesto Lehman is a 68 year old male with several months of back pain.  He had positive stress test.  Coronary angiogram shows 3V CAD.    Assessment and Plan:  Modesto Lehman is a 68 year old male with 3V CAD  1) Recommend CABG - LIMA to LAD, possible bilateral mammary artery harvest, possible radial artery harvest, will check lower extremity US for lesser vein  2) Check PFTs  3) Will schedule for recheck after PFTs   4) Please call with questions or concerns.     Thank you for the opportunity to participate in the care of this patient.    Kit Mason MD  Cardiothoracic Surgery  [phone]    Past Medical History:  Past Medical History:   Diagnosis Date    Allergy status to analgesic agent     No reported medication allergies    Anesthesia of skin     No  problems with anaesthesia    History of recurrent pneumonia     No Comments Provided    Hyperlipidemia     No Comments Provided    Low back pain     pain with bilateral leg and disc injuries.    Personal history of nicotine dependence     Personal history of other medical treatment (CODE)     No blood transfusions       Past Surgical History:  Past Surgical History:   Procedure Laterality Date    ANGIOPLASTY      Right leg stenting and bypass, Left also    COLONOSCOPY  08/23/2010    Q 10yrs.    COLONOSCOPY N/A 02/24/2022    6 tubular adenomas, 5 year follow up, 2/24/2027    CV CORONARY ANGIOGRAM N/A 4/15/2024    Procedure: Coronary Angiogram;  Surgeon: Toni Sellers MD;  Location:  HEART CARDIAC CATH LAB    EXTRACTION(S) DENTAL      recently removed    OTHER SURGICAL HISTORY      [id number],PREMALIG/BENIGN SKIN LESION EXCISION, removed from chest    OTHER SURGICAL HISTORY       [id number],ANESTHESIA ALERT,No  problems with anaesthesia        Family History:   Family History   Problem Relation Age of Onset    Diabetes Mother         Diabetes    Heart Disease Mother         Heart Disease    Other - See Comments Father         COPD, CD    Substance Abuse Other         Alcohol/Drug, No hx of chemical dependency.    Diabetes Other         Diabetes    Heart Disease Other         Heart Disease    Heart Disease Brother         Heart Disease    Other - See Comments Brother         killed by drunk  at age 19.       Social History:  Social History     Socioeconomic History    Marital status:      Spouse name: Not on file    Number of children: Not on file    Years of education: Not on file    Highest education level: Not on file   Occupational History    Not on file   Tobacco Use    Smoking status: Every Day     Current packs/day: 0.25     Average packs/day: 0.3 packs/day for 14.5 years (3.6 ttl pk-yrs)     Types: Cigars, Cigarettes     Start date: 11/4/2009    Smokeless tobacco: Never    Tobacco comments:     Quit smoking: quit 6/20/17 (cigarettes); smokes cigars 3 or 4 per day   Vaping Use    Vaping status: Never Used   Substance and Sexual Activity    Alcohol use: Yes     Comment: 2 vodka every evening    Drug use: Not on file     Comment: every evening    Sexual activity: Not Currently   Other Topics Concern    Not on file   Social History Narrative    Self employed as .   with 3 adult children.    No hx of chemical dependency.  Patient is a former smoker.   Alcohol Use - yes occ    Wife - Imelda Lehman     Social Determinants of Health     Financial Resource Strain: Low Risk  (2/16/2024)    Financial Resource Strain     Within the past 12 months, have you or your family members you live with been unable to get utilities (heat, electricity) when it was really needed?: No   Food Insecurity: Low Risk  (2/16/2024)    Food Insecurity     Within the past 12 months, did you worry  that your food would run out before you got money to buy more?: No     Within the past 12 months, did the food you bought just not last and you didn t have money to get more?: No   Transportation Needs: Low Risk  (2/16/2024)    Transportation Needs     Within the past 12 months, has lack of transportation kept you from medical appointments, getting your medicines, non-medical meetings or appointments, work, or from getting things that you need?: No   Physical Activity: Sufficiently Active (2/16/2024)    Exercise Vital Sign     Days of Exercise per Week: 7 days     Minutes of Exercise per Session: 60 min   Stress: Stress Concern Present (2/16/2024)    [employer] - Occupational Stress Questionnaire     Feeling of Stress : Very much   Social Connections: Not on file   Interpersonal Safety: Low Risk  (4/11/2024)    Interpersonal Safety     Do you feel physically and emotionally safe where you currently live?: Yes     Within the past 12 months, have you been hit, slapped, kicked or otherwise physically hurt by someone?: No     Within the past 12 months, have you been humiliated or emotionally abused in other ways by your partner or ex-partner?: No   Housing Stability: Low Risk  (2/16/2024)    Housing Stability     Do you have housing? : Yes     Are you worried about losing your housing?: No       Allergies:   Allergies   Allergen Reactions    Amoxicillin Swelling     Severe tongue and throat swelling noted on ED visit 7/13/2020. Required epinephrine, steroids.    Doxycycline Swelling     Hand swelling  , hives and welts       Medications:  Current Outpatient Medications   Medication Sig Dispense Refill    aspirin (ASA) 81 MG chewable tablet Take 1 tablet (81 mg) by mouth daily 90 tablet 3    atorvastatin (LIPITOR) 40 MG tablet Take 1 tablet (40 mg) by mouth daily 90 tablet 4    gabapentin (NEURONTIN) 100 MG capsule Take 1 capsule (100 mg) by mouth 3 times daily 270 capsule 4    ibuprofen  "(ADVIL/MOTRIN) 800 MG tablet Take 1 tablet (800 mg) by mouth 3 times daily as needed TAKE 1 TABLET BY MOUTH 3 TIMES DAILY WITH MEALS. 100 tablet 5    nitroGLYcerin (NITROSTAT) 0.4 MG sublingual tablet For chest pain place 1 tablet under the tongue every 5 minutes for 3 doses. If symptoms persist 5 minutes after 1st dose call 911. 25 tablet 3    EPINEPHrine (ANY BX GENERIC EQUIV) 0.3 MG/0.3ML injection 2-pack Inject 0.3 mLs (0.3 mg) into the muscle once as needed for anaphylaxis (Patient not taking: Reported on 4/18/2024) 1 each 0    tiZANidine (ZANAFLEX) 4 MG tablet Take 1 tablet (4 mg) by mouth 3 times daily (Patient not taking: Reported on 4/18/2024) 90 tablet 11     No current facility-administered medications for this visit.       Review of Systems:   A 10 point ROS was performed and is negative other than HPI.    Physical Exam:  Pulse:  [76] 76  BP: (114)/(67) 114/67  SpO2:  [96 %] 96 %    Gen: NAD, resting comfortably in chair   Lungs: CTAB, non-labored breathing   CV: regular rhythm, normal rate   Abd: Soft, not tender, not distended   Ext: Motor, sensation, pulses intact   Neuro: AOx3    Labs:  Lab Results   Component Value Date    WBC 7.2 04/15/2024    WBC 10.6 07/31/2020     Lab Results   Component Value Date    RBC 5.36 04/15/2024    RBC 4.61 07/31/2020     Lab Results   Component Value Date    HGB 16.8 04/15/2024    HGB 14.2 07/31/2020     Lab Results   Component Value Date    HCT 49.9 04/15/2024    HCT 41.4 07/31/2020     No components found for: \"MCT\"  Lab Results   Component Value Date    MCV 93 04/15/2024    MCV 90 07/31/2020     Lab Results   Component Value Date    MCH 31.3 04/15/2024    MCH 30.8 07/31/2020     Lab Results   Component Value Date    MCHC 33.7 04/15/2024    MCHC 34.3 07/31/2020     Lab Results   Component Value Date    RDW 14.1 04/15/2024    RDW 14.5 07/31/2020     Lab Results   Component Value Date     04/15/2024     07/31/2020     Last Comprehensive Metabolic " Panel:  Lab Results   Component Value Date     04/15/2024    POTASSIUM 4.8 04/15/2024    CHLORIDE 102 04/15/2024    CO2 24 04/15/2024    ANIONGAP 11 04/15/2024     (H) 04/15/2024    BUN 13.2 04/15/2024    CR 0.86 04/15/2024    GFRESTIMATED >90 04/15/2024    DANE 9.2 04/15/2024           Imaging:  Transthoracic echocardiogram - pending    Coronary angiogram (4/15/24):    Conclusion         Mid LM lesion is 70% stenosed.    Prox LAD to Mid LAD lesion is 60% stenosed.    1st Diag lesion is 60% stenosed.    Mid Cx lesion is 100% stenosed.    Ost Cx to Prox Cx lesion is 99% stenosed.    Prox RCA lesion is 100% stenosed.     Significant ostial left main disease.  Chronic total occlusion of circumflex with left to left collaterals from diagonal.  Chronic total occlusion right coronary artery with left to left (from septals) and right to right (from marginal) collaterals.  Recommend consideration for bypass surgery.

## 2024-04-18 NOTE — LETTER
4/18/2024      RE: Modesto Lehman  [address]  [place] [address]       Dear Colleague,    Thank you for the opportunity to participate in the care of your patient, Modesto Lehman, at the [facility]. Please see a copy of my visit note below.    [facility] CARDIOTHORACIC SURGERY CONSULT  Patient Name: Modesto Lehman  Medical Record Number: [id number]  YOB: 1955  Room Number: Room/bed info not found  Referring Physician: Dr. Chandler    CC: Coronary artery disease    History of Present Illness: Modesto Lehman is a 68 year old male with several months of back pain.  He had positive stress test.  Coronary angiogram shows 3V CAD.    Assessment and Plan:  Modesto Lehman is a 68 year old male with 3V CAD  1) Recommend CABG - LIMA to LAD, possible bilateral mammary artery harvest, possible radial artery harvest, will check lower extremity US for lesser vein  2) Check PFTs  3) Will schedule for recheck after PFTs   4) Please call with questions or concerns.     Thank you for the opportunity to participate in the care of this patient.    Kit Mason MD  Cardiothoracic Surgery  [phone]    Past Medical History:  Past Medical History:   Diagnosis Date     Allergy status to analgesic agent     No reported medication allergies     Anesthesia of skin     No  problems with anaesthesia     History of recurrent pneumonia     No Comments Provided     Hyperlipidemia     No Comments Provided     Low back pain     pain with bilateral leg and disc injuries.     Personal history of nicotine dependence      Personal history of other medical treatment (CODE)     No blood transfusions       Past Surgical History:  Past Surgical History:   Procedure Laterality Date     ANGIOPLASTY      Right leg stenting and bypass, Left also     COLONOSCOPY  08/23/2010    Q 10yrs.     COLONOSCOPY N/A 02/24/2022    6 tubular adenomas, 5  year follow up, 2/24/2027     CV CORONARY ANGIOGRAM N/A 4/15/2024    Procedure: Coronary Angiogram;  Surgeon: Toni Sellers MD;  Location:  HEART CARDIAC CATH LAB     EXTRACTION(S) DENTAL      recently removed     OTHER SURGICAL HISTORY      [id number],PREMALIG/BENIGN SKIN LESION EXCISION, removed from chest     OTHER SURGICAL HISTORY      [id number],ANESTHESIA ALERT,No  problems with anaesthesia        Family History:   Family History   Problem Relation Age of Onset     Diabetes Mother         Diabetes     Heart Disease Mother         Heart Disease     Other - See Comments Father         COPD, CD     Substance Abuse Other         Alcohol/Drug, No hx of chemical dependency.     Diabetes Other         Diabetes     Heart Disease Other         Heart Disease     Heart Disease Brother         Heart Disease     Other - See Comments Brother         killed by drunk  at age 19.       Social History:  Social History     Socioeconomic History     Marital status:      Spouse name: Not on file     Number of children: Not on file     Years of education: Not on file     Highest education level: Not on file   Occupational History     Not on file   Tobacco Use     Smoking status: Every Day     Current packs/day: 0.25     Average packs/day: 0.3 packs/day for 14.5 years (3.6 ttl pk-yrs)     Types: Cigars, Cigarettes     Start date: 11/4/2009     Smokeless tobacco: Never     Tobacco comments:     Quit smoking: quit 6/20/17 (cigarettes); smokes cigars 3 or 4 per day   Vaping Use     Vaping status: Never Used   Substance and Sexual Activity     Alcohol use: Yes     Comment: 2 vodka every evening     Drug use: Not on file     Comment: every evening     Sexual activity: Not Currently   Other Topics Concern     Not on file   Social History Narrative    Self employed as .   with 3 adult children.    No hx of chemical dependency.  Patient is a former smoker.   Alcohol Use - yes occ    Wife - Imelda Lehman      Social Determinants of Health     Financial Resource Strain: Low Risk  (2/16/2024)    Financial Resource Strain      Within the past 12 months, have you or your family members you live with been unable to get utilities (heat, electricity) when it was really needed?: No   Food Insecurity: Low Risk  (2/16/2024)    Food Insecurity      Within the past 12 months, did you worry that your food would run out before you got money to buy more?: No      Within the past 12 months, did the food you bought just not last and you didn t have money to get more?: No   Transportation Needs: Low Risk  (2/16/2024)    Transportation Needs      Within the past 12 months, has lack of transportation kept you from medical appointments, getting your medicines, non-medical meetings or appointments, work, or from getting things that you need?: No   Physical Activity: Sufficiently Active (2/16/2024)    Exercise Vital Sign      Days of Exercise per Week: 7 days      Minutes of Exercise per Session: 60 min   Stress: Stress Concern Present (2/16/2024)    [employer] - Occupational Stress Questionnaire      Feeling of Stress : Very much   Social Connections: Not on file   Interpersonal Safety: Low Risk  (4/11/2024)    Interpersonal Safety      Do you feel physically and emotionally safe where you currently live?: Yes      Within the past 12 months, have you been hit, slapped, kicked or otherwise physically hurt by someone?: No      Within the past 12 months, have you been humiliated or emotionally abused in other ways by your partner or ex-partner?: No   Housing Stability: Low Risk  (2/16/2024)    Housing Stability      Do you have housing? : Yes      Are you worried about losing your housing?: No       Allergies:   Allergies   Allergen Reactions     Amoxicillin Swelling     Severe tongue and throat swelling noted on ED visit 7/13/2020. Required epinephrine, steroids.     Doxycycline Swelling     Hand swelling  ,  "hives and welts       Medications:  Current Outpatient Medications   Medication Sig Dispense Refill     aspirin (ASA) 81 MG chewable tablet Take 1 tablet (81 mg) by mouth daily 90 tablet 3     atorvastatin (LIPITOR) 40 MG tablet Take 1 tablet (40 mg) by mouth daily 90 tablet 4     gabapentin (NEURONTIN) 100 MG capsule Take 1 capsule (100 mg) by mouth 3 times daily 270 capsule 4     ibuprofen (ADVIL/MOTRIN) 800 MG tablet Take 1 tablet (800 mg) by mouth 3 times daily as needed TAKE 1 TABLET BY MOUTH 3 TIMES DAILY WITH MEALS. 100 tablet 5     nitroGLYcerin (NITROSTAT) 0.4 MG sublingual tablet For chest pain place 1 tablet under the tongue every 5 minutes for 3 doses. If symptoms persist 5 minutes after 1st dose call 911. 25 tablet 3     EPINEPHrine (ANY BX GENERIC EQUIV) 0.3 MG/0.3ML injection 2-pack Inject 0.3 mLs (0.3 mg) into the muscle once as needed for anaphylaxis (Patient not taking: Reported on 4/18/2024) 1 each 0     tiZANidine (ZANAFLEX) 4 MG tablet Take 1 tablet (4 mg) by mouth 3 times daily (Patient not taking: Reported on 4/18/2024) 90 tablet 11     No current facility-administered medications for this visit.       Review of Systems:   A 10 point ROS was performed and is negative other than HPI.    Physical Exam:  Pulse:  [76] 76  BP: (114)/(67) 114/67  SpO2:  [96 %] 96 %    Gen: NAD, resting comfortably in chair   Lungs: CTAB, non-labored breathing   CV: regular rhythm, normal rate   Abd: Soft, not tender, not distended   Ext: Motor, sensation, pulses intact   Neuro: AOx3    Labs:  Lab Results   Component Value Date    WBC 7.2 04/15/2024    WBC 10.6 07/31/2020     Lab Results   Component Value Date    RBC 5.36 04/15/2024    RBC 4.61 07/31/2020     Lab Results   Component Value Date    HGB 16.8 04/15/2024    HGB 14.2 07/31/2020     Lab Results   Component Value Date    HCT 49.9 04/15/2024    HCT 41.4 07/31/2020     No components found for: \"MCT\"  Lab Results   Component Value Date    MCV 93 04/15/2024    " MCV 90 07/31/2020     Lab Results   Component Value Date    MCH 31.3 04/15/2024    MCH 30.8 07/31/2020     Lab Results   Component Value Date    MCHC 33.7 04/15/2024    MCHC 34.3 07/31/2020     Lab Results   Component Value Date    RDW 14.1 04/15/2024    RDW 14.5 07/31/2020     Lab Results   Component Value Date     04/15/2024     07/31/2020     Last Comprehensive Metabolic Panel:  Lab Results   Component Value Date     04/15/2024    POTASSIUM 4.8 04/15/2024    CHLORIDE 102 04/15/2024    CO2 24 04/15/2024    ANIONGAP 11 04/15/2024     (H) 04/15/2024    BUN 13.2 04/15/2024    CR 0.86 04/15/2024    GFRESTIMATED >90 04/15/2024    DANE 9.2 04/15/2024           Imaging:  Transthoracic echocardiogram - pending    Coronary angiogram (4/15/24):    Conclusion          Mid LM lesion is 70% stenosed.     Prox LAD to Mid LAD lesion is 60% stenosed.     1st Diag lesion is 60% stenosed.     Mid Cx lesion is 100% stenosed.     Ost Cx to Prox Cx lesion is 99% stenosed.     Prox RCA lesion is 100% stenosed.     Significant ostial left main disease.  Chronic total occlusion of circumflex with left to left collaterals from diagonal.  Chronic total occlusion right coronary artery with left to left (from septals) and right to right (from marginal) collaterals.  Recommend consideration for bypass surgery.      Please do not hesitate to contact me if you have any questions/concerns.     Sincerely,     Kit Mason MD

## 2024-04-18 NOTE — TELEPHONE ENCOUNTER
FYI-After verification, patient said he just saw Dr Mason and not sure if he likes his plan.  Patient will plan on coming in for echo next week and seeing Dr Chandler on 4/25 as scheduled.  Fanny Olguin LPN ....................4/18/2024   4:52 PM

## 2024-04-19 NOTE — TELEPHONE ENCOUNTER
FUTURE VISIT INFORMATION      SURGERY INFORMATION:  Date: NAFISA Mason  Consult: ov 24    RECORDS REQUESTED FROM:       Primary Care Provider:[facility]    Pertinent Medical History: pad    Most recent EKG+ [id number]

## 2024-04-23 ENCOUNTER — HOSPITAL ENCOUNTER (OUTPATIENT)
Dept: CARDIOLOGY | Facility: OTHER | Age: 69
Discharge: HOME | End: 2024-04-23
Attending: THORACIC SURGERY (CARDIOTHORACIC VASCULAR SURGERY) | Admitting: THORACIC SURGERY (CARDIOTHORACIC VASCULAR SURGERY)
Payer: MEDICARE

## 2024-04-23 DIAGNOSIS — I25.118 CORONARY ARTERY DISEASE OF NATIVE ARTERY OF NATIVE HEART WITH STABLE ANGINA PECTORIS (H): ICD-10-CM

## 2024-04-23 LAB — LVEF ECHO: NORMAL

## 2024-04-23 PROCEDURE — 93306 TTE W/DOPPLER COMPLETE: CPT | Mod: 26 | Performed by: INTERNAL MEDICINE

## 2024-04-23 PROCEDURE — 93306 TTE W/DOPPLER COMPLETE: CPT

## 2024-04-24 NOTE — PROGRESS NOTES
[facility] HEART [facility]   CARDIOLOGY PROGRESS NOTE     Chief Complaint   Patient presents with    Follow Up     angiogram          Diagnosis:  1.  CAD.   -Cardiac cath on 4/15/2024, multivessel disease-[facility].   -LM 70%, LAD 60%, first diagonal 60%, LCx 99%, mid LCx 100%, % with collaterals.  2.  Elevated coronary calcium score   -2015.1 and 3/5/2024.  3.  CHF-systolic.   -30 to 35% on 4/20/2024.  4.  WMA.   -9/23/2024.  5.  PAD.   -Bilateral popliteal bypass.  6.  Tobacco abuse.   -3-5 Norah sweets a day.      Assessment/Plan:    1.  Multivessel disease: Patient having stable angina with very mild activities.  Has had chest discomfort for at least 6 years.  He had a CTA coronaries changed to CT scan for calcium because of his severely elevated coronary calcium score at 2,015.  He believed his substernal and intrascapular discomfort was related to arthritis.  Discussed with the patient's that his symptoms are coming from his heart because of blocked coronary arteries.  He had described a tightness/sharp discomfort radiating between his shoulder blades, exacerbated by minimal activity.  He gets extremely diaphoretic. He is a longtime smoker with hyperlipidemia.  He underwent cardiac catheterization at the [facility].  He is found to have severe multivessel disease with collaterals.  Plan is for bypass.  Continue medical management aspirin 81 mg daily, 81 mg daily and SL nitro.  Will increase atorvastatin from 40 mg once daily to 80 mg once daily.  Follow-up after bypass.    2.  Carotid artery disease: Concern for.  Ultrasound of carotid arteries pending.  3.  PAD: History of bilateral popliteal bypass.  Continue aspirin, Lipitor, and activity.  Not having symptoms.  He has seeing vascular through [facility].  Preferably, would go to [facility] in the future.  4.  Tobacco abuse: He states he has now quit smoking.  5.  Follow-up after completion of bypass.      Interval history:  See above.      HPI:     Mr. Lehman is being seen by cardiology for chest discomfort and a severely elevated coronary calcium score.  He has been referred to cardiology for concerns of coronary artery disease.     Modesto describes multiple back injuries dating back to third grade.  In the third grade, he was working on a roof.  I believe he fell twice landing on his back.  Once to the ground.  The other was hitting a metal pole.  About a year ago, he rolled his 4 gómez but was not injured by the machine.  He jumped out of the way.  But when trying to operate the machine, he states it was very heavy.  When he went to pick it up, this exacerbated his back discomfort.  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  He has been concerned that his intrascapular discomfort is related to his back.       He describes substernal, right precordial, neck, and intrascapular discomfort which has been exacerbated with activity.  Even with emotional stress, it will exacerbate his symptoms.  He describes sharp but also heavy discomfort to his chest.  With that, he is diaphoretic.  It limits his activities.  He smokes Norah sweets on average 3-5 a day.  He has been smoking since age 16.  He did quit for a while starting up once again 10-15 years ago.  His wife has had a lot of medical issues and ultimately had a renal transplant.  He states that the stress of her health has caused him to continue to smoke.  He does have a history of PAD with bilateral femoropopliteal.  He has not been taking aspirin regularly.  He does take his statin but has never been given SL nitro.  Patient has been convinced that his substernal and intrascapular discomfort has been related to arthritis.     He had an MRI of his thoracic spine on 3/14/2023 showing mild progression of multivessel thoracic spondylosis with no high-grade spinal canal or foraminal narrowing.      Patient has been hesitant to have stress testing.  Underwent CT coronary for calcium on 3/5/2024.  Was to be a CTA of coronaries but changed to CT coronary for calcium because of a calcium score of 2015.1.  Calcium score was 96 percentile for same age, gender, race, and ethnicity      Relevant testing:  Echo on 4/16/2024:  Left ventricular function is decreased.   The ejection fraction is 30-35% (moderately reduced).  There is moderate diffuse hypokinesis with akinesis of the basal-mid  inferolateral and basal inferior segments indicative of multivessel CAD.  Global right ventricular function is normal.  No significant valvular abnormalities present.  Estimated mean right atrial pressure is normal.  No pericardial effusion is present.    Echo on 4/23/2024:  Left ventricular function is decreased. The ejection fraction is 30-35% (moderately reduced).  There is moderate diffuse hypokinesis with akinesis of the basal-mid  inferolateral and basal inferior segments indicative of multivessel CAD.  Global right ventricular function is normal.  No significant valvular abnormalities present.  Estimated mean right atrial pressure is normal.  No pericardial effusion is present.    Cardiac cath on 4/15/2024:    Mid LM lesion is 70% stenosed.    Prox LAD to Mid LAD lesion is 60% stenosed.    1st Diag lesion is 60% stenosed.    Mid Cx lesion is 100% stenosed.    Ost Cx to Prox Cx lesion is 99% stenosed.    Prox RCA lesion is 100% stenosed.     Significant ostial left main disease.  Chronic total occlusion of circumflex with left to left collaterals from diagonal.  Chronic total occlusion right coronary artery with left to left (from septals) and right to right (from marginal) collaterals.  Recommend consideration for bypass surgery.    US abdominal aorta for AAA on 3/8/2024:  No abdominal aortic aneurysm.    US abdominal aorta on 3/5/2024:  No abdominal aortic aneurysm.    CT scan for calcium on 3/5/2024:  Left main: 88.5  Left  anterior descendin.4  Left circumflex: 685.3  Right coronary artery: 256.9  Posterior descending artery: 0.0  TOTAL: 2015.1     The estimated probability of a non-zero calcium score for a white Male  of age 66 years is 78%. The observed calcium score is at the 96th  percentile for subjects of the same age, gender, and race/ethnicity  who are free of clinical cardiovascular disease and treated diabetes.  The heart, thoracic aorta and main pulmonary artery are within normal limits in size.     CT angio abdomen on 2023 [facility]:  1. Patent right common iliac to common femoral artery stent graft.   2. Patent bilateral femoral to popliteal artery bypass grafts without evidence of graft stenosis or aneurysmal dilatation.   3. 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   4. Three-vessel runoff to the left foot with diffuse calcified atherosclerosis and small vessel lumens in the 3 lower leg arteries.      Stress echo on 2013:  1. Normal echocardiographic portion of the stress echocardiogram with ejection fraction increasing from 60% pre-exercise to 80% immediately post-exercise with no new regional wall motion abnormalities.  2. Screening color and spectral Doppler echocardiogram was not performed.  3. For details of the stress EKG portion of the examination, please see  Dr. Acosta's report.          ICD-10-CM    1. Elevated coronary artery calcium score  R93.1 atorvastatin (LIPITOR) 80 MG tablet      2. Peripheral vascular disease (H24)  I73.9 atorvastatin (LIPITOR) 80 MG tablet      3. Coronary artery disease of native artery of native heart with stable angina pectoris (H24)  I25.118       4. Positive cardiac stress test on 3/5/2024  R94.39           Past Medical History:   Diagnosis Date    Allergy status to analgesic agent     No reported medication allergies    Anesthesia of skin     No  problems with anaesthesia    History of recurrent pneumonia     No  Comments Provided    Hyperlipidemia     No Comments Provided    Low back pain     pain with bilateral leg and disc injuries.    Personal history of nicotine dependence     Personal history of other medical treatment (CODE)     No blood transfusions       Past Surgical History:   Procedure Laterality Date    ANGIOPLASTY      Right leg stenting and bypass, Left also    COLONOSCOPY  08/23/2010    Q 10yrs.    COLONOSCOPY N/A 02/24/2022    6 tubular adenomas, 5 year follow up, 2/24/2027    CV CORONARY ANGIOGRAM N/A 4/15/2024    Procedure: Coronary Angiogram;  Surgeon: Toni Sellers MD;  Location:  HEART CARDIAC CATH LAB    EXTRACTION(S) DENTAL      recently removed    OTHER SURGICAL HISTORY      BYS182,PREMALIG/BENIGN SKIN LESION EXCISION, removed from chest    OTHER SURGICAL HISTORY      208489,ANESTHESIA ALERT,No  problems with anaesthesia       Allergies   Allergen Reactions    Amoxicillin Swelling     Severe tongue and throat swelling noted on ED visit 7/13/2020. Required epinephrine, steroids.    Doxycycline Swelling     Hand swelling  , hives and welts       Current Outpatient Medications   Medication Sig Dispense Refill    aspirin (ASA) 81 MG chewable tablet Take 1 tablet (81 mg) by mouth daily 90 tablet 3    atorvastatin (LIPITOR) 80 MG tablet Take 1 tablet (80 mg) by mouth daily 90 tablet 3    EPINEPHrine (ANY BX GENERIC EQUIV) 0.3 MG/0.3ML injection 2-pack Inject 0.3 mLs (0.3 mg) into the muscle once as needed for anaphylaxis 1 each 0    gabapentin (NEURONTIN) 100 MG capsule Take 1 capsule (100 mg) by mouth 3 times daily 270 capsule 4    ibuprofen (ADVIL/MOTRIN) 800 MG tablet Take 1 tablet (800 mg) by mouth 3 times daily as needed TAKE 1 TABLET BY MOUTH 3 TIMES DAILY WITH MEALS. 100 tablet 5    nitroGLYcerin (NITROSTAT) 0.4 MG sublingual tablet For chest pain place 1 tablet under the tongue every 5 minutes for 3 doses. If symptoms persist 5 minutes after 1st dose call 911. 25 tablet 3    tiZANidine  (ZANAFLEX) 4 MG tablet Take 1 tablet (4 mg) by mouth 3 times daily 90 tablet 11       Social History     Socioeconomic History    Marital status:      Spouse name: Not on file    Number of children: Not on file    Years of education: Not on file    Highest education level: Not on file   Occupational History    Not on file   Tobacco Use    Smoking status: Former     Current packs/day: 0.25     Average packs/day: 0.3 packs/day for 14.5 years (3.6 ttl pk-yrs)     Types: Cigars, Cigarettes     Start date: 11/4/2009    Smokeless tobacco: Never    Tobacco comments:     Quit smoking: quit 6/20/17 (cigarettes); smokes cigars 3 or 4 per day   Vaping Use    Vaping status: Never Used   Substance and Sexual Activity    Alcohol use: Yes     Comment: 2 vodka every evening    Drug use: Not on file     Comment: every evening    Sexual activity: Not Currently   Other Topics Concern    Not on file   Social History Narrative    Self employed as .   with 3 adult children.    No hx of chemical dependency.  Patient is a former smoker.   Alcohol Use - yes occ    Wife - Imelda Lehman     Social Determinants of Health     Financial Resource Strain: Low Risk  (2/16/2024)    Financial Resource Strain     Within the past 12 months, have you or your family members you live with been unable to get utilities (heat, electricity) when it was really needed?: No   Food Insecurity: Low Risk  (2/16/2024)    Food Insecurity     Within the past 12 months, did you worry that your food would run out before you got money to buy more?: No     Within the past 12 months, did the food you bought just not last and you didn t have money to get more?: No   Transportation Needs: Low Risk  (2/16/2024)    Transportation Needs     Within the past 12 months, has lack of transportation kept you from medical appointments, getting your medicines, non-medical meetings or appointments, work, or from getting things that you need?: No   Physical Activity:  Sufficiently Active (2/16/2024)    Exercise Vital Sign     Days of Exercise per Week: 7 days     Minutes of Exercise per Session: 60 min   Stress: Stress Concern Present (2/16/2024)    [employer] [place] [employer] - Occupational Stress Questionnaire     Feeling of Stress : Very much   Social Connections: Not on file   Interpersonal Safety: Low Risk  (4/11/2024)    Interpersonal Safety     Do you feel physically and emotionally safe where you currently live?: Yes     Within the past 12 months, have you been hit, slapped, kicked or otherwise physically hurt by someone?: No     Within the past 12 months, have you been humiliated or emotionally abused in other ways by your partner or ex-partner?: No   Housing Stability: Low Risk  (2/16/2024)    Housing Stability     Do you have housing? : Yes     Are you worried about losing your housing?: No       LAB RESULTS:   Office Visit on 04/11/2024   Component Date Value Ref Range Status    Ventricular Rate 04/11/2024 69  BPM Final    Atrial Rate 04/11/2024 69  BPM Final    GA Interval 04/11/2024 172  ms Final    QRS Duration 04/11/2024 114  ms Final    QT 04/11/2024 412  ms Final    QTc 04/11/2024 441  ms Final    P Axis 04/11/2024 49  degrees Final    R AXIS 04/11/2024 -40  degrees Final    T Axis 04/11/2024 83  degrees Final    Interpretation ECG 04/11/2024    Final                    Value:Sinus rhythm  Left axis deviation  Nonspecific ST and T wave abnormality  Left axis deviation  When compared with ECG of 27-FEB-2023 11:43,  ST no longer elevated in Anterior leads  Confirmed by MD VEGA, NOÉ ([id number]) on 4/11/2024 12:40:19 PM          Review of systems: Negative except that which was noted in the HPI.    Physical examination:  /80 (BP Location: Right arm, Patient Position: Sitting, Cuff Size: Adult Regular)   Pulse 96   Temp 99.1  F (37.3  C) (Temporal)   Resp 16   Wt 82.1 kg (181 lb)   SpO2 97%   BMI 26.21 kg/m      GENERAL APPEARANCE: healthy,  alert and no distress  CHEST: lungs clear to auscultation - no rales, rhonchi or wheezes, no use of accessory muscles, no retractions, respirations are unlabored, normal respiratory rate  CARDIOVASCULAR: regular rhythm, normal S1 with physiologic split S2, no S3 or S4 and no murmur, click or rub  EXTREMITIES: no clubbing, cyanosis or edema.    Total time spent on day of visit, including review of tests, obtaining/reviewing separately obtained history, ordering medications/tests/procedures, communicating with PCP/consultants, and documenting in electronic medical record: 20 minutes.              Thank you for allowing me to participate in the care of your patient. Please do not hesitate to contact me if you have any questions.     Omega Chandler, DO

## 2024-04-25 ENCOUNTER — OFFICE VISIT (OUTPATIENT)
Dept: CARDIOLOGY | Facility: OTHER | Age: 69
End: 2024-04-25
Attending: INTERNAL MEDICINE
Payer: COMMERCIAL

## 2024-04-25 VITALS
SYSTOLIC BLOOD PRESSURE: 110 MMHG | DIASTOLIC BLOOD PRESSURE: 80 MMHG | OXYGEN SATURATION: 97 % | RESPIRATION RATE: 16 BRPM | HEART RATE: 96 BPM | WEIGHT: 181 LBS | BODY MASS INDEX: 26.21 KG/M2 | TEMPERATURE: 99.1 F

## 2024-04-25 DIAGNOSIS — R94.39 POSITIVE CARDIAC STRESS TEST: ICD-10-CM

## 2024-04-25 DIAGNOSIS — I73.9 PERIPHERAL VASCULAR DISEASE (H): ICD-10-CM

## 2024-04-25 DIAGNOSIS — I25.118 CORONARY ARTERY DISEASE OF NATIVE ARTERY OF NATIVE HEART WITH STABLE ANGINA PECTORIS (H): ICD-10-CM

## 2024-04-25 DIAGNOSIS — R93.1 ELEVATED CORONARY ARTERY CALCIUM SCORE: Primary | ICD-10-CM

## 2024-04-25 PROCEDURE — G0463 HOSPITAL OUTPT CLINIC VISIT: HCPCS

## 2024-04-25 PROCEDURE — 99213 OFFICE O/P EST LOW 20 MIN: CPT | Performed by: INTERNAL MEDICINE

## 2024-04-25 RX ORDER — ATORVASTATIN CALCIUM 80 MG/1
80 TABLET, FILM COATED ORAL DAILY
Qty: 90 TABLET | Refills: 3 | Status: SHIPPED | OUTPATIENT
Start: 2024-04-25 | End: 2024-08-13

## 2024-04-25 ASSESSMENT — PAIN SCALES - GENERAL: PAINLEVEL: MILD PAIN (2)

## 2024-04-25 NOTE — NURSING NOTE
"Patient comes in for  follow up after angiogram.    Chief Complaint   Patient presents with    Follow Up     angiogram       Initial /80 (BP Location: Right arm, Patient Position: Sitting, Cuff Size: Adult Regular)   Pulse 96   Temp 99.1  F (37.3  C) (Temporal)   Resp 16   Wt 82.1 kg (181 lb)   SpO2 97%   BMI 26.21 kg/m   Estimated body mass index is 26.21 kg/m  as calculated from the following:    Height as of 4/11/24: 1.77 m (5' 9.69\").    Weight as of this encounter: 82.1 kg (181 lb).  Meds Reconciled: complete  Pt is on Aspirin  Pt is on a Statin  PHQ and/or ELSA reviewed. Pt referred to PCP/MH Provider as appropriate.    Fanny Olguin LPN      "

## 2024-04-30 ENCOUNTER — ANCILLARY PROCEDURE (OUTPATIENT)
Dept: ULTRASOUND IMAGING | Facility: CLINIC | Age: 69
End: 2024-04-30
Attending: THORACIC SURGERY (CARDIOTHORACIC VASCULAR SURGERY)
Payer: COMMERCIAL

## 2024-04-30 ENCOUNTER — ANCILLARY PROCEDURE (OUTPATIENT)
Dept: CT IMAGING | Facility: CLINIC | Age: 69
End: 2024-04-30
Attending: THORACIC SURGERY (CARDIOTHORACIC VASCULAR SURGERY)
Payer: COMMERCIAL

## 2024-04-30 DIAGNOSIS — I25.118 CORONARY ARTERY DISEASE OF NATIVE ARTERY OF NATIVE HEART WITH STABLE ANGINA PECTORIS (H): ICD-10-CM

## 2024-04-30 DIAGNOSIS — R09.89 OTHER SPECIFIED SYMPTOMS AND SIGNS INVOLVING THE CIRCULATORY AND RESPIRATORY SYSTEMS: ICD-10-CM

## 2024-04-30 DIAGNOSIS — I65.23 BILATERAL CAROTID ARTERY STENOSIS: Primary | ICD-10-CM

## 2024-04-30 DIAGNOSIS — R07.9 CHEST PAIN, UNSPECIFIED TYPE: ICD-10-CM

## 2024-04-30 LAB
ABO/RH(D): NORMAL
ANTIBODY SCREEN: NEGATIVE
RADIOLOGIST FLAGS: ABNORMAL
RADIOLOGIST FLAGS: ABNORMAL
SPECIMEN EXPIRATION DATE: NORMAL

## 2024-04-30 PROCEDURE — 71250 CT THORAX DX C-: CPT | Performed by: RADIOLOGY

## 2024-04-30 PROCEDURE — 93880 EXTRACRANIAL BILAT STUDY: CPT | Mod: GC | Performed by: STUDENT IN AN ORGANIZED HEALTH CARE EDUCATION/TRAINING PROGRAM

## 2024-04-30 PROCEDURE — 93931 UPPER EXTREMITY STUDY: CPT | Mod: LT | Performed by: RADIOLOGY

## 2024-04-30 PROCEDURE — 93970 EXTREMITY STUDY: CPT | Mod: GC | Performed by: STUDENT IN AN ORGANIZED HEALTH CARE EDUCATION/TRAINING PROGRAM

## 2024-04-30 PROCEDURE — 93000 ELECTROCARDIOGRAM COMPLETE: CPT | Performed by: INTERNAL MEDICINE

## 2024-05-01 ENCOUNTER — ANESTHESIA EVENT (OUTPATIENT)
Dept: SURGERY | Facility: CLINIC | Age: 69
End: 2024-05-01

## 2024-05-01 ENCOUNTER — PRE VISIT (OUTPATIENT)
Dept: SURGERY | Facility: CLINIC | Age: 69
End: 2024-05-01

## 2024-05-01 ENCOUNTER — LAB (OUTPATIENT)
Dept: LAB | Facility: CLINIC | Age: 69
End: 2024-05-01
Payer: COMMERCIAL

## 2024-05-01 ENCOUNTER — OFFICE VISIT (OUTPATIENT)
Dept: PULMONOLOGY | Facility: CLINIC | Age: 69
End: 2024-05-01
Payer: COMMERCIAL

## 2024-05-01 ENCOUNTER — TRANSCRIBE ORDERS (OUTPATIENT)
Dept: CARDIOLOGY | Facility: OTHER | Age: 69
End: 2024-05-01

## 2024-05-01 ENCOUNTER — OFFICE VISIT (OUTPATIENT)
Dept: SURGERY | Facility: CLINIC | Age: 69
End: 2024-05-01
Payer: COMMERCIAL

## 2024-05-01 VITALS
SYSTOLIC BLOOD PRESSURE: 135 MMHG | OXYGEN SATURATION: 96 % | RESPIRATION RATE: 16 BRPM | HEART RATE: 70 BPM | TEMPERATURE: 98.1 F | WEIGHT: 181.4 LBS | DIASTOLIC BLOOD PRESSURE: 74 MMHG | HEIGHT: 70 IN | BODY MASS INDEX: 25.97 KG/M2

## 2024-05-01 DIAGNOSIS — I25.118 CORONARY ARTERY DISEASE OF NATIVE ARTERY OF NATIVE HEART WITH STABLE ANGINA PECTORIS (H): ICD-10-CM

## 2024-05-01 DIAGNOSIS — R79.89 OTHER SPECIFIED ABNORMAL FINDINGS OF BLOOD CHEMISTRY: ICD-10-CM

## 2024-05-01 DIAGNOSIS — I73.9 PAD (PERIPHERAL ARTERY DISEASE) (H): ICD-10-CM

## 2024-05-01 DIAGNOSIS — Z01.818 PREOP EXAMINATION: Primary | ICD-10-CM

## 2024-05-01 DIAGNOSIS — I65.23 BILATERAL CAROTID ARTERY STENOSIS: Primary | ICD-10-CM

## 2024-05-01 DIAGNOSIS — R07.9 CHEST PAIN, UNSPECIFIED TYPE: ICD-10-CM

## 2024-05-01 LAB
ALBUMIN SERPL BCG-MCNC: 4.8 G/DL (ref 3.5–5.2)
ALBUMIN UR-MCNC: 10 MG/DL
ALP SERPL-CCNC: 105 U/L (ref 40–150)
ALT SERPL W P-5'-P-CCNC: 39 U/L (ref 0–70)
ANION GAP SERPL CALCULATED.3IONS-SCNC: 11 MMOL/L (ref 7–15)
APPEARANCE UR: CLEAR
APTT PPP: 30 SECONDS (ref 22–38)
AST SERPL W P-5'-P-CCNC: 36 U/L (ref 0–45)
ATRIAL RATE - MUSE: 79 BPM
BILIRUB SERPL-MCNC: 0.5 MG/DL
BILIRUB UR QL STRIP: NEGATIVE
BUN SERPL-MCNC: 12.7 MG/DL (ref 8–23)
CALCIUM SERPL-MCNC: 9.3 MG/DL (ref 8.8–10.2)
CHLORIDE SERPL-SCNC: 101 MMOL/L (ref 98–107)
COLOR UR AUTO: YELLOW
CREAT SERPL-MCNC: 0.84 MG/DL (ref 0.67–1.17)
DEPRECATED HCO3 PLAS-SCNC: 26 MMOL/L (ref 22–29)
DIASTOLIC BLOOD PRESSURE - MUSE: NORMAL MMHG
EGFRCR SERPLBLD CKD-EPI 2021: >90 ML/MIN/1.73M2
ERYTHROCYTE [DISTWIDTH] IN BLOOD BY AUTOMATED COUNT: 13.5 % (ref 10–15)
GLUCOSE SERPL-MCNC: 111 MG/DL (ref 70–99)
GLUCOSE UR STRIP-MCNC: NEGATIVE MG/DL
HBA1C MFR BLD: 5.9 %
HCT VFR BLD AUTO: 49.3 % (ref 40–53)
HGB BLD-MCNC: 17.1 G/DL (ref 13.3–17.7)
HGB UR QL STRIP: ABNORMAL
INR PPP: 1.06 (ref 0.85–1.15)
INTERPRETATION ECG - MUSE: NORMAL
KETONES UR STRIP-MCNC: 10 MG/DL
LEUKOCYTE ESTERASE UR QL STRIP: NEGATIVE
MAGNESIUM SERPL-MCNC: 2.4 MG/DL (ref 1.7–2.3)
MCH RBC QN AUTO: 31.4 PG (ref 26.5–33)
MCHC RBC AUTO-ENTMCNC: 34.7 G/DL (ref 31.5–36.5)
MCV RBC AUTO: 91 FL (ref 78–100)
MUCOUS THREADS #/AREA URNS LPF: PRESENT /LPF
NITRATE UR QL: NEGATIVE
P AXIS - MUSE: 46 DEGREES
PH UR STRIP: 7 [PH] (ref 5–7)
PLATELET # BLD AUTO: 217 10E3/UL (ref 150–450)
POTASSIUM SERPL-SCNC: 4.3 MMOL/L (ref 3.4–5.3)
PR INTERVAL - MUSE: 170 MS
PREALB SERPL-MCNC: 26.7 MG/DL (ref 20–40)
PROT SERPL-MCNC: 8.2 G/DL (ref 6.4–8.3)
QRS DURATION - MUSE: 110 MS
QT - MUSE: 382 MS
QTC - MUSE: 438 MS
R AXIS - MUSE: -15 DEGREES
RBC # BLD AUTO: 5.44 10E6/UL (ref 4.4–5.9)
RBC URINE: 4 /HPF
SODIUM SERPL-SCNC: 138 MMOL/L (ref 135–145)
SP GR UR STRIP: 1.02 (ref 1–1.03)
SYSTOLIC BLOOD PRESSURE - MUSE: NORMAL MMHG
T AXIS - MUSE: 266 DEGREES
UROBILINOGEN UR STRIP-MCNC: NORMAL MG/DL
VENTRICULAR RATE- MUSE: 79 BPM
WBC # BLD AUTO: 8.3 10E3/UL (ref 4–11)
WBC URINE: 0 /HPF

## 2024-05-01 PROCEDURE — 83735 ASSAY OF MAGNESIUM: CPT | Performed by: PATHOLOGY

## 2024-05-01 PROCEDURE — 84134 ASSAY OF PREALBUMIN: CPT | Performed by: THORACIC SURGERY (CARDIOTHORACIC VASCULAR SURGERY)

## 2024-05-01 PROCEDURE — 80053 COMPREHEN METABOLIC PANEL: CPT | Performed by: PATHOLOGY

## 2024-05-01 PROCEDURE — 83036 HEMOGLOBIN GLYCOSYLATED A1C: CPT | Performed by: THORACIC SURGERY (CARDIOTHORACIC VASCULAR SURGERY)

## 2024-05-01 PROCEDURE — 81001 URINALYSIS AUTO W/SCOPE: CPT | Performed by: PATHOLOGY

## 2024-05-01 PROCEDURE — 94726 PLETHYSMOGRAPHY LUNG VOLUMES: CPT | Performed by: INTERNAL MEDICINE

## 2024-05-01 PROCEDURE — 94375 RESPIRATORY FLOW VOLUME LOOP: CPT | Performed by: INTERNAL MEDICINE

## 2024-05-01 PROCEDURE — 36415 COLL VENOUS BLD VENIPUNCTURE: CPT | Performed by: PATHOLOGY

## 2024-05-01 PROCEDURE — 99000 SPECIMEN HANDLING OFFICE-LAB: CPT | Performed by: PATHOLOGY

## 2024-05-01 PROCEDURE — 99203 OFFICE O/P NEW LOW 30 MIN: CPT | Performed by: CLINICAL NURSE SPECIALIST

## 2024-05-01 PROCEDURE — 85027 COMPLETE CBC AUTOMATED: CPT | Performed by: PATHOLOGY

## 2024-05-01 PROCEDURE — 85730 THROMBOPLASTIN TIME PARTIAL: CPT | Performed by: PATHOLOGY

## 2024-05-01 PROCEDURE — 94729 DIFFUSING CAPACITY: CPT | Performed by: INTERNAL MEDICINE

## 2024-05-01 PROCEDURE — 85610 PROTHROMBIN TIME: CPT | Performed by: PATHOLOGY

## 2024-05-01 PROCEDURE — 86900 BLOOD TYPING SEROLOGIC ABO: CPT | Performed by: THORACIC SURGERY (CARDIOTHORACIC VASCULAR SURGERY)

## 2024-05-01 ASSESSMENT — PAIN SCALES - GENERAL: PAINLEVEL: MILD PAIN (2)

## 2024-05-01 ASSESSMENT — ENCOUNTER SYMPTOMS
DYSRHYTHMIAS: 0
SEIZURES: 0

## 2024-05-01 ASSESSMENT — LIFESTYLE VARIABLES: TOBACCO_USE: 1

## 2024-05-01 NOTE — PATIENT INSTRUCTIONS
Preparing for Your Surgery      Name:  Modesto Lehman   MRN:  [id number]   :  1955   Today's Date:  2024       Arriving for surgery:  Surgery date:  To be determined  Arrival time:  To be determined-most likely 5:00 am    Please come to:     Please come to:       [facility]    [address] [place]  [address]     The [facility] [place] Patient/Visitor Ramp is at [address]. Patients and visitors who self-park will receive the reduced hospital parking rate. If the Patient /Visitor Ramp is full, please follow the signs to the [employer] car park located at the main hospital entrance.       parking is available (24 hours/ 7 days a week)      Discounted parking pass options are available for patients and visitors. They can be purchased at the [employer] desk at the main hospital entrance.     -    Stop at the security desk and they will direct surgery patients to the [facility] Check [facility] [phone]        - If you need directions, a wheelchair or an escort please stop at the Information/security desk in the lobby.     What can I eat or drink?  -  You may eat and drink normally up to 8 hours prior to arrival time.   -  You may have clear liquids until 2 hours prior to arrival time.     Examples of clear liquids:  Water  Clear broth  Juices (apple, white grape, white cranberry  and cider) without pulp  Noncarbonated, powder based beverages  (lemonade and Ahmet-Aid)  Sodas (Sprite, 7-Up, ginger ale and seltzer)  Coffee or tea (without milk or cream)  Gatorade    -  No Alcohol or cannabis products for at least 24 hours before surgery.     Which medicines can I take?    Hold Ibuprofen (Advil, Motrin) for 7 day(s) before surgery--unless otherwise directed by surgeon.  Hold Naproxen (Aleve) for 7 days before surgery.    -  DO NOT take these medications the day of  surgery:   Aspirin-look for instructions in the cardiac letter      -  PLEASE TAKE these medications the day of surgery:   Ok to take Gabapentin (Neurontin)   Ok to take Tizanidine (Zanaflex) as needed      How do I prepare myself?  - Please take 2 showers (one the night prior to surgery and one the morning of surgery) using Scrubcare or Hibiclens soap.    Use this soap only from the neck to your toes.     Leave the soap on your skin for one minute--then rinse thoroughly.      You may use your own shampoo and conditioner. No other hair products.   - Please remove all jewelry and body piercings.  - No lotions, deodorants or fragrance.  - Bring your ID and insurance card.    -If you use a CPAP machine, please bring the CPAP machine, tubing, and mask to hospital.    -If you have a Deep Brain Stimulator, Spinal Cord Stimulator, or any Neuro Stimulator device---you must bring the remote control to the hospital.        Covid testing policy as of 12/06/2022  Your surgeon will notify and schedule you for a COVID test if one is needed before surgery--please direct any questions or COVID symptoms to your surgeon      Questions or Concerns:    - For any questions regarding the day of surgery or your hospital stay, please contact the Pre Admission Nursing Office at [phone].       - If you have health changes between today and your surgery, please call your surgeon.       - For questions after surgery, please call your surgeons office.           Current Visitor Guidelines    You may have 2 visitors in the pre op area.    Visiting hours: 8 a.m. to 8:30 p.m.    Patients confirmed or suspected to have symptoms of COVID 19 or flu:     No visitors allowed for adult patients.   Children (under age 18) can have 1 named visitor.     People who are sick or showing symptoms of COVID 19 or flu:    Are not allowed to visit patients--we can only make exceptions in special situations.       Please follow these guidelines for your  visit:          Please maintain social distance          Masking is optional--however at times you may be asked to wear a mask for the safety of yourself and others     Clean your hands with alcohol hand . Do this when you arrive at and leave the building and patient room,    And again after you touch your mask or anything in the room.     Go directly to and from the room you are visiting.     Stay in the patient s room during your visit. Limit going to other places in the hospital as much as possible     Leave bags and jackets at home or in the car.     For everyone s health, please don t come and go during your visit. That includes for smoking   during your visit.

## 2024-05-01 NOTE — PROGRESS NOTES
Date: 5/1/2024    Time of Call: 9:44 AM     Diagnosis:  Bilateral carotid artery stenosis , PAD     [ VORB ] Ordering provider: Dr Rasheed Sam  Order:   - CTA Head and Neck  - US LISA no Exercise  - US BLE Arterial Duplex     Order received by: Lucita Daniels LPN     Follow-up/additional notes:

## 2024-05-01 NOTE — H&P
Pre-Operative H & P     CC:  Preoperative exam to assess for increased cardiopulmonary risk while undergoing surgery and anesthesia.    Date of Encounter: 5/1/2024  Primary Care Physician:  Ha Keating     Reason for visit:   Encounter Diagnoses   Name Primary?    Preop examination Yes    Coronary artery disease of native artery of native heart with stable angina pectoris (H24)        HPI  Modesto Lehman is a 68 year old male who presents for pre-operative H & P in preparation for  Procedure Information       Date/Time: TBD     Procedure: CABG    Anesthesia type: General     Pre-op diagnosis: CAD    Location: [facility]    Providers: Dr. Mason        History is obtained from the patient, wife and chart review    Patient with longstanding history of back pain after several back injuries. He recently reported worsening pain including a tightness/sharp discomfort radiating between his shoulder blades, exacerbated by minimal activity.  This was associated with diaphoresis.  He was referred for CTA. CT scan was performed for calcium because of his severely elevated coronary calcium score at 2,015.  He then underwent cardiac catheterization at the [facility] and was found to have severe multivessel disease with collaterals. He has been evaluated by Dr. Mason and has been counseled for coronary artery bypass grafting. His work up continues, and he is undergoing evaluation by Dr. Sam.    Patient's history is otherwise significant for HLD, PAD with history of bilateral popliteal bypass, and tobacco use.     Hx of abnormal bleeding or anti-platelet use: ASA 81 mg daily      Past Medical History  Past Medical History:   Diagnosis Date    Allergy status to analgesic agent     No reported medication allergies    Anesthesia of skin     No  problems with anaesthesia    CAD (coronary artery disease)     Elevated coronary artery calcium score      History of recurrent pneumonia     No Comments Provided    Hyperlipidemia     No Comments Provided    Low back pain     pain with bilateral leg and disc injuries.    Personal history of nicotine dependence     Personal history of other medical treatment (CODE)     No blood transfusions    PVD (peripheral vascular disease) (H24)        Past Surgical History  Past Surgical History:   Procedure Laterality Date    ANGIOPLASTY      Right leg stenting and bypass, Left also    COLONOSCOPY  08/23/2010    Q 10yrs.    COLONOSCOPY N/A 02/24/2022    6 tubular adenomas, 5 year follow up, 2/24/2027    CV CORONARY ANGIOGRAM N/A 4/15/2024    Procedure: Coronary Angiogram;  Surgeon: Toni Sellers MD;  Location:  [facility]ATH LAB    EXTRACTION(S) DENTAL      recently removed    OTHER SURGICAL HISTORY      [id number],PREMALIG/BENIGN SKIN LESION EXCISION, removed from chest    OTHER SURGICAL HISTORY      [id number],ANESTHESIA ALERT,No  problems with anaesthesia       Prior to Admission Medications  Current Outpatient Medications   Medication Sig Dispense Refill    aspirin (ASA) 81 MG chewable tablet Take 1 tablet (81 mg) by mouth daily (Patient taking differently: Take 81 mg by mouth every morning) 90 tablet 3    atorvastatin (LIPITOR) 80 MG tablet Take 1 tablet (80 mg) by mouth daily (Patient taking differently: Take 80 mg by mouth at bedtime) 90 tablet 3    EPINEPHrine (ANY BX GENERIC EQUIV) 0.3 MG/0.3ML injection 2-pack Inject 0.3 mLs (0.3 mg) into the muscle once as needed for anaphylaxis 1 each 0    gabapentin (NEURONTIN) 100 MG capsule Take 1 capsule (100 mg) by mouth 3 times daily 270 capsule 4    ibuprofen (ADVIL/MOTRIN) 800 MG tablet Take 1 tablet (800 mg) by mouth 3 times daily as needed TAKE 1 TABLET BY MOUTH 3 TIMES DAILY WITH MEALS. 100 tablet 5    nitroGLYcerin (NITROSTAT) 0.4 MG sublingual tablet For chest pain place 1 tablet under the tongue every 5 minutes for 3 doses. If symptoms persist 5 minutes after 1st  dose call 911. 25 tablet 3    tiZANidine (ZANAFLEX) 4 MG tablet Take 1 tablet (4 mg) by mouth 3 times daily (Patient taking differently: Take 4 mg by mouth 3 times daily as needed) 90 tablet 11       Allergies  No Known Allergies      Social History  Social History     Socioeconomic History    Marital status:      Spouse name: Not on file    Number of children: Not on file    Years of education: Not on file    Highest education level: Not on file   Occupational History    Not on file   Tobacco Use    Smoking status: Former     Current packs/day: 0.25     Average packs/day: 0.3 packs/day for 14.5 years (3.6 ttl pk-yrs)     Types: Cigars, Cigarettes     Start date: 11/4/2009    Smokeless tobacco: Never    Tobacco comments:     Quit smoking: quit 6/20/17 (cigarettes); smokes cigars 3 or 4 per day   Vaping Use    Vaping status: Never Used   Substance and Sexual Activity    Alcohol use: Yes     Comment: 2 vodka every evening    Drug use: Yes     Types: Marijuana     Comment: Not daily    Sexual activity: Not Currently   Other Topics Concern    Not on file   Social History Narrative    Self employed as .   with 3 adult children.    No hx of chemical dependency.  Patient is a former smoker.   Alcohol Use - yes occ    Wife - Imelda Lehman     Social Determinants of Health     Financial Resource Strain: Low Risk  (2/16/2024)    Financial Resource Strain     Within the past 12 months, have you or your family members you live with been unable to get utilities (heat, electricity) when it was really needed?: No   Food Insecurity: Low Risk  (2/16/2024)    Food Insecurity     Within the past 12 months, did you worry that your food would run out before you got money to buy more?: No     Within the past 12 months, did the food you bought just not last and you didn t have money to get more?: No   Transportation Needs: Low Risk  (2/16/2024)    Transportation Needs     Within the past 12 months, has lack of  transportation kept you from medical appointments, getting your medicines, non-medical meetings or appointments, work, or from getting things that you need?: No   Physical Activity: Sufficiently Active (2/16/2024)    Exercise Vital Sign     Days of Exercise per Week: 7 days     Minutes of Exercise per Session: 60 min   Stress: Stress Concern Present (2/16/2024)    [employer] [place] [employer] - Occupational Stress Questionnaire     Feeling of Stress : Very much   Social Connections: Not on file   Interpersonal Safety: Low Risk  (4/11/2024)    Interpersonal Safety     Do you feel physically and emotionally safe where you currently live?: Yes     Within the past 12 months, have you been hit, slapped, kicked or otherwise physically hurt by someone?: No     Within the past 12 months, have you been humiliated or emotionally abused in other ways by your partner or ex-partner?: No   Housing Stability: Low Risk  (2/16/2024)    Housing Stability     Do you have housing? : Yes     Are you worried about losing your housing?: No       Family History  Family History   Problem Relation Age of Onset    Diabetes Mother         Diabetes    Heart Disease Mother         Heart Disease    Other - See Comments Father         COPD, CD    Heart Disease Brother         Heart Disease    Other - See Comments Brother         killed by drunk  at age 19.    Substance Abuse Other         Alcohol/Drug, No hx of chemical dependency.    Diabetes Other         Diabetes    Heart Disease Other         Heart Disease    Anesthesia Reaction No family hx of     Clotting Disorder No family hx of     Bleeding Disorder No family hx of        Review of Systems  The complete review of systems is negative other than noted in the HPI or here.   Anesthesia Evaluation   Pt has had prior anesthetic. Type: General and MAC.    No history of anesthetic complications       ROS/MED HX  ENT/Pulmonary:     (+)                tobacco use, Past use,          "           (-) recent URI   Neurologic:    (-) no seizures and no CVA   Cardiovascular: Comment: PAD.  Bilateral popliteal bypass      (+) Dyslipidemia - Peripheral Vascular Disease-- Other and Carotid Stenosis.  CAD -  - -   Taking blood thinners Pt has not received instructions: Instructions Given to patient: Will remain on ASA up to DOS. CHF etiology: ischemic Last EF: 30-35% date: 4/16/24  [facility].                      Previous cardiac testing   Echo: Date: 4/6/24 Results:    Stress Test:  Date: 2013 Results:    ECG Reviewed:  Date: 4/30/24 Results:  Sinus rhythm   Possible Left atrial enlargement   Minimal voltage criteria for LVH, may be normal variant ( [place] product )   ST & T wave abnormality, consider lateral ischemia   Abnormal ECG     Cath:  Date: 4/15/24 Results:   (-) arrhythmias   METS/Exercise Tolerance: 1 - Eating, dressing    Hematologic:    (-) history of blood clots and history of blood transfusion   Musculoskeletal:       GI/Hepatic:    (-) GERD   Renal/Genitourinary:  - neg Renal ROS     Endo:     (+)               Obesity,       Psychiatric/Substance Use:     (+)     Recreational drug usage: Cannabis. (-) psychiatric history   Infectious Disease:  - neg infectious disease ROS     Malignancy:  - neg malignancy ROS     Other:  - neg other ROS    (+)  , H/O Chronic Pain,         /74 (BP Location: Right arm, Patient Position: Sitting, Cuff Size: Adult Regular)   Pulse 70   Temp 98.1  F (36.7  C) (Oral)   Resp 16   Ht 1.77 m (5' 9.7\")   Wt 82.3 kg (181 lb 6.4 oz)   SpO2 96%   BMI 26.25 kg/m      Physical Exam   Constitutional: Awake, alert, cooperative, no apparent distress, and appears stated age. Accompanied by wife  Eyes: Pupils equal, round and reactive to light, extra ocular muscles intact, sclera clear, conjunctiva normal.  HENT: Normocephalic, oral pharynx with moist mucus membranes, several teeth missing. No goiter appreciated.   Respiratory: Clear to auscultation bilaterally, no " crackles or wheezing. No cough or obvious dyspnea.  Cardiovascular: Regular rate and rhythm, normal S1 and S2, and no murmur noted. Carotids +2, no bruits. No edema. Palpable pulses to radial  DP and PT arteries.   GI: Normal bowel sounds, soft, non-distended, non-tender, no masses palpated.  Lymph/Hematologic: No cervical lymphadenopathy and no supraclavicular lymphadenopathy.  Genitourinary:  Deferred.   Skin: Warm and dry.    Musculoskeletal: Full ROM of neck. There is no redness, warmth, or swelling of the joints. Gross motor strength is normal.    Neurologic: Awake, alert, oriented to name, place and time. Cranial nerves II-XII are grossly intact. Gait is normal.   Neuropsychiatric: Calm, cooperative. Normal affect.     Prior Labs/Diagnostic Studies   All labs and imaging personally reviewed   Lab Results   Component Value Date    WBC 8.3 05/01/2024    WBC 10.6 07/31/2020     Lab Results   Component Value Date    RBC 5.44 05/01/2024    RBC 4.61 07/31/2020     Lab Results   Component Value Date    HGB 17.1 05/01/2024    HGB 14.2 07/31/2020     Lab Results   Component Value Date    HCT 49.3 05/01/2024    HCT 41.4 07/31/2020     Lab Results   Component Value Date    MCV 91 05/01/2024    MCV 90 07/31/2020     Lab Results   Component Value Date    MCH 31.4 05/01/2024    MCH 30.8 07/31/2020     Lab Results   Component Value Date    MCHC 34.7 05/01/2024    MCHC 34.3 07/31/2020     Lab Results   Component Value Date    RDW 13.5 05/01/2024    RDW 14.5 07/31/2020     Lab Results   Component Value Date     05/01/2024     07/31/2020     Last Comprehensive Metabolic Panel:  Sodium   Date Value Ref Range Status   05/01/2024 138 135 - 145 mmol/L Final     Comment:     Reference intervals for this test were updated on 09/26/2023 to more accurately reflect our healthy population. There may be differences in the flagging of prior results with similar values performed with this method. Interpretation of those prior  results can be made in the context of the updated reference intervals.    07/31/2020 140 134 - 144 mmol/L Final     Potassium   Date Value Ref Range Status   05/01/2024 4.3 3.4 - 5.3 mmol/L Final   12/01/2021 4.1 3.5 - 5.1 mmol/L Final   07/31/2020 3.4 (L) 3.5 - 5.1 mmol/L Final     Chloride   Date Value Ref Range Status   05/01/2024 101 98 - 107 mmol/L Final   12/01/2021 100 98 - 107 mmol/L Final   07/31/2020 109 (H) 98 - 107 mmol/L Final     Carbon Dioxide   Date Value Ref Range Status   07/31/2020 23 21 - 31 mmol/L Final     Carbon Dioxide (CO2)   Date Value Ref Range Status   05/01/2024 26 22 - 29 mmol/L Final   12/01/2021 31 21 - 31 mmol/L Final     Anion Gap   Date Value Ref Range Status   05/01/2024 11 7 - 15 mmol/L Final   12/01/2021 6 3 - 14 mmol/L Final   07/31/2020 8 3 - 14 mmol/L Final     Glucose   Date Value Ref Range Status   05/01/2024 111 (H) 70 - 99 mg/dL Final   12/01/2021 87 70 - 105 mg/dL Final   07/31/2020 165 (H) 70 - 105 mg/dL Final     Urea Nitrogen   Date Value Ref Range Status   05/01/2024 12.7 8.0 - 23.0 mg/dL Final   12/01/2021 14 7 - 25 mg/dL Final   07/31/2020 9 7 - 25 mg/dL Final     Creatinine   Date Value Ref Range Status   05/01/2024 0.84 0.67 - 1.17 mg/dL Final   07/31/2020 0.80 0.70 - 1.30 mg/dL Final     GFR Estimate   Date Value Ref Range Status   05/01/2024 >90 >60 mL/min/1.73m2 Final   07/31/2020 >90 >60 mL/min/[1.73_m2] Final     Calcium   Date Value Ref Range Status   05/01/2024 9.3 8.8 - 10.2 mg/dL Final   07/31/2020 8.2 (L) 8.6 - 10.3 mg/dL Final     Bilirubin Total   Date Value Ref Range Status   05/01/2024 0.5 <=1.2 mg/dL Final   07/03/2020 0.4 0.3 - 1.0 mg/dL Final     Alkaline Phosphatase   Date Value Ref Range Status   05/01/2024 105 40 - 150 U/L Final     Comment:     Reference intervals for this test were updated on 11/14/2023 to more accurately reflect our healthy population. There may be differences in the flagging of prior results with similar values performed  with this method. Interpretation of those prior results can be made in the context of the updated reference intervals.   2020 96 34 - 104 U/L Final     ALT   Date Value Ref Range Status   2024 39 0 - 70 U/L Final     Comment:     Reference intervals for this test were updated on 2023 to more accurately reflect our healthy population. There may be differences in the flagging of prior results with similar values performed with this method. Interpretation of those prior results can be made in the context of the updated reference intervals.     2020 19 7 - 52 U/L Final     AST   Date Value Ref Range Status   2024 36 0 - 45 U/L Final     Comment:     Reference intervals for this test were updated on 2023 to more accurately reflect our healthy population. There may be differences in the flagging of prior results with similar values performed with this method. Interpretation of those prior results can be made in the context of the updated reference intervals.   2020 17 13 - 39 U/L Final   INR 1.06  PTT 30  Mg 2.4  A1c 5.9    EK24 Sinus rhythm   Possible Left atrial enlargement   Minimal voltage criteria for LVH, may be normal variant ( [place] product )   ST & T wave abnormality, consider lateral ischemia   Abnormal ECG      Echocardiogram 2024:  Interpretation Summary  Left ventricular function is decreased. The ejection fraction is 30-35%  (moderately reduced).  There is moderate diffuse hypokinesis with akinesis of the basal-mid  inferolateral and basal inferior segments indicative of multivessel CAD.  Global right ventricular function is normal.  No significant valvular abnormalities present.  Estimated mean right atrial pressure is normal.  No pericardial effusion is present.     Cardiac cath 4/15/2024:    Mid LM lesion is 70% stenosed.    Prox LAD to Mid LAD lesion is 60% stenosed.    1st Diag lesion is 60% stenosed.    Mid Cx lesion is 100% stenosed.    Ost Cx to  Prox Cx lesion is 99% stenosed.    Prox RCA lesion is 100% stenosed.     Significant ostial left main disease.  Chronic total occlusion of circumflex with left to left collaterals from diagonal.  Chronic total occlusion right coronary artery with left to left (from septals) and right to right (from marginal) collaterals.  Recommend consideration for bypass surgery.     US abdominal aorta for AAA on 3/8/2024:  No abdominal aortic aneurysm.        CT angio abdomen on 9/20/2023 [facility]:  1. Patent right common iliac to common femoral artery stent graft.   2. Patent bilateral femoral to popliteal artery bypass grafts without evidence of graft stenosis or aneurysmal dilatation.   3. 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   4. Three-vessel runoff to the left foot with diffuse calcified atherosclerosis and small vessel lumens in the 3 lower leg arteries.       4/30/24 Carotid US  Impression:  1. Right side:      Degree of stenosis of the internal carotid artery: Completely  occluded right internal carotid artery immediately distal to the  carotid bifurcation.     2. Left side:       Degree of stenosis of the internal carotid artery: High-grade  stenosis of the left internal carotid artery, most pronounced along  the proximal segment with stenosis >70%.    CT Chest 4/30/24        IMPRESSION: Minimal ascending aortic calcification. A few scattered  subpleural nodules. Per Fleischner guidelines these are considered  statistically benign if the patient is low risk.         Latest Ref Rng & Units 5/1/2024     7:47 AM   PFT   FVC L 5.40  P   FEV1 L 4.02  P   FVC% % 138  P   FEV1% % 133  P      P Preliminary result         The patient's records and results personally reviewed by this provider.     Outside records reviewed from: Care Everywhere      Assessment    Modesto Lehman is a 68 year old male seen as a PAC referral for risk assessment and optimization for  "anesthesia.    Plan/Recommendations  Pt will be optimized for the proposed procedure.  See below for details on the assessment, risk, and preoperative recommendations    NEUROLOGY  - No history of TIA, CVA or seizure  - Chronic Pain in back after multiple injuries. May take gabapentin on DOS.     -Post Op delirium risk factors:  High co-morbid index    ENT  - No current airway concerns.  Will need to be reassessed day of surgery.  Mallampati: I  TM: > 3  Upper dentures  Few loose teeth on bottom     CARDIAC  HLD. Atorvastatin at HS. CAD as above with plan for CAB. Echocardiogram showed EF 30-35%, (moderately reduced), moderate diffuse hypokinesis with akinesis of the basal-mid inferolateral and basal inferior segments indicative of multivessel CAD. PAD s/p bilateral popliteal bypass. Carotid US today-completely occluded right internal carotid artery immediately distal to the carotid bifurcation, high-grade stenosis of the left internal carotid artery, most pronounced along the proximal segment with stenosis greater than 70%.  Evaluation continues by vascular surgery.  Patient reports ongoing chest pain with minimal activity.  Has not taken nitroglycerin.  ASA 81 mg daily and will remain on up to day of surgery.  Final surgical decisions pending by cardiovascular and vascular teams.   - METS (Metabolic Equivalents)<4    PULMONARY  Continues to smoke cigars  Denies cough or shortness of breath. Lungs CTA  Low risk for NELLIE    - Tobacco History    History   Smoking Status    Former    Types: Cigars, Cigarettes   Smokeless Tobacco    Never       GI: Denies GERD.     PONV Low Risk  Total Score: 1           1 AN PONV: Patient is not a current smoker        /RENAL  - Baseline Creatinine  0.84    ENDOCRINE    - BMI: Estimated body mass index is 26.25 kg/m  as calculated from the following:    Height as of this encounter: 1.77 m (5' 9.7\").    Weight as of this encounter: 82.3 kg (181 lb 6.4 oz).  Overweight (BMI " 25.0-29.9)  Prediabetes with A1c 5.9    HEME: VTE risk 0.5%  Denies personal or family history of blood clots  Denies personal or family history of bleeding disorder  Denies history of blood transfusion   Type and screen drawn by service    MSK: Frailty score 2/5    PSYCH  - Difficulty sleeping due to back pain     Patient is concerned about pain during postop recovery.    Different anesthesia methods/types have been discussed with the patient, but they are aware that the final plan will be decided by the assigned anesthesia provider on the date of service.    The patient is optimized for their procedure. AVS with information on surgery time/arrival time, meds and NPO status given by nursing staff. No further diagnostic testing indicated.      On the day of service:     Prep time: 15 minutes  Visit time: 14 minutes  Documentation time: 13 minutes  ------------------------------------------  Total time: 42 minutes      CHIN Siddiqui CNS  Preoperative Assessment Center  [facility]  Phone: [phone]  Fax: [phone]

## 2024-05-02 LAB
DLCOUNC-%PRED-PRE: 132 %
DLCOUNC-PRE: 34.19 ML/MIN/MMHG
DLCOUNC-PRED: 25.71 ML/MIN/MMHG
ERV-%PRED-PRE: 81 %
ERV-PRE: 1.18 L
ERV-PRED: 1.45 L
EXPTIME-PRE: 6.64 SEC
FEF2575-%PRED-PRE: 123 %
FEF2575-PRE: 3 L/SEC
FEF2575-PRED: 2.43 L/SEC
FEFMAX-%PRED-PRE: 124 %
FEFMAX-PRE: 10.51 L/SEC
FEFMAX-PRED: 8.47 L/SEC
FEV1-%PRED-PRE: 133 %
FEV1-PRE: 4.02 L
FEV1FEV6-PRE: 75 %
FEV1FEV6-PRED: 78 %
FEV1FVC-PRE: 74 %
FEV1FVC-PRED: 77 %
FEV1SVC-PRE: 70 %
FEV1SVC-PRED: 72 %
FIFMAX-PRE: 7.41 L/SEC
FRCPLETH-%PRED-PRE: 126 %
FRCPLETH-PRE: 4.58 L
FRCPLETH-PRED: 3.61 L
FVC-%PRED-PRE: 138 %
FVC-PRE: 5.4 L
FVC-PRED: 3.9 L
IC-%PRED-PRE: 157 %
IC-PRE: 4.56 L
IC-PRED: 2.89 L
RVPLETH-%PRED-PRE: 134 %
RVPLETH-PRE: 3.4 L
RVPLETH-PRED: 2.53 L
TLCPLETH-%PRED-PRE: 131 %
TLCPLETH-PRE: 9.14 L
TLCPLETH-PRED: 6.92 L
VA-%PRED-PRE: 133 %
VA-PRE: 8.34 L
VC-%PRED-PRE: 136 %
VC-PRE: 5.74 L
VC-PRED: 4.19 L

## 2024-05-07 ENCOUNTER — HOSPITAL ENCOUNTER (OUTPATIENT)
Dept: CT IMAGING | Facility: HOSPITAL | Age: 69
Discharge: HOME | End: 2024-05-07
Attending: FAMILY MEDICINE | Admitting: FAMILY MEDICINE
Payer: MEDICARE

## 2024-05-07 DIAGNOSIS — I65.23 BILATERAL CAROTID ARTERY STENOSIS: ICD-10-CM

## 2024-05-07 PROCEDURE — 250N000011 HC RX IP 250 OP 636: Performed by: RADIOLOGY

## 2024-05-07 PROCEDURE — 70496 CT ANGIOGRAPHY HEAD: CPT | Mod: MG

## 2024-05-07 RX ORDER — IOPAMIDOL 755 MG/ML
67 INJECTION, SOLUTION INTRAVASCULAR ONCE
Status: COMPLETED | OUTPATIENT
Start: 2024-05-07 | End: 2024-05-07

## 2024-05-07 RX ADMIN — IOPAMIDOL 67 ML: 755 INJECTION, SOLUTION INTRAVENOUS at 14:49

## 2024-05-22 ENCOUNTER — ANCILLARY PROCEDURE (OUTPATIENT)
Dept: ULTRASOUND IMAGING | Facility: CLINIC | Age: 69
End: 2024-05-22
Attending: SURGERY
Payer: COMMERCIAL

## 2024-05-22 ENCOUNTER — OFFICE VISIT (OUTPATIENT)
Dept: VASCULAR SURGERY | Facility: CLINIC | Age: 69
End: 2024-05-22
Attending: SURGERY
Payer: COMMERCIAL

## 2024-05-22 VITALS — HEART RATE: 81 BPM | OXYGEN SATURATION: 95 % | SYSTOLIC BLOOD PRESSURE: 137 MMHG | DIASTOLIC BLOOD PRESSURE: 71 MMHG

## 2024-05-22 DIAGNOSIS — I73.9 PAD (PERIPHERAL ARTERY DISEASE) (H): ICD-10-CM

## 2024-05-22 DIAGNOSIS — I25.118 CORONARY ARTERY DISEASE OF NATIVE ARTERY OF NATIVE HEART WITH STABLE ANGINA PECTORIS (H): ICD-10-CM

## 2024-05-22 DIAGNOSIS — R94.39 POSITIVE CARDIAC STRESS TEST: ICD-10-CM

## 2024-05-22 DIAGNOSIS — I65.21 RIGHT INTERNAL CAROTID OCCLUSION: ICD-10-CM

## 2024-05-22 DIAGNOSIS — Z87.891 FORMER CIGARETTE SMOKER: ICD-10-CM

## 2024-05-22 DIAGNOSIS — I65.22 LEFT CAROTID STENOSIS: Primary | ICD-10-CM

## 2024-05-22 PROCEDURE — 93922 UPR/L XTREMITY ART 2 LEVELS: CPT | Mod: GC | Performed by: RADIOLOGY

## 2024-05-22 PROCEDURE — 99204 OFFICE O/P NEW MOD 45 MIN: CPT | Performed by: SURGERY

## 2024-05-22 PROCEDURE — 93925 LOWER EXTREMITY STUDY: CPT | Mod: GC | Performed by: RADIOLOGY

## 2024-05-22 NOTE — PATIENT INSTRUCTIONS
Thank you so much for choosing us for your care. It was a pleasure to see you at the vascular clinic today.     Follow-up recommendations: We will get in touch after speaking with CVTS.    Additional testing/imaging ordered today: None      Our scheduling team will get in touch with you to set up any follow-up testing/imaging and/or appointments. Please be aware that any testing/imaging recommended today will need to completed prior to your next visit with the provider. If testing/imaging is not completed prior to your next visit, your visit may be rescheduled.     If you have any questions, please contact our clinic directly at [phone] and ask for the nurse. We also encourage the use of [employer] to communicate with your healthcare provider.    If you have an urgent need after business hours (8:00 am to 4:30 pm) please call [phone], option 4, and ask for the vascular attending on call. For non-urgent after hours needs, please call the vascular clinic at [phone]. For scheduling needs, please call our clinic directly at [phone].    =====================================================================    [facility] is recognized by the [facility] as a comprehensive stroke center. As part of our commitment to better patient outcomes and excellent stroke education, we attach the below stroke education materials to ALL of the after visit summaries in our vascular clinic.        Learning About BE FAST: Stroke Warning Signs  BE FAST is a simple way to remember the main symptoms of stroke. These symptoms happen suddenly. So learning what to look for helps you know when to call for medical help. BE FAST stands for:    B - Balance.  Loss of balance or trouble walking.     E - Eyes.  Trouble seeing out of one or both eyes.     F - Face.  Weakness or drooping on one side of the face.     A - Arm.  Weakness or numbness in an arm or leg.     S - Speech.  Trouble speaking.     T -  Time to call 911.  Also call 911 if you have other stroke symptoms. They include:  Sudden confusion.  Sudden trouble understanding simple statements.  Fainting.  A seizure.  A sudden, severe headache.     A stroke happens when a blood vessel in the brain bursts or is blocked by a blood clot. The blood supply to part of the brain--and the oxygen the blood carries--is reduced. This damages the brain.   If you have a stroke, quick treatment may save your life. And it may reduce the damage in your brain so that you have fewer problems after the stroke.   Current as of: December 18, 2022               Content Version: 13.8    6142-2566 [email or web address]   Care instructions adapted under license by your healthcare professional. If you have questions about a medical condition or this instruction, always ask your healthcare professional. [email or web address] disclaims any warranty or liability for your use of this information.    Learning About How to Prevent a Stroke  What is a stroke?  A stroke is damage to the brain that occurs when a blood vessel in the brain bursts or is blocked by a blood clot. Without blood and the oxygen it carries, part of the brain starts to die. The part of the body controlled by the damaged area of the brain can't work properly.  Brain damage can start within minutes of a stroke. But quick treatment can help limit the damage and increase the chance of a full recovery.  What puts you at risk for stroke?  A risk factor is anything that makes you more likely to have a particular health problem.  Risk factors for stroke that you can manage or change include:  Health problems like atrial fibrillation, diabetes, high blood pressure, high cholesterol, hardening of the arteries (atherosclerosis), and sickle cell disease.  Smoking.  Drinking more than 2 alcoholic drinks a day for men and 1 drink a day for women.  Being overweight.  Not eating healthy foods.  Not getting enough physical  activity.  Risk factors you can't change include:  Having a previous stroke.  Family history of stroke.  Being older.  Being , [place] Native, , or South  American.  Being female.  Having certain problems during pregnancy, such as preeclampsia.  Being past menopause.  Your doctor can help you know your risk. Then you and your doctor can talk about whether to take steps to lower it.  How can you help prevent a stroke?  Here are some things you can do to help prevent a stroke.  Manage health problems that raise your risk. These include atrial fibrillation, diabetes, high blood pressure, and high cholesterol.  Have a heart-healthy lifestyle.  Don't smoke. If you need help quitting, talk to your doctor about stop-smoking programs and medicines. These can increase your chances of quitting for good.  Limit alcohol to 2 drinks a day for men and 1 drink a day for women.  Stay at a healthy weight. Lose weight if you need to.  Be active. Get at least 30 minutes of exercise on most days of the week. Walking is a good choice. You also may want to do other activities, such as running, swimming, cycling, or playing tennis or team sports.  Eat heart-healthy foods. These include vegetables, fruits, nuts, beans, lean meat, fish, and whole grains. Limit sodium and sugar.  If you think you may have a problem with alcohol or drug use, talk to your doctor.  If you use hormone therapy for menopause or hormonal birth control, talk with your doctor. Ask if these are right for you. They may raise the risk of stroke in some people.  Decide with your doctor whether you will also take medicines to help lower your risk. For example, you and your doctor may decide you will take a medicine that prevents blood clots.  What are the symptoms of a stroke?  Symptoms of a stroke happen quickly. A stroke may cause:  Sudden numbness, tingling, weakness, or loss of movement in your face, arm, or leg, especially on only  one side of your body.  Sudden vision changes.  Sudden trouble speaking.  Sudden confusion or trouble understanding simple statements.  Sudden problems with walking or balance.  A sudden, severe headache that is different from past headaches.  Fainting.  A seizure.  It's important to call for medical help if you have stroke symptoms. Quick treatment may save your life. And it may reduce the damage in your brain so that you have fewer problems after the stroke.  Follow-up care is a key part of your treatment and safety. Be sure to make and go to all appointments, and call your doctor if you are having problems. It's also a good idea to know your test results and keep a list of the medicines you take.    Current as of: December 18, 2022               Content Version: 13.8    2547-4797 [employer]   Care instructions adapted under license by your healthcare professional. If you have questions about a medical condition or this instruction, always ask your healthcare professional. [employer] disclaims any warranty or liability for your use of this information.

## 2024-06-03 ENCOUNTER — PREP FOR PROCEDURE (OUTPATIENT)
Dept: SURGERY | Facility: CLINIC | Age: 69
End: 2024-06-03

## 2024-06-03 ENCOUNTER — VIRTUAL VISIT (OUTPATIENT)
Dept: VASCULAR SURGERY | Facility: CLINIC | Age: 69
End: 2024-06-03
Payer: COMMERCIAL

## 2024-06-03 VITALS — WEIGHT: 185 LBS | BODY MASS INDEX: 26.48 KG/M2 | HEIGHT: 70 IN

## 2024-06-03 DIAGNOSIS — I65.22 CAROTID STENOSIS, ASYMPTOMATIC, LEFT: Primary | ICD-10-CM

## 2024-06-03 DIAGNOSIS — I25.118 CORONARY ARTERY DISEASE OF NATIVE ARTERY OF NATIVE HEART WITH STABLE ANGINA PECTORIS (H): ICD-10-CM

## 2024-06-03 DIAGNOSIS — I65.21 RIGHT-SIDED CAROTID ARTERY OCCLUSION WITHOUT CEREBRAL INFARCTION: ICD-10-CM

## 2024-06-03 DIAGNOSIS — I65.22 ASYMPTOMATIC STENOSIS OF LEFT CAROTID ARTERY WITHOUT INFARCTION: Primary | ICD-10-CM

## 2024-06-03 DIAGNOSIS — Z87.891 FORMER CIGARETTE SMOKER: ICD-10-CM

## 2024-06-03 DIAGNOSIS — I73.9 PAD (PERIPHERAL ARTERY DISEASE) (H): ICD-10-CM

## 2024-06-03 PROCEDURE — 99441 PR PHYSICIAN TELEPHONE EVALUATION 5-10 MIN: CPT | Mod: 93 | Performed by: SURGERY

## 2024-06-03 ASSESSMENT — PAIN SCALES - GENERAL: PAINLEVEL: MILD PAIN (3)

## 2024-06-03 NOTE — NURSING NOTE
No other vitals to report today      Is the patient currently in the state of [place]? YES    Visit mode:TELEPHONE    If the visit is dropped, the patient can be reconnected by: TELEPHONE VISIT: Phone number:   Telephone Information:   Mobile [phone]       Will anyone else be joining the visit? Pts wife possibly  (If patient encounters technical issues they should call [phone] :[phone])    How would you like to obtain your AVS? MyChart    Are changes needed to the allergy or medication list? No    Patient declined individual allergy and medication review by support staff because they deny any changes and state that all information remains accurate since last reviewed/verified.     Are refills needed on medications prescribed by this physician? NO    Reason for visit: RUSTAM Lee MA VVF

## 2024-06-03 NOTE — PROGRESS NOTES
Vascular & Endovascular Surgery Clinic Return Visit   Vascular Surgeon: Rasheed Sam MD, RPVI      Location:  [facility]    Modesto Lehman  Medical Record #:  [id number]  YOB: 1955  Age:  68 year old     Date of Service: 6/3/2024    Primary Care Provider: Ha Keating    Chief Complaint/Reason for Visit: Preoperative discussion    Interval History:  Mr. Lehman is a pleasant 68 year old male who returns today by phone to discuss plan after multidisciplinary discussion.      Review of Systems:    A 12 point ROS was reviewed and is negative except for symptoms noted in HPI.     Medical/Surgical History:    Past Medical History:   Diagnosis Date    Allergy status to analgesic agent     No reported medication allergies    Anesthesia of skin     No  problems with anaesthesia    CAD (coronary artery disease)     Elevated coronary artery calcium score     History of recurrent pneumonia     No Comments Provided    Hyperlipidemia     No Comments Provided    Low back pain     pain with bilateral leg and disc injuries.    Personal history of nicotine dependence     Personal history of other medical treatment (CODE)     No blood transfusions    PVD (peripheral vascular disease) (H24)          Past Surgical History:   Procedure Laterality Date    ANGIOPLASTY      Right leg stenting and bypass, Left also    COLONOSCOPY  08/23/2010    Q 10yrs.    COLONOSCOPY N/A 02/24/2022    6 tubular adenomas, 5 year follow up, 2/24/2027    CV CORONARY ANGIOGRAM N/A 4/15/2024    Procedure: Coronary Angiogram;  Surgeon: Toni Sellers MD;  Location:  [facility]    EXTRACTION(S) DENTAL      recently removed    OTHER SURGICAL HISTORY      [id number],PREMALIG/BENIGN SKIN LESION EXCISION, removed from chest    OTHER SURGICAL HISTORY      208489,ANESTHESIA ALERT,No  problems with anaesthesia        Allergies:   No Known Allergies     Medications:    Current Outpatient  Medications   Medication Sig Dispense Refill    aspirin (ASA) 81 MG chewable tablet Take 1 tablet (81 mg) by mouth daily (Patient taking differently: Take 81 mg by mouth every morning) 90 tablet 3    atorvastatin (LIPITOR) 80 MG tablet Take 1 tablet (80 mg) by mouth daily (Patient taking differently: Take 80 mg by mouth at bedtime) 90 tablet 3    EPINEPHrine (ANY BX GENERIC EQUIV) 0.3 MG/0.3ML injection 2-pack Inject 0.3 mLs (0.3 mg) into the muscle once as needed for anaphylaxis 1 each 0    gabapentin (NEURONTIN) 100 MG capsule Take 1 capsule (100 mg) by mouth 3 times daily 270 capsule 4    ibuprofen (ADVIL/MOTRIN) 800 MG tablet Take 1 tablet (800 mg) by mouth 3 times daily as needed TAKE 1 TABLET BY MOUTH 3 TIMES DAILY WITH MEALS. 100 tablet 5    nitroGLYcerin (NITROSTAT) 0.4 MG sublingual tablet For chest pain place 1 tablet under the tongue every 5 minutes for 3 doses. If symptoms persist 5 minutes after 1st dose call 911. 25 tablet 3    tiZANidine (ZANAFLEX) 4 MG tablet Take 1 tablet (4 mg) by mouth 3 times daily (Patient taking differently: Take 4 mg by mouth 3 times daily as needed) 90 tablet 11     No current facility-administered medications for this visit.        Social Habits:    Tobacco Use      Smoking status: Former        Packs/day: 0.25        Years: 0.3 packs/day for 14.6 years (3.6 ttl pk-yrs)        Types: Cigars, Cigarettes        Start date: 11/4/2009      Smokeless tobacco: Never      Tobacco comments: Quit smoking: quit 6/20/17 (cigarettes); smokes cigars 3 or 4 per day       Alcohol Use: Not on file       History   Drug Use    Types: Marijuana     Comment: Not daily        Family History:    Family History   Problem Relation Age of Onset    Diabetes Mother         Diabetes    Heart Disease Mother         Heart Disease    Other - See Comments Father         COPD, CD    Heart Disease Brother         Heart Disease    Other - See Comments Brother         killed by drunk  at age 19.     "Substance Abuse Other         Alcohol/Drug, No hx of chemical dependency.    Diabetes Other         Diabetes    Heart Disease Other         Heart Disease    Anesthesia Reaction No family hx of     Clotting Disorder No family hx of     Bleeding Disorder No family hx of        Physical Examination:  Ht 1.778 m (5' 10\")   Wt 83.9 kg (185 lb)   BMI 26.54 kg/m    Wt Readings from Last 1 Encounters:   06/03/24 83.9 kg (185 lb)     Body mass index is 26.54 kg/m .    Exam:  No exam as this was a telephone visit    Laboratory Findings:  Hemoglobin   Date Value Ref Range Status   05/01/2024 17.1 13.3 - 17.7 g/dL Final   04/15/2024 16.8 13.3 - 17.7 g/dL Final   07/31/2020 14.2 13.3 - 17.7 g/dL Final   07/13/2020 15.4 13.3 - 17.7 g/dL Final     WBC   Date Value Ref Range Status   07/31/2020 10.6 4.0 - 11.0 10e9/L Final   07/13/2020 14.7 (H) 4.0 - 11.0 10e9/L Final     WBC Count   Date Value Ref Range Status   05/01/2024 8.3 4.0 - 11.0 10e3/uL Final   04/15/2024 7.2 4.0 - 11.0 10e3/uL Final     Platelet Count   Date Value Ref Range Status   05/01/2024 217 150 - 450 10e3/uL Final   04/15/2024 204 150 - 450 10e3/uL Final   07/31/2020 250 150 - 450 10e9/L Final   07/13/2020 239 150 - 450 10e9/L Final     Creatinine   Date Value Ref Range Status   05/01/2024 0.84 0.67 - 1.17 mg/dL Final   07/31/2020 0.80 0.70 - 1.30 mg/dL Final     Sodium   Date Value Ref Range Status   05/01/2024 138 135 - 145 mmol/L Final     Comment:     Reference intervals for this test were updated on 09/26/2023 to more accurately reflect our healthy population. There may be differences in the flagging of prior results with similar values performed with this method. Interpretation of those prior results can be made in the context of the updated reference intervals.    07/31/2020 140 134 - 144 mmol/L Final     Glucose   Date Value Ref Range Status   05/01/2024 111 (H) 70 - 99 mg/dL Final   04/15/2024 111 (H) 70 - 99 mg/dL Final   12/01/2021 87 70 - 105 mg/dL " Final   07/31/2020 165 (H) 70 - 105 mg/dL Final   07/13/2020 106 (H) 70 - 105 mg/dL Final     Hemoglobin A1C   Date Value Ref Range Status   05/01/2024 5.9 (H) <5.7 % Final     Comment:     Normal <5.7%   Prediabetes 5.7-6.4%    Diabetes 6.5% or higher     Note: Adopted from ADA consensus guidelines.     INR   Date Value Ref Range Status   05/01/2024 1.06 0.85 - 1.15 Final       Imaging:  No additional imaging.    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 Mason  #4- Hyperlipidemia  #5- Peripheral arterial disease status post bilateral bypasses, elsewhere  #6- Heart Failure with Reduced EF, 30-35%  #7- Former nicotine dependence  Mr. Lehman is a pleasant 68 year old male seen for pre surgical follow up of carotid disease. We had previously had extensive discussion on rationale for, risks of carotid revascularization, as well as multiple options for concomitant carotid and coronary disease as well as their associated risks.  Dr. Mason and I discussed at lengths.  He would strongly prefer carotid revascularization prior to CABG, if anesthesia is amenable.  I think this is quite reasonable.  We discussed this.  Mr. Lehman would like to proceed.    Risks, benefits, and alternatives left-sided carotid endarterectomy including but not limited to death, stroke, cranial nerve injury, hyperperfusion syndrome, bleeding, MI or other cardiopulmonary complications in the setting of known coronary artery disease and, infection. We reviewed the benefits and alternatives including medical management and stenting either by the transfermoral route or transcervical carotid artery revascularization (TCAR). Mr. Lehman was involved in all aspects of decision making and appears to understand. The patient understands the risks and would like to  proceed with left carotid endarterectomy..    Discussed warning signs including, but not limited to, stroke/TIA, focal neurologic deficit, upper or lower extremity weakness or sensory dysfunction, amaurosis fugax, aphasia, dysarthria, or facial droop.  If he experiences any of these, he has been advised to seek treatment and contact my team.    Mr. Lehman is in agreement with this plan as outlined.    Recommendations/Plan:  Plan for left carotid endarterectomy prior to CABG  PAC visit with updated plan given need for anesthesia eval with high coronary risk, new H&P  Continue OMT as previously outlined.    Rasheed Sam MD  Vascular & Endovascular Surgery

## 2024-06-03 NOTE — LETTER
6/3/2024       RE: Modesto Lehman  [address]  [facility] [address]     Dear Colleague,    Thank you for referring your patient, Modesto Lehman, to the [facility]ULAR [facility] [place] at [facility]. Please see a copy of my visit note below.    Vascular & Endovascular Surgery Clinic Return Visit   Vascular Surgeon: Rasheed Sam MD, RPVI      Location:  [facility]    Modesto Lehman  Medical Record #:  [id number]  YOB: 1955  Age:  68 year old     Date of Service: 6/3/2024    Primary Care Provider: Ha Keating    Chief Complaint/Reason for Visit: Preoperative discussion    Interval History:  Mr. Lehman is a pleasant 68 year old male who returns today by phone to discuss plan after multidisciplinary discussion.      Review of Systems:    A 12 point ROS was reviewed and is negative except for symptoms noted in HPI.     Medical/Surgical History:    Past Medical History:   Diagnosis Date    Allergy status to analgesic agent     No reported medication allergies    Anesthesia of skin     No  problems with anaesthesia    CAD (coronary artery disease)     Elevated coronary artery calcium score     History of recurrent pneumonia     No Comments Provided    Hyperlipidemia     No Comments Provided    Low back pain     pain with bilateral leg and disc injuries.    Personal history of nicotine dependence     Personal history of other medical treatment (CODE)     No blood transfusions    PVD (peripheral vascular disease) (H24)          Past Surgical History:   Procedure Laterality Date    ANGIOPLASTY      Right leg stenting and bypass, Left also    COLONOSCOPY  08/23/2010    Q 10yrs.    COLONOSCOPY N/A 02/24/2022    6 tubular adenomas, 5 year follow up, 2/24/2027    CV CORONARY ANGIOGRAM N/A 4/15/2024    Procedure: Coronary Angiogram;  Surgeon: Toni Sellers MD;  Location: [facility] CARDIAC  CATH LAB    EXTRACTION(S) DENTAL      recently removed    OTHER SURGICAL HISTORY      GLQ941,PREMALIG/BENIGN SKIN LESION EXCISION, removed from chest    OTHER SURGICAL HISTORY      208489,ANESTHESIA ALERT,No  problems with anaesthesia        Allergies:   No Known Allergies     Medications:    Current Outpatient Medications   Medication Sig Dispense Refill    aspirin (ASA) 81 MG chewable tablet Take 1 tablet (81 mg) by mouth daily (Patient taking differently: Take 81 mg by mouth every morning) 90 tablet 3    atorvastatin (LIPITOR) 80 MG tablet Take 1 tablet (80 mg) by mouth daily (Patient taking differently: Take 80 mg by mouth at bedtime) 90 tablet 3    EPINEPHrine (ANY BX GENERIC EQUIV) 0.3 MG/0.3ML injection 2-pack Inject 0.3 mLs (0.3 mg) into the muscle once as needed for anaphylaxis 1 each 0    gabapentin (NEURONTIN) 100 MG capsule Take 1 capsule (100 mg) by mouth 3 times daily 270 capsule 4    ibuprofen (ADVIL/MOTRIN) 800 MG tablet Take 1 tablet (800 mg) by mouth 3 times daily as needed TAKE 1 TABLET BY MOUTH 3 TIMES DAILY WITH MEALS. 100 tablet 5    nitroGLYcerin (NITROSTAT) 0.4 MG sublingual tablet For chest pain place 1 tablet under the tongue every 5 minutes for 3 doses. If symptoms persist 5 minutes after 1st dose call 911. 25 tablet 3    tiZANidine (ZANAFLEX) 4 MG tablet Take 1 tablet (4 mg) by mouth 3 times daily (Patient taking differently: Take 4 mg by mouth 3 times daily as needed) 90 tablet 11     No current facility-administered medications for this visit.        Social Habits:    Tobacco Use      Smoking status: Former        Packs/day: 0.25        Years: 0.3 packs/day for 14.6 years (3.6 ttl pk-yrs)        Types: Cigars, Cigarettes        Start date: 11/4/2009      Smokeless tobacco: Never      Tobacco comments: Quit smoking: quit 6/20/17 (cigarettes); smokes cigars 3 or 4 per day       Alcohol Use: Not on file       History   Drug Use    Types: Marijuana     Comment: Not daily        Family  "History:    Family History   Problem Relation Age of Onset    Diabetes Mother         Diabetes    Heart Disease Mother         Heart Disease    Other - See Comments Father         COPD, CD    Heart Disease Brother         Heart Disease    Other - See Comments Brother         killed by drunk  at age 19.    Substance Abuse Other         Alcohol/Drug, No hx of chemical dependency.    Diabetes Other         Diabetes    Heart Disease Other         Heart Disease    Anesthesia Reaction No family hx of     Clotting Disorder No family hx of     Bleeding Disorder No family hx of        Physical Examination:  Ht 1.778 m (5' 10\")   Wt 83.9 kg (185 lb)   BMI 26.54 kg/m    Wt Readings from Last 1 Encounters:   06/03/24 83.9 kg (185 lb)     Body mass index is 26.54 kg/m .    Exam:  No exam as this was a telephone visit    Laboratory Findings:  Hemoglobin   Date Value Ref Range Status   05/01/2024 17.1 13.3 - 17.7 g/dL Final   04/15/2024 16.8 13.3 - 17.7 g/dL Final   07/31/2020 14.2 13.3 - 17.7 g/dL Final   07/13/2020 15.4 13.3 - 17.7 g/dL Final     WBC   Date Value Ref Range Status   07/31/2020 10.6 4.0 - 11.0 10e9/L Final   07/13/2020 14.7 (H) 4.0 - 11.0 10e9/L Final     WBC Count   Date Value Ref Range Status   05/01/2024 8.3 4.0 - 11.0 10e3/uL Final   04/15/2024 7.2 4.0 - 11.0 10e3/uL Final     Platelet Count   Date Value Ref Range Status   05/01/2024 217 150 - 450 10e3/uL Final   04/15/2024 204 150 - 450 10e3/uL Final   07/31/2020 250 150 - 450 10e9/L Final   07/13/2020 239 150 - 450 10e9/L Final     Creatinine   Date Value Ref Range Status   05/01/2024 0.84 0.67 - 1.17 mg/dL Final   07/31/2020 0.80 0.70 - 1.30 mg/dL Final     Sodium   Date Value Ref Range Status   05/01/2024 138 135 - 145 mmol/L Final     Comment:     Reference intervals for this test were updated on 09/26/2023 to more accurately reflect our healthy population. There may be differences in the flagging of prior results with similar values performed " with this method. Interpretation of those prior results can be made in the context of the updated reference intervals.    07/31/2020 140 134 - 144 mmol/L Final     Glucose   Date Value Ref Range Status   05/01/2024 111 (H) 70 - 99 mg/dL Final   04/15/2024 111 (H) 70 - 99 mg/dL Final   12/01/2021 87 70 - 105 mg/dL Final   07/31/2020 165 (H) 70 - 105 mg/dL Final   07/13/2020 106 (H) 70 - 105 mg/dL Final     Hemoglobin A1C   Date Value Ref Range Status   05/01/2024 5.9 (H) <5.7 % Final     Comment:     Normal <5.7%   Prediabetes 5.7-6.4%    Diabetes 6.5% or higher     Note: Adopted from ADA consensus guidelines.     INR   Date Value Ref Range Status   05/01/2024 1.06 0.85 - 1.15 Final       Imaging:  No additional imaging.    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 Mason  #4- Hyperlipidemia  #5- Peripheral arterial disease status post bilateral bypasses, elsewhere  #6- Heart Failure with Reduced EF, 30-35%  #7- Former nicotine dependence  Mr. Lehman is a pleasant 68 year old male seen for pre surgical follow up of carotid disease. We had previously had extensive discussion on rationale for, risks of carotid revascularization, as well as multiple options for concomitant carotid and coronary disease as well as their associated risks.  Dr. Mason and I discussed at lengths.  He would strongly prefer carotid revascularization prior to CABG, if anesthesia is amenable.  I think this is quite reasonable.  We discussed this.  Mr. Lehman would like to proceed.    Risks, benefits, and alternatives left-sided carotid endarterectomy including but not limited to death, stroke, cranial nerve injury, hyperperfusion syndrome, bleeding, MI or other cardiopulmonary complications in the setting of known coronary artery disease and, infection.  We reviewed the benefits and alternatives including medical management and stenting either by the transfermoral route or transcervical carotid artery revascularization (TCAR). Mr. Lehman was involved in all aspects of decision making and appears to understand. The patient understands the risks and would like to proceed with left carotid endarterectomy..    Discussed warning signs including, but not limited to, stroke/TIA, focal neurologic deficit, upper or lower extremity weakness or sensory dysfunction, amaurosis fugax, aphasia, dysarthria, or facial droop.  If he experiences any of these, he has been advised to seek treatment and contact my team.    Mr. Lehman is in agreement with this plan as outlined.    Recommendations/Plan:  Plan for left carotid endarterectomy prior to CABG  PAC visit with updated plan given need for anesthesia eval with high coronary risk, new H&P  Continue OMT as previously outlined.          Again, thank you for allowing me to participate in the care of your patient.      Sincerely,    Rasheed Sam MD

## 2024-06-04 ENCOUNTER — TELEPHONE (OUTPATIENT)
Dept: VASCULAR SURGERY | Facility: CLINIC | Age: 69
End: 2024-06-04
Payer: COMMERCIAL

## 2024-06-04 DIAGNOSIS — I65.22 ASYMPTOMATIC STENOSIS OF LEFT CAROTID ARTERY WITHOUT INFARCTION: Primary | ICD-10-CM

## 2024-06-04 DIAGNOSIS — Z98.890 POSTOPERATIVE STATE: ICD-10-CM

## 2024-06-04 NOTE — TELEPHONE ENCOUNTER
Surgery Scheduled    PAC scheduled 06/12/2024    Surgery/Procedure: Left Carotid Endarterectomy    Special Equipment: EEG, sundt shunt available    Location: [facility]:  [address] [place] [address] (Fax: [phone])    Date: 06/25/2024    Time: 8:00am    Admission Type: Surgery Admit    Surgeon:  Dr. Sam    Post Op: 07/10/08471    Reps Contacted: NA    Blood Thinners Addressed: No Message sent to support pool    Stress Test Clearance: NO

## 2024-06-05 NOTE — TELEPHONE ENCOUNTER
FUTURE VISIT INFORMATION      SURGERY INFORMATION:  Date: 24  Location: [facility]  Surgeon:  Rasheed Sam MD   Anesthesia Type:  general  Procedure: ENDARTERECTOMY, CAROTID   Consult: virtual visit 6/3/24    RECORDS REQUESTED FROM:       Primary Care Provider: [facility]    Pertinent Medical History: PAD    Most recent EKG+ Tracin24    Most recent ECHO: 24    Most recent Cardiac Stress Test: 23    Most recent Coronary Angiogram: 4/15/24    Most recent PFT's: 24    
Quality 265: Biopsy Follow-Up: Biopsy results reviewed, communicated, tracked, and documented
Detail Level: Detailed

## 2024-06-12 ENCOUNTER — PRE VISIT (OUTPATIENT)
Dept: SURGERY | Facility: CLINIC | Age: 69
End: 2024-06-12

## 2024-06-12 ENCOUNTER — ANESTHESIA EVENT (OUTPATIENT)
Dept: SURGERY | Facility: CLINIC | Age: 69
DRG: 038 | End: 2024-06-12
Payer: MEDICARE

## 2024-06-12 NOTE — TELEPHONE ENCOUNTER
FUTURE VISIT INFORMATION        SURGERY INFORMATION:  Date: 24  Location: [facility]  Surgeon:  Rasheed Sam MD   Anesthesia Type:  general  Procedure: ENDARTERECTOMY, CAROTID   Consult: virtual visit 6/3/24     RECORDS REQUESTED FROM:         Primary Care Provider: [facility]     Pertinent Medical History: PAD     Most recent EKG+ Tracin24     Most recent ECHO: 24     Most recent Cardiac Stress Test: 23     Most recent Coronary Angiogram: 4/15/24     Most recent PFT's: 24

## 2024-06-13 ENCOUNTER — VIRTUAL VISIT (OUTPATIENT)
Dept: SURGERY | Facility: CLINIC | Age: 69
End: 2024-06-13
Payer: COMMERCIAL

## 2024-06-13 ENCOUNTER — PRE VISIT (OUTPATIENT)
Dept: SURGERY | Facility: CLINIC | Age: 69
End: 2024-06-13

## 2024-06-13 DIAGNOSIS — I65.22 CAROTID STENOSIS, ASYMPTOMATIC, LEFT: ICD-10-CM

## 2024-06-13 DIAGNOSIS — Z01.818 PRE-OP EVALUATION: Primary | ICD-10-CM

## 2024-06-13 PROCEDURE — 99215 OFFICE O/P EST HI 40 MIN: CPT | Mod: 95 | Performed by: PHYSICIAN ASSISTANT

## 2024-06-13 ASSESSMENT — LIFESTYLE VARIABLES: TOBACCO_USE: 1

## 2024-06-13 ASSESSMENT — ENCOUNTER SYMPTOMS
DYSRHYTHMIAS: 0
SEIZURES: 0

## 2024-06-13 ASSESSMENT — COPD QUESTIONNAIRES: COPD: 0

## 2024-06-13 NOTE — H&P
Pre-Operative H & P     CC:  Preoperative exam to assess for increased cardiopulmonary risk while undergoing surgery and anesthesia.    Date of Encounter: 6/13/2024  Primary Care Physician:  Ha Keating     Reason for visit:   Encounter Diagnoses   Name Primary?    Pre-op evaluation Yes    Carotid stenosis, asymptomatic, left        HPI  Modesto Lehman is a 68 year old male who presents for pre-operative H & P in preparation for  Procedure Information       Case: [id number] Date/Time: 06/25/24 0800    Procedure: ENDARTERECTOMY, CAROTID (Left: Neck)    Anesthesia type: General    Diagnosis: Carotid stenosis, asymptomatic, left [I65.22]    Pre-op diagnosis: Carotid stenosis, asymptomatic, left [I65.22]    Location:  OR  /  OR    Providers: Rasheed Sam MD            Patient is being evaluated for comorbid conditions of HLD, CAD, ischemic cardiomyopathy, chronic pain.    While undergoing work up for coronary artery disease, patient was found to have completely occluded right internal carotid artery and high-grade stenosis of the left internal carotid artery. He has been evaluated by Dr. Sam, who has discussed his situation extensively with cardiothoracic surgery. The above procedure has been recommended prior to proceeding with future CABG.     History is obtained from the patient and chart review    Hx of abnormal bleeding or anti-platelet use: ASA 81 mg      Past Medical History  Past Medical History:   Diagnosis Date    Allergy status to analgesic agent     No reported medication allergies    Anesthesia of skin     No  problems with anaesthesia    CAD (coronary artery disease)     Elevated coronary artery calcium score     History of recurrent pneumonia     No Comments Provided    Hyperlipidemia     No Comments Provided    Low back pain     pain with bilateral leg and disc injuries.    Personal history of nicotine dependence     Personal history of other medical treatment (CODE)     No  blood transfusions    PVD (peripheral vascular disease) (H24)        Past Surgical History  Past Surgical History:   Procedure Laterality Date    ANGIOPLASTY      Right leg stenting and bypass, Left also    COLONOSCOPY  08/23/2010    Q 10yrs.    COLONOSCOPY N/A 02/24/2022    6 tubular adenomas, 5 year follow up, 2/24/2027    CV CORONARY ANGIOGRAM N/A 4/15/2024    Procedure: Coronary Angiogram;  Surgeon: Toni Sellers MD;  Location:  [facility]    EXTRACTION(S) DENTAL      recently removed    OTHER SURGICAL HISTORY      [id number],PREMALIG/BENIGN SKIN LESION EXCISION, removed from chest    OTHER SURGICAL HISTORY      [id number],ANESTHESIA ALERT,No  problems with anaesthesia       Prior to Admission Medications  Current Outpatient Medications   Medication Sig Dispense Refill    aspirin (ASA) 81 MG chewable tablet Take 1 tablet (81 mg) by mouth daily (Patient taking differently: Take 81 mg by mouth every morning) 90 tablet 3    atorvastatin (LIPITOR) 80 MG tablet Take 1 tablet (80 mg) by mouth daily (Patient taking differently: Take 80 mg by mouth at bedtime) 90 tablet 3    EPINEPHrine (ANY BX GENERIC EQUIV) 0.3 MG/0.3ML injection 2-pack Inject 0.3 mLs (0.3 mg) into the muscle once as needed for anaphylaxis 1 each 0    gabapentin (NEURONTIN) 100 MG capsule Take 1 capsule (100 mg) by mouth 3 times daily 270 capsule 4    ibuprofen (ADVIL/MOTRIN) 800 MG tablet Take 1 tablet (800 mg) by mouth 3 times daily as needed TAKE 1 TABLET BY MOUTH 3 TIMES DAILY WITH MEALS. 100 tablet 5    nitroGLYcerin (NITROSTAT) 0.4 MG sublingual tablet For chest pain place 1 tablet under the tongue every 5 minutes for 3 doses. If symptoms persist 5 minutes after 1st dose call 911. 25 tablet 3    tiZANidine (ZANAFLEX) 4 MG tablet Take 1 tablet (4 mg) by mouth 3 times daily (Patient taking differently: Take 4 mg by mouth 3 times daily as needed) 90 tablet 11       Allergies  No Known Allergies    Social History  Social History      Socioeconomic History    Marital status:      Spouse name: Not on file    Number of children: Not on file    Years of education: Not on file    Highest education level: Not on file   Occupational History    Not on file   Tobacco Use    Smoking status: Former     Current packs/day: 0.25     Average packs/day: 0.3 packs/day for 14.6 years (3.7 ttl pk-yrs)     Types: Cigars, Cigarettes     Start date: 11/4/2009    Smokeless tobacco: Never    Tobacco comments:     Quit smoking: quit 6/20/17 (cigarettes); smokes cigars 1-2 cigars a day   Vaping Use    Vaping status: Never Used   Substance and Sexual Activity    Alcohol use: Yes     Comment: 2 vodka every evening    Drug use: Yes     Types: Marijuana     Comment: Not daily 2x week    Sexual activity: Not Currently   Other Topics Concern    Not on file   Social History Narrative    Self employed as .   with 3 adult children.    No hx of chemical dependency.  Patient is a former smoker.   Alcohol Use - yes occ    Wife - Imelda Lehman     Social Determinants of Health     Financial Resource Strain: Low Risk  (2/16/2024)    Financial Resource Strain     Within the past 12 months, have you or your family members you live with been unable to get utilities (heat, electricity) when it was really needed?: No   Food Insecurity: Low Risk  (2/16/2024)    Food Insecurity     Within the past 12 months, did you worry that your food would run out before you got money to buy more?: No     Within the past 12 months, did the food you bought just not last and you didn t have money to get more?: No   Transportation Needs: Low Risk  (2/16/2024)    Transportation Needs     Within the past 12 months, has lack of transportation kept you from medical appointments, getting your medicines, non-medical meetings or appointments, work, or from getting things that you need?: No   Physical Activity: Sufficiently Active (2/16/2024)    Exercise Vital Sign     Days of Exercise per  Week: 7 days     Minutes of Exercise per Session: 60 min   Stress: Stress Concern Present (2/16/2024)    [employer] [place] [employer] - Occupational Stress Questionnaire     Feeling of Stress : Very much   Social Connections: Not on file   Interpersonal Safety: Low Risk  (4/11/2024)    Interpersonal Safety     Do you feel physically and emotionally safe where you currently live?: Yes     Within the past 12 months, have you been hit, slapped, kicked or otherwise physically hurt by someone?: No     Within the past 12 months, have you been humiliated or emotionally abused in other ways by your partner or ex-partner?: No   Housing Stability: Low Risk  (2/16/2024)    Housing Stability     Do you have housing? : Yes     Are you worried about losing your housing?: No       Family History  Family History   Problem Relation Age of Onset    Diabetes Mother         Diabetes    Heart Disease Mother         Heart Disease    Other - See Comments Father         COPD, CD    Heart Disease Brother         Heart Disease    Other - See Comments Brother         killed by drunk  at age 19.    Substance Abuse Other         Alcohol/Drug, No hx of chemical dependency.    Diabetes Other         Diabetes    Heart Disease Other         Heart Disease    Anesthesia Reaction No family hx of     Clotting Disorder No family hx of     Bleeding Disorder No family hx of        Review of Systems  The complete review of systems is negative other than noted in the HPI or here.   Anesthesia Evaluation   Pt has had prior anesthetic. Type: General and MAC.    No history of anesthetic complications       ROS/MED HX  ENT/Pulmonary:     (+)                tobacco use, Current use,   patient smoked within 24 hours,                 (-) asthma, COPD and recent URI   Neurologic:    (-) no seizures and no CVA   Cardiovascular: Comment: PAD s/p Bilateral popliteal bypass      (+) Dyslipidemia - Peripheral Vascular Disease-- Other and Carotid  Stenosis.  CAD -  - -   Taking blood thinners Pt has not received instructions: Instructions Given to patient: Will remain on ASA up to DOS. CHF etiology: ischemic Last EF: 30-35% date: 4/16/24  [facility].                      Previous cardiac testing   Echo: Date: 4/23/24 Results:  Interpretation Summary  Left ventricular function is decreased. The ejection fraction is 30-35%  (moderately reduced).  There is moderate diffuse hypokinesis with akinesis of the basal-mid  inferolateral and basal inferior segments indicative of multivessel CAD.  Global right ventricular function is normal.  No significant valvular abnormalities present.  Estimated mean right atrial pressure is normal.  No pericardial effusion is present.    Stress Test:  Date: 2013 Results:    ECG Reviewed:  Date: 4/30/24 Results:  Sinus rhythm   Possible Left atrial enlargement   Minimal voltage criteria for LVH, may be normal variant ( [place] product )   ST & T wave abnormality, consider lateral ischemia   Abnormal ECG     Cath:  Date: 4/15/24 Results:     Mid LM lesion is 70% stenosed.     Prox LAD to Mid LAD lesion is 60% stenosed.     1st Diag lesion is 60% stenosed.     Mid Cx lesion is 100% stenosed.     Ost Cx to Prox Cx lesion is 99% stenosed.     Prox RCA lesion is 100% stenosed.     Significant ostial left main disease.  Chronic total occlusion of circumflex with left to left collaterals from diagonal.  Chronic total occlusion right coronary artery with left to left (from septals) and right to right (from marginal) collaterals.  Recommend consideration for bypass surgery (-) angina, arrhythmias and angina   METS/Exercise Tolerance:  Comment: METS currently ~4 due to restrictions from cardiology. Does all of cooking and cares for his wife, gardening.    Hematologic:  - neg hematologic  ROS     Musculoskeletal:       GI/Hepatic:  - neg GI/hepatic ROS     Renal/Genitourinary:  - neg Renal ROS     Endo:     (+)               Obesity,    (-) Type II  DM, thyroid disease and chronic steroid usage   Psychiatric/Substance Use:     (+)     Recreational drug usage: Cannabis. (-) psychiatric history   Infectious Disease: Comment: Hx of Lyme Disease   (-) Recent Fever   Malignancy:  - neg malignancy ROS     Other:  - neg other ROS    (+)  , H/O Chronic Pain (back, h/o multiple back injuries),         Virtual visit -  No vitals were obtained    Physical Exam  Constitutional: Awake, alert, cooperative, no apparent distress, and appears stated age.  Eyes: Pupils equal  HENT: Normocephalic  Respiratory: non labored breathing   Neurologic: Awake, alert, oriented to name, place and time.   Neuropsychiatric: Calm, cooperative. Normal affect.      Prior Labs/Diagnostic Studies   All labs and imaging personally reviewed     EKG/ stress test - if available please see in ROS above   Echo result w/o MOPS: Interpretation SummaryLeft ventricular function is decreased. The ejection fraction is 30-35%(moderately reduced).There is moderate diffuse hypokinesis with akinesis of the basal-midinferolateral and basal inferior segments indicative of multivessel CAD.Global right ventricular function is normal.No significant valvular abnormalities present.Estimated mean right atrial pressure is normal.No pericardial effusion is present.          Latest Ref Rng & Units 5/1/2024     7:47 AM   PFT   FVC L 5.40    FEV1 L 4.02    FVC% % 138    FEV1% % 133          The patient's records and results personally reviewed by this provider.     Outside records reviewed from: Care Everywhere      Assessment  Modesto Lehman is a 68 year old male seen as a PAC referral for risk assessment and optimization for anesthesia.    Plan/Recommendations  Pt will be optimized for the proposed procedure.  See below for details on the assessment, risk, and preoperative recommendations    NEUROLOGY  - No history of TIA, CVA or seizure    -Post Op delirium risk factors:  No risk identified    ENT  - No current airway  "concerns.  Will need to be reassessed day of surgery.  Mallampati: Unable to assess  TM: Unable to assess    CARDIAC/VASCULAR  - No history of Hypertension and Afib  - CAD  Recently diagnosed with severe, multi-vessel disease. Planning for future CABG with Dr. Mason.   - Ischemic cardiomyopathy  EF 30-55% on recent echo, full report above  - Hyperlipidemia  Continue statin  - PVD  S/p bilateral popliteal bypass  Recently diagnosed with asymptomatic carotid artery stenosis during work up for future CABG. Above surgery planned for further management.    - METS (Metabolic Equivalents)  Patient performs 4 or more METS exercise without symptoms             Total Score: 0      RCRI-Moderate risk: Class 3  6.6% complication rate             Total Score: 2    RCRI: CAD    RCRI: CHF        PULMONARY    NELLIE Low Risk             Total Score: 2    NELLIE: Over 50 ys old    NELLIE: Male      - Denies asthma or inhaler use. Denies respiratory concerns today.  - Tobacco History    History   Smoking Status    Former    Types: Cigars, Cigarettes   Smokeless Tobacco    Never   Working on quitting cigars, down to about 1-2 per day. States he will quit before surgery.     GI    PONV Low Risk  Total Score: 0          /RENAL  - Baseline Creatinine  0.84    ENDOCRINE    - BMI: Estimated body mass index is 26.54 kg/m  as calculated from the following:    Height as of 6/12/24: 1.778 m (5' 10\").    Weight as of 6/12/24: 83.9 kg (185 lb).  Overweight (BMI 25.0-29.9)  - No history of Diabetes Mellitus    HEME  VTE Low Risk 0.5%             Total Score: 3    VTE: Greater than 59 yrs old    VTE: Male      - Platelet dysfunction secondary to Aspirin (Misa, many others) 81 mg. Recommend patient remain on this medication in the nneka-operative setting due to known severe multivessel coronary artery disease.       MSK  - Chronic back pain  History of multiple back injuries. Recommend consideration for careful positioning to limit patient " discomfort.    Different anesthesia methods/types have been discussed with the patient, but they are aware that the final plan will be decided by the assigned anesthesia provider on the date of service.    Patient was discussed with Dr Wilkins    The patient is optimized for their procedure. AVS with information on surgery time/arrival time, meds and NPO status given by nursing staff. No further diagnostic testing indicated.    Please refer to the physical examination documented by the anesthesiologist in the anesthesia record on the day of surgery.    Video-Visit Details    Type of service:  Video Visit    Provider received verbal consent for a Video Visit from the patient? Yes   Video Start Time:  1015  Video End Time: 1026    Originating Location (pt. Location): Home    Distant Location (provider location):  Off-site  Mode of Communication:  Video Conference via [employer]  On the day of service:     Prep time: 20 minutes  Visit time: 11 minutes  Documentation time: 20 minutes  ------------------------------------------  Total time: 51 minutes      Loni Pichardo PA-C  Preoperative Assessment Center  [facility]  Phone: [phone]  Fax: [phone]

## 2024-06-13 NOTE — H&P (VIEW-ONLY)
Pre-Operative H & P     CC:  Preoperative exam to assess for increased cardiopulmonary risk while undergoing surgery and anesthesia.    Date of Encounter: 6/13/2024  Primary Care Physician:  Ha Keating     Reason for visit:   Encounter Diagnoses   Name Primary?    Pre-op evaluation Yes    Carotid stenosis, asymptomatic, left        HPI  Modesto Lehman is a 68 year old male who presents for pre-operative H & P in preparation for  Procedure Information       Case: [id number] Date/Time: 06/25/24 0800    Procedure: ENDARTERECTOMY, CAROTID (Left: Neck)    Anesthesia type: General    Diagnosis: Carotid stenosis, asymptomatic, left [I65.22]    Pre-op diagnosis: Carotid stenosis, asymptomatic, left [I65.22]    Location:  OR  /  OR    Providers: Rasheed Sam MD            Patient is being evaluated for comorbid conditions of HLD, CAD, ischemic cardiomyopathy, chronic pain.    While undergoing work up for coronary artery disease, patient was found to have completely occluded right internal carotid artery and high-grade stenosis of the left internal carotid artery. He has been evaluated by Dr. Sam, who has discussed his situation extensively with cardiothoracic surgery. The above procedure has been recommended prior to proceeding with future CABG.     History is obtained from the patient and chart review    Hx of abnormal bleeding or anti-platelet use: ASA 81 mg      Past Medical History  Past Medical History:   Diagnosis Date    Allergy status to analgesic agent     No reported medication allergies    Anesthesia of skin     No  problems with anaesthesia    CAD (coronary artery disease)     Elevated coronary artery calcium score     History of recurrent pneumonia     No Comments Provided    Hyperlipidemia     No Comments Provided    Low back pain     pain with bilateral leg and disc injuries.    Personal history of nicotine dependence     Personal history of other medical treatment (CODE)     No  blood transfusions    PVD (peripheral vascular disease) (H24)        Past Surgical History  Past Surgical History:   Procedure Laterality Date    ANGIOPLASTY      Right leg stenting and bypass, Left also    COLONOSCOPY  08/23/2010    Q 10yrs.    COLONOSCOPY N/A 02/24/2022    6 tubular adenomas, 5 year follow up, 2/24/2027    CV CORONARY ANGIOGRAM N/A 4/15/2024    Procedure: Coronary Angiogram;  Surgeon: Toni Sellers MD;  Location:  [facility]    EXTRACTION(S) DENTAL      recently removed    OTHER SURGICAL HISTORY      [id number],PREMALIG/BENIGN SKIN LESION EXCISION, removed from chest    OTHER SURGICAL HISTORY      [id number],ANESTHESIA ALERT,No  problems with anaesthesia       Prior to Admission Medications  Current Outpatient Medications   Medication Sig Dispense Refill    aspirin (ASA) 81 MG chewable tablet Take 1 tablet (81 mg) by mouth daily (Patient taking differently: Take 81 mg by mouth every morning) 90 tablet 3    atorvastatin (LIPITOR) 80 MG tablet Take 1 tablet (80 mg) by mouth daily (Patient taking differently: Take 80 mg by mouth at bedtime) 90 tablet 3    EPINEPHrine (ANY BX GENERIC EQUIV) 0.3 MG/0.3ML injection 2-pack Inject 0.3 mLs (0.3 mg) into the muscle once as needed for anaphylaxis 1 each 0    gabapentin (NEURONTIN) 100 MG capsule Take 1 capsule (100 mg) by mouth 3 times daily 270 capsule 4    ibuprofen (ADVIL/MOTRIN) 800 MG tablet Take 1 tablet (800 mg) by mouth 3 times daily as needed TAKE 1 TABLET BY MOUTH 3 TIMES DAILY WITH MEALS. 100 tablet 5    nitroGLYcerin (NITROSTAT) 0.4 MG sublingual tablet For chest pain place 1 tablet under the tongue every 5 minutes for 3 doses. If symptoms persist 5 minutes after 1st dose call 911. 25 tablet 3    tiZANidine (ZANAFLEX) 4 MG tablet Take 1 tablet (4 mg) by mouth 3 times daily (Patient taking differently: Take 4 mg by mouth 3 times daily as needed) 90 tablet 11       Allergies  No Known Allergies    Social History  Social History      Socioeconomic History    Marital status:      Spouse name: Not on file    Number of children: Not on file    Years of education: Not on file    Highest education level: Not on file   Occupational History    Not on file   Tobacco Use    Smoking status: Former     Current packs/day: 0.25     Average packs/day: 0.3 packs/day for 14.6 years (3.7 ttl pk-yrs)     Types: Cigars, Cigarettes     Start date: 11/4/2009    Smokeless tobacco: Never    Tobacco comments:     Quit smoking: quit 6/20/17 (cigarettes); smokes cigars 1-2 cigars a day   Vaping Use    Vaping status: Never Used   Substance and Sexual Activity    Alcohol use: Yes     Comment: 2 vodka every evening    Drug use: Yes     Types: Marijuana     Comment: Not daily 2x week    Sexual activity: Not Currently   Other Topics Concern    Not on file   Social History Narrative    Self employed as .   with 3 adult children.    No hx of chemical dependency.  Patient is a former smoker.   Alcohol Use - yes occ    Wife - Imelda Lehman     Social Determinants of Health     Financial Resource Strain: Low Risk  (2/16/2024)    Financial Resource Strain     Within the past 12 months, have you or your family members you live with been unable to get utilities (heat, electricity) when it was really needed?: No   Food Insecurity: Low Risk  (2/16/2024)    Food Insecurity     Within the past 12 months, did you worry that your food would run out before you got money to buy more?: No     Within the past 12 months, did the food you bought just not last and you didn t have money to get more?: No   Transportation Needs: Low Risk  (2/16/2024)    Transportation Needs     Within the past 12 months, has lack of transportation kept you from medical appointments, getting your medicines, non-medical meetings or appointments, work, or from getting things that you need?: No   Physical Activity: Sufficiently Active (2/16/2024)    Exercise Vital Sign     Days of Exercise per  Week: 7 days     Minutes of Exercise per Session: 60 min   Stress: Stress Concern Present (2/16/2024)    [employer] [place] [employer] - Occupational Stress Questionnaire     Feeling of Stress : Very much   Social Connections: Not on file   Interpersonal Safety: Low Risk  (4/11/2024)    Interpersonal Safety     Do you feel physically and emotionally safe where you currently live?: Yes     Within the past 12 months, have you been hit, slapped, kicked or otherwise physically hurt by someone?: No     Within the past 12 months, have you been humiliated or emotionally abused in other ways by your partner or ex-partner?: No   Housing Stability: Low Risk  (2/16/2024)    Housing Stability     Do you have housing? : Yes     Are you worried about losing your housing?: No       Family History  Family History   Problem Relation Age of Onset    Diabetes Mother         Diabetes    Heart Disease Mother         Heart Disease    Other - See Comments Father         COPD, CD    Heart Disease Brother         Heart Disease    Other - See Comments Brother         killed by drunk  at age 19.    Substance Abuse Other         Alcohol/Drug, No hx of chemical dependency.    Diabetes Other         Diabetes    Heart Disease Other         Heart Disease    Anesthesia Reaction No family hx of     Clotting Disorder No family hx of     Bleeding Disorder No family hx of        Review of Systems  The complete review of systems is negative other than noted in the HPI or here.   Anesthesia Evaluation   Pt has had prior anesthetic. Type: General and MAC.    No history of anesthetic complications       ROS/MED HX  ENT/Pulmonary:     (+)                tobacco use, Current use,   patient smoked within 24 hours,                 (-) asthma, COPD and recent URI   Neurologic:    (-) no seizures and no CVA   Cardiovascular: Comment: PAD s/p Bilateral popliteal bypass      (+) Dyslipidemia - Peripheral Vascular Disease-- Other and Carotid  Stenosis.  CAD -  - -   Taking blood thinners Pt has not received instructions: Instructions Given to patient: Will remain on ASA up to DOS. CHF etiology: ischemic Last EF: 30-35% date: 4/16/24  [facility].                      Previous cardiac testing   Echo: Date: 4/23/24 Results:  Interpretation Summary  Left ventricular function is decreased. The ejection fraction is 30-35%  (moderately reduced).  There is moderate diffuse hypokinesis with akinesis of the basal-mid  inferolateral and basal inferior segments indicative of multivessel CAD.  Global right ventricular function is normal.  No significant valvular abnormalities present.  Estimated mean right atrial pressure is normal.  No pericardial effusion is present.    Stress Test:  Date: 2013 Results:    ECG Reviewed:  Date: 4/30/24 Results:  Sinus rhythm   Possible Left atrial enlargement   Minimal voltage criteria for LVH, may be normal variant ( Nicasio product )   ST & T wave abnormality, consider lateral ischemia   Abnormal ECG     Cath:  Date: 4/15/24 Results:     Mid LM lesion is 70% stenosed.     Prox LAD to Mid LAD lesion is 60% stenosed.     1st Diag lesion is 60% stenosed.     Mid Cx lesion is 100% stenosed.     Ost Cx to Prox Cx lesion is 99% stenosed.     Prox RCA lesion is 100% stenosed.     Significant ostial left main disease.  Chronic total occlusion of circumflex with left to left collaterals from diagonal.  Chronic total occlusion right coronary artery with left to left (from septals) and right to right (from marginal) collaterals.  Recommend consideration for bypass surgery (-) angina, arrhythmias and angina   METS/Exercise Tolerance:  Comment: METS currently ~4 due to restrictions from cardiology. Does all of cooking and cares for his wife, gardening.    Hematologic:  - neg hematologic  ROS     Musculoskeletal:       GI/Hepatic:  - neg GI/hepatic ROS     Renal/Genitourinary:  - neg Renal ROS     Endo:     (+)               Obesity,    (-) Type II  DM, thyroid disease and chronic steroid usage   Psychiatric/Substance Use:     (+)     Recreational drug usage: Cannabis. (-) psychiatric history   Infectious Disease: Comment: Hx of Lyme Disease   (-) Recent Fever   Malignancy:  - neg malignancy ROS     Other:  - neg other ROS    (+)  , H/O Chronic Pain (back, h/o multiple back injuries),         Virtual visit -  No vitals were obtained    Physical Exam  Constitutional: Awake, alert, cooperative, no apparent distress, and appears stated age.  Eyes: Pupils equal  HENT: Normocephalic  Respiratory: non labored breathing   Neurologic: Awake, alert, oriented to name, place and time.   Neuropsychiatric: Calm, cooperative. Normal affect.      Prior Labs/Diagnostic Studies   All labs and imaging personally reviewed     EKG/ stress test - if available please see in ROS above   Echo result w/o MOPS: Interpretation SummaryLeft ventricular function is decreased. The ejection fraction is 30-35%(moderately reduced).There is moderate diffuse hypokinesis with akinesis of the basal-midinferolateral and basal inferior segments indicative of multivessel CAD.Global right ventricular function is normal.No significant valvular abnormalities present.Estimated mean right atrial pressure is normal.No pericardial effusion is present.          Latest Ref Rng & Units 5/1/2024     7:47 AM   PFT   FVC L 5.40    FEV1 L 4.02    FVC% % 138    FEV1% % 133          The patient's records and results personally reviewed by this provider.     Outside records reviewed from: Care Everywhere      Assessment  oMdesto Lehman is a 68 year old male seen as a PAC referral for risk assessment and optimization for anesthesia.    Plan/Recommendations  Pt will be optimized for the proposed procedure.  See below for details on the assessment, risk, and preoperative recommendations    NEUROLOGY  - No history of TIA, CVA or seizure    -Post Op delirium risk factors:  No risk identified    ENT  - No current airway  "concerns.  Will need to be reassessed day of surgery.  Mallampati: Unable to assess  TM: Unable to assess    CARDIAC/VASCULAR  - No history of Hypertension and Afib  - CAD  Recently diagnosed with severe, multi-vessel disease. Planning for future CABG with Dr. Mason.   - Ischemic cardiomyopathy  EF 30-55% on recent echo, full report above  - Hyperlipidemia  Continue statin  - PVD  S/p bilateral popliteal bypass  Recently diagnosed with asymptomatic carotid artery stenosis during work up for future CABG. Above surgery planned for further management.    - METS (Metabolic Equivalents)  Patient performs 4 or more METS exercise without symptoms             Total Score: 0      RCRI-Moderate risk: Class 3  6.6% complication rate             Total Score: 2    RCRI: CAD    RCRI: CHF        PULMONARY    NELLIE Low Risk             Total Score: 2    NELLIE: Over 50 ys old    NELLIE: Male      - Denies asthma or inhaler use. Denies respiratory concerns today.  - Tobacco History    History   Smoking Status    Former    Types: Cigars, Cigarettes   Smokeless Tobacco    Never   Working on quitting cigars, down to about 1-2 per day. States he will quit before surgery.     GI    PONV Low Risk  Total Score: 0          /RENAL  - Baseline Creatinine  0.84    ENDOCRINE    - BMI: Estimated body mass index is 26.54 kg/m  as calculated from the following:    Height as of 6/12/24: 1.778 m (5' 10\").    Weight as of 6/12/24: 83.9 kg (185 lb).  Overweight (BMI 25.0-29.9)  - No history of Diabetes Mellitus    HEME  VTE Low Risk 0.5%             Total Score: 3    VTE: Greater than 59 yrs old    VTE: Male      - Platelet dysfunction secondary to Aspirin (Misa, many others) 81 mg. Recommend patient remain on this medication in the nneka-operative setting due to known severe multivessel coronary artery disease.       MSK  - Chronic back pain  History of multiple back injuries. Recommend consideration for careful positioning to limit patient " discomfort.    Different anesthesia methods/types have been discussed with the patient, but they are aware that the final plan will be decided by the assigned anesthesia provider on the date of service.    Patient was discussed with Dr Wilkins    The patient is optimized for their procedure. AVS with information on surgery time/arrival time, meds and NPO status given by nursing staff. No further diagnostic testing indicated.    Please refer to the physical examination documented by the anesthesiologist in the anesthesia record on the day of surgery.    Video-Visit Details    Type of service:  Video Visit    Provider received verbal consent for a Video Visit from the patient? Yes   Video Start Time:  1015  Video End Time: 1026    Originating Location (pt. Location): Home    Distant Location (provider location):  Off-site  Mode of Communication:  Video Conference via [employer]  On the day of service:     Prep time: 20 minutes  Visit time: 11 minutes  Documentation time: 20 minutes  ------------------------------------------  Total time: 51 minutes      oLni Pichardo PA-C  Preoperative Assessment Center  [facility]  Clinic and Surgery Center  Phone: [phone]  Fax: [phone]

## 2024-06-13 NOTE — PROGRESS NOTES
Modesto is a 68 year old who is being evaluated via a billable video visit.    How would you like to obtain your AVS? MyChart  If the video visit is dropped, the invitation should be resent by: Text to cell phone: [phone]    Subjective   Modesto is a 68 year old, presenting for the following health issues:  Pre-Op Exam      HPI       Physical Exam

## 2024-06-13 NOTE — PATIENT INSTRUCTIONS
Preparing for Your Surgery      Name:  Modesto Lehman   MRN:  [id number]   :  1955   Today's Date:  2024       Arriving for surgery:  Surgery date:  24  Arrival time:  6 am    Please come to:     [facility] [place] [facility]    [address] [place]  [address]     The [facility] [place] Patient/Visitor Ramp is at [address]. Patients and visitors who self-park will receive the reduced hospital parking rate. If the Patient /Visitor Ramp is full, please follow the signs to the [employer] car park located at the main hospital entrance.       parking is available (24 hours/ 7 days a week)      Discounted parking pass options are available for patients and visitors. They can be purchased at the [employer] desk at the [facility] hospital entrance.     -  Stop at the security desk and they will direct surgery patients to Registration and the Surgery Check in and Family Lounge. [phone]        - If you need directions, a wheelchair or an escort please stop at the Information/security desk in the lobby.     What can I eat or drink?  -  You may eat and drink normally up to 8 hours prior to arrival time. (Until 10 pm 24)  -  You may have clear liquids until 2 hours prior to arrival time. (Until 4 am)    Examples of clear liquids:  Water  Clear broth  Juices (apple, white grape, white cranberry  and cider) without pulp  Noncarbonated, powder based beverages  (lemonade and Ahmet-Aid)  Sodas (Sprite, 7-Up, ginger ale and seltzer)  Coffee or tea (without milk or cream)  Gatorade    -  No Alcohol or cannabis products for at least 24 hours before surgery.     Which medicines can I take?  Hold Multivitamins for 10 days before surgery.  Hold Supplements for 10 days before surgery.  Hold Ibuprofen (Advil, Motrin) for 10 days before surgery--unless otherwise directed by surgeon. Reported Ibuprofen is on  hold for surgery.  Hold Naproxen (Aleve) for 10 days before surgery.  Acetaminophen (Tylenol) is okay to take if needed.      -  PLEASE TAKE these medications the day of surgery:  Aspirin  Gabapentin (Neurontin) if needed  Tizanidine (Zanaflex) if needed    How do I prepare myself?  - Please take 2 showers (one the night prior to surgery and one the morning of surgery) using Scrubcare or Hibiclens soap.    Use this soap only from the neck to your toes.     Leave the soap on your skin for one minute--then rinse thoroughly.      You may use your own shampoo and conditioner. No other hair products.   - Please remove all jewelry and body piercings.  - No lotions, deodorants or fragrance.  - Bring your ID and insurance card.    -If you use a CPAP machine, please bring the CPAP machine, tubing, and mask to hospital.    -If you have a Deep Brain Stimulator, Spinal Cord Stimulator, or any Neuro Stimulator device---you must bring the remote control to the hospital.    Covid testing policy as of 12/06/2022  Your surgeon will notify and schedule you for a COVID test if one is needed before surgery--please direct any questions or COVID symptoms to your surgeon      Questions or Concerns:    - For any questions regarding the day of surgery or your hospital stay, please contact the Pre Admission Nursing Office at [phone].       - If you have health changes between today and your surgery, please call your surgeon.       - For questions after surgery, please call your surgeons office.           Current Visitor Guidelines    You may have 2 visitors in the pre op area.    Visiting hours: 8 a.m. to 8:30 p.m.    Patients confirmed or suspected to have symptoms of COVID 19 or flu:     No visitors allowed for adult patients.   Children (under age 18) can have 1 named visitor.     People who are sick or showing symptoms of COVID 19 or flu:    Are not allowed to visit patients--we can only make exceptions in special situations.        Please follow these guidelines for your visit:          Please maintain social distance          Masking is optional--however at times you may be asked to wear a mask for the safety of yourself and others     Clean your hands with alcohol hand . Do this when you arrive at and leave the building and patient room,    And again after you touch your mask or anything in the room.     Go directly to and from the room you are visiting.     Stay in the patient s room during your visit. Limit going to other places in the hospital as much as possible     Leave bags and jackets at home or in the car.     For everyone s health, please don t come and go during your visit. That includes for smoking   during your visit.

## 2024-06-24 ASSESSMENT — ENCOUNTER SYMPTOMS
DYSRHYTHMIAS: 0
SEIZURES: 0

## 2024-06-24 ASSESSMENT — LIFESTYLE VARIABLES: TOBACCO_USE: 1

## 2024-06-24 ASSESSMENT — COPD QUESTIONNAIRES: COPD: 0

## 2024-06-24 NOTE — ANESTHESIA PREPROCEDURE EVALUATION
Anesthesia Pre-Procedure Evaluation    Patient: Modesto Lehman   MRN: [id number] : 1955        Procedure : Procedure(s):  ENDARTERECTOMY, CAROTID          Past Medical History:   Diagnosis Date    Allergy status to analgesic agent     No reported medication allergies    Anesthesia of skin     No  problems with anaesthesia    CAD (coronary artery disease)     Elevated coronary artery calcium score     History of recurrent pneumonia     No Comments Provided    Hyperlipidemia     No Comments Provided    Low back pain     pain with bilateral leg and disc injuries.    Personal history of nicotine dependence     Personal history of other medical treatment (CODE)     No blood transfusions    PVD (peripheral vascular disease) (H24)       Past Surgical History:   Procedure Laterality Date    ANGIOPLASTY      Right leg stenting and bypass, Left also    COLONOSCOPY  2010    Q 10yrs.    COLONOSCOPY N/A 2022    6 tubular adenomas, 5 year follow up, 2027    CV CORONARY ANGIOGRAM N/A 4/15/2024    Procedure: Coronary Angiogram;  Surgeon: Toni Sellers MD;  Location:  [facility]    EXTRACTION(S) DENTAL      recently removed    OTHER SURGICAL HISTORY      [id number],PREMALIG/BENIGN SKIN LESION EXCISION, removed from chest    OTHER SURGICAL HISTORY      2084,ANESTHESIA ALERT,No  problems with anaesthesia      No Known Allergies   Social History     Tobacco Use    Smoking status: Former     Current packs/day: 0.25     Average packs/day: 0.3 packs/day for 14.6 years (3.7 ttl pk-yrs)     Types: Cigars, Cigarettes     Start date: 2009    Smokeless tobacco: Never    Tobacco comments:     Quit smoking: quit 17 (cigarettes); smokes cigars 1-2 cigars a day   Substance Use Topics    Alcohol use: Yes     Comment: 2 vodka every evening      Wt Readings from Last 1 Encounters:   24 83.9 kg (185 lb)        Anesthesia Evaluation   Pt has had prior anesthetic. Type: General and MAC.     No history of anesthetic complications       ROS/MED HX  ENT/Pulmonary:     (+)                tobacco use, Current use,   patient smoked within 24 hours,                 (-) asthma, COPD and recent URI   Neurologic:    (-) no seizures and no CVA   Cardiovascular: Comment: PAD s/p Bilateral popliteal bypass      (+) Dyslipidemia - Peripheral Vascular Disease-- Other and Carotid Stenosis.  CAD -  - -   Taking blood thinners Pt has not received instructions: Instructions Given to patient: Will remain on ASA up to DOS. CHF etiology: ischemic Last EF: 30-35% date: 4/16/24  FORD.                      Previous cardiac testing   Echo: Date: 4/23/24 Results:  Interpretation Summary  Left ventricular function is decreased. The ejection fraction is 30-35%  (moderately reduced).  There is moderate diffuse hypokinesis with akinesis of the basal-mid  inferolateral and basal inferior segments indicative of multivessel CAD.  Global right ventricular function is normal.  No significant valvular abnormalities present.  Estimated mean right atrial pressure is normal.  No pericardial effusion is present.    Stress Test:  Date: 2013 Results:    ECG Reviewed:  Date: 4/30/24 Results:  Sinus rhythm   Possible Left atrial enlargement   Minimal voltage criteria for LVH, may be normal variant ( Jono product )   ST & T wave abnormality, consider lateral ischemia   Abnormal ECG     Cath:  Date: 4/15/24 Results:     Mid LM lesion is 70% stenosed.     Prox LAD to Mid LAD lesion is 60% stenosed.     1st Diag lesion is 60% stenosed.     Mid Cx lesion is 100% stenosed.     Ost Cx to Prox Cx lesion is 99% stenosed.     Prox RCA lesion is 100% stenosed.     Significant ostial left main disease.  Chronic total occlusion of circumflex with left to left collaterals from diagonal.  Chronic total occlusion right coronary artery with left to left (from septals) and right to right (from marginal) collaterals.  Recommend consideration for bypass surgery  "(-) angina, arrhythmias and angina   METS/Exercise Tolerance:  Comment: METS currently ~4 due to restrictions from cardiology. Does all of cooking and cares for his wife, gardening.    Hematologic:  - neg hematologic  ROS     Musculoskeletal:       GI/Hepatic:  - neg GI/hepatic ROS     Renal/Genitourinary:  - neg Renal ROS     Endo:     (+)               Obesity,    (-) Type II DM, thyroid disease and chronic steroid usage   Psychiatric/Substance Use:     (+)     Recreational drug usage: Cannabis. (-) psychiatric history   Infectious Disease: Comment: Hx of Lyme Disease   (-) Recent Fever   Malignancy:  - neg malignancy ROS     Other:  - neg other ROS    (+)  , H/O Chronic Pain (back, h/o multiple back injuries),         Physical Exam    Airway        Mallampati: II   TM distance: > 3 FB   Neck ROM: full   Mouth opening: > 3 cm    Respiratory Devices and Support         Dental       (+) Modest Abnormalities - crowns, retainers, 1 or 2 missing teeth and Removable bridges or other hardware      Cardiovascular   cardiovascular exam normal          Pulmonary   pulmonary exam normal                OUTSIDE LABS:  CBC:   Lab Results   Component Value Date    WBC 8.3 05/01/2024    WBC 7.2 04/15/2024    HGB 17.1 05/01/2024    HGB 16.8 04/15/2024    HCT 49.3 05/01/2024    HCT 49.9 04/15/2024     05/01/2024     04/15/2024     BMP:   Lab Results   Component Value Date     05/01/2024     04/15/2024    POTASSIUM 4.3 05/01/2024    POTASSIUM 4.8 04/15/2024    CHLORIDE 101 05/01/2024    CHLORIDE 102 04/15/2024    CO2 26 05/01/2024    CO2 24 04/15/2024    BUN 12.7 05/01/2024    BUN 13.2 04/15/2024    CR 0.84 05/01/2024    CR 0.86 04/15/2024     (H) 05/01/2024     (H) 04/15/2024     COAGS:   Lab Results   Component Value Date    PTT 30 05/01/2024    INR 1.06 05/01/2024     POC: No results found for: \"BGM\", \"HCG\", \"HCGS\"  HEPATIC:   Lab Results   Component Value Date    ALBUMIN 4.8 05/01/2024    " "PROTTOTAL 8.2 05/01/2024    ALT 39 05/01/2024    AST 36 05/01/2024    ALKPHOS 105 05/01/2024    BILITOTAL 0.5 05/01/2024     OTHER:   Lab Results   Component Value Date    A1C 5.9 (H) 05/01/2024    DANE 9.3 05/01/2024    MAG 2.4 (H) 05/01/2024    CRP 0.7 (H) 07/31/2020    SED 25 (H) 07/31/2020       Anesthesia Plan    ASA Status:  3    NPO Status:  NPO Appropriate    Anesthesia Type: General.     - Airway: ETT   Induction: Intravenous.   Maintenance: Balanced.   Techniques and Equipment:     - Lines/Monitors: 2nd IV, Arterial Line     - Blood: T&C     - Drips/Meds: Phenylephrine, Remifentanil     Consents    Anesthesia Plan(s) and associated risks, benefits, and realistic alternatives discussed. Questions answered and patient/representative(s) expressed understanding.     - Discussed:     - Discussed with:  Patient      - Extended Intubation/Ventilatory Support Discussed: No.      - Patient is DNR/DNI Status: No     Use of blood products discussed: Yes.     - Discussed with: Patient.     Postoperative Care    Pain management: IV analgesics, Oral pain medications, Multi-modal analgesia.   PONV prophylaxis: Ondansetron (or other 5HT-3), Dexamethasone or Solumedrol     Comments:    Other Comments: Given significant coronary lesions will plan for preinduction A line           Florentin Fuentes MD    I have reviewed the pertinent notes and labs in the chart from the past 30 days and (re)examined the patient.  Any updates or changes from those notes are reflected in this note.              # Overweight: Estimated body mass index is 26.54 kg/m  as calculated from the following:    Height as of 6/12/24: 1.778 m (5' 10\").    Weight as of 6/12/24: 83.9 kg (185 lb).      "

## 2024-06-25 ENCOUNTER — ANESTHESIA (OUTPATIENT)
Dept: SURGERY | Facility: CLINIC | Age: 69
DRG: 038 | End: 2024-06-25
Payer: MEDICARE

## 2024-06-25 ENCOUNTER — HOSPITAL ENCOUNTER (INPATIENT)
Facility: CLINIC | Age: 69
LOS: 1 days | Discharge: HOME | DRG: 038 | End: 2024-06-26
Attending: SURGERY | Admitting: SURGERY
Payer: MEDICARE

## 2024-06-25 DIAGNOSIS — I65.22 LEFT CAROTID STENOSIS: Primary | ICD-10-CM

## 2024-06-25 LAB
ABO/RH(D): NORMAL
ACT BLD: 123 SECONDS (ref 74–150)
ACT BLD: 224 SECONDS (ref 74–150)
ACT BLD: 249 SECONDS (ref 74–150)
ACT BLD: 249 SECONDS (ref 74–150)
ANTIBODY SCREEN: NEGATIVE
BASE EXCESS BLDA CALC-SCNC: -1.6 MMOL/L (ref -3–3)
BASE EXCESS BLDA CALC-SCNC: -7.9 MMOL/L (ref -3–3)
CA-I BLD-MCNC: 3.5 MG/DL (ref 4.4–5.2)
CA-I BLD-MCNC: 4.4 MG/DL (ref 4.4–5.2)
GLUCOSE BLD-MCNC: 121 MG/DL (ref 70–99)
GLUCOSE BLD-MCNC: 77 MG/DL (ref 70–99)
GLUCOSE BLDC GLUCOMTR-MCNC: 129 MG/DL (ref 70–99)
HCO3 BLDA-SCNC: 16 MMOL/L (ref 21–28)
HCO3 BLDA-SCNC: 23 MMOL/L (ref 21–28)
HGB BLD-MCNC: 11.5 G/DL (ref 13.3–17.7)
HGB BLD-MCNC: 16.1 G/DL (ref 13.3–17.7)
HOLD SPECIMEN: NORMAL
LACTATE BLD-SCNC: 0.4 MMOL/L (ref 0.7–2)
LACTATE BLD-SCNC: 0.9 MMOL/L (ref 0.7–2)
O2/TOTAL GAS SETTING VFR VENT: 30 %
O2/TOTAL GAS SETTING VFR VENT: 35 %
OXYHGB MFR BLDA: 95 % (ref 92–100)
OXYHGB MFR BLDA: 96 % (ref 92–100)
PCO2 BLDA: 28 MM HG (ref 35–45)
PCO2 BLDA: 39 MM HG (ref 35–45)
PH BLDA: 7.37 [PH] (ref 7.35–7.45)
PH BLDA: 7.38 [PH] (ref 7.35–7.45)
PLATELET # BLD AUTO: 181 10E3/UL (ref 150–450)
PO2 BLDA: 117 MM HG (ref 80–105)
PO2 BLDA: 145 MM HG (ref 80–105)
POTASSIUM BLD-SCNC: 2.6 MMOL/L (ref 3.4–5.3)
POTASSIUM BLD-SCNC: 4.3 MMOL/L (ref 3.4–5.3)
SAO2 % BLDA: 98 % (ref 95–96)
SAO2 % BLDA: 99 % (ref 95–96)
SODIUM BLD-SCNC: 138 MMOL/L (ref 135–145)
SODIUM BLD-SCNC: 142 MMOL/L (ref 135–145)
SPECIMEN EXPIRATION DATE: NORMAL

## 2024-06-25 PROCEDURE — 86900 BLOOD TYPING SEROLOGIC ABO: CPT

## 2024-06-25 PROCEDURE — 03UL3KZ SUPPLEMENT LEFT INTERNAL CAROTID ARTERY WITH NONAUTOLOGOUS TISSUE SUBSTITUTE, PERCUTANEOUS APPROACH: ICD-10-PCS | Performed by: SURGERY

## 2024-06-25 PROCEDURE — 258N000003 HC RX IP 258 OP 636: Performed by: STUDENT IN AN ORGANIZED HEALTH CARE EDUCATION/TRAINING PROGRAM

## 2024-06-25 PROCEDURE — 250N000013 HC RX MED GY IP 250 OP 250 PS 637: Performed by: STUDENT IN AN ORGANIZED HEALTH CARE EDUCATION/TRAINING PROGRAM

## 2024-06-25 PROCEDURE — C1763 CONN TISS, NON-HUMAN: HCPCS | Performed by: SURGERY

## 2024-06-25 PROCEDURE — 258N000003 HC RX IP 258 OP 636: Mod: JZ

## 2024-06-25 PROCEDURE — 250N000013 HC RX MED GY IP 250 OP 250 PS 637

## 2024-06-25 PROCEDURE — 250N000011 HC RX IP 250 OP 636: Mod: JZ | Performed by: STUDENT IN AN ORGANIZED HEALTH CARE EDUCATION/TRAINING PROGRAM

## 2024-06-25 PROCEDURE — 360N000077 HC SURGERY LEVEL 4, PER MIN: Performed by: SURGERY

## 2024-06-25 PROCEDURE — 85347 COAGULATION TIME ACTIVATED: CPT

## 2024-06-25 PROCEDURE — 36415 COLL VENOUS BLD VENIPUNCTURE: CPT | Performed by: STUDENT IN AN ORGANIZED HEALTH CARE EDUCATION/TRAINING PROGRAM

## 2024-06-25 PROCEDURE — 4A10X4G MONITORING OF CENTRAL NERVOUS ELECTRICAL ACTIVITY, INTRAOPERATIVE, EXTERNAL APPROACH: ICD-10-PCS | Performed by: SURGERY

## 2024-06-25 PROCEDURE — 88311 DECALCIFY TISSUE: CPT | Mod: TC | Performed by: SURGERY

## 2024-06-25 PROCEDURE — 88311 DECALCIFY TISSUE: CPT | Mod: 26 | Performed by: PATHOLOGY

## 2024-06-25 PROCEDURE — 250N000009 HC RX 250

## 2024-06-25 PROCEDURE — 710N000010 HC RECOVERY PHASE 1, LEVEL 2, PER MIN: Performed by: SURGERY

## 2024-06-25 PROCEDURE — 250N000011 HC RX IP 250 OP 636: Mod: JZ

## 2024-06-25 PROCEDURE — 120N000003 HC R&B IMCU UMMC

## 2024-06-25 PROCEDURE — 250N000025 HC SEVOFLURANE, PER MIN: Performed by: SURGERY

## 2024-06-25 PROCEDURE — 35301 RECHANNELING OF ARTERY: CPT | Mod: LT | Performed by: SURGERY

## 2024-06-25 PROCEDURE — 82330 ASSAY OF CALCIUM: CPT

## 2024-06-25 PROCEDURE — 999N000141 HC STATISTIC PRE-PROCEDURE NURSING ASSESSMENT: Performed by: SURGERY

## 2024-06-25 PROCEDURE — 03CL0ZZ EXTIRPATION OF MATTER FROM LEFT INTERNAL CAROTID ARTERY, OPEN APPROACH: ICD-10-PCS | Performed by: SURGERY

## 2024-06-25 PROCEDURE — 370N000017 HC ANESTHESIA TECHNICAL FEE, PER MIN: Performed by: SURGERY

## 2024-06-25 PROCEDURE — 272N000001 HC OR GENERAL SUPPLY STERILE: Performed by: SURGERY

## 2024-06-25 PROCEDURE — 88304 TISSUE EXAM BY PATHOLOGIST: CPT | Mod: 26 | Performed by: PATHOLOGY

## 2024-06-25 PROCEDURE — 36415 COLL VENOUS BLD VENIPUNCTURE: CPT

## 2024-06-25 PROCEDURE — 85049 AUTOMATED PLATELET COUNT: CPT | Performed by: STUDENT IN AN ORGANIZED HEALTH CARE EDUCATION/TRAINING PROGRAM

## 2024-06-25 PROCEDURE — 35301 RECHANNELING OF ARTERY: CPT | Performed by: ANESTHESIOLOGY

## 2024-06-25 PROCEDURE — 250N000011 HC RX IP 250 OP 636: Performed by: SURGERY

## 2024-06-25 DEVICE — GRAFT PATCH VASC XENOSURE BIOLOGIC 0.8X08CM E0.8P8: Type: IMPLANTABLE DEVICE | Site: CAROTID | Status: FUNCTIONAL

## 2024-06-25 RX ORDER — HEPARIN SODIUM 1000 [USP'U]/ML
INJECTION, SOLUTION INTRAVENOUS; SUBCUTANEOUS PRN
Status: DISCONTINUED | OUTPATIENT
Start: 2024-06-25 | End: 2024-06-25

## 2024-06-25 RX ORDER — SODIUM CHLORIDE, SODIUM LACTATE, POTASSIUM CHLORIDE, CALCIUM CHLORIDE 600; 310; 30; 20 MG/100ML; MG/100ML; MG/100ML; MG/100ML
INJECTION, SOLUTION INTRAVENOUS CONTINUOUS
Status: DISCONTINUED | OUTPATIENT
Start: 2024-06-25 | End: 2024-06-25 | Stop reason: HOSPADM

## 2024-06-25 RX ORDER — NALOXONE HYDROCHLORIDE 0.4 MG/ML
0.2 INJECTION, SOLUTION INTRAMUSCULAR; INTRAVENOUS; SUBCUTANEOUS
Status: DISCONTINUED | OUTPATIENT
Start: 2024-06-25 | End: 2024-06-26 | Stop reason: HOSPADM

## 2024-06-25 RX ORDER — PROTAMINE SULFATE 10 MG/ML
INJECTION, SOLUTION INTRAVENOUS PRN
Status: DISCONTINUED | OUTPATIENT
Start: 2024-06-25 | End: 2024-06-25

## 2024-06-25 RX ORDER — HYDRALAZINE HYDROCHLORIDE 20 MG/ML
2.5-5 INJECTION INTRAMUSCULAR; INTRAVENOUS EVERY 10 MIN PRN
Status: DISCONTINUED | OUTPATIENT
Start: 2024-06-25 | End: 2024-06-25 | Stop reason: HOSPADM

## 2024-06-25 RX ORDER — LABETALOL HYDROCHLORIDE 5 MG/ML
10 INJECTION, SOLUTION INTRAVENOUS EVERY 10 MIN PRN
Status: DISCONTINUED | OUTPATIENT
Start: 2024-06-25 | End: 2024-06-26 | Stop reason: HOSPADM

## 2024-06-25 RX ORDER — FENTANYL CITRATE 50 UG/ML
50 INJECTION, SOLUTION INTRAMUSCULAR; INTRAVENOUS EVERY 5 MIN PRN
Status: DISCONTINUED | OUTPATIENT
Start: 2024-06-25 | End: 2024-06-25 | Stop reason: HOSPADM

## 2024-06-25 RX ORDER — HYDROMORPHONE HCL IN WATER/PF 6 MG/30 ML
0.2 PATIENT CONTROLLED ANALGESIA SYRINGE INTRAVENOUS
Status: DISCONTINUED | OUTPATIENT
Start: 2024-06-25 | End: 2024-06-26 | Stop reason: HOSPADM

## 2024-06-25 RX ORDER — DEXAMETHASONE SODIUM PHOSPHATE 4 MG/ML
INJECTION, SOLUTION INTRA-ARTICULAR; INTRALESIONAL; INTRAMUSCULAR; INTRAVENOUS; SOFT TISSUE PRN
Status: DISCONTINUED | OUTPATIENT
Start: 2024-06-25 | End: 2024-06-25

## 2024-06-25 RX ORDER — ACETAMINOPHEN 325 MG/1
975 TABLET ORAL ONCE
Status: COMPLETED | OUTPATIENT
Start: 2024-06-25 | End: 2024-06-25

## 2024-06-25 RX ORDER — FENTANYL CITRATE 50 UG/ML
25 INJECTION, SOLUTION INTRAMUSCULAR; INTRAVENOUS EVERY 5 MIN PRN
Status: DISCONTINUED | OUTPATIENT
Start: 2024-06-25 | End: 2024-06-25 | Stop reason: HOSPADM

## 2024-06-25 RX ORDER — POLYETHYLENE GLYCOL 3350 17 G/17G
17 POWDER, FOR SOLUTION ORAL DAILY
Status: DISCONTINUED | OUTPATIENT
Start: 2024-06-26 | End: 2024-06-26 | Stop reason: HOSPADM

## 2024-06-25 RX ORDER — OXYCODONE HYDROCHLORIDE 10 MG/1
10 TABLET ORAL EVERY 4 HOURS PRN
Status: DISCONTINUED | OUTPATIENT
Start: 2024-06-25 | End: 2024-06-26 | Stop reason: HOSPADM

## 2024-06-25 RX ORDER — LIDOCAINE 40 MG/G
CREAM TOPICAL
Status: DISCONTINUED | OUTPATIENT
Start: 2024-06-25 | End: 2024-06-25 | Stop reason: HOSPADM

## 2024-06-25 RX ORDER — HYDROMORPHONE HCL IN WATER/PF 6 MG/30 ML
0.4 PATIENT CONTROLLED ANALGESIA SYRINGE INTRAVENOUS
Status: DISCONTINUED | OUTPATIENT
Start: 2024-06-25 | End: 2024-06-26 | Stop reason: HOSPADM

## 2024-06-25 RX ORDER — SODIUM CHLORIDE, SODIUM LACTATE, POTASSIUM CHLORIDE, CALCIUM CHLORIDE 600; 310; 30; 20 MG/100ML; MG/100ML; MG/100ML; MG/100ML
INJECTION, SOLUTION INTRAVENOUS CONTINUOUS PRN
Status: DISCONTINUED | OUTPATIENT
Start: 2024-06-25 | End: 2024-06-25

## 2024-06-25 RX ORDER — PROCHLORPERAZINE MALEATE 5 MG
5 TABLET ORAL EVERY 6 HOURS PRN
Status: DISCONTINUED | OUTPATIENT
Start: 2024-06-25 | End: 2024-06-26 | Stop reason: HOSPADM

## 2024-06-25 RX ORDER — HYDRALAZINE HYDROCHLORIDE 20 MG/ML
10 INJECTION INTRAMUSCULAR; INTRAVENOUS EVERY 6 HOURS PRN
Status: DISCONTINUED | OUTPATIENT
Start: 2024-06-25 | End: 2024-06-26 | Stop reason: HOSPADM

## 2024-06-25 RX ORDER — NALOXONE HYDROCHLORIDE 0.4 MG/ML
0.4 INJECTION, SOLUTION INTRAMUSCULAR; INTRAVENOUS; SUBCUTANEOUS
Status: DISCONTINUED | OUTPATIENT
Start: 2024-06-25 | End: 2024-06-26 | Stop reason: HOSPADM

## 2024-06-25 RX ORDER — ONDANSETRON 2 MG/ML
4 INJECTION INTRAMUSCULAR; INTRAVENOUS EVERY 6 HOURS PRN
Status: DISCONTINUED | OUTPATIENT
Start: 2024-06-25 | End: 2024-06-26 | Stop reason: HOSPADM

## 2024-06-25 RX ORDER — HYDROMORPHONE HCL IN WATER/PF 6 MG/30 ML
0.4 PATIENT CONTROLLED ANALGESIA SYRINGE INTRAVENOUS EVERY 5 MIN PRN
Status: DISCONTINUED | OUTPATIENT
Start: 2024-06-25 | End: 2024-06-25 | Stop reason: HOSPADM

## 2024-06-25 RX ORDER — SODIUM CHLORIDE, SODIUM GLUCONATE, SODIUM ACETATE, POTASSIUM CHLORIDE AND MAGNESIUM CHLORIDE 526; 502; 368; 37; 30 MG/100ML; MG/100ML; MG/100ML; MG/100ML; MG/100ML
INJECTION, SOLUTION INTRAVENOUS CONTINUOUS PRN
Status: DISCONTINUED | OUTPATIENT
Start: 2024-06-25 | End: 2024-06-25

## 2024-06-25 RX ORDER — CEFAZOLIN SODIUM/WATER 2 G/20 ML
SYRINGE (ML) INTRAVENOUS PRN
Status: DISCONTINUED | OUTPATIENT
Start: 2024-06-25 | End: 2024-06-25

## 2024-06-25 RX ORDER — NALOXONE HYDROCHLORIDE 0.4 MG/ML
0.1 INJECTION, SOLUTION INTRAMUSCULAR; INTRAVENOUS; SUBCUTANEOUS
Status: DISCONTINUED | OUTPATIENT
Start: 2024-06-25 | End: 2024-06-25 | Stop reason: HOSPADM

## 2024-06-25 RX ORDER — ALBUTEROL SULFATE 0.83 MG/ML
2.5 SOLUTION RESPIRATORY (INHALATION) EVERY 4 HOURS PRN
Status: DISCONTINUED | OUTPATIENT
Start: 2024-06-25 | End: 2024-06-25 | Stop reason: HOSPADM

## 2024-06-25 RX ORDER — DEXAMETHASONE SODIUM PHOSPHATE 4 MG/ML
4 INJECTION, SOLUTION INTRA-ARTICULAR; INTRALESIONAL; INTRAMUSCULAR; INTRAVENOUS; SOFT TISSUE
Status: DISCONTINUED | OUTPATIENT
Start: 2024-06-25 | End: 2024-06-25 | Stop reason: HOSPADM

## 2024-06-25 RX ORDER — ONDANSETRON 2 MG/ML
INJECTION INTRAMUSCULAR; INTRAVENOUS PRN
Status: DISCONTINUED | OUTPATIENT
Start: 2024-06-25 | End: 2024-06-25

## 2024-06-25 RX ORDER — HEPARIN SODIUM 5000 [USP'U]/.5ML
5000 INJECTION, SOLUTION INTRAVENOUS; SUBCUTANEOUS EVERY 8 HOURS SCHEDULED
Status: DISCONTINUED | OUTPATIENT
Start: 2024-06-26 | End: 2024-06-26 | Stop reason: HOSPADM

## 2024-06-25 RX ORDER — ONDANSETRON 4 MG/1
4 TABLET, ORALLY DISINTEGRATING ORAL EVERY 6 HOURS PRN
Status: DISCONTINUED | OUTPATIENT
Start: 2024-06-25 | End: 2024-06-26 | Stop reason: HOSPADM

## 2024-06-25 RX ORDER — HALOPERIDOL 5 MG/ML
1 INJECTION INTRAMUSCULAR
Status: DISCONTINUED | OUTPATIENT
Start: 2024-06-25 | End: 2024-06-25 | Stop reason: HOSPADM

## 2024-06-25 RX ORDER — ACETAMINOPHEN 325 MG/1
975 TABLET ORAL EVERY 8 HOURS SCHEDULED
Status: DISCONTINUED | OUTPATIENT
Start: 2024-06-25 | End: 2024-06-26 | Stop reason: HOSPADM

## 2024-06-25 RX ORDER — HYDROMORPHONE HCL IN WATER/PF 6 MG/30 ML
0.2 PATIENT CONTROLLED ANALGESIA SYRINGE INTRAVENOUS EVERY 5 MIN PRN
Status: DISCONTINUED | OUTPATIENT
Start: 2024-06-25 | End: 2024-06-25 | Stop reason: HOSPADM

## 2024-06-25 RX ORDER — ASPIRIN 81 MG/1
81 TABLET, CHEWABLE ORAL DAILY
Status: DISCONTINUED | OUTPATIENT
Start: 2024-06-26 | End: 2024-06-26 | Stop reason: HOSPADM

## 2024-06-25 RX ORDER — FENTANYL CITRATE 50 UG/ML
INJECTION, SOLUTION INTRAMUSCULAR; INTRAVENOUS PRN
Status: DISCONTINUED | OUTPATIENT
Start: 2024-06-25 | End: 2024-06-25

## 2024-06-25 RX ORDER — NITROGLYCERIN 10 MG/100ML
INJECTION INTRAVENOUS PRN
Status: DISCONTINUED | OUTPATIENT
Start: 2024-06-25 | End: 2024-06-25

## 2024-06-25 RX ORDER — SODIUM CHLORIDE, SODIUM LACTATE, POTASSIUM CHLORIDE, CALCIUM CHLORIDE 600; 310; 30; 20 MG/100ML; MG/100ML; MG/100ML; MG/100ML
INJECTION, SOLUTION INTRAVENOUS CONTINUOUS
Status: DISCONTINUED | OUTPATIENT
Start: 2024-06-25 | End: 2024-06-26 | Stop reason: HOSPADM

## 2024-06-25 RX ORDER — LANOLIN ALCOHOL/MO/W.PET/CERES
3 CREAM (GRAM) TOPICAL EVERY EVENING
Status: DISCONTINUED | OUTPATIENT
Start: 2024-06-25 | End: 2024-06-26 | Stop reason: HOSPADM

## 2024-06-25 RX ORDER — ESMOLOL HYDROCHLORIDE 10 MG/ML
INJECTION INTRAVENOUS PRN
Status: DISCONTINUED | OUTPATIENT
Start: 2024-06-25 | End: 2024-06-25

## 2024-06-25 RX ORDER — OXYCODONE HYDROCHLORIDE 5 MG/1
5 TABLET ORAL EVERY 4 HOURS PRN
Status: DISCONTINUED | OUTPATIENT
Start: 2024-06-25 | End: 2024-06-26 | Stop reason: HOSPADM

## 2024-06-25 RX ORDER — VASOPRESSIN IN 0.9 % NACL 2 UNIT/2ML
SYRINGE (ML) INTRAVENOUS PRN
Status: DISCONTINUED | OUTPATIENT
Start: 2024-06-25 | End: 2024-06-25

## 2024-06-25 RX ORDER — AMOXICILLIN 250 MG
1 CAPSULE ORAL 2 TIMES DAILY
Status: DISCONTINUED | OUTPATIENT
Start: 2024-06-25 | End: 2024-06-26 | Stop reason: HOSPADM

## 2024-06-25 RX ORDER — PROPOFOL 10 MG/ML
INJECTION, EMULSION INTRAVENOUS PRN
Status: DISCONTINUED | OUTPATIENT
Start: 2024-06-25 | End: 2024-06-25

## 2024-06-25 RX ORDER — DIPHENHYDRAMINE HCL 12.5MG/5ML
12.5 LIQUID (ML) ORAL EVERY 6 HOURS PRN
Status: DISCONTINUED | OUTPATIENT
Start: 2024-06-25 | End: 2024-06-25 | Stop reason: HOSPADM

## 2024-06-25 RX ORDER — GABAPENTIN 100 MG/1
100 CAPSULE ORAL 3 TIMES DAILY
Status: DISCONTINUED | OUTPATIENT
Start: 2024-06-25 | End: 2024-06-26 | Stop reason: HOSPADM

## 2024-06-25 RX ORDER — LIDOCAINE HYDROCHLORIDE 20 MG/ML
INJECTION, SOLUTION INFILTRATION; PERINEURAL PRN
Status: DISCONTINUED | OUTPATIENT
Start: 2024-06-25 | End: 2024-06-25

## 2024-06-25 RX ORDER — ONDANSETRON 4 MG/1
4 TABLET, ORALLY DISINTEGRATING ORAL EVERY 30 MIN PRN
Status: DISCONTINUED | OUTPATIENT
Start: 2024-06-25 | End: 2024-06-25 | Stop reason: HOSPADM

## 2024-06-25 RX ORDER — BISACODYL 10 MG
10 SUPPOSITORY, RECTAL RECTAL DAILY PRN
Status: DISCONTINUED | OUTPATIENT
Start: 2024-06-28 | End: 2024-06-26 | Stop reason: HOSPADM

## 2024-06-25 RX ORDER — LIDOCAINE 40 MG/G
CREAM TOPICAL
Status: DISCONTINUED | OUTPATIENT
Start: 2024-06-25 | End: 2024-06-26 | Stop reason: HOSPADM

## 2024-06-25 RX ORDER — ACETAMINOPHEN 325 MG/1
650 TABLET ORAL EVERY 4 HOURS PRN
Status: DISCONTINUED | OUTPATIENT
Start: 2024-06-28 | End: 2024-06-26 | Stop reason: HOSPADM

## 2024-06-25 RX ORDER — DIPHENHYDRAMINE HYDROCHLORIDE 50 MG/ML
12.5 INJECTION INTRAMUSCULAR; INTRAVENOUS EVERY 6 HOURS PRN
Status: DISCONTINUED | OUTPATIENT
Start: 2024-06-25 | End: 2024-06-25 | Stop reason: HOSPADM

## 2024-06-25 RX ORDER — ATORVASTATIN CALCIUM 80 MG/1
80 TABLET, FILM COATED ORAL EVERY EVENING
Status: DISCONTINUED | OUTPATIENT
Start: 2024-06-25 | End: 2024-06-26 | Stop reason: HOSPADM

## 2024-06-25 RX ORDER — ONDANSETRON 2 MG/ML
4 INJECTION INTRAMUSCULAR; INTRAVENOUS EVERY 30 MIN PRN
Status: DISCONTINUED | OUTPATIENT
Start: 2024-06-25 | End: 2024-06-25 | Stop reason: HOSPADM

## 2024-06-25 RX ADMIN — NITROGLYCERIN 50 MCG: 10 INJECTION INTRAVENOUS at 09:31

## 2024-06-25 RX ADMIN — PHENYLEPHRINE HYDROCHLORIDE 150 MCG: 10 INJECTION INTRAVENOUS at 09:25

## 2024-06-25 RX ADMIN — Medication 200 MG: at 11:00

## 2024-06-25 RX ADMIN — ACETAMINOPHEN 975 MG: 325 TABLET, FILM COATED ORAL at 06:37

## 2024-06-25 RX ADMIN — PHENYLEPHRINE HYDROCHLORIDE 50 MCG: 10 INJECTION INTRAVENOUS at 10:27

## 2024-06-25 RX ADMIN — NITROGLYCERIN 50 MCG: 10 INJECTION INTRAVENOUS at 09:27

## 2024-06-25 RX ADMIN — ONDANSETRON 4 MG: 2 INJECTION INTRAMUSCULAR; INTRAVENOUS at 10:38

## 2024-06-25 RX ADMIN — Medication 2 G: at 08:25

## 2024-06-25 RX ADMIN — SODIUM CHLORIDE, POTASSIUM CHLORIDE, SODIUM LACTATE AND CALCIUM CHLORIDE: 600; 310; 30; 20 INJECTION, SOLUTION INTRAVENOUS at 12:39

## 2024-06-25 RX ADMIN — Medication 10 MG: at 10:25

## 2024-06-25 RX ADMIN — PHENYLEPHRINE HYDROCHLORIDE 100 MCG: 10 INJECTION INTRAVENOUS at 08:33

## 2024-06-25 RX ADMIN — HYDROMORPHONE HYDROCHLORIDE 0.2 MG: 0.2 INJECTION, SOLUTION INTRAMUSCULAR; INTRAVENOUS; SUBCUTANEOUS at 13:56

## 2024-06-25 RX ADMIN — PHENYLEPHRINE HYDROCHLORIDE 50 MCG: 10 INJECTION INTRAVENOUS at 08:48

## 2024-06-25 RX ADMIN — FENTANYL CITRATE 100 MCG: 50 INJECTION INTRAMUSCULAR; INTRAVENOUS at 08:19

## 2024-06-25 RX ADMIN — OXYCODONE HYDROCHLORIDE 5 MG: 5 TABLET ORAL at 12:29

## 2024-06-25 RX ADMIN — PROPOFOL 40 MG: 10 INJECTION, EMULSION INTRAVENOUS at 11:04

## 2024-06-25 RX ADMIN — SODIUM CHLORIDE, POTASSIUM CHLORIDE, SODIUM LACTATE AND CALCIUM CHLORIDE: 600; 310; 30; 20 INJECTION, SOLUTION INTRAVENOUS at 15:04

## 2024-06-25 RX ADMIN — HYDROMORPHONE HYDROCHLORIDE 0.5 MG: 1 INJECTION, SOLUTION INTRAMUSCULAR; INTRAVENOUS; SUBCUTANEOUS at 10:47

## 2024-06-25 RX ADMIN — PHENYLEPHRINE HYDROCHLORIDE 50 MCG: 10 INJECTION INTRAVENOUS at 10:44

## 2024-06-25 RX ADMIN — ATORVASTATIN CALCIUM 80 MG: 80 TABLET, FILM COATED ORAL at 20:39

## 2024-06-25 RX ADMIN — Medication 0.5 UNITS: at 09:28

## 2024-06-25 RX ADMIN — HEPARIN SODIUM 10000 UNITS: 1000 INJECTION INTRAVENOUS; SUBCUTANEOUS at 09:15

## 2024-06-25 RX ADMIN — ACETAMINOPHEN 975 MG: 325 TABLET, FILM COATED ORAL at 12:30

## 2024-06-25 RX ADMIN — PROPOFOL 30 MG: 10 INJECTION, EMULSION INTRAVENOUS at 11:07

## 2024-06-25 RX ADMIN — SODIUM CHLORIDE, SODIUM GLUCONATE, SODIUM ACETATE, POTASSIUM CHLORIDE AND MAGNESIUM CHLORIDE: 526; 502; 368; 37; 30 INJECTION, SOLUTION INTRAVENOUS at 08:02

## 2024-06-25 RX ADMIN — PHENYLEPHRINE HYDROCHLORIDE 100 MCG: 10 INJECTION INTRAVENOUS at 10:33

## 2024-06-25 RX ADMIN — ACETAMINOPHEN 975 MG: 325 TABLET, FILM COATED ORAL at 22:52

## 2024-06-25 RX ADMIN — Medication 20 MG: at 09:28

## 2024-06-25 RX ADMIN — HEPARIN SODIUM 1000 UNITS: 1000 INJECTION INTRAVENOUS; SUBCUTANEOUS at 09:20

## 2024-06-25 RX ADMIN — Medication 3 MG: at 20:39

## 2024-06-25 RX ADMIN — PROTAMINE SULFATE 20 MG: 10 INJECTION, SOLUTION INTRAVENOUS at 10:43

## 2024-06-25 RX ADMIN — GABAPENTIN 100 MG: 100 CAPSULE ORAL at 15:08

## 2024-06-25 RX ADMIN — REMIFENTANIL HYDROCHLORIDE 0.08 MCG/KG/MIN: 1 INJECTION, POWDER, LYOPHILIZED, FOR SOLUTION INTRAVENOUS at 08:25

## 2024-06-25 RX ADMIN — SODIUM CHLORIDE, POTASSIUM CHLORIDE, SODIUM LACTATE AND CALCIUM CHLORIDE: 600; 310; 30; 20 INJECTION, SOLUTION INTRAVENOUS at 08:19

## 2024-06-25 RX ADMIN — NITROGLYCERIN 50 MCG: 10 INJECTION INTRAVENOUS at 09:29

## 2024-06-25 RX ADMIN — DEXAMETHASONE SODIUM PHOSPHATE 8 MG: 4 INJECTION, SOLUTION INTRA-ARTICULAR; INTRALESIONAL; INTRAMUSCULAR; INTRAVENOUS; SOFT TISSUE at 08:48

## 2024-06-25 RX ADMIN — ESMOLOL HYDROCHLORIDE 40 MG: 10 INJECTION, SOLUTION INTRAVENOUS at 08:23

## 2024-06-25 RX ADMIN — PHENYLEPHRINE HYDROCHLORIDE 0.5 MCG/KG/MIN: 10 INJECTION INTRAVENOUS at 08:31

## 2024-06-25 RX ADMIN — PROPOFOL 90 MG: 10 INJECTION, EMULSION INTRAVENOUS at 08:19

## 2024-06-25 RX ADMIN — DOCUSATE SODIUM 50 MG AND SENNOSIDES 8.6 MG 1 TABLET: 8.6; 5 TABLET, FILM COATED ORAL at 20:38

## 2024-06-25 RX ADMIN — HYDROMORPHONE HYDROCHLORIDE 0.5 MG: 1 INJECTION, SOLUTION INTRAMUSCULAR; INTRAVENOUS; SUBCUTANEOUS at 10:38

## 2024-06-25 RX ADMIN — PHENYLEPHRINE HYDROCHLORIDE 50 MCG: 10 INJECTION INTRAVENOUS at 10:36

## 2024-06-25 RX ADMIN — PHENYLEPHRINE HYDROCHLORIDE 200 MCG: 10 INJECTION INTRAVENOUS at 08:30

## 2024-06-25 RX ADMIN — PHENYLEPHRINE HYDROCHLORIDE 50 MCG: 10 INJECTION INTRAVENOUS at 10:30

## 2024-06-25 RX ADMIN — GABAPENTIN 100 MG: 100 CAPSULE ORAL at 20:39

## 2024-06-25 RX ADMIN — Medication 50 MG: at 08:20

## 2024-06-25 RX ADMIN — ACETAMINOPHEN 975 MG: 325 TABLET, FILM COATED ORAL at 15:07

## 2024-06-25 RX ADMIN — PHENYLEPHRINE HYDROCHLORIDE 100 MCG: 10 INJECTION INTRAVENOUS at 09:23

## 2024-06-25 RX ADMIN — PHENYLEPHRINE HYDROCHLORIDE 200 MCG: 10 INJECTION INTRAVENOUS at 08:35

## 2024-06-25 RX ADMIN — LIDOCAINE HYDROCHLORIDE 100 MG: 20 INJECTION, SOLUTION INFILTRATION; PERINEURAL at 08:19

## 2024-06-25 RX ADMIN — ESMOLOL HYDROCHLORIDE 60 MG: 10 INJECTION, SOLUTION INTRAVENOUS at 08:52

## 2024-06-25 ASSESSMENT — ACTIVITIES OF DAILY LIVING (ADL)
ADLS_ACUITY_SCORE: 18
ADLS_ACUITY_SCORE: 29
ADLS_ACUITY_SCORE: 27
ADLS_ACUITY_SCORE: 18
ADLS_ACUITY_SCORE: 29
ADLS_ACUITY_SCORE: 18
ADLS_ACUITY_SCORE: 29
ADLS_ACUITY_SCORE: 18
ADLS_ACUITY_SCORE: 18
ADLS_ACUITY_SCORE: 29
ADLS_ACUITY_SCORE: 18
ADLS_ACUITY_SCORE: 29

## 2024-06-25 ASSESSMENT — VISUAL ACUITY
OU: NORMAL ACUITY

## 2024-06-25 NOTE — ANESTHESIA PROCEDURE NOTES
Airway       Patient location during procedure: OR       Procedure Start/Stop Times: 6/25/2024 8:23 AM  Staff -        Anesthesiologist:  Pretty Gambino MD       Resident/Fellow: Florentin Fuentes MD       Performed By: resident and with residents       Procedure performed by resident/fellow/CRNA in presence of a teaching physician.    Consent for Airway        Urgency: elective  Indications and Patient Condition       Indications for airway management: nneka-procedural       Induction type:intravenous       Mask difficulty assessment: 1 - vent by mask    Final Airway Details       Final airway type: endotracheal airway       Successful airway: ETT - single  Endotracheal Airway Details        ETT size (mm): 8.0       Cuffed: yes       Successful intubation technique: direct laryngoscopy       DL Blade Type: MAC 3       Grade View of Cords: 1       Adjucts: stylet       Position: Right       Measured from: lips       Secured at (cm): 24       Bite block used: Oral Airway    Post intubation assessment        Placement verified by: capnometry, equal breath sounds and chest rise        Number of attempts at approach: 1       Number of other approaches attempted: 0       Secured with: pink tape       Ease of procedure: easy       Dentition: Intact    Medication(s) Administered   Medication Administration Time: 6/25/2024 8:23 AM    Additional Comments       Routine intubation without complications.

## 2024-06-25 NOTE — INTERVAL H&P NOTE
"I have reviewed the surgical (or preoperative) H&P that is linked to this encounter, and examined the patient. There are no significant changes    Clinical Conditions Present on Arrival:  Clinically Significant Risk Factors Present on Admission                 # Drug Induced Platelet Defect: home medication list includes an antiplatelet medication      # Overweight: Estimated body mass index is 26.21 kg/m  as calculated from the following:    Height as of this encounter: 1.753 m (5' 9\").    Weight as of this encounter: 80.5 kg (177 lb 7.5 oz).       "

## 2024-06-25 NOTE — ANESTHESIA PROCEDURE NOTES
Arterial Line Procedure Note    Pre-Procedure   Staff -        Anesthesiologist:  Pretty Gambino MD       Resident/Fellow: Florentin Fuentes MD       Performed By: resident and with residents       Procedure performed by resident/fellow/CRNA in presence of a teaching physician.         Location: OR       Pre-Anesthestic Checklist: patient identified, IV checked, risks and benefits discussed, informed consent, monitors and equipment checked, pre-op evaluation and at physician/surgeon's request  Timeout:       Correct Patient: Yes        Correct Procedure: Yes        Correct Site: Yes        Correct Position: Yes   Line Placement:   This line was placed Pre Induction starting at 6/25/2024 8:05 AM and ending at 6/25/2024 8:15 AM  Procedure   Procedure: arterial line and elective       Diagnosis: Blood Pressure Monitoring and/or Frequent Blood Draws       Laterality: right       Insertion Site: radial.  Sterile Prep        Standard elements of sterile barrier followed       Skin prep: Chloraprep  Insertion/Injection        Technique: Alejandro's test completed, Seldinger Technique and ultrasound guided        1. Ultrasound was used to evaluate the access site.       2. Artery evaluated via ultrasound for patency/adequacy.       3. Using real-time ultrasound the needle/catheter was observed entering the artery/vein.       Catheter Type/Size: 20 G, 1.75 in/4.5 cm quick cath (integral wire)  Narrative        Tegaderm dressing used.       Complications: None apparent,        Arterial waveform: Yes        IBP within 10% of NIBP: Yes   Comments:  Routine arterial line placement without complications.

## 2024-06-25 NOTE — BRIEF OP NOTE
[facility]    Brief Operative Note    Pre-operative diagnosis: Carotid stenosis, asymptomatic, left [I65.22]  Post-operative diagnosis Same as pre-operative diagnosis    Procedure: ENDARTERECTOMY, CAROTID, Left - Neck    Surgeon: Surgeons and Role:     * Rasheed Sam MD - Primary     * Timo Bhatia MD - Resident - Assisting     * Ene Burton MD - Fellow - Assisting  Anesthesia: General   Estimated Blood Loss: Less than 50 ml    Drains: None  Specimens:   ID Type Source Tests Collected by Time Destination   1 : Left carotid artery plaque Tissue Carotid Plaque, Left SURGICAL PATHOLOGY EXAM Rasheed Sam MD 6/25/2024 10:55 AM      Findings:   Focal plaque at the bifurcation .  Complications: None.  Implants:   Implant Name Type Inv. Item Serial No.  Lot No. LRB No. Used Action   GRAFT PATCH VASC XENOSURE BIOLOGIC 0.8X08CM E0.8P8 - [id number]  GRAFT PATCH VASC XENOSURE BIOLOGIC 0.8X08CM E0.8P8  [facility] VASCULAR IN [id number] Left 1 Implanted

## 2024-06-25 NOTE — PROGRESS NOTES
Transfer  Transferred from: PACU  Via:cart/stretcher  Reason for transfer: Pt appropriate for 6B- patient condition  Family: Aware of transfer-wife at bedside   Belongings: Received with pt (clothes, dentures)  Chart: Received with pt  Medications: Meds received from old unit with pt  Code Status verified on armband: yes  2 RN Skin Assessment Completed By: Kitty DIALLO  Med rec completed: yes  Suction/Ambu bag/Flowmeter at bedside: yes    Report received from: Tatiana DIALLO  Pt status:  Alert and stable condition    Skin: L endarterectomy

## 2024-06-25 NOTE — OP NOTE
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 Mason  #4- Hyperlipidemia  #5- Peripheral arterial disease status post bilateral bypasses, elsewhere  #6- Heart Failure with Reduced EF, 30-35%  #7- Former nicotine dependence    Postoperative diagnosis    #1- Asymptomatic high grade left carotid stenosis  #2- Asymptomatic chronic occlusion of the right internal carotid artery  #3- Coronary artery disease, pending coronary bypass with Dr. Mason  #4- Hyperlipidemia  #5- Peripheral arterial disease status post bilateral bypasses, elsewhere  #6- Heart Failure with Reduced EF, 30-35%  #7- Former nicotine dependence    Surgeon: Rasheed Sam MD  First Assistant: Ene Burton MD (PGY 6 vascular surgery fellow)  A first assistant actively participated and was necessary for one or more of the following: opening, exposure and visualization during the case, maintaining hemostasis, wound closure resulting in its safe and expeditious completion.   Other Assistant:  Timo Bhatia MD (PGY 2 vascular surgery resident)     Procedures:  Left Carotid Endarterectomy with bovine pericardial patch angioplasty  Intraoperative EEG monitoring  Intraoperative completion ultrasound with radiologic interpretation    Findings:   High grade calcific plaque  No EEG changes  Intraoperative duplex without residual stenosis, dissection, or fronds    Indication for procedure:    Mr. Lehman is a 68 year old male with a history of multiple vascular interventions and severe coronary disease.  He was found to have high grade left carotid stenosis and  right contralteral occlusion.  Decision was made to proceed with staged CEA followed by CABG with Dr. Mason. left-sided carotid endarterectomy including but not limited to death, stroke, cranial nerve injury, hyperperfusion syndrome, bleeding, MI or other cardiopulmonary complications and, infection. We reviewed the benefits and alternatives including medical management and stenting either by the transfermoral route or transcervical carotid artery revascularization (TCAR). Mr. Lehman was involved in all aspects of decision making and appears to understand. The patient and his wife understand the risks and would like to proceed with left carotid endarterectomy.      Description of procedure:    Operative details:    The patient was brought to the operating room.  Under satisfactory general anesthesia, he was placed in supine position with all pressure points padded in standard fashion.  The neck and chest were widely prepped and draped in sterile, standard fashion.  A surgical pause was performed with all members of the surgical team to verify patient, medical record number, birth date, planned surgical procedure, surgical site, allergies, fire risk and perioperative antibiotics.    The left side of the neck was marked with the bifurcation identified on ultrasound. EEG monitoring  electrodes were placed on the patient's scalp. A radial arterial line was placed for hemodynamic monitoring, and appropriate intravenous lines were also  placed.    An incision was made along the anterior border of the left sternocleidomastoid muscle. We then dissected through the subcutaneous tissue and the platysma using electrocautery, and then sharply dissected down to the level of the omohyoid muscle. The vagus nerve was identified and protected throughout the remainder of the procedure. The common carotid artery was then encircled using umbilical tape and a Jaxon tourniquet. The patient was then given 10,000 units of Heparin IV,  and this was allowed appropriate time to circulate. An ACT was checked and kept above 200 for the remainder of the case. We then dissected the distal internal carotid artery using no touch technique for the bulb. The internal carotid artery was dissected distally to an area that was soft and adequate for clamping. We did identify the hypoglossal nerve and kept this protected through the remainder of the procedure. After appropriate circulation time for the IV heparin, the blood pressure was increased to a mean arterial pressure of . We controlled the internal carotid artery with a Lovelady clamp.  We then dissected the superior thyroid artery and the external carotid artery and encircled them with silastic vessel loops. The external carotid artery and superior thyroid artery were controlled by pulling up on the silastic vessel loops. The common carotid artery was controlled with an angled DeBakey clamp. No EEG changes were noted with clamping of the internal carotid artery, so we proceeded with opening the carotid artery using an 11-blade scalpel and extending the arteriotomy onto the internal carotid artery in the anterolateral position with Nash scissors. The proximal internal carotid artery was filled with plaque. An endarterectomy was performed, beginning at the point of maximal disease in the proximal internal carotid artery and extending initially into the common carotid artery. We performed a circumferential dissection using a right-angled clamp, and then trimmed the plaque with tenotomy scissors. We then dissected distally into the internal carotid artery until we achieved a nice feathered endpoint. We performed an eversion endarterectomy of the external carotid artery, dividing the plaque with tenotomy scissors. Loose bits of intima and plaque were then removed from the endarterectomy surface with Patrice forceps under loupe magnification.  We then tacked the proximal endpoint with several  interrupted 7-0 Prolene sutures. A bovine pericardial patch was chosen to close the artery beginning at the distal portion of the arteriotomy in the internal carotid artery using 6-0 Prolene suture and extended the closure proximally. We trimmed the patch to the appropriate length and then began at the proximal portion of the arteriotomy in the common carotid artery, and closed the patch extending distally. Just prior to completing the patch closure, we allowed forward and back-bleeding, and irrigated the endarterectomy site copiously with heparinized saline. We then completed the closure and opened the external carotid artery and superior thyroid artery first, followed by the common carotid artery, and after allowing 20-30 seconds of flow into the external carotid artery, we opened the internal carotid artery. There were no changes in the EEG with restoration of flow.    An intraoperative ultrasound was then performed which showed no technical difficulties. Hemostasis was then assured. We irrigated the wound copiously, and using Surgiflo and electrocautery, achieved excellent hemostasis. The wound was closed using interrupted buried 3-0 Vicryl suture to close the platysma, followed by a running 4-0 Monocryl suture to close the skin. Gauze and tape dressing was applied. The patient awaoke in the operating room moving all four extremities and following commands. They were transferred to the recovery room in stable condition. The needle, sponge and instrument counts were correct x 2.    Estimated blood loss: 25 cc    Specimens: none     Complications: None apparent    Plan:  -ASA 81 mg  -Strict SBP< 160  -Routine neurologic monitoring  -Monitor for headache, contact team if he has headaches or elevated blood pressure  -HOB>30 degrees  -Ice pack  to affected area  -Bedrest with bathroom privileges            Rasheed Sam MD, RPVI  VASCULAR AND ENDOVASCULAR SURGERY   [facility] Vascular Surgery

## 2024-06-25 NOTE — ANESTHESIA CARE TRANSFER NOTE
Patient: Modesto Lehman    Procedure: Procedure(s):  Left Carotid Endarterectomy with bovine pericardial patch angioplasty. Intraoperative Ultrasound. Intraoperative EEG monitoring       Diagnosis: Carotid stenosis, asymptomatic, left [I65.22]  Diagnosis Additional Information: No value filed.    Anesthesia Type:   General     Note:    Oropharynx: oropharynx clear of all foreign objects and spontaneously breathing  Level of Consciousness: drowsy  Oxygen Supplementation: face mask  Level of Supplemental Oxygen (L/min / FiO2): 6  Independent Airway: airway patency satisfactory and stable  Dentition: dentition unchanged  Vital Signs Stable: post-procedure vital signs reviewed and stable  Report to RN Given: handoff report given  Patient transferred to: PACU    Handoff Report: Identifed the Patient, Identified the Reponsible Provider, Reviewed the pertinent medical history, Discussed the surgical course, Reviewed Intra-OP anesthesia mangement and issues during anesthesia, Set expectations for post-procedure period and Allowed opportunity for questions and acknowledgement of understanding      Vitals:  Vitals Value Taken Time   BP 96/80 06/25/24 1132   Temp     Pulse 76 06/25/24 1140   Resp 13 06/25/24 1140   SpO2 100 % 06/25/24 1140   Vitals shown include unfiled device data.    Electronically Signed By: Florentin Fuentes MD  June 25, 2024  11:40 AM

## 2024-06-25 NOTE — ANESTHESIA POSTPROCEDURE EVALUATION
Patient: Modesto Lehman    Procedure: Procedure(s):  Left Carotid Endarterectomy with bovine pericardial patch angioplasty. Intraoperative Ultrasound. Intraoperative EEG monitoring       Anesthesia Type:  General    Note:  Disposition: Inpatient; Admission   Postop Pain Control: Uneventful            Sign Out: Well controlled pain   PONV: No   Neuro/Psych: Uneventful            Sign Out: Acceptable/Baseline neuro status   Airway/Respiratory: Uneventful            Sign Out: Acceptable/Baseline resp. status   CV/Hemodynamics: Uneventful            Sign Out: Acceptable CV status; No obvious hypovolemia; No obvious fluid overload   Other NRE: NONE   DID A NON-ROUTINE EVENT OCCUR? No           Last vitals:  Vitals Value Taken Time   /63 06/25/24 1245   Temp 36.7  C (98  F) 06/25/24 1245   Pulse 58 06/25/24 1254   Resp 11 06/25/24 1254   SpO2 99 % 06/25/24 1254   Vitals shown include unfiled device data.    Electronically Signed By: Andrews Mims MD  June 25, 2024  12:54 PM

## 2024-06-25 NOTE — PLAN OF CARE
~[id number]    Neuro: A&Ox4. Q4 neuro checks  Cardiac: SR/SB HR 60s. VSS. Sys 110s,  Respiratory: Sating 90s on RA.  GI/: Due to void. BM-   Diet/appetite: Advanced to reg diet. Pt understands to go slow. Eating fair  Activity:  IND, up to bath   Pain: At acceptable level on current regimen. L neck pain from incision. Pain managed with scheduled meds, and heat pack(behind neck), cold pack (on incision)  Skin: No new deficits noted. L Neck incision, old BLE incisions  LDA's: PIV X2 L    Plan: Continue with POC. Notify primary team with changes.   Standard 24 hour s/p surgery protocols. Keep icing neck incision to reduce bruising/swelling

## 2024-06-26 VITALS
BODY MASS INDEX: 26.68 KG/M2 | WEIGHT: 180.12 LBS | DIASTOLIC BLOOD PRESSURE: 64 MMHG | TEMPERATURE: 98.1 F | SYSTOLIC BLOOD PRESSURE: 106 MMHG | OXYGEN SATURATION: 96 % | RESPIRATION RATE: 20 BRPM | HEART RATE: 54 BPM | HEIGHT: 69 IN

## 2024-06-26 LAB
ANION GAP SERPL CALCULATED.3IONS-SCNC: 9 MMOL/L (ref 7–15)
BASOPHILS # BLD AUTO: 0 10E3/UL (ref 0–0.2)
BASOPHILS NFR BLD AUTO: 0 %
BUN SERPL-MCNC: 11.6 MG/DL (ref 8–23)
CALCIUM SERPL-MCNC: 8.8 MG/DL (ref 8.8–10.2)
CHLORIDE SERPL-SCNC: 103 MMOL/L (ref 98–107)
CREAT SERPL-MCNC: 0.86 MG/DL (ref 0.67–1.17)
DEPRECATED HCO3 PLAS-SCNC: 26 MMOL/L (ref 22–29)
EGFRCR SERPLBLD CKD-EPI 2021: >90 ML/MIN/1.73M2
EOSINOPHIL # BLD AUTO: 0.1 10E3/UL (ref 0–0.7)
EOSINOPHIL NFR BLD AUTO: 0 %
ERYTHROCYTE [DISTWIDTH] IN BLOOD BY AUTOMATED COUNT: 14.1 % (ref 10–15)
GLUCOSE BLDC GLUCOMTR-MCNC: 108 MG/DL (ref 70–99)
GLUCOSE SERPL-MCNC: 122 MG/DL (ref 70–99)
HCT VFR BLD AUTO: 41.9 % (ref 40–53)
HGB BLD-MCNC: 13.9 G/DL (ref 13.3–17.7)
IMM GRANULOCYTES # BLD: 0 10E3/UL
IMM GRANULOCYTES NFR BLD: 0 %
LYMPHOCYTES # BLD AUTO: 2.1 10E3/UL (ref 0.8–5.3)
LYMPHOCYTES NFR BLD AUTO: 19 %
MCH RBC QN AUTO: 31.6 PG (ref 26.5–33)
MCHC RBC AUTO-ENTMCNC: 33.2 G/DL (ref 31.5–36.5)
MCV RBC AUTO: 95 FL (ref 78–100)
MONOCYTES # BLD AUTO: 0.7 10E3/UL (ref 0–1.3)
MONOCYTES NFR BLD AUTO: 6 %
NEUTROPHILS # BLD AUTO: 8.4 10E3/UL (ref 1.6–8.3)
NEUTROPHILS NFR BLD AUTO: 75 %
NRBC # BLD AUTO: 0 10E3/UL
NRBC BLD AUTO-RTO: 0 /100
PLATELET # BLD AUTO: 160 10E3/UL (ref 150–450)
POTASSIUM SERPL-SCNC: 4.8 MMOL/L (ref 3.4–5.3)
RBC # BLD AUTO: 4.4 10E6/UL (ref 4.4–5.9)
SODIUM SERPL-SCNC: 138 MMOL/L (ref 135–145)
WBC # BLD AUTO: 11.3 10E3/UL (ref 4–11)

## 2024-06-26 PROCEDURE — 82374 ASSAY BLOOD CARBON DIOXIDE: CPT | Performed by: STUDENT IN AN ORGANIZED HEALTH CARE EDUCATION/TRAINING PROGRAM

## 2024-06-26 PROCEDURE — 85025 COMPLETE CBC W/AUTO DIFF WBC: CPT | Performed by: STUDENT IN AN ORGANIZED HEALTH CARE EDUCATION/TRAINING PROGRAM

## 2024-06-26 PROCEDURE — 258N000003 HC RX IP 258 OP 636: Performed by: STUDENT IN AN ORGANIZED HEALTH CARE EDUCATION/TRAINING PROGRAM

## 2024-06-26 PROCEDURE — 250N000011 HC RX IP 250 OP 636: Performed by: STUDENT IN AN ORGANIZED HEALTH CARE EDUCATION/TRAINING PROGRAM

## 2024-06-26 PROCEDURE — 36415 COLL VENOUS BLD VENIPUNCTURE: CPT | Performed by: STUDENT IN AN ORGANIZED HEALTH CARE EDUCATION/TRAINING PROGRAM

## 2024-06-26 PROCEDURE — 250N000013 HC RX MED GY IP 250 OP 250 PS 637: Performed by: STUDENT IN AN ORGANIZED HEALTH CARE EDUCATION/TRAINING PROGRAM

## 2024-06-26 RX ORDER — POLYETHYLENE GLYCOL 3350 17 G/17G
17 POWDER, FOR SOLUTION ORAL DAILY
Qty: 119 G | Refills: 0 | Status: SHIPPED | OUTPATIENT
Start: 2024-06-26 | End: 2024-07-03

## 2024-06-26 RX ORDER — OXYCODONE HYDROCHLORIDE 5 MG/1
5 TABLET ORAL EVERY 8 HOURS PRN
Qty: 8 TABLET | Refills: 0 | Status: SHIPPED | OUTPATIENT
Start: 2024-06-26 | End: 2024-07-17

## 2024-06-26 RX ORDER — ACETAMINOPHEN 325 MG/1
975 TABLET ORAL EVERY 8 HOURS PRN
COMMUNITY
Start: 2024-06-26 | End: 2024-07-03

## 2024-06-26 RX ORDER — AMOXICILLIN 250 MG
1 CAPSULE ORAL 2 TIMES DAILY
Qty: 14 TABLET | Refills: 0 | Status: SHIPPED | OUTPATIENT
Start: 2024-06-26 | End: 2024-07-03

## 2024-06-26 RX ADMIN — HEPARIN SODIUM 5000 UNITS: 5000 INJECTION, SOLUTION INTRAVENOUS; SUBCUTANEOUS at 06:44

## 2024-06-26 RX ADMIN — SODIUM CHLORIDE, POTASSIUM CHLORIDE, SODIUM LACTATE AND CALCIUM CHLORIDE: 600; 310; 30; 20 INJECTION, SOLUTION INTRAVENOUS at 01:58

## 2024-06-26 RX ADMIN — GABAPENTIN 100 MG: 100 CAPSULE ORAL at 08:22

## 2024-06-26 RX ADMIN — DOCUSATE SODIUM 50 MG AND SENNOSIDES 8.6 MG 1 TABLET: 8.6; 5 TABLET, FILM COATED ORAL at 08:22

## 2024-06-26 RX ADMIN — OXYCODONE 10 MG: 10 TABLET ORAL at 01:57

## 2024-06-26 RX ADMIN — ASPIRIN 81 MG CHEWABLE TABLET 81 MG: 81 TABLET CHEWABLE at 08:22

## 2024-06-26 RX ADMIN — ACETAMINOPHEN 975 MG: 325 TABLET, FILM COATED ORAL at 06:44

## 2024-06-26 ASSESSMENT — ACTIVITIES OF DAILY LIVING (ADL)
ADLS_ACUITY_SCORE: 18

## 2024-06-26 NOTE — DISCHARGE INSTRUCTIONS
Discharge Instructions    During this hospital stay you had left carotid endarterectomy procedure. While at home, please monitor for headaches and blood pressure. If your headache does not resolve with tylenol, please call vascular surgery immediately. Please monitor Blood Pressure daily, if your systolic blood pressure is above 180, please call vascular surgery immediately.       Diet  - You may return to your regular diet. We always encourage taking fiber as well as staying well-hydrated.   - Be sure to drink plenty of fluids.     Activity  - No lifting, pushing, or pulling greater than 10 pounds and no strenuous exercise for 4-6 weeks.   - We encourage throat lozenges or cough drops if you develop a cough.  - No driving while on narcotic pain medications (such as oxycodone, hydrocodone, hydromorphone, tramadol)  - No driving until you are able to fully twist to both sides quickly and without any pain    Wound Care  -You have a left neck incision please do not shave anywhere close to the surrounding areas for the next 2 to 4 weeks.  - Inspect your wounds daily for signs of infection (increased redness, drainage, pain)  - Keep your wound clean and dry, inspect it daily for the above signs.  - If your incision was closed with skin glue, this will fall off on it's own. Occasionally, we will lay down a protective film on top of the skin glue before it has dried. I  - You may shower 24 hours after your surgery, but do not soak incisions in tubs, pools, lakes for at least 14 days post-surgery. When showering, please do not scrub your incisions or the skin glue - let soapy water run over them, pat to dry.      Call Vascular surgery [facility] for:  - Temperature > 101F, chills, rigors, dizziness  - Redness around or purulent drainage from wound  - Inability to tolerate diet, nausea or vomiting  - You stop passing gas develop significant bloating, abdominal pain  - Have blood in stools/vomit  - Have severe  diarrhea/constipation  - Any other questions or concerns.    Medication Instructions  Some of your medications may have changed. Please take only prescribed and resumed medications.     We recommend you take Tylenol around the clock for baseline pain control (this was prescribed for you). Then take narcotic pain medication only as needed if you were prescribed any. Once you are no longer needing narcotics, you may take the Tylenol as needed. Do not take more than 4,000 mg of acetaminophen (Tylenol) from all sources to reduce the risk of liver damage.     You are prescribed oxycodone for severe pain take as directed you also are prescribed as needed bowel regiment medications MiraLAX and senna.    Follow up:    PCP:  Follow up with PCP in 5-7 days for post-hospitalization follow up. You may call to set this up - most clinics will be able to get you an appointment within the week if you let them know you were recently hospitalized and need to see your PCP.     Vascular:  Future Appointments 6/26/2024 - 12/23/2024        Date Visit Type Length Department Provider     7/8/2024 12:30 PM POST-OP 30 min [facility] VASCULAR SURGERY Humera Angel APRN CNP    Location Instructions:     The [facility] [place] ([facility]) is [place] a dense [place] area with multiple transportation and parking options. You may wish to review options for  service and self-parking in more detail on the [facility] website at [email or web address].&nbsp;                12/16/2024 12:20 PM US CAROTID BILATERAL 50 min [facility] ULTRASOUND [email or web address]    Location Instructions:     The [facility] [place] ([facility]) is in a dense urban area with multiple transportation and parking options. You may wish to review options for  service and self-parking in more detail on the [facility] website at [email or web address].&nbsp;                12/16/2024  1:30 PM POST-OP 30 min [facility] VASCULAR SURGERY Rasheed Sam MD    Location Instructions:     The [facility] [place] is in a dense urban area with multiple transportation and parking options. You may wish to review options for  service and self-parking in more detail on the [facility] s website at [email or web address].&nbsp;                        With general vascular surgery questions, concerns, or to request/change an appointment, please call:      Vascular Call Center  [phone]    To contact someone after 5 pm, on a weekend, or on a Holiday, please call:  [facility]  [phone], option 4 to have the Attending Physician for Vascular Surgery Service paged.

## 2024-06-26 NOTE — PROGRESS NOTES
I visited Mr. Lehman again this morning. He is doing very well.  He has minimal discomfort compared to his existing back pain.  No neurologic deficits.  He has eaten and ambulated already.  No change in voice or swallowing function.  On exam he has no cranial nerve deficits and equal upper and lower motor/sensory function.  His blood pressure is well controlled.  He will go home today if he continues to do well this morning. Discussed expected post operative course and potential issues after discharge including but not limited to bleeding and hyperperfusion syndrome.  I will update Dr. Mason.     Please feel free to contact the vascular surgery team/fellow on-call with any questions or concerns

## 2024-06-26 NOTE — PHARMACY-ADMISSION MEDICATION HISTORY
Pharmacist Admission Medication History    Admission medication history is complete. The information provided in this note is only as accurate as the sources available at the time of the update.    Information Source(s): Patient via in-person    Pertinent Information:   -- rare use of gabapentin/tizanidine    Changes made to PTA medication list:  Added: None  Deleted: None  Changed: gabapentin changed to prn    Allergies reviewed with patient and updates made in EHR: no    Medication History Completed By: Bayron Esparza RPH 6/26/2024 8:37 AM    PTA Med List   Medication Sig Note Last Dose    aspirin (ASA) 81 MG chewable tablet Take 1 tablet (81 mg) by mouth daily (Patient taking differently: Take 81 mg by mouth every morning) 6/13/2024: Take Aspirin the day of surgery. (Per f4samurai ARSEN) 6/25/2024 at 0500    atorvastatin (LIPITOR) 80 MG tablet Take 1 tablet (80 mg) by mouth daily (Patient taking differently: Take 80 mg by mouth at bedtime)  6/24/2024 at 2100

## 2024-06-26 NOTE — PLAN OF CARE
"BP 99/65 (BP Location: Right arm)   Pulse 59   Temp 97.9  F (36.6  C) (Oral)   Resp 18   Ht 1.753 m (5' 9\")   Wt 80.5 kg (177 lb 7.5 oz)   SpO2 98%   BMI 26.21 kg/m      Neuro: A&Ox4. Neuro checks q4.  Cardiac: SR/SB. VSS. HR: 50-80s. -110s.   Respiratory: Sating >94% on RA.  GI/: Adequate urine output. No BM at night.  Diet/appetite: Tolerating regular diet. Eating well.  Activity:  SBA. Moves well.  Pain: Patient stated pain level of 3-4 throughout much of night. Oxy 10mg given x1.  Skin: No new deficits noted.  LDA's:  - L) PIV - infusing Lr @ 70mL/hr  - L) PIV - saline locked    Plan: Continue with POC. Notify primary team with changes.    "

## 2024-06-26 NOTE — DISCHARGE SUMMARY
Discharge instructions reviewed and understood by patient. VSS, PIV removed, new medications reviewed and understood, patient has all belongings. Patient left unit with spouse and daughter.

## 2024-06-26 NOTE — DISCHARGE SUMMARY
Vascular Surgery Discharge Summary    NAME: Modesto Lehman   MRN: [id number]   : 1955     DATE OF ADMISSION: 2024     PRE/POSTOPERATIVE DIAGNOSES:    #1- Asymptomatic high grade left carotid stenosis  #2- Asymptomatic chronic occlusion of the right internal carotid artery  #3- Coronary artery disease, pending coronary bypass with Dr. Mason  #4- Hyperlipidemia  #5- Peripheral arterial disease status post bilateral bypasses, elsewhere  #6- Heart Failure with Reduced EF, 30-35%  #7- Former nicotine dependence    PROCEDURES PERFORMED, 2024:   Left Carotid Endarterectomy with bovine pericardial patch angioplasty  Intraoperative EEG monitoring  Intraoperative completion ultrasound with radiologic interpretation     INTRAOPERATIVE FINDINGS:   High grade calcific plaque  No EEG changes  Intraoperative duplex without residual stenosis, dissection, or fronds    POSTOPERATIVE COMPLICATIONS: None    DATE OF DISCHARGE:  2024    HOSPITAL COURSE: Modesto Lehman is a 68 year old male who on 2024  underwent the above-named procedures.  He tolerated the procedure well and postoperatively was transferred to the general post-surgical unit.  The remainder of his course was essentiallly uncomplicated.  Prior to discharge, his pain was controlled well with tylenol and oxycodone. He was able to perform ADLs and ambulate independently without difficulty, and had full return of bowel and bladder function.  On 2024, he was discharged to home in stable condition.    Physical Exam:  Constitutional: healthy, alert, no acute distress and cooperative   Cardiovascular: pulses regular  Respiratory: breathing unlabored without secondary muscle use  Psychiatric: mentation appears normal and affect normal/bright  Neck: no asymmetry  GI/Abdomen: abdomen soft, non-tender. No palpable masses  MSK: able to move all extremities without weakness or ataxia  Extremities: no open lesions, extremities warm and well  perfused  Hematology: Mild bruising on the left neck incision line from the procedure .   Pulses: symmetric and palpable radial, DP/PT bilaterally    DISCHARGE INSTRUCTIONS:    During this hospital stay you had left carotid endarterectomy procedure. While at home, please monitor for headaches and blood pressure. If your headache does not resolve with tylenol, please call vascular surgery immediately. Please monitor Blood Pressure daily, if your systolic blood pressure is above 180, please call vascular surgery immediately.       Diet  - You may return to your regular diet. We always encourage taking fiber as well as staying well-hydrated.   - Be sure to drink plenty of fluids.     Activity  - No lifting, pushing, or pulling greater than 10 pounds and no strenuous exercise for 4-6 weeks.   - We encourage throat lozenges or cough drops if you develop a cough.  - No driving while on narcotic pain medications (such as oxycodone, hydrocodone, hydromorphone, tramadol)  - No driving until you are able to fully twist to both sides quickly and without any pain    Wound Care  -You have a left neck incision please do not shave anywhere close to the surrounding areas for the next 2 to 4 weeks.  - Inspect your wounds daily for signs of infection (increased redness, drainage, pain)  - Keep your wound clean and dry, inspect it daily for the above signs.  - If your incision was closed with skin glue, this will fall off on it's own. Occasionally, we will lay down a protective film on top of the skin glue before it has dried. I  - You may shower 24 hours after your surgery, but do not soak incisions in tubs, pools, lakes for at least 14 days post-surgery. When showering, please do not scrub your incisions or the skin glue - let soapy water run over them, pat to dry.      Call Vascular surgery [facility] for:  - Temperature > 101F, chills, rigors, dizziness  - Redness around or purulent drainage from wound  - Inability to tolerate diet,  nausea or vomiting  - You stop passing gas develop significant bloating, abdominal pain  - Have blood in stools/vomit  - Have severe diarrhea/constipation  - Any other questions or concerns.    Medication Instructions  Some of your medications may have changed. Please take only prescribed and resumed medications.     We recommend you take Tylenol around the clock for baseline pain control (this was prescribed for you). Then take narcotic pain medication only as needed if you were prescribed any. Once you are no longer needing narcotics, you may take the Tylenol as needed. Do not take more than 4,000 mg of acetaminophen (Tylenol) from all sources to reduce the risk of liver damage.     You are prescribed oxycodone for severe pain take as directed you also are prescribed as needed bowel regiment medications MiraLAX and senna.    Follow up:    PCP:  Follow up with PCP in 5-7 days for post-hospitalization follow up. You may call to set this up - most clinics will be able to get you an appointment within the week if you let them know you were recently hospitalized and need to see your PCP.     Vascular:  Future Appointments 6/26/2024 - 12/23/2024        Date Visit Type Length Department Provider     7/8/2024 12:30 PM POST-OP 30 min [facility] VASCULAR SURGERY Humera Angel APRN CNP    Location Instructions:     The [facility] [place] a dense urban area with multiple transportation and parking options. You may wish to review options for  service and self-parking in more detail on the [facility] s website at [email or web address].&nbsp;                12/16/2024 12:20 PM US CAROTID BILATERAL 50 min [facility] ULTRASOUND [id number]    Location Instructions:     The [facility] [place] a dense urban area with multiple transportation and parking options. You may wish to review options for  service and self-parking in more detail on the [facility] website at [email or web address].&nbsp;                 12/16/2024  1:30 PM POST-OP 30 min [facility] VASCULAR SURGERY Rasheed Sam MD    Location Instructions:     [facility] is in a dense urban area with multiple transportation and parking options. You may wish to review options for  service and self-parking in more detail on the [facility] s website at [email or web address].&nbsp;                        With general vascular surgery questions, concerns, or to request/change an appointment, please call:      Vascular Call Center  [phone]    To contact someone after 5 pm, on a weekend, or on a Holiday, please call:  [facility]  [phone], option 4 to have the Attending Physician for Vascular Surgery Service paged.         DISCHARGE MEDICATIONS:     Review of your medicines        UNREVIEWED medicines. Ask your doctor about these medicines        Dose / Directions   aspirin 81 MG chewable tablet  Commonly known as: ASA  Used for: Elevated coronary artery calcium score, Positive cardiac stress test on 3/5/2024, Coronary artery disease of native artery of native heart with stable angina pectoris (H24)      Dose: 81 mg  Take 1 tablet (81 mg) by mouth daily  Quantity: 90 tablet  Refills: 3     atorvastatin 80 MG tablet  Commonly known as: LIPITOR  Used for: Elevated coronary artery calcium score, Peripheral vascular disease (H24)      Dose: 80 mg  Take 1 tablet (80 mg) by mouth daily  Quantity: 90 tablet  Refills: 3     EPINEPHrine 0.3 MG/0.3ML injection 2-pack  Commonly known as: ANY BX GENERIC EQUIV      Dose: 0.3 mg  Inject 0.3 mLs (0.3 mg) into the muscle once as needed for anaphylaxis  Quantity: 1 each  Refills: 0     gabapentin 100 MG capsule  Commonly known as: NEURONTIN  Used for: Spondylosis of thoracic region without myelopathy or radiculopathy      Dose: 100 mg  Take 1 capsule (100 mg) by mouth 3 times daily  Quantity: 270 capsule  Refills: 4     ibuprofen 800 MG tablet  Commonly known  as: ADVIL/MOTRIN  Used for: Spinal stenosis of lumbar region without neurogenic claudication      Dose: 800 mg  Take 1 tablet (800 mg) by mouth 3 times daily as needed TAKE 1 TABLET BY MOUTH 3 TIMES DAILY WITH MEALS.  Quantity: 100 tablet  Refills: 5     nitroGLYcerin 0.4 MG sublingual tablet  Commonly known as: NITROSTAT  Used for: Elevated coronary artery calcium score, Positive cardiac stress test on 3/5/2024, Coronary artery disease of native artery of native heart with stable angina pectoris (H24)      For chest pain place 1 tablet under the tongue every 5 minutes for 3 doses. If symptoms persist 5 minutes after 1st dose call 911.  Quantity: 25 tablet  Refills: 3     tiZANidine 4 MG tablet  Commonly known as: ZANAFLEX  Used for: Chronic midline thoracic back pain      Dose: 4 mg  Take 1 tablet (4 mg) by mouth 3 times daily  Quantity: 90 tablet  Refills: 11              *MEDICATIONS INTENDED TO BE THE SAME AS PRIOR TO ADMISSION, AND ADDED PAIN MEDICATION AND STOOL SOFTENERS     Humera Angel [facility]  Vascular Surgery  Pager: [phone]  [email or web address]  Send message or 10 digit call back number Securely via [employer] with the [employer] Web Console (learn more here)'

## 2024-06-27 ENCOUNTER — TELEPHONE (OUTPATIENT)
Dept: VASCULAR SURGERY | Facility: CLINIC | Age: 69
End: 2024-06-27
Payer: COMMERCIAL

## 2024-06-27 NOTE — TELEPHONE ENCOUNTER
Called Modesto for post-op follow-up call s/p L sided carotid endarterectomy. Unfortunately I was unable to reach him and there was no option to leave a voicemail. Will make 2nd attempt tomorrow.    JOSEFA StephensN, RN  RN Care Coordinator  [facility] Vascular Surgery - [facility] phone: [phone]  Fax: [phone]

## 2024-06-28 NOTE — TELEPHONE ENCOUNTER
"2nd attempt for post-op follow-up call. I reached Modesto but he was unfortunately not able to hear me. Pt did say \"If you can hear me we are in the emergency room\". Attempted to call back x2 but was unable to reach pt. No ED visit noted in Epic, including CareEverywhere. Will follow-up on Monday and will send MyC message to pt.    ZACHARY Stephens, RN  RN Care Coordinator  [facility] Vascular Surgery - [facility] phone: [phone]  Fax: [phone]    "

## 2024-07-01 LAB
PATH REPORT.COMMENTS IMP SPEC: NORMAL
PATH REPORT.COMMENTS IMP SPEC: NORMAL
PATH REPORT.FINAL DX SPEC: NORMAL
PATH REPORT.GROSS SPEC: NORMAL
PATH REPORT.MICROSCOPIC SPEC OTHER STN: NORMAL
PATH REPORT.RELEVANT HX SPEC: NORMAL
PHOTO IMAGE: NORMAL

## 2024-07-02 ENCOUNTER — PREP FOR PROCEDURE (OUTPATIENT)
Dept: CARDIOLOGY | Facility: CLINIC | Age: 69
End: 2024-07-02
Payer: COMMERCIAL

## 2024-07-02 ENCOUNTER — TELEPHONE (OUTPATIENT)
Dept: CARDIOLOGY | Facility: CLINIC | Age: 69
End: 2024-07-02
Payer: COMMERCIAL

## 2024-07-02 DIAGNOSIS — I25.118 CORONARY ARTERY DISEASE OF NATIVE ARTERY OF NATIVE HEART WITH STABLE ANGINA PECTORIS (H): Primary | ICD-10-CM

## 2024-07-02 DIAGNOSIS — I99.8 OTHER DISORDER OF CIRCULATORY SYSTEM: ICD-10-CM

## 2024-07-02 DIAGNOSIS — I25.10 CAD (CORONARY ARTERY DISEASE): Primary | ICD-10-CM

## 2024-07-02 NOTE — TELEPHONE ENCOUNTER
FUTURE VISIT INFORMATION      SURGERY INFORMATION:  Coronary arter bypass graft.  Dr. Kit Mason.  24.  Cardiac issues; no pulm     RECORDS REQUESTED FROM:       Primary Care Provider: Ha Keating MD - [facility]    Pertinent Medical History: PAD    Most recent EKG+ Tracin24    Most recent ECHO: 24    Most recent PFT's: 24

## 2024-07-03 ENCOUNTER — TELEPHONE (OUTPATIENT)
Dept: CARDIOLOGY | Facility: CLINIC | Age: 69
End: 2024-07-03
Payer: COMMERCIAL

## 2024-07-07 LAB
ABO/RH(D): NORMAL
ANTIBODY SCREEN: NEGATIVE
SPECIMEN EXPIRATION DATE: NORMAL

## 2024-07-08 ENCOUNTER — LAB (OUTPATIENT)
Dept: LAB | Facility: CLINIC | Age: 69
End: 2024-07-08
Payer: COMMERCIAL

## 2024-07-08 ENCOUNTER — OFFICE VISIT (OUTPATIENT)
Dept: VASCULAR SURGERY | Facility: CLINIC | Age: 69
End: 2024-07-08
Payer: COMMERCIAL

## 2024-07-08 ENCOUNTER — ANCILLARY PROCEDURE (OUTPATIENT)
Dept: GENERAL RADIOLOGY | Facility: CLINIC | Age: 69
End: 2024-07-08
Attending: THORACIC SURGERY (CARDIOTHORACIC VASCULAR SURGERY)
Payer: COMMERCIAL

## 2024-07-08 VITALS — SYSTOLIC BLOOD PRESSURE: 118 MMHG | HEART RATE: 85 BPM | DIASTOLIC BLOOD PRESSURE: 75 MMHG | OXYGEN SATURATION: 94 %

## 2024-07-08 DIAGNOSIS — I25.118 CORONARY ARTERY DISEASE OF NATIVE ARTERY OF NATIVE HEART WITH STABLE ANGINA PECTORIS (H): ICD-10-CM

## 2024-07-08 DIAGNOSIS — Z98.890 POSTOPERATIVE STATE: Primary | ICD-10-CM

## 2024-07-08 DIAGNOSIS — Z51.89 VISIT FOR WOUND CHECK: ICD-10-CM

## 2024-07-08 DIAGNOSIS — I99.8 OTHER DISORDER OF CIRCULATORY SYSTEM: ICD-10-CM

## 2024-07-08 DIAGNOSIS — I65.22 ASYMPTOMATIC STENOSIS OF LEFT CAROTID ARTERY WITHOUT INFARCTION: ICD-10-CM

## 2024-07-08 LAB
ALBUMIN SERPL BCG-MCNC: 4.3 G/DL (ref 3.5–5.2)
ALBUMIN UR-MCNC: NEGATIVE MG/DL
ALP SERPL-CCNC: 88 U/L (ref 40–150)
ALT SERPL W P-5'-P-CCNC: 49 U/L (ref 0–70)
ANION GAP SERPL CALCULATED.3IONS-SCNC: 10 MMOL/L (ref 7–15)
APPEARANCE UR: CLEAR
APTT PPP: 29 SECONDS (ref 22–38)
AST SERPL W P-5'-P-CCNC: 41 U/L (ref 0–45)
BILIRUB SERPL-MCNC: 0.2 MG/DL
BILIRUB UR QL STRIP: NEGATIVE
BUN SERPL-MCNC: 12.5 MG/DL (ref 8–23)
CALCIUM SERPL-MCNC: 9.2 MG/DL (ref 8.8–10.2)
CHLORIDE SERPL-SCNC: 100 MMOL/L (ref 98–107)
COLOR UR AUTO: ABNORMAL
CREAT SERPL-MCNC: 0.95 MG/DL (ref 0.67–1.17)
DEPRECATED HCO3 PLAS-SCNC: 29 MMOL/L (ref 22–29)
EGFRCR SERPLBLD CKD-EPI 2021: 88 ML/MIN/1.73M2
ERYTHROCYTE [DISTWIDTH] IN BLOOD BY AUTOMATED COUNT: 14.5 % (ref 10–15)
GLUCOSE SERPL-MCNC: 106 MG/DL (ref 70–99)
GLUCOSE UR STRIP-MCNC: NEGATIVE MG/DL
HCT VFR BLD AUTO: 43.7 % (ref 40–53)
HGB BLD-MCNC: 14.7 G/DL (ref 13.3–17.7)
HGB UR QL STRIP: NEGATIVE
INR PPP: 1.02 (ref 0.85–1.15)
KETONES UR STRIP-MCNC: NEGATIVE MG/DL
LEUKOCYTE ESTERASE UR QL STRIP: NEGATIVE
MAGNESIUM SERPL-MCNC: 2.1 MG/DL (ref 1.7–2.3)
MCH RBC QN AUTO: 31.7 PG (ref 26.5–33)
MCHC RBC AUTO-ENTMCNC: 33.6 G/DL (ref 31.5–36.5)
MCV RBC AUTO: 94 FL (ref 78–100)
MUCOUS THREADS #/AREA URNS LPF: PRESENT /LPF
NITRATE UR QL: NEGATIVE
PH UR STRIP: 7 [PH] (ref 5–7)
PLATELET # BLD AUTO: 220 10E3/UL (ref 150–450)
POTASSIUM SERPL-SCNC: 4.2 MMOL/L (ref 3.4–5.3)
PREALB SERPL-MCNC: 25.8 MG/DL (ref 20–40)
PROT SERPL-MCNC: 7 G/DL (ref 6.4–8.3)
RBC # BLD AUTO: 4.64 10E6/UL (ref 4.4–5.9)
RBC URINE: 3 /HPF
SODIUM SERPL-SCNC: 139 MMOL/L (ref 135–145)
SP GR UR STRIP: 1.01 (ref 1–1.03)
UROBILINOGEN UR STRIP-MCNC: NORMAL MG/DL
WBC # BLD AUTO: 7.8 10E3/UL (ref 4–11)
WBC URINE: 0 /HPF

## 2024-07-08 PROCEDURE — 86900 BLOOD TYPING SEROLOGIC ABO: CPT

## 2024-07-08 PROCEDURE — 71046 X-RAY EXAM CHEST 2 VIEWS: CPT | Mod: GC | Performed by: RADIOLOGY

## 2024-07-08 PROCEDURE — 99024 POSTOP FOLLOW-UP VISIT: CPT | Performed by: NURSE PRACTITIONER

## 2024-07-08 PROCEDURE — 36415 COLL VENOUS BLD VENIPUNCTURE: CPT | Performed by: PATHOLOGY

## 2024-07-08 PROCEDURE — 93000 ELECTROCARDIOGRAM COMPLETE: CPT | Performed by: INTERNAL MEDICINE

## 2024-07-08 PROCEDURE — 85610 PROTHROMBIN TIME: CPT | Performed by: PATHOLOGY

## 2024-07-08 PROCEDURE — 81001 URINALYSIS AUTO W/SCOPE: CPT | Performed by: PATHOLOGY

## 2024-07-08 PROCEDURE — 85730 THROMBOPLASTIN TIME PARTIAL: CPT | Performed by: PATHOLOGY

## 2024-07-08 PROCEDURE — 80053 COMPREHEN METABOLIC PANEL: CPT | Performed by: PATHOLOGY

## 2024-07-08 PROCEDURE — 84134 ASSAY OF PREALBUMIN: CPT | Performed by: THORACIC SURGERY (CARDIOTHORACIC VASCULAR SURGERY)

## 2024-07-08 PROCEDURE — 83735 ASSAY OF MAGNESIUM: CPT | Performed by: PATHOLOGY

## 2024-07-08 PROCEDURE — 85027 COMPLETE CBC AUTOMATED: CPT | Performed by: PATHOLOGY

## 2024-07-08 PROCEDURE — 99000 SPECIMEN HANDLING OFFICE-LAB: CPT | Performed by: PATHOLOGY

## 2024-07-08 ASSESSMENT — PAIN SCALES - GENERAL: PAINLEVEL: NO PAIN (0)

## 2024-07-08 NOTE — LETTER
7/8/2024       RE: Modesto Lehman  [address]  [facility] [address]     Dear Colleague,    Thank you for referring your patient, Modesto Lehman, to the [facility]. Please see a copy of my visit note below.        VASCULAR SURGERY PROGRESS NOTE    LOCATION: [facility]     Modesto Lehman  Medical Record #:  [id number]  YOB: 1955  Age:  68 year old     Date of Service: 7/8/2024    PRIMARY CARE PROVIDER: Ha Keating    Reason for visit: Post-op wound check     IMPRESSION / RECOMMENDATION:   Modesto Lehman is a 68 years old gentleman who underwent L CEA on 6/25/24 with Dr. Sam, presents to clinic for wound check and post operative assessment. Left neck incision is healing appropriately, without signs of infection. Mild headaches are relieved with tylenol, blood pressure is well contr.     - Continue ASA and statin  - Recommend smoking cessation  - Maintain normotension  - Keep left neck incision clean at all times. Clean daily with chloraprep (swabs provided) given facial hair growth. Okay to shave surrounding the area, however no shaving over incision until it has healed.  - Patient has follow up scheduled with Dr. Sam.    Information/Education: patient able to teach back. Patient agreeable to plan of care: yes    All questions were answered and support provided. He has our contact information and knows to reach out with any additional questions or concerns.     Humera Angel, LÓPEZ  Vascular Surgery  Pager: [phone]  [email or web address]  Send message or 10 digit call back number Securely via [employer] with the Vocera Web Console (learn more here)        HPI:  Modesto Lehman is a 68 year old male with past medical history significant for high-grade L carotid stenosis, asymptomatic chronic occlusion of the DARIUSZ, CAD (pending CABG with Dr. Mason), HLD, PAD,  HFrEF 30-5%, current smoker who on 6/25/24 underwent left CEA with Dr. Thompson Childs.  Postoperatively did well and on POD#1 patient was discharged to home. Left neck incision without swelling, no drainage no signs of infection. Small, appropriate bruising noted at incision site, however this continues to improve. Mild headaches are well-managed with Tylenol, no hypertension. Neurologically intact.     REVIEW OF SYSTEMS:    A 12 point ROS was reviewed and is negative except for what is listed above in HPI.    PHH:    Past Medical History:   Diagnosis Date     Allergy status to analgesic agent     No reported medication allergies     Anesthesia of skin     No  problems with anaesthesia     CAD (coronary artery disease)      Elevated coronary artery calcium score      History of recurrent pneumonia     No Comments Provided     Hyperlipidemia     No Comments Provided     Low back pain     pain with bilateral leg and disc injuries.     Personal history of nicotine dependence      Personal history of other medical treatment (CODE)     No blood transfusions     PVD (peripheral vascular disease) (H24)           Past Surgical History:   Procedure Laterality Date     ANGIOPLASTY      Right leg stenting and bypass, Left also     COLONOSCOPY  08/23/2010    Q 10yrs.     COLONOSCOPY N/A 02/24/2022    6 tubular adenomas, 5 year follow up, 2/24/2027     CV CORONARY ANGIOGRAM N/A 4/15/2024    Procedure: Coronary Angiogram;  Surgeon: Toni Sellers MD;  Location:  HEART CARDIAC CATH LAB     ENDARTERECTOMY CAROTID Left 6/25/2024    Procedure: Left Carotid Endarterectomy with bovine pericardial patch angioplasty. Intraoperative Ultrasound. Intraoperative EEG monitoring;  Surgeon: Rasheed Sam MD;  Location: [facility] OR     EXTRACTION(S) DENTAL      recently removed     OTHER SURGICAL HISTORY      [id number],PREMALIG/BENIGN SKIN LESION EXCISION, removed from chest     OTHER SURGICAL HISTORY      208489,ANESTHESIA ALERT,No   problems with anaesthesia       ALLERGIES:  Patient has no known allergies.    MEDS:    Current Outpatient Medications:      aspirin (ASA) 81 MG chewable tablet, Take 1 tablet (81 mg) by mouth daily (Patient taking differently: Take 81 mg by mouth every morning), Disp: 90 tablet, Rfl: 3     atorvastatin (LIPITOR) 80 MG tablet, Take 1 tablet (80 mg) by mouth daily (Patient taking differently: Take 80 mg by mouth at bedtime), Disp: 90 tablet, Rfl: 3     EPINEPHrine (ANY BX GENERIC EQUIV) 0.3 MG/0.3ML injection 2-pack, Inject 0.3 mLs (0.3 mg) into the muscle once as needed for anaphylaxis, Disp: 1 each, Rfl: 0     nitroGLYcerin (NITROSTAT) 0.4 MG sublingual tablet, For chest pain place 1 tablet under the tongue every 5 minutes for 3 doses. If symptoms persist 5 minutes after 1st dose call 911., Disp: 25 tablet, Rfl: 3     gabapentin (NEURONTIN) 100 MG capsule, Take 1 capsule (100 mg) by mouth 3 times daily (Patient not taking: Reported on 7/8/2024), Disp: 270 capsule, Rfl: 4     ibuprofen (ADVIL/MOTRIN) 800 MG tablet, Take 1 tablet (800 mg) by mouth 3 times daily as needed TAKE 1 TABLET BY MOUTH 3 TIMES DAILY WITH MEALS. (Patient not taking: Reported on 7/8/2024), Disp: 100 tablet, Rfl: 5     oxyCODONE (ROXICODONE) 5 MG tablet, Take 1 tablet (5 mg) by mouth every 8 hours as needed for severe pain (Patient not taking: Reported on 7/8/2024), Disp: 8 tablet, Rfl: 0     tiZANidine (ZANAFLEX) 4 MG tablet, Take 1 tablet (4 mg) by mouth 3 times daily (Patient not taking: Reported on 7/8/2024), Disp: 90 tablet, Rfl: 11    SOCIAL HABITS:    History   Smoking Status     Former     Types: Cigars, Cigarettes   Smokeless Tobacco     Never     Social History    Substance and Sexual Activity      Alcohol use: Yes        Comment: 2 vodka every evening      History   Drug Use     Types: Marijuana     Comment: Not daily 2x week       FAMILY HISTORY:    Family History   Problem Relation Age of Onset     Diabetes Mother         Diabetes      Heart Disease Mother         Heart Disease     Other - See Comments Father         COPD, CD     Heart Disease Brother         Heart Disease     Other - See Comments Brother         killed by drunk  at age 19.     Substance Abuse Other         Alcohol/Drug, No hx of chemical dependency.     Diabetes Other         Diabetes     Heart Disease Other         Heart Disease     Anesthesia Reaction No family hx of      Clotting Disorder No family hx of      Bleeding Disorder No family hx of        PE:  /75 (BP Location: Right arm, Patient Position: Chair, Cuff Size: Adult Regular)   Pulse 85   SpO2 94%   Wt Readings from Last 1 Encounters:   06/26/24 180 lb 1.9 oz     There is no height or weight on file to calculate BMI.    EXAM:  GENERAL: well-developed 68 year old male who appears his stated age  CARDIAC: normal   CHEST/LUNG: normal respiratory effort   MUSCULOSKELETAL: grossly normal and both lower extremities are intact, no lower extremity edema  NEUROLOGIC: focally intact, alert and oriented x 3  PSYCH: appropriate affect  NEURO: CN II-XII intact, no neuro deficits          Again, thank you for allowing me to participate in the care of your patient.      Sincerely,    CHIN Wilkerson CNP

## 2024-07-08 NOTE — NURSING NOTE
Chief Complaint   Patient presents with    Follow Up     POST-OP        Vitals were taken, medications reconciled.    Raya Peralta, Clinic Assistant   12:17 PM

## 2024-07-08 NOTE — PATIENT INSTRUCTIONS
Thank you so much for choosing us for your care. It was a pleasure to see you at the vascular clinic today.     Follow-up recommendations:   - Continue ASA and statin  - Recommend smoking cessation  - Maintain normotension  - Keep left neck incision clean at all times. Clean daily with chloraprep (swabs provided) given facial hair growth. Okay to shave surrounding the area, however no shaving over incision until it has healed.  - Patient has follow up scheduled with Dr. Sam.     Our scheduling team will get in touch with you to set up any follow-up testing/imaging and/or appointments. Please be aware that any testing/imaging recommended today will need to completed prior to your next visit with the provider. If testing/imaging is not completed prior to your next visit, your visit may be rescheduled.        If you have any questions, please contact our clinic directly at [phone] and ask for the nurse. We also encourage the use of KeepFu to communicate with your HealthCare Provider.        If you have an urgent need after business hours (8:00 am to 4:30 pm) please call [phone], option 4, and ask for the vascular attending on call. For non-urgent after hours needs, please call the vascular nurse at [phone] and leave a detailed voicemail. For scheduling needs, please call our clinic directly at [phone].    [facility] is recognized by the [facility] as a comprehensive stroke center. As part of our commitment to better patient outcomes and excellent stroke education, we attach the below stroke education materials to ALL of the after visit summaries in our vascular clinic.        Learning About BE FAST: Stroke Warning Signs  BE FAST is a simple way to remember the main symptoms of stroke. These symptoms happen suddenly. So learning what to look for helps you know when to call for medical help. BE FAST stands for:    B - Balance.  Loss of balance or trouble  walking.     E - Eyes.  Trouble seeing out of one or both eyes.     F - Face.  Weakness or drooping on one side of the face.     A - Arm.  Weakness or numbness in an arm or leg.     S - Speech.  Trouble speaking.     T - Time to call 911.  Also call 911 if you have other stroke symptoms. They include:  Sudden confusion.  Sudden trouble understanding simple statements.  Fainting.  A seizure.  A sudden, severe headache.     A stroke happens when a blood vessel in the brain bursts or is blocked by a blood clot. The blood supply to part of the brain--and the oxygen the blood carries--is reduced. This damages the brain.   If you have a stroke, quick treatment may save your life. And it may reduce the damage in your brain so that you have fewer problems after the stroke.   Current as of: December 18, 2022               Content Version: 13.8    3802-9125 [facility].   Care instructions adapted under license by your healthcare professional. If you have questions about a medical condition or this instruction, always ask your healthcare professional. [facility] disclaims any warranty or liability for your use of this information.    Learning About How to Prevent a Stroke  What is a stroke?  A stroke is damage to the brain that occurs when a blood vessel in the brain bursts or is blocked by a blood clot. Without blood and the oxygen it carries, part of the brain starts to die. The part of the body controlled by the damaged area of the brain can't work properly.  Brain damage can start within minutes of a stroke. But quick treatment can help limit the damage and increase the chance of a full recovery.  What puts you at risk for stroke?  A risk factor is anything that makes you more likely to have a particular health problem.  Risk factors for stroke that you can manage or change include:  Health problems like atrial fibrillation, diabetes, high blood pressure, high cholesterol, hardening of the  arteries (atherosclerosis), and sickle cell disease.  Smoking.  Drinking more than 2 alcoholic drinks a day for men and 1 drink a day for women.  Being overweight.  Not eating healthy foods.  Not getting enough physical activity.  Risk factors you can't change include:  Having a previous stroke.  Family history of stroke.  Being older.  Being , [place] Native, , or South  American.  Being female.  Having certain problems during pregnancy, such as preeclampsia.  Being past menopause.  Your doctor can help you know your risk. Then you and your doctor can talk about whether to take steps to lower it.  How can you help prevent a stroke?  Here are some things you can do to help prevent a stroke.  Manage health problems that raise your risk. These include atrial fibrillation, diabetes, high blood pressure, and high cholesterol.  Have a heart-healthy lifestyle.  Don't smoke. If you need help quitting, talk to your doctor about stop-smoking programs and medicines. These can increase your chances of quitting for good.  Limit alcohol to 2 drinks a day for men and 1 drink a day for women.  Stay at a healthy weight. Lose weight if you need to.  Be active. Get at least 30 minutes of exercise on most days of the week. Walking is a good choice. You also may want to do other activities, such as running, swimming, cycling, or playing tennis or team sports.  Eat heart-healthy foods. These include vegetables, fruits, nuts, beans, lean meat, fish, and whole grains. Limit sodium and sugar.  If you think you may have a problem with alcohol or drug use, talk to your doctor.  If you use hormone therapy for menopause or hormonal birth control, talk with your doctor. Ask if these are right for you. They may raise the risk of stroke in some people.  Decide with your doctor whether you will also take medicines to help lower your risk. For example, you and your doctor may decide you will take a medicine that  prevents blood clots.  What are the symptoms of a stroke?  Symptoms of a stroke happen quickly. A stroke may cause:  Sudden numbness, tingling, weakness, or loss of movement in your face, arm, or leg, especially on only one side of your body.  Sudden vision changes.  Sudden trouble speaking.  Sudden confusion or trouble understanding simple statements.  Sudden problems with walking or balance.  A sudden, severe headache that is different from past headaches.  Fainting.  A seizure.  It's important to call for medical help if you have stroke symptoms. Quick treatment may save your life. And it may reduce the damage in your brain so that you have fewer problems after the stroke.  Follow-up care is a key part of your treatment and safety. Be sure to make and go to all appointments, and call your doctor if you are having problems. It's also a good idea to know your test results and keep a list of the medicines you take.    Current as of: December 18, 2022               Content Version: 13.8    5704-1909 [employer].   Care instructions adapted under license by your healthcare professional. If you have questions about a medical condition or this instruction, always ask your healthcare professional. [facility] disclaims any warranty or liability for your use of this information.

## 2024-07-08 NOTE — PROGRESS NOTES
VASCULAR SURGERY PROGRESS NOTE    LOCATION: [facility]     Modesto Lehman  Medical Record #:  [id number]  YOB: 1955  Age:  68 year old     Date of Service: 7/8/2024    PRIMARY CARE PROVIDER: Ha Keating    Reason for visit: Post-op wound check     IMPRESSION / RECOMMENDATION:   Modesto Lehman is a 68 years old gentleman who underwent L CEA on 6/25/24 with Dr. Sam, presents to clinic for wound check and post operative assessment. Left neck incision is healing appropriately, without signs of infection. Mild headaches are relieved with tylenol, blood pressure is well contr.     - Continue ASA and statin  - Recommend smoking cessation  - Maintain normotension  - Keep left neck incision clean at all times. Clean daily with chloraprep (swabs provided) given facial hair growth. Okay to shave surrounding the area, however no shaving over incision until it has healed.  - Patient has follow up scheduled with Dr. Sam.    Information/Education: patient able to teach back. Patient agreeable to plan of care: yes    All questions were answered and support provided. He has our contact information and knows to reach out with any additional questions or concerns.     Humera Angel, [facility]  Vascular Surgery  Pager: [phone]  [email or web address]  Send message or 10 digit call back number Securely via [employer] with the [employer] Web Console (learn more here)        HPI:  Modesto Lehman is a 68 year old male with past medical history significant for high-grade L carotid stenosis, asymptomatic chronic occlusion of the DARIUSZ, CAD (pending CABG with Dr. Mason), HLD, PAD, HFrEF 30-5%, current smoker who on 6/25/24 underwent left CEA with Dr. Thompson Childs.  Postoperatively did well and on POD#1 patient was discharged to home. Left neck incision without swelling, no drainage no signs of infection. Small, appropriate bruising noted at incision site, however this continues to  improve. Mild headaches are well-managed with Tylenol, no hypertension. Neurologically intact.     REVIEW OF SYSTEMS:    A 12 point ROS was reviewed and is negative except for what is listed above in HPI.    PHH:    Past Medical History:   Diagnosis Date    Allergy status to analgesic agent     No reported medication allergies    Anesthesia of skin     No  problems with anaesthesia    CAD (coronary artery disease)     Elevated coronary artery calcium score     History of recurrent pneumonia     No Comments Provided    Hyperlipidemia     No Comments Provided    Low back pain     pain with bilateral leg and disc injuries.    Personal history of nicotine dependence     Personal history of other medical treatment (CODE)     No blood transfusions    PVD (peripheral vascular disease) (H24)           Past Surgical History:   Procedure Laterality Date    ANGIOPLASTY      Right leg stenting and bypass, Left also    COLONOSCOPY  08/23/2010    Q 10yrs.    COLONOSCOPY N/A 02/24/2022    6 tubular adenomas, 5 year follow up, 2/24/2027    CV CORONARY ANGIOGRAM N/A 4/15/2024    Procedure: Coronary Angiogram;  Surgeon: Toni Sellers MD;  Location:  HEART CARDIAC CATH LAB    ENDARTERECTOMY CAROTID Left 6/25/2024    Procedure: Left Carotid Endarterectomy with bovine pericardial patch angioplasty. Intraoperative Ultrasound. Intraoperative EEG monitoring;  Surgeon: Rasheed Sam MD;  Location: [facility] OR    EXTRACTION(S) DENTAL      recently removed    OTHER SURGICAL HISTORY      ORD091,PREMALIG/BENIGN SKIN LESION EXCISION, removed from chest    OTHER SURGICAL HISTORY      208489,ANESTHESIA ALERT,No  problems with anaesthesia       ALLERGIES:  Patient has no known allergies.    MEDS:    Current Outpatient Medications:     aspirin (ASA) 81 MG chewable tablet, Take 1 tablet (81 mg) by mouth daily (Patient taking differently: Take 81 mg by mouth every morning), Disp: 90 tablet, Rfl: 3    atorvastatin (LIPITOR) 80 MG tablet,  Take 1 tablet (80 mg) by mouth daily (Patient taking differently: Take 80 mg by mouth at bedtime), Disp: 90 tablet, Rfl: 3    EPINEPHrine (ANY BX GENERIC EQUIV) 0.3 MG/0.3ML injection 2-pack, Inject 0.3 mLs (0.3 mg) into the muscle once as needed for anaphylaxis, Disp: 1 each, Rfl: 0    nitroGLYcerin (NITROSTAT) 0.4 MG sublingual tablet, For chest pain place 1 tablet under the tongue every 5 minutes for 3 doses. If symptoms persist 5 minutes after 1st dose call 911., Disp: 25 tablet, Rfl: 3    gabapentin (NEURONTIN) 100 MG capsule, Take 1 capsule (100 mg) by mouth 3 times daily (Patient not taking: Reported on 7/8/2024), Disp: 270 capsule, Rfl: 4    ibuprofen (ADVIL/MOTRIN) 800 MG tablet, Take 1 tablet (800 mg) by mouth 3 times daily as needed TAKE 1 TABLET BY MOUTH 3 TIMES DAILY WITH MEALS. (Patient not taking: Reported on 7/8/2024), Disp: 100 tablet, Rfl: 5    oxyCODONE (ROXICODONE) 5 MG tablet, Take 1 tablet (5 mg) by mouth every 8 hours as needed for severe pain (Patient not taking: Reported on 7/8/2024), Disp: 8 tablet, Rfl: 0    tiZANidine (ZANAFLEX) 4 MG tablet, Take 1 tablet (4 mg) by mouth 3 times daily (Patient not taking: Reported on 7/8/2024), Disp: 90 tablet, Rfl: 11    SOCIAL HABITS:    History   Smoking Status    Former    Types: Cigars, Cigarettes   Smokeless Tobacco    Never     Social History    Substance and Sexual Activity      Alcohol use: Yes        Comment: 2 vodka every evening      History   Drug Use    Types: Marijuana     Comment: Not daily 2x week       FAMILY HISTORY:    Family History   Problem Relation Age of Onset    Diabetes Mother         Diabetes    Heart Disease Mother         Heart Disease    Other - See Comments Father         COPD, CD    Heart Disease Brother         Heart Disease    Other - See Comments Brother         killed by drunk  at age 19.    Substance Abuse Other         Alcohol/Drug, No hx of chemical dependency.    Diabetes Other         Diabetes    Heart  Disease Other         Heart Disease    Anesthesia Reaction No family hx of     Clotting Disorder No family hx of     Bleeding Disorder No family hx of        PE:  /75 (BP Location: Right arm, Patient Position: Chair, Cuff Size: Adult Regular)   Pulse 85   SpO2 94%   Wt Readings from Last 1 Encounters:   06/26/24 180 lb 1.9 oz     There is no height or weight on file to calculate BMI.    EXAM:  GENERAL: well-developed 68 year old male who appears his stated age  CARDIAC: normal   CHEST/LUNG: normal respiratory effort   MUSCULOSKELETAL: grossly normal and both lower extremities are intact, no lower extremity edema  NEUROLOGIC: focally intact, alert and oriented x 3  PSYCH: appropriate affect  NEURO: CN II-XII intact, no neuro deficits

## 2024-07-09 ENCOUNTER — PRE VISIT (OUTPATIENT)
Dept: SURGERY | Facility: CLINIC | Age: 69
End: 2024-07-09

## 2024-07-09 LAB
ATRIAL RATE - MUSE: 70 BPM
DIASTOLIC BLOOD PRESSURE - MUSE: NORMAL MMHG
INTERPRETATION ECG - MUSE: NORMAL
P AXIS - MUSE: 58 DEGREES
PR INTERVAL - MUSE: 170 MS
QRS DURATION - MUSE: 114 MS
QT - MUSE: 410 MS
QTC - MUSE: 442 MS
R AXIS - MUSE: -26 DEGREES
SYSTOLIC BLOOD PRESSURE - MUSE: NORMAL MMHG
T AXIS - MUSE: 158 DEGREES
VENTRICULAR RATE- MUSE: 70 BPM

## 2024-07-09 NOTE — TELEPHONE ENCOUNTER
FUTURE VISIT INFORMATION      SURGERY INFORMATION:  Date: 24  [facility]  Surgeon:  Kit Mason MD   Anesthesia Type:  general  Procedure: CORONARY ARTERY BYPASS GRAFT AND ALL INDICATED PROCEDURES     RECORDS REQUESTED FROM:         Primary Care Provider: Ha Keating MD - [facility]     Pertinent Medical History: PAD     Most recent EKG+ Tracin24     Most recent ECHO: 24     Most recent PFT's: 24

## 2024-07-11 ENCOUNTER — VIRTUAL VISIT (OUTPATIENT)
Dept: SURGERY | Facility: CLINIC | Age: 69
End: 2024-07-11
Payer: COMMERCIAL

## 2024-07-11 ENCOUNTER — ANESTHESIA EVENT (OUTPATIENT)
Dept: SURGERY | Facility: CLINIC | Age: 69
DRG: 235 | End: 2024-07-11
Payer: MEDICARE

## 2024-07-11 ENCOUNTER — PRE VISIT (OUTPATIENT)
Dept: SURGERY | Facility: CLINIC | Age: 69
End: 2024-07-11

## 2024-07-11 VITALS — HEIGHT: 70 IN | WEIGHT: 180 LBS | BODY MASS INDEX: 25.77 KG/M2

## 2024-07-11 DIAGNOSIS — I25.118 CORONARY ARTERY DISEASE OF NATIVE ARTERY OF NATIVE HEART WITH STABLE ANGINA PECTORIS (H): ICD-10-CM

## 2024-07-11 DIAGNOSIS — Z01.818 PREOP EXAMINATION: Primary | ICD-10-CM

## 2024-07-11 PROCEDURE — 99214 OFFICE O/P EST MOD 30 MIN: CPT | Mod: 24 | Performed by: CLINICAL NURSE SPECIALIST

## 2024-07-11 ASSESSMENT — PAIN SCALES - GENERAL: PAINLEVEL: MILD PAIN (3)

## 2024-07-11 ASSESSMENT — LIFESTYLE VARIABLES: TOBACCO_USE: 1

## 2024-07-11 ASSESSMENT — ENCOUNTER SYMPTOMS
SEIZURES: 0
DYSRHYTHMIAS: 0

## 2024-07-11 ASSESSMENT — COPD QUESTIONNAIRES: COPD: 0

## 2024-07-11 NOTE — PATIENT INSTRUCTIONS
Preparing for Your Surgery      Name:  Modesto Lehman   MRN:  [id number]   :  1955   Today's Date:  2024       Arriving for surgery:  Surgery date:  24  Arrival time:  5 am    Please come to:     [facility] [place] [facility]    [address] [place]  [address]     The [facility] [place] Patient/Visitor Ramp is at [address]. Patients and visitors who self-park will receive the reduced hospital parking rate. If the Patient /Visitor Ramp is full, please follow the signs to the [employer] car park located at the main hospital entrance.       parking is available (24 hours/ 7 days a week)      Discounted parking pass options are available for patients and visitors. They can be purchased at the [employer] desk at the [facility] hospital entrance.     -  Stop at the security desk and they will direct surgery patients to the Surgery Check in and Family Lounge. [phone]        - If you need directions, a wheelchair or an escort please stop at the Information/security desk in the lobby.     What can I eat or drink?  -  You may eat and drink normally up to 8 hours prior to arrival time. (Until 9 pm 24)  -  You may have clear liquids until 2 hours prior to arrival time. (Until 3 am)    Examples of clear liquids:  Water  Clear broth  Juices (apple, white grape, white cranberry  and cider) without pulp  Noncarbonated, powder based beverages  (lemonade and Ahmet-Aid)  Sodas (Sprite, 7-Up, ginger ale and seltzer)  Coffee or tea (without milk or cream)  Gatorade    -  No Alcohol or cannabis products for at least 24 hours before surgery.     Which medicines can I take?       Hold Multivitamins for 7 days before surgery.  Hold Supplements for 7 days before surgery.  Hold Ibuprofen (Advil, Motrin) for 7 days before surgery--unless otherwise directed by surgeon.  Hold Naproxen (Aleve) for 7 days before  surgery.  Acetaminophen (Tylenol) is okay to take if needed.    ASPIRIN is okay to take up to the day before your surgery but NOT the day of your surgery.     -  DO NOT take these medications the day of surgery:  Aspirin    -  PLEASE TAKE these medications the day of surgery:  Acetaminophen (Tylenol) if needed    If Gabapentin (Neurontin) or Tizanidine (Zanaflex) are restarted prior to surgery, they can be taken the morning of surgery.    How do I prepare myself?  - Please take 2 showers (one the night prior to surgery and one the morning of surgery) using Scrubcare or Hibiclens soap.    You may use your own shampoo and conditioner. No other hair products.     Use this soap only from the neck to your toes.     Leave the soap on your skin for one minute--then rinse thoroughly.      - Please remove all jewelry and body piercings.  - No lotions, deodorants or fragrance.  - No makeup or fingernail polish.   - Bring your ID and insurance card.    -If you use a CPAP machine, please bring the CPAP machine, tubing, and mask to hospital.    -If you have a Deep Brain Stimulator, Spinal Cord Stimulator, or any Neuro Stimulator device---you must bring the remote control to the hospital.      ALL PATIENTS GOING HOME THE SAME DAY OF SURGERY ARE REQUIRED TO HAVE A RESPONSIBLE ADULT TO DRIVE AND BE IN ATTENDANCE WITH THEM FOR 24 HOURS FOLLOWING SURGERY.    Covid testing policy as of 12/06/2022  Your surgeon will notify and schedule you for a COVID test if one is needed before surgery--please direct any questions or COVID symptoms to your surgeon      Questions or Concerns:    - For any questions regarding the day of surgery or your hospital stay, please contact the Pre Admission Nursing Office at [phone].       - If you have health changes between today and your surgery, please call your surgeon.       - For questions after surgery, please call your surgeons office.           Current Visitor Guidelines    You may have 2  visitors in the pre op area.    Visiting hours: 8 a.m. to 8:30 p.m.    Patients confirmed or suspected to have symptoms of COVID 19 or flu:     No visitors allowed for adult patients.   Children (under age 18) can have 1 named visitor.     People who are sick or showing symptoms of COVID 19 or flu:    Are not allowed to visit patients--we can only make exceptions in special situations.       Please follow these guidelines for your visit:          Please maintain social distance          Masking is optional--however at times you may be asked to wear a mask for the safety of yourself and others     Clean your hands with alcohol hand . Do this when you arrive at and leave the building and patient room,    And again after you touch your mask or anything in the room.     Go directly to and from the room you are visiting.     Stay in the patient s room during your visit. Limit going to other places in the hospital as much as possible     Leave bags and jackets at home or in the car.     For everyone s health, please don t come and go during your visit. That includes for smoking   during your visit.

## 2024-07-11 NOTE — PROGRESS NOTES
Modesto is a 68 year old who is being evaluated via a billable video visit.    How would you like to obtain your AVS? MyChart  If the video visit is dropped, the invitation should be resent by: Text to cell phone: [phone]    Subjective   Modesto is a 68 year old, presenting for the following health issues:  Pre-Op Exam      HPI           Physical Exam

## 2024-07-11 NOTE — H&P
Pre-Operative H & P     CC:  Preoperative exam to assess for increased cardiopulmonary risk while undergoing surgery and anesthesia.    Date of Encounter: 7/11/2024  Primary Care Physician:  Ha Keating     Reason for visit:   Encounter Diagnoses   Name Primary?    Preop examination Yes    Coronary artery disease of native artery of native heart with stable angina pectoris (H24)        HPI  Modesto Lehman is a 68 year old male who presents for pre-operative H & P in preparation for  Procedure Information       Case: [id number] Date/Time: 07/25/24 0730    Procedure: CORONARY ARTERY BYPASS GRAFT AND ALL INDICATED PROCEDURES (Chest)    Anesthesia type: General    Diagnosis: CAD (coronary artery disease) [I25.10]    Pre-op diagnosis: CAD (coronary artery disease) [I25.10]    Location:  OR [address] [place] [address] OR    Providers: Kit Mason MD          History is obtained from the patient and chart review    Patient who was undergoing workup for coronary artery disease, and was found to have a completely occluded right internal carotid artery and high-grade stenosis of the left internal carotid artery. He was referred to Dr. Sam, and was taken the OR on 6/25/24 for left CEA. Postoperatively, he did well and on POD#1 patient was discharged to home. He has recently had follow up with vascular team.     His follow-up has continued with Dr. Romano and he is now preparing for above coronary artery bypass graft surgery in treatment of severe multivessel CAD.    His history is otherwise significant for HLD, ischemic cardiomyopathy, PAD, with history of bilateral popliteal bypass, and chronic back pain.    Hx of abnormal bleeding or anti-platelet use: ASA 81 mg daily    Past Medical History  Past Medical History:   Diagnosis Date    Allergy status to analgesic agent     No reported medication allergies    Anesthesia of skin     No  problems with anaesthesia    CAD (coronary artery disease)     Elevated coronary  artery calcium score     History of recurrent pneumonia     No Comments Provided    Hyperlipidemia     No Comments Provided    Low back pain     pain with bilateral leg and disc injuries.    Personal history of nicotine dependence     Personal history of other medical treatment (CODE)     No blood transfusions    PVD (peripheral vascular disease) (H24)        Past Surgical History  Past Surgical History:   Procedure Laterality Date    ANGIOPLASTY      Right leg stenting and bypass, Left also    COLONOSCOPY  08/23/2010    Q 10yrs.    COLONOSCOPY N/A 02/24/2022    6 tubular adenomas, 5 year follow up, 2/24/2027    CV CORONARY ANGIOGRAM N/A 4/15/2024    Procedure: Coronary Angiogram;  Surgeon: Toni Sellers MD;  Location:  [facility]    ENDARTERECTOMY CAROTID Left 6/25/2024    Procedure: Left Carotid Endarterectomy with bovine pericardial patch angioplasty. Intraoperative Ultrasound. Intraoperative EEG monitoring;  Surgeon: Rasheed Sam MD;  Location: [facility] OR    EXTRACTION(S) DENTAL      recently removed    OTHER SURGICAL HISTORY      [id number],PREMALIG/BENIGN SKIN LESION EXCISION, removed from chest    OTHER SURGICAL HISTORY      [id number],ANESTHESIA ALERT,No  problems with anaesthesia       Prior to Admission Medications  Current Outpatient Medications   Medication Sig Dispense Refill    aspirin (ASA) 81 MG chewable tablet Take 1 tablet (81 mg) by mouth daily (Patient taking differently: Take 81 mg by mouth every morning) 90 tablet 3    atorvastatin (LIPITOR) 80 MG tablet Take 1 tablet (80 mg) by mouth daily (Patient taking differently: Take 80 mg by mouth at bedtime) 90 tablet 3    EPINEPHrine (ANY BX GENERIC EQUIV) 0.3 MG/0.3ML injection 2-pack Inject 0.3 mLs (0.3 mg) into the muscle once as needed for anaphylaxis 1 each 0    gabapentin (NEURONTIN) 100 MG capsule Take 1 capsule (100 mg) by mouth 3 times daily (Patient not taking: Reported on 7/8/2024) 270 capsule 4    ibuprofen  (ADVIL/MOTRIN) 800 MG tablet Take 1 tablet (800 mg) by mouth 3 times daily as needed TAKE 1 TABLET BY MOUTH 3 TIMES DAILY WITH MEALS. (Patient not taking: Reported on 7/8/2024) 100 tablet 5    nitroGLYcerin (NITROSTAT) 0.4 MG sublingual tablet For chest pain place 1 tablet under the tongue every 5 minutes for 3 doses. If symptoms persist 5 minutes after 1st dose call 911. 25 tablet 3    oxyCODONE (ROXICODONE) 5 MG tablet Take 1 tablet (5 mg) by mouth every 8 hours as needed for severe pain (Patient not taking: Reported on 7/8/2024) 8 tablet 0    tiZANidine (ZANAFLEX) 4 MG tablet Take 1 tablet (4 mg) by mouth 3 times daily (Patient not taking: Reported on 7/8/2024) 90 tablet 11       Allergies  No Known Allergies    Social History  Social History     Socioeconomic History    Marital status:      Spouse name: Not on file    Number of children: Not on file    Years of education: Not on file    Highest education level: Not on file   Occupational History    Not on file   Tobacco Use    Smoking status: Former     Current packs/day: 0.25     Average packs/day: 0.3 packs/day for 14.7 years (3.7 ttl pk-yrs)     Types: Cigars, Cigarettes     Start date: 11/4/2009    Smokeless tobacco: Never    Tobacco comments:     Quit smoking: quit 6/20/17 (cigarettes); smokes cigars 2 puffs cigar week   Vaping Use    Vaping status: Never Used   Substance and Sexual Activity    Alcohol use: Yes     Comment: 2 vodka 3-4x week    Drug use: Yes     Types: Marijuana     Comment: Not daily 2x week    Sexual activity: Not Currently   Other Topics Concern    Not on file   Social History Narrative    Self employed as .   with 3 adult children.    No hx of chemical dependency.  Patient is a former smoker.   Alcohol Use - yes occ    Wife - Imelda Lehman     Social Determinants of Health     Financial Resource Strain: Low Risk  (2/16/2024)    Financial Resource Strain     Within the past 12 months, have you or your family members  you live with been unable to get utilities (heat, electricity) when it was really needed?: No   Food Insecurity: Low Risk  (2/16/2024)    Food Insecurity     Within the past 12 months, did you worry that your food would run out before you got money to buy more?: No     Within the past 12 months, did the food you bought just not last and you didn t have money to get more?: No   Transportation Needs: Low Risk  (2/16/2024)    Transportation Needs     Within the past 12 months, has lack of transportation kept you from medical appointments, getting your medicines, non-medical meetings or appointments, work, or from getting things that you need?: No   Physical Activity: Sufficiently Active (2/16/2024)    Exercise Vital Sign     Days of Exercise per Week: 7 days     Minutes of Exercise per Session: 60 min   Stress: Stress Concern Present (2/16/2024)    [employer] [place] - Occupational Stress Questionnaire     Feeling of Stress : Very much   Social Connections: Not on file   Interpersonal Safety: Low Risk  (4/11/2024)    Interpersonal Safety     Do you feel physically and emotionally safe where you currently live?: Yes     Within the past 12 months, have you been hit, slapped, kicked or otherwise physically hurt by someone?: No     Within the past 12 months, have you been humiliated or emotionally abused in other ways by your partner or ex-partner?: No   Housing Stability: Low Risk  (2/16/2024)    Housing Stability     Do you have housing? : Yes     Are you worried about losing your housing?: No       Family History  Family History   Problem Relation Age of Onset    Diabetes Mother         Diabetes    Heart Disease Mother         Heart Disease    Other - See Comments Father         COPD, CD    Heart Disease Brother         Heart Disease    Other - See Comments Brother         killed by drunk  at age 19.    Substance Abuse Other         Alcohol/Drug, No hx of chemical dependency.    Diabetes  Other         Diabetes    Heart Disease Other         Heart Disease    Anesthesia Reaction No family hx of     Clotting Disorder No family hx of     Bleeding Disorder No family hx of        Review of Systems  The complete review of systems is negative other than noted in the HPI or here.   Anesthesia Evaluation   Pt has had prior anesthetic. Type: General and MAC.    No history of anesthetic complications       ROS/MED HX  ENT/Pulmonary: Comment: Reports 2 puffs of cigar twice weekly  Smokes marijuana twice weekly    (+)                tobacco use, Current use,   patient smoked within 24 hours,                 (-) asthma, COPD, NELLIE risk factors and recent URI   Neurologic:    (-) no seizures and no CVA   Cardiovascular: Comment: PAD s/p Bilateral popliteal bypass  S/p left CEA 6/25/24      (+) Dyslipidemia - Peripheral Vascular Disease-- Other and Carotid Stenosis.  CAD -  - -   Taking blood thinners Pt has not received instructions: Instructions Given to patient: Will remain on ASA up to DOS. CHF etiology: ischemic Last EF: 30-35% date: 4/16/24                        Previous cardiac testing   Echo: Date: 4/23/24 Results:    Stress Test:  Date: 2013 Results:    ECG Reviewed:  Date: 4/30/24 Results:  Sinus rhythm   Possible Left atrial enlargement   Minimal voltage criteria for LVH, may be normal variant ( Jono product )   ST & T wave abnormality, consider lateral ischemia   Abnormal ECG     Cath:  Date: 4/15/24 Results:   (-) angina, FORD, arrhythmias and angina   METS/Exercise Tolerance: 3 - Able to walk 1-2 blocks without stopping Comment: METS currently ~4 due to restrictions from cardiology. Does all of cooking and cares for his wife, gardening.    Hematologic:  - neg hematologic  ROS  (-) history of blood clots and history of blood transfusion   Musculoskeletal:       GI/Hepatic:  - neg GI/hepatic ROS  (-) GERD   Renal/Genitourinary:  - neg Renal ROS     Endo:     (+)               Obesity,    (-) Type II  DM, thyroid disease and chronic steroid usage   Psychiatric/Substance Use:     (+)     Recreational drug usage: Cannabis. (-) psychiatric history   Infectious Disease: Comment: Hx of Lyme Disease   (-) Recent Fever   Malignancy:  - neg malignancy ROS     Other:  - neg other ROS    (+)  , H/O Chronic Pain (back, h/o multiple back injuries),         Virtual visit -  No vitals were obtained    Physical Exam  Constitutional: Awake, alert, no apparent distress, and appears stated age.  HENT: Normocephalic  Respiratory: non labored breathing; no cough   Neurologic: Oriented to name, place and time.   Neuropsychiatric: Calm, cooperative. Normal affect.      Prior Labs/Diagnostic Studies   All labs and imaging personally reviewed   Lab Results   Component Value Date    WBC 7.8 07/08/2024    WBC 10.6 07/31/2020     Lab Results   Component Value Date    RBC 4.64 07/08/2024    RBC 4.61 07/31/2020     Lab Results   Component Value Date    HGB 14.7 07/08/2024    HGB 14.2 07/31/2020     Lab Results   Component Value Date    HCT 43.7 07/08/2024    HCT 41.4 07/31/2020     Lab Results   Component Value Date    MCV 94 07/08/2024    MCV 90 07/31/2020     Lab Results   Component Value Date    MCH 31.7 07/08/2024    MCH 30.8 07/31/2020     Lab Results   Component Value Date    MCHC 33.6 07/08/2024    MCHC 34.3 07/31/2020     Lab Results   Component Value Date    RDW 14.5 07/08/2024    RDW 14.5 07/31/2020     Lab Results   Component Value Date     07/08/2024     07/31/2020     Last Comprehensive Metabolic Panel:  Sodium   Date Value Ref Range Status   07/08/2024 139 135 - 145 mmol/L Final   07/31/2020 140 134 - 144 mmol/L Final     Potassium   Date Value Ref Range Status   07/08/2024 4.2 3.4 - 5.3 mmol/L Final   12/01/2021 4.1 3.5 - 5.1 mmol/L Final   07/31/2020 3.4 (L) 3.5 - 5.1 mmol/L Final     Potassium POCT   Date Value Ref Range Status   06/25/2024 4.3 3.4 - 5.3 mmol/L Final     Chloride   Date Value Ref Range Status    07/08/2024 100 98 - 107 mmol/L Final   12/01/2021 100 98 - 107 mmol/L Final   07/31/2020 109 (H) 98 - 107 mmol/L Final     Carbon Dioxide   Date Value Ref Range Status   07/31/2020 23 21 - 31 mmol/L Final     Carbon Dioxide (CO2)   Date Value Ref Range Status   07/08/2024 29 22 - 29 mmol/L Final   12/01/2021 31 21 - 31 mmol/L Final     Anion Gap   Date Value Ref Range Status   07/08/2024 10 7 - 15 mmol/L Final   12/01/2021 6 3 - 14 mmol/L Final   07/31/2020 8 3 - 14 mmol/L Final     Glucose   Date Value Ref Range Status   07/08/2024 106 (H) 70 - 99 mg/dL Final   12/01/2021 87 70 - 105 mg/dL Final   07/31/2020 165 (H) 70 - 105 mg/dL Final     GLUCOSE BY METER POCT   Date Value Ref Range Status   06/26/2024 108 (H) 70 - 99 mg/dL Final     Urea Nitrogen   Date Value Ref Range Status   07/08/2024 12.5 8.0 - 23.0 mg/dL Final   12/01/2021 14 7 - 25 mg/dL Final   07/31/2020 9 7 - 25 mg/dL Final     Creatinine   Date Value Ref Range Status   07/08/2024 0.95 0.67 - 1.17 mg/dL Final   07/31/2020 0.80 0.70 - 1.30 mg/dL Final     GFR Estimate   Date Value Ref Range Status   07/08/2024 88 >60 mL/min/1.73m2 Final     Comment:     eGFR calculated using 2021 CKD-EPI equation.   07/31/2020 >90 >60 mL/min/[1.73_m2] Final     Calcium   Date Value Ref Range Status   07/08/2024 9.2 8.8 - 10.2 mg/dL Final   07/31/2020 8.2 (L) 8.6 - 10.3 mg/dL Final     Bilirubin Total   Date Value Ref Range Status   07/08/2024 0.2 <=1.2 mg/dL Final   07/03/2020 0.4 0.3 - 1.0 mg/dL Final     Alkaline Phosphatase   Date Value Ref Range Status   07/08/2024 88 40 - 150 U/L Final   07/03/2020 96 34 - 104 U/L Final     ALT   Date Value Ref Range Status   07/08/2024 49 0 - 70 U/L Final     Comment:     Reference intervals for this test were updated on 6/12/2023 to more accurately reflect our healthy population. There may be differences in the flagging of prior results with similar values performed with this method. Interpretation of those prior results can be  made in the context of the updated reference intervals.     2020 19 7 - 52 U/L Final     AST   Date Value Ref Range Status   2024 41 0 - 45 U/L Final     Comment:     Reference intervals for this test were updated on 2023 to more accurately reflect our healthy population. There may be differences in the flagging of prior results with similar values performed with this method. Interpretation of those prior results can be made in the context of the updated reference intervals.   2020 17 13 - 39 U/L Final     INR 1.02  PTT 29    EK24  Sinus rhythm   Possible Left atrial enlargement   Left ventricular hypertrophy with repolarization abnormality ( Bessemer product )  ST elevation consider anterior injury or acute infarct   Abnormal ECG     24 Echocardiogram  Interpretation Summary  Left ventricular function is decreased. The ejection fraction is 30-35%  (moderately reduced).  There is moderate diffuse hypokinesis with akinesis of the basal-mid  inferolateral and basal inferior segments indicative of multivessel CAD.  Global right ventricular function is normal.  No significant valvular abnormalities present.  Estimated mean right atrial pressure is normal.  No pericardial effusion is present.    Cardiac cath 4/15/24    Mid LM lesion is 70% stenosed.    Prox LAD to Mid LAD lesion is 60% stenosed.    1st Diag lesion is 60% stenosed.    Mid Cx lesion is 100% stenosed.    Ost Cx to Prox Cx lesion is 99% stenosed.    Prox RCA lesion is 100% stenosed.    24 CHEST X-RAY                   IMPRESSION: No focal airspace disease.        Latest Ref Rng & Units 2024     7:47 AM   PFT   FVC L 5.40    FEV1 L 4.02    FVC% % 138    FEV1% % 133          The patient's records and results personally reviewed by this provider.     Outside records reviewed from: Care Everywhere      Assessment    Modesto Lehman is a 68 year old male seen as a PAC referral for risk assessment and optimization for  anesthesia.    Plan/Recommendations  Pt will be optimized for the proposed procedure.  See below for details on the assessment, risk, and preoperative recommendations    NEUROLOGY  - No history of TIA, CVA or seizure  Chronic Pain in back after multiple injuries. May take gabapentin and/or Zanaflex if needed. Currently not taking.    -Post Op delirium risk factors:  High co-morbid index    ENT  - No current airway concerns.  Will need to be reassessed day of surgery.  Mallampati: Unable to assess  TM: Unable to assess  Upper dentures  Few loose teeth on bottom  Anesthesia record available    CARDIAC  HLD. Atorvastatin at HS. CAD as above with plan for CAB. ICM. Echocardiogram showed EF 30-35%, (moderately reduced), moderate diffuse hypokinesis with akinesis of the basal-mid inferolateral and basal inferior segments indicative of multivessel CAD. PAD s/p bilateral popliteal bypass. Carotid US showing completely occluded right internal carotid artery immediately distal to the carotid bifurcation, high-grade stenosis of the left internal carotid artery, most pronounced along the proximal segment with stenosis greater than 70%.  Evaluation continues by vascular surgery and he is s/p left CEA on 6/25/24. Today, patient denies chest pain and DYSPNEA ON EXERTION. Is doing normal activities around his home.  ASA 81 mg daily and will remain on up to day of surgery.    - METS (Metabolic Equivalents)<4    PULMONARY  Continues to smoke cigars, 2 puffs twice weekly  Denies cough or shortness of breath.   Low risk for NELLIE  NELLIE Low Risk             Total Score: 2    NELLIE: Over 50 ys old    NELLIE: Male        - Tobacco History    History   Smoking Status    Former    Types: Cigars, Cigarettes   Smokeless Tobacco    Never       GI: Denies GERD  PONV Medium Risk  Total Score: 2           1 AN PONV: Patient is not a current smoker    1 AN PONV: Intended Post Op Opioids        /RENAL  - Baseline Creatinine  0.95    ENDOCRINE    - BMI:  "Estimated body mass index is 26.05 kg/m  as calculated from the following:    Height as of this encounter: 1.77 m (5' 9.7\").    Weight as of this encounter: 81.6 kg (180 lb).  Overweight (BMI 25.0-29.9)  Prediabetes with A1c 5.9     HEME  VTE Low Risk 0.5%             Total Score: 3    VTE: Greater than 59 yrs old    VTE: Male      Denies personal or family history of blood clots  Denies personal or family history of bleeding disorder  Unknown history of blood transfusion   Type and screen drawn by service      MSK: Frailty score 1/5    PSYCH  - Difficulty sleeping due to back pain    Patient is concerned about pain during postop recovery.    Will hold marijuana for 24 hours prior to surgery    Different anesthesia methods/types have been discussed with the patient, but they are aware that the final plan will be decided by the assigned anesthesia provider on the date of service.    The patient is optimized for their procedure. AVS with information on surgery time/arrival time, meds and NPO status given by nursing staff. No further diagnostic testing indicated.    Please refer to the physical examination documented by the anesthesiologist in the anesthesia record on the day of surgery.    Video-Visit Details    Type of service:  Video Visit    Provider received verbal consent for a Video Visit from the patient? Yes   Video Start Time: 8:46am  Video End Time: 8:51am    Originating Location (pt. Location): Home    Distant Location (provider location):  Off-site  Mode of Communication:  Video Conference via [employer]  On the day of service:     Prep time: 12 minutes  Visit time: 5 minutes  Documentation time: 15 minutes  ------------------------------------------  Total time: 32 minutes      CHIN Siddiqui CNS  Preoperative Assessment Center  [facility]  [facility]  Phone: [phone]  Fax: [phone]    "

## 2024-07-17 RX ORDER — GABAPENTIN 100 MG/1
100 CAPSULE ORAL 3 TIMES DAILY PRN
COMMUNITY

## 2024-07-17 NOTE — PHARMACY-ADMISSION MEDICATION HISTORY
Pharmacy Intern Pre-operative Admission Medication History    Admission medication history was completed on July 17, 2024 in anticipation for upcoming surgical admission currently scheduled for 7/25/24. The information provided in this note is only as accurate as the sources available at the time of the update.  Pre-operative nursing staff should still review this list with patient for any changes or updates.     Information Source(s): Patient and CareEverywhere/SureScripts via phone    Pertinent Information:     - Per patient, the only medications that he is taking are aspirin and atorvastatin.     - The patient stated that he has gabapentin currently but only uses it as needed when his pain is severe. The patient said it has been a while since he needed it.      Changes made to PTA medication list:  Added: None  Deleted:   Oxycodone 5 mg tablets: The patient stated that he is not on this medication anymore.   Tizanidine 4 mg tablets:  The patient stated that he is not on this medication anymore.   Changed:   gabapentin changed to as needed.     Allergies reviewed with patient and updates made in EHR: yes    Medication History Completed By: Darryn Puga 7/17/2024 3:44 PM    Prior to Admission medications    Medication Sig Last Dose Taking? Auth Provider Long Term End Date   aspirin (ASA) 81 MG chewable tablet Take 1 tablet (81 mg) by mouth daily  Patient taking differently: Take 81 mg by mouth every morning  Yes Omega Chandler, DO No    atorvastatin (LIPITOR) 80 MG tablet Take 1 tablet (80 mg) by mouth daily  Patient taking differently: Take 80 mg by mouth at bedtime  Yes Omega Chandler, DO Yes    EPINEPHrine (ANY BX GENERIC EQUIV) 0.3 MG/0.3ML injection 2-pack Inject 0.3 mLs (0.3 mg) into the muscle once as needed for anaphylaxis   Brain Pena MD     gabapentin (NEURONTIN) 100 MG capsule Take 100 mg by mouth 3 times daily as needed (Pain)  Patient not taking: Reported on 7/17/2024 Not Taking   Unknown, Entered By History No    ibuprofen (ADVIL/MOTRIN) 800 MG tablet Take 1 tablet (800 mg) by mouth 3 times daily as needed TAKE 1 TABLET BY MOUTH 3 TIMES DAILY WITH MEALS.  Patient not taking: Reported on 7/17/2024 Not Taking  Hoa Seay APRN CNP     nitroGLYcerin (NITROSTAT) 0.4 MG sublingual tablet For chest pain place 1 tablet under the tongue every 5 minutes for 3 doses. If symptoms persist 5 minutes after 1st dose call 911.   Omega Chandler, DO Yes

## 2024-07-25 ENCOUNTER — HOSPITAL ENCOUNTER (INPATIENT)
Facility: CLINIC | Age: 69
LOS: 6 days | Discharge: HOME | DRG: 235 | End: 2024-07-31
Attending: THORACIC SURGERY (CARDIOTHORACIC VASCULAR SURGERY) | Admitting: THORACIC SURGERY (CARDIOTHORACIC VASCULAR SURGERY)
Payer: MEDICARE

## 2024-07-25 ENCOUNTER — ANESTHESIA (OUTPATIENT)
Dept: SURGERY | Facility: CLINIC | Age: 69
DRG: 235 | End: 2024-07-25
Payer: MEDICARE

## 2024-07-25 ENCOUNTER — APPOINTMENT (OUTPATIENT)
Dept: GENERAL RADIOLOGY | Facility: CLINIC | Age: 69
DRG: 235 | End: 2024-07-25
Attending: SURGERY
Payer: MEDICARE

## 2024-07-25 DIAGNOSIS — I25.118 CORONARY ARTERY DISEASE OF NATIVE ARTERY OF NATIVE HEART WITH STABLE ANGINA PECTORIS (H): Primary | ICD-10-CM

## 2024-07-25 LAB
ABO/RH(D): NORMAL
ALBUMIN SERPL BCG-MCNC: 3.8 G/DL (ref 3.5–5.2)
ALLEN'S TEST: ABNORMAL
ALP SERPL-CCNC: 72 U/L (ref 40–150)
ALT SERPL W P-5'-P-CCNC: 25 U/L (ref 0–70)
ANION GAP SERPL CALCULATED.3IONS-SCNC: 13 MMOL/L (ref 7–15)
ANTIBODY SCREEN: NEGATIVE
APTT PPP: 29 SECONDS (ref 22–38)
APTT PPP: 31 SECONDS (ref 22–38)
AST SERPL W P-5'-P-CCNC: 43 U/L (ref 0–45)
BASE EXCESS BLDA CALC-SCNC: -0.1 MMOL/L (ref -3–3)
BASE EXCESS BLDA CALC-SCNC: -0.5 MMOL/L (ref -3–3)
BASE EXCESS BLDA CALC-SCNC: -1.1 MMOL/L (ref -3–3)
BASE EXCESS BLDA CALC-SCNC: -1.7 MMOL/L (ref -3–3)
BASE EXCESS BLDA CALC-SCNC: -2.4 MMOL/L (ref -3–3)
BASE EXCESS BLDA CALC-SCNC: -2.5 MMOL/L (ref -3–3)
BASE EXCESS BLDA CALC-SCNC: -2.7 MMOL/L (ref -3–3)
BASE EXCESS BLDA CALC-SCNC: -4 MMOL/L (ref -3–3)
BASE EXCESS BLDA CALC-SCNC: 0.7 MMOL/L (ref -3–3)
BASE EXCESS BLDA CALC-SCNC: 2 MMOL/L (ref -3–3)
BASE EXCESS BLDV CALC-SCNC: -2.1 MMOL/L (ref -3–3)
BASE EXCESS BLDV CALC-SCNC: 0.7 MMOL/L (ref -3–3)
BASE EXCESS BLDV CALC-SCNC: 2.4 MMOL/L (ref -3–3)
BASE EXCESS BLDV CALC-SCNC: 2.9 MMOL/L (ref -3–3)
BILIRUB SERPL-MCNC: 0.4 MG/DL
BLD PROD TYP BPU: NORMAL
BLD PROD TYP BPU: NORMAL
BLOOD COMPONENT TYPE: NORMAL
BLOOD COMPONENT TYPE: NORMAL
BUN SERPL-MCNC: 13.4 MG/DL (ref 8–23)
CA-I BLD-MCNC: 3.8 MG/DL (ref 4.4–5.2)
CA-I BLD-MCNC: 3.9 MG/DL (ref 4.4–5.2)
CA-I BLD-MCNC: 4 MG/DL (ref 4.4–5.2)
CA-I BLD-MCNC: 4.1 MG/DL (ref 4.4–5.2)
CA-I BLD-MCNC: 4.1 MG/DL (ref 4.4–5.2)
CA-I BLD-MCNC: 4.3 MG/DL (ref 4.4–5.2)
CA-I BLD-MCNC: 4.4 MG/DL (ref 4.4–5.2)
CA-I BLD-MCNC: 4.5 MG/DL (ref 4.4–5.2)
CA-I BLD-MCNC: 4.6 MG/DL (ref 4.4–5.2)
CALCIUM SERPL-MCNC: 8.3 MG/DL (ref 8.8–10.4)
CHLORIDE SERPL-SCNC: 105 MMOL/L (ref 98–107)
CLOT INIT KAOL IND TO POST HEP NEUT TRTO: 0.9 {RATIO}
CLOT INIT KAOL IND TO POST HEP NEUT TRTO: 1 {RATIO}
CLOT INIT KAOLIN IND BLD US: 123 SEC (ref 113–166)
CLOT INIT KAOLIN IND BLD US: 78 SEC (ref 113–166)
CLOT INIT KAOLIN IND P HEP NEUT BLD US: 124 SEC (ref 103–153)
CLOT INIT KAOLIN IND P HEP NEUT BLD US: 83 SEC (ref 103–153)
CLOT STIFF PLT CONT BLD CALC: 15.2 HPA (ref 11.9–29.8)
CLOT STIFF PLT CONT BLD CALC: 16 HPA (ref 11.9–29.8)
CLOT STIFF TF IND P HEP NEUT BLD US: 17.3 HPA (ref 13–33.2)
CLOT STIFF TF IND P HEP NEUT BLD US: 18.1 HPA (ref 13–33.2)
CLOT STIFF TF IND+IIB-IIIA INH P HEP NEU: 2.1 HPA (ref 1–3.7)
CLOT STIFF TF IND+IIB-IIIA INH P HEP NEU: 2.1 HPA (ref 1–3.7)
CODING SYSTEM: NORMAL
CODING SYSTEM: NORMAL
COHGB MFR BLD: 95.6 % (ref 95–96)
COHGB MFR BLD: 97.9 % (ref 95–96)
COHGB MFR BLD: 99.2 % (ref 95–96)
CREAT SERPL-MCNC: 0.89 MG/DL (ref 0.67–1.17)
CROSSMATCH: NORMAL
CROSSMATCH: NORMAL
EGFRCR SERPLBLD CKD-EPI 2021: >90 ML/MIN/1.73M2
ERYTHROCYTE [DISTWIDTH] IN BLOOD BY AUTOMATED COUNT: 14.6 % (ref 10–15)
FIBRINOGEN PPP-MCNC: 278 MG/DL (ref 170–490)
GLUCOSE BLD-MCNC: 137 MG/DL (ref 70–99)
GLUCOSE BLD-MCNC: 155 MG/DL (ref 70–99)
GLUCOSE BLD-MCNC: 157 MG/DL (ref 70–99)
GLUCOSE BLD-MCNC: 181 MG/DL (ref 70–99)
GLUCOSE BLD-MCNC: 191 MG/DL (ref 70–99)
GLUCOSE BLD-MCNC: 197 MG/DL (ref 70–99)
GLUCOSE BLD-MCNC: 200 MG/DL (ref 70–99)
GLUCOSE BLD-MCNC: 90 MG/DL (ref 70–99)
GLUCOSE BLDC GLUCOMTR-MCNC: 105 MG/DL (ref 70–99)
GLUCOSE BLDC GLUCOMTR-MCNC: 109 MG/DL (ref 70–99)
GLUCOSE BLDC GLUCOMTR-MCNC: 123 MG/DL (ref 70–99)
GLUCOSE BLDC GLUCOMTR-MCNC: 137 MG/DL (ref 70–99)
GLUCOSE BLDC GLUCOMTR-MCNC: 142 MG/DL (ref 70–99)
GLUCOSE BLDC GLUCOMTR-MCNC: 151 MG/DL (ref 70–99)
GLUCOSE SERPL-MCNC: 158 MG/DL (ref 70–99)
HCO3 BLD-SCNC: 26 MMOL/L (ref 21–28)
HCO3 BLD-SCNC: 26 MMOL/L (ref 21–28)
HCO3 BLD-SCNC: 27 MMOL/L (ref 21–28)
HCO3 BLDA-SCNC: 23 MMOL/L (ref 21–28)
HCO3 BLDA-SCNC: 23 MMOL/L (ref 21–28)
HCO3 BLDA-SCNC: 24 MMOL/L (ref 21–28)
HCO3 BLDA-SCNC: 24 MMOL/L (ref 21–28)
HCO3 BLDA-SCNC: 25 MMOL/L (ref 21–28)
HCO3 BLDA-SCNC: 25 MMOL/L (ref 21–28)
HCO3 BLDA-SCNC: 26 MMOL/L (ref 21–28)
HCO3 BLDV-SCNC: 26 MMOL/L (ref 21–28)
HCO3 BLDV-SCNC: 28 MMOL/L (ref 21–28)
HCO3 BLDV-SCNC: 29 MMOL/L (ref 21–28)
HCO3 BLDV-SCNC: 29 MMOL/L (ref 21–28)
HCO3 SERPL-SCNC: 24 MMOL/L (ref 22–29)
HCT VFR BLD AUTO: 40.2 % (ref 40–53)
HGB BLD-MCNC: 11.6 G/DL (ref 13.3–17.7)
HGB BLD-MCNC: 12.7 G/DL (ref 13.3–17.7)
HGB BLD-MCNC: 12.8 G/DL (ref 13.3–17.7)
HGB BLD-MCNC: 13.2 G/DL (ref 13.3–17.7)
HGB BLD-MCNC: 13.3 G/DL (ref 13.3–17.7)
HGB BLD-MCNC: 13.3 G/DL (ref 13.3–17.7)
HGB BLD-MCNC: 13.8 G/DL (ref 13.3–17.7)
HGB BLD-MCNC: 15.2 G/DL (ref 13.3–17.7)
HGB BLD-MCNC: 15.3 G/DL (ref 13.3–17.7)
HOLD SPECIMEN: NORMAL
INR PPP: 1.24 (ref 0.85–1.15)
INR PPP: 1.43 (ref 0.85–1.15)
LACTATE BLD-SCNC: 0.6 MMOL/L (ref 0.7–2)
LACTATE BLD-SCNC: 0.7 MMOL/L (ref 0.7–2)
LACTATE BLD-SCNC: 1 MMOL/L (ref 0.7–2)
LACTATE BLD-SCNC: 1 MMOL/L (ref 0.7–2)
LACTATE BLD-SCNC: 1.2 MMOL/L (ref 0.7–2)
LACTATE BLD-SCNC: 1.9 MMOL/L (ref 0.7–2)
LACTATE BLD-SCNC: 2.4 MMOL/L (ref 0.7–2)
LACTATE BLD-SCNC: 2.4 MMOL/L (ref 0.7–2)
LACTATE SERPL-SCNC: 2.4 MMOL/L (ref 0.7–2)
MAGNESIUM SERPL-MCNC: 2.9 MG/DL (ref 1.7–2.3)
MCH RBC QN AUTO: 32.2 PG (ref 26.5–33)
MCHC RBC AUTO-ENTMCNC: 34.3 G/DL (ref 31.5–36.5)
MCV RBC AUTO: 94 FL (ref 78–100)
O2/TOTAL GAS SETTING VFR VENT: 100 %
O2/TOTAL GAS SETTING VFR VENT: 3 %
O2/TOTAL GAS SETTING VFR VENT: 3 %
O2/TOTAL GAS SETTING VFR VENT: 40 %
O2/TOTAL GAS SETTING VFR VENT: 50 %
O2/TOTAL GAS SETTING VFR VENT: 60 %
O2/TOTAL GAS SETTING VFR VENT: 60 %
O2/TOTAL GAS SETTING VFR VENT: 80 %
OXYHGB MFR BLDA: 96 % (ref 92–100)
OXYHGB MFR BLDA: 96 % (ref 92–100)
OXYHGB MFR BLDA: 97 % (ref 92–100)
OXYHGB MFR BLDA: 98 % (ref 92–100)
OXYHGB MFR BLDA: 98 % (ref 92–100)
OXYHGB MFR BLDV: 76 % (ref 70–75)
OXYHGB MFR BLDV: 77 % (ref 70–75)
OXYHGB MFR BLDV: 77 % (ref 70–75)
OXYHGB MFR BLDV: 86 % (ref 70–75)
OXYHGB MFR BLDV: 86 % (ref 70–75)
PCO2 BLD: 43 MM HG (ref 35–45)
PCO2 BLD: 45 MM HG (ref 35–45)
PCO2 BLD: 47 MM HG (ref 35–45)
PCO2 BLDA: 39 MM HG (ref 35–45)
PCO2 BLDA: 39 MM HG (ref 35–45)
PCO2 BLDA: 44 MM HG (ref 35–45)
PCO2 BLDA: 45 MM HG (ref 35–45)
PCO2 BLDA: 47 MM HG (ref 35–45)
PCO2 BLDA: 52 MM HG (ref 35–45)
PCO2 BLDA: 56 MM HG (ref 35–45)
PCO2 BLDV: 49 MM HG (ref 40–50)
PCO2 BLDV: 52 MM HG (ref 40–50)
PCO2 BLDV: 54 MM HG (ref 40–50)
PCO2 BLDV: 57 MM HG (ref 40–50)
PEEP: 5 CM H2O
PEEP: 5 CM H2O
PH BLD: 7.35 [PH] (ref 7.35–7.45)
PH BLD: 7.39 [PH] (ref 7.35–7.45)
PH BLD: 7.39 [PH] (ref 7.35–7.45)
PH BLDA: 7.27 [PH] (ref 7.35–7.45)
PH BLDA: 7.28 [PH] (ref 7.35–7.45)
PH BLDA: 7.3 [PH] (ref 7.35–7.45)
PH BLDA: 7.34 [PH] (ref 7.35–7.45)
PH BLDA: 7.35 [PH] (ref 7.35–7.45)
PH BLDA: 7.37 [PH] (ref 7.35–7.45)
PH BLDA: 7.41 [PH] (ref 7.35–7.45)
PH BLDV: 7.27 [PH] (ref 7.32–7.43)
PH BLDV: 7.33 [PH] (ref 7.32–7.43)
PH BLDV: 7.35 [PH] (ref 7.32–7.43)
PH BLDV: 7.38 [PH] (ref 7.32–7.43)
PHOSPHATE SERPL-MCNC: 3 MG/DL (ref 2.5–4.5)
PLATELET # BLD AUTO: 153 10E3/UL (ref 150–450)
PLATELET # BLD AUTO: 156 10E3/UL (ref 150–450)
PO2 BLD: 119 MM HG (ref 80–105)
PO2 BLD: 79 MM HG (ref 80–105)
PO2 BLD: 95 MM HG (ref 80–105)
PO2 BLDA: 140 MM HG (ref 80–105)
PO2 BLDA: 179 MM HG (ref 80–105)
PO2 BLDA: 355 MM HG (ref 80–105)
PO2 BLDA: 369 MM HG (ref 80–105)
PO2 BLDA: 395 MM HG (ref 80–105)
PO2 BLDA: 437 MM HG (ref 80–105)
PO2 BLDA: 463 MM HG (ref 80–105)
PO2 BLDV: 44 MM HG (ref 25–47)
PO2 BLDV: 46 MM HG (ref 25–47)
PO2 BLDV: 46 MM HG (ref 25–47)
PO2 BLDV: 62 MM HG (ref 25–47)
POTASSIUM BLD-SCNC: 3.6 MMOL/L (ref 3.4–5.3)
POTASSIUM BLD-SCNC: 3.7 MMOL/L (ref 3.4–5.3)
POTASSIUM BLD-SCNC: 4.4 MMOL/L (ref 3.4–5.3)
POTASSIUM BLD-SCNC: 4.4 MMOL/L (ref 3.4–5.3)
POTASSIUM BLD-SCNC: 4.5 MMOL/L (ref 3.4–5.3)
POTASSIUM BLD-SCNC: 4.5 MMOL/L (ref 3.4–5.3)
POTASSIUM BLD-SCNC: 4.6 MMOL/L (ref 3.4–5.3)
POTASSIUM BLD-SCNC: 4.6 MMOL/L (ref 3.4–5.3)
POTASSIUM SERPL-SCNC: 3.7 MMOL/L (ref 3.4–5.3)
PROT SERPL-MCNC: 6 G/DL (ref 6.4–8.3)
RBC # BLD AUTO: 4.28 10E6/UL (ref 4.4–5.9)
SAO2 % BLDA: 100 % (ref 95–96)
SAO2 % BLDA: 93 % (ref 92–100)
SAO2 % BLDA: 95 % (ref 92–100)
SAO2 % BLDA: 97 % (ref 92–100)
SAO2 % BLDA: 99 % (ref 95–96)
SAO2 % BLDA: >100 % (ref 95–96)
SAO2 % BLDV: 78 % (ref 70–75)
SAO2 % BLDV: 78.4 % (ref 70–75)
SAO2 % BLDV: 79.1 % (ref 70–75)
SAO2 % BLDV: 89 % (ref 70–75)
SODIUM BLD-SCNC: 138 MMOL/L (ref 135–145)
SODIUM BLD-SCNC: 138 MMOL/L (ref 135–145)
SODIUM BLD-SCNC: 139 MMOL/L (ref 135–145)
SODIUM BLD-SCNC: 140 MMOL/L (ref 135–145)
SODIUM SERPL-SCNC: 142 MMOL/L (ref 135–145)
SPECIMEN EXPIRATION DATE: NORMAL
UNIT ABO/RH: NORMAL
UNIT ABO/RH: NORMAL
UNIT NUMBER: NORMAL
UNIT NUMBER: NORMAL
UNIT STATUS: NORMAL
UNIT STATUS: NORMAL
UNIT TYPE ISBT: 6200
UNIT TYPE ISBT: 6200
WBC # BLD AUTO: 22.5 10E3/UL (ref 4–11)

## 2024-07-25 PROCEDURE — 82330 ASSAY OF CALCIUM: CPT

## 2024-07-25 PROCEDURE — 272N000001 HC OR GENERAL SUPPLY STERILE: Performed by: THORACIC SURGERY (CARDIOTHORACIC VASCULAR SURGERY)

## 2024-07-25 PROCEDURE — 94002 VENT MGMT INPAT INIT DAY: CPT

## 2024-07-25 PROCEDURE — 021109W BYPASS CORONARY ARTERY, TWO ARTERIES FROM AORTA WITH AUTOLOGOUS VENOUS TISSUE, OPEN APPROACH: ICD-10-PCS | Performed by: THORACIC SURGERY (CARDIOTHORACIC VASCULAR SURGERY)

## 2024-07-25 PROCEDURE — 250N000013 HC RX MED GY IP 250 OP 250 PS 637: Performed by: SURGERY

## 2024-07-25 PROCEDURE — 84295 ASSAY OF SERUM SODIUM: CPT

## 2024-07-25 PROCEDURE — 410N000003 HC PER-PERFUSION 1ST 30 MIN: Performed by: THORACIC SURGERY (CARDIOTHORACIC VASCULAR SURGERY)

## 2024-07-25 PROCEDURE — 999N000141 HC STATISTIC PRE-PROCEDURE NURSING ASSESSMENT: Performed by: THORACIC SURGERY (CARDIOTHORACIC VASCULAR SURGERY)

## 2024-07-25 PROCEDURE — 250N000009 HC RX 250

## 2024-07-25 PROCEDURE — 83735 ASSAY OF MAGNESIUM: CPT | Performed by: SURGERY

## 2024-07-25 PROCEDURE — 85384 FIBRINOGEN ACTIVITY: CPT | Performed by: THORACIC SURGERY (CARDIOTHORACIC VASCULAR SURGERY)

## 2024-07-25 PROCEDURE — 71045 X-RAY EXAM CHEST 1 VIEW: CPT | Mod: 26 | Performed by: STUDENT IN AN ORGANIZED HEALTH CARE EDUCATION/TRAINING PROGRAM

## 2024-07-25 PROCEDURE — 82805 BLOOD GASES W/O2 SATURATION: CPT | Performed by: SURGERY

## 2024-07-25 PROCEDURE — 83605 ASSAY OF LACTIC ACID: CPT | Performed by: SURGERY

## 2024-07-25 PROCEDURE — P9047 ALBUMIN (HUMAN), 25%, 50ML: HCPCS

## 2024-07-25 PROCEDURE — 85730 THROMBOPLASTIN TIME PARTIAL: CPT | Performed by: SURGERY

## 2024-07-25 PROCEDURE — 33518 CABG ARTERY-VEIN TWO: CPT | Mod: GC | Performed by: THORACIC SURGERY (CARDIOTHORACIC VASCULAR SURGERY)

## 2024-07-25 PROCEDURE — 33533 CABG ARTERIAL SINGLE: CPT | Performed by: NURSE ANESTHETIST, CERTIFIED REGISTERED

## 2024-07-25 PROCEDURE — 999N000157 HC STATISTIC RCP TIME EA 10 MIN

## 2024-07-25 PROCEDURE — 250N000011 HC RX IP 250 OP 636: Performed by: SURGERY

## 2024-07-25 PROCEDURE — 84155 ASSAY OF PROTEIN SERUM: CPT | Performed by: SURGERY

## 2024-07-25 PROCEDURE — 02100Z9 BYPASS CORONARY ARTERY, ONE ARTERY FROM LEFT INTERNAL MAMMARY, OPEN APPROACH: ICD-10-PCS | Performed by: THORACIC SURGERY (CARDIOTHORACIC VASCULAR SURGERY)

## 2024-07-25 PROCEDURE — 84295 ASSAY OF SERUM SODIUM: CPT | Performed by: SURGERY

## 2024-07-25 PROCEDURE — 250N000009 HC RX 250: Performed by: ANESTHESIOLOGY

## 2024-07-25 PROCEDURE — 86900 BLOOD TYPING SEROLOGIC ABO: CPT | Performed by: THORACIC SURGERY (CARDIOTHORACIC VASCULAR SURGERY)

## 2024-07-25 PROCEDURE — 250N000011 HC RX IP 250 OP 636: Performed by: THORACIC SURGERY (CARDIOTHORACIC VASCULAR SURGERY)

## 2024-07-25 PROCEDURE — 33508 ENDOSCOPIC VEIN HARVEST: CPT | Mod: XU | Performed by: THORACIC SURGERY (CARDIOTHORACIC VASCULAR SURGERY)

## 2024-07-25 PROCEDURE — 85730 THROMBOPLASTIN TIME PARTIAL: CPT | Performed by: THORACIC SURGERY (CARDIOTHORACIC VASCULAR SURGERY)

## 2024-07-25 PROCEDURE — 93005 ELECTROCARDIOGRAM TRACING: CPT

## 2024-07-25 PROCEDURE — 200N000002 HC R&B ICU UMMC

## 2024-07-25 PROCEDURE — 84100 ASSAY OF PHOSPHORUS: CPT | Performed by: SURGERY

## 2024-07-25 PROCEDURE — 82805 BLOOD GASES W/O2 SATURATION: CPT

## 2024-07-25 PROCEDURE — 250N000011 HC RX IP 250 OP 636

## 2024-07-25 PROCEDURE — 250N000009 HC RX 250: Performed by: THORACIC SURGERY (CARDIOTHORACIC VASCULAR SURGERY)

## 2024-07-25 PROCEDURE — C1760 CLOSURE DEV, VASC: HCPCS | Performed by: THORACIC SURGERY (CARDIOTHORACIC VASCULAR SURGERY)

## 2024-07-25 PROCEDURE — 06BQ4ZZ EXCISION OF LEFT SAPHENOUS VEIN, PERCUTANEOUS ENDOSCOPIC APPROACH: ICD-10-PCS | Performed by: THORACIC SURGERY (CARDIOTHORACIC VASCULAR SURGERY)

## 2024-07-25 PROCEDURE — 5A1221Z PERFORMANCE OF CARDIAC OUTPUT, CONTINUOUS: ICD-10-PCS | Performed by: THORACIC SURGERY (CARDIOTHORACIC VASCULAR SURGERY)

## 2024-07-25 PROCEDURE — 85396 CLOTTING ASSAY WHOLE BLOOD: CPT

## 2024-07-25 PROCEDURE — 250N000024 HC ISOFLURANE, PER MIN: Performed by: THORACIC SURGERY (CARDIOTHORACIC VASCULAR SURGERY)

## 2024-07-25 PROCEDURE — 85049 AUTOMATED PLATELET COUNT: CPT | Performed by: THORACIC SURGERY (CARDIOTHORACIC VASCULAR SURGERY)

## 2024-07-25 PROCEDURE — 82810 BLOOD GASES O2 SAT ONLY: CPT

## 2024-07-25 PROCEDURE — 360N000079 HC SURGERY LEVEL 6, PER MIN: Performed by: THORACIC SURGERY (CARDIOTHORACIC VASCULAR SURGERY)

## 2024-07-25 PROCEDURE — 258N000003 HC RX IP 258 OP 636

## 2024-07-25 PROCEDURE — 999N000253 HC STATISTIC WEANING TRIALS

## 2024-07-25 PROCEDURE — 36415 COLL VENOUS BLD VENIPUNCTURE: CPT | Performed by: THORACIC SURGERY (CARDIOTHORACIC VASCULAR SURGERY)

## 2024-07-25 PROCEDURE — 33533 CABG ARTERIAL SINGLE: CPT | Performed by: ANESTHESIOLOGY

## 2024-07-25 PROCEDURE — 85610 PROTHROMBIN TIME: CPT | Performed by: THORACIC SURGERY (CARDIOTHORACIC VASCULAR SURGERY)

## 2024-07-25 PROCEDURE — 410N000004: Performed by: THORACIC SURGERY (CARDIOTHORACIC VASCULAR SURGERY)

## 2024-07-25 PROCEDURE — 272N000085 HC PACK CELL SAVER CSP: Performed by: THORACIC SURGERY (CARDIOTHORACIC VASCULAR SURGERY)

## 2024-07-25 PROCEDURE — 999N000065 XR CHEST PORT 1 VIEW

## 2024-07-25 PROCEDURE — 33533 CABG ARTERIAL SINGLE: CPT | Mod: GC | Performed by: THORACIC SURGERY (CARDIOTHORACIC VASCULAR SURGERY)

## 2024-07-25 PROCEDURE — 85610 PROTHROMBIN TIME: CPT | Performed by: SURGERY

## 2024-07-25 PROCEDURE — 370N000017 HC ANESTHESIA TECHNICAL FEE, PER MIN: Performed by: THORACIC SURGERY (CARDIOTHORACIC VASCULAR SURGERY)

## 2024-07-25 PROCEDURE — 250N000013 HC RX MED GY IP 250 OP 250 PS 637: Performed by: THORACIC SURGERY (CARDIOTHORACIC VASCULAR SURGERY)

## 2024-07-25 PROCEDURE — 82330 ASSAY OF CALCIUM: CPT | Performed by: SURGERY

## 2024-07-25 PROCEDURE — 85049 AUTOMATED PLATELET COUNT: CPT | Performed by: SURGERY

## 2024-07-25 PROCEDURE — 258N000003 HC RX IP 258 OP 636: Performed by: THORACIC SURGERY (CARDIOTHORACIC VASCULAR SURGERY)

## 2024-07-25 PROCEDURE — 86923 COMPATIBILITY TEST ELECTRIC: CPT | Performed by: THORACIC SURGERY (CARDIOTHORACIC VASCULAR SURGERY)

## 2024-07-25 PROCEDURE — 250N000011 HC RX IP 250 OP 636: Performed by: ANESTHESIOLOGY

## 2024-07-25 PROCEDURE — 99291 CRITICAL CARE FIRST HOUR: CPT | Mod: 24 | Performed by: STUDENT IN AN ORGANIZED HEALTH CARE EDUCATION/TRAINING PROGRAM

## 2024-07-25 PROCEDURE — 272N000002 HC OR SUPPLY OTHER OPNP: Performed by: THORACIC SURGERY (CARDIOTHORACIC VASCULAR SURGERY)

## 2024-07-25 PROCEDURE — 272N000088 HC PUMP APP ADULT PERFUSION: Performed by: THORACIC SURGERY (CARDIOTHORACIC VASCULAR SURGERY)

## 2024-07-25 RX ORDER — PROCHLORPERAZINE MALEATE 5 MG
5 TABLET ORAL EVERY 6 HOURS PRN
Status: DISCONTINUED | OUTPATIENT
Start: 2024-07-25 | End: 2024-07-31 | Stop reason: HOSPADM

## 2024-07-25 RX ORDER — FAMOTIDINE 20 MG/1
20 TABLET, FILM COATED ORAL
Status: COMPLETED | OUTPATIENT
Start: 2024-07-25 | End: 2024-07-25

## 2024-07-25 RX ORDER — NALOXONE HYDROCHLORIDE 0.4 MG/ML
0.2 INJECTION, SOLUTION INTRAMUSCULAR; INTRAVENOUS; SUBCUTANEOUS
Status: DISCONTINUED | OUTPATIENT
Start: 2024-07-25 | End: 2024-07-31 | Stop reason: HOSPADM

## 2024-07-25 RX ORDER — CHLORHEXIDINE GLUCONATE ORAL RINSE 1.2 MG/ML
10 SOLUTION DENTAL ONCE
Status: COMPLETED | OUTPATIENT
Start: 2024-07-25 | End: 2024-07-25

## 2024-07-25 RX ORDER — NICOTINE POLACRILEX 4 MG
15-30 LOZENGE BUCCAL
Status: DISCONTINUED | OUTPATIENT
Start: 2024-07-25 | End: 2024-07-26

## 2024-07-25 RX ORDER — DEXMEDETOMIDINE HYDROCHLORIDE 4 UG/ML
.2-.7 INJECTION, SOLUTION INTRAVENOUS CONTINUOUS
Status: DISCONTINUED | OUTPATIENT
Start: 2024-07-25 | End: 2024-07-25

## 2024-07-25 RX ORDER — SODIUM CHLORIDE, SODIUM GLUCONATE, SODIUM ACETATE, POTASSIUM CHLORIDE AND MAGNESIUM CHLORIDE 526; 502; 368; 37; 30 MG/100ML; MG/100ML; MG/100ML; MG/100ML; MG/100ML
INJECTION, SOLUTION INTRAVENOUS CONTINUOUS PRN
Status: DISCONTINUED | OUTPATIENT
Start: 2024-07-25 | End: 2024-07-25

## 2024-07-25 RX ORDER — FIBRINOGEN (HUMAN) 700-1300MG
1150 KIT INTRAVENOUS ONCE
Status: DISCONTINUED | OUTPATIENT
Start: 2024-07-25 | End: 2024-07-26

## 2024-07-25 RX ORDER — BISACODYL 10 MG
10 SUPPOSITORY, RECTAL RECTAL DAILY PRN
Status: DISCONTINUED | OUTPATIENT
Start: 2024-07-28 | End: 2024-07-31 | Stop reason: HOSPADM

## 2024-07-25 RX ORDER — ONDANSETRON 2 MG/ML
4 INJECTION INTRAMUSCULAR; INTRAVENOUS EVERY 6 HOURS PRN
Status: DISCONTINUED | OUTPATIENT
Start: 2024-07-25 | End: 2024-07-31 | Stop reason: HOSPADM

## 2024-07-25 RX ORDER — HYDRALAZINE HYDROCHLORIDE 20 MG/ML
10 INJECTION INTRAMUSCULAR; INTRAVENOUS EVERY 30 MIN PRN
Status: DISCONTINUED | OUTPATIENT
Start: 2024-07-25 | End: 2024-07-31 | Stop reason: HOSPADM

## 2024-07-25 RX ORDER — OXYCODONE HYDROCHLORIDE 10 MG/1
10 TABLET ORAL EVERY 4 HOURS PRN
Status: DISCONTINUED | OUTPATIENT
Start: 2024-07-25 | End: 2024-07-27

## 2024-07-25 RX ORDER — DEXTROSE MONOHYDRATE 25 G/50ML
25-50 INJECTION, SOLUTION INTRAVENOUS
Status: DISCONTINUED | OUTPATIENT
Start: 2024-07-25 | End: 2024-07-26

## 2024-07-25 RX ORDER — CEFAZOLIN SODIUM/WATER 2 G/20 ML
2 SYRINGE (ML) INTRAVENOUS SEE ADMIN INSTRUCTIONS
Status: DISCONTINUED | OUTPATIENT
Start: 2024-07-25 | End: 2024-07-25 | Stop reason: HOSPADM

## 2024-07-25 RX ORDER — PROPOFOL 10 MG/ML
INJECTION, EMULSION INTRAVENOUS PRN
Status: DISCONTINUED | OUTPATIENT
Start: 2024-07-25 | End: 2024-07-25

## 2024-07-25 RX ORDER — DEXMEDETOMIDINE HYDROCHLORIDE 4 UG/ML
.1-1.2 INJECTION, SOLUTION INTRAVENOUS CONTINUOUS
Status: DISCONTINUED | OUTPATIENT
Start: 2024-07-25 | End: 2024-07-25 | Stop reason: HOSPADM

## 2024-07-25 RX ORDER — NITROGLYCERIN 10 MG/100ML
INJECTION INTRAVENOUS PRN
Status: DISCONTINUED | OUTPATIENT
Start: 2024-07-25 | End: 2024-07-25

## 2024-07-25 RX ORDER — FENTANYL CITRATE 50 UG/ML
INJECTION, SOLUTION INTRAMUSCULAR; INTRAVENOUS PRN
Status: DISCONTINUED | OUTPATIENT
Start: 2024-07-25 | End: 2024-07-25

## 2024-07-25 RX ORDER — PANTOPRAZOLE SODIUM 40 MG/1
40 TABLET, DELAYED RELEASE ORAL DAILY
Status: DISCONTINUED | OUTPATIENT
Start: 2024-07-25 | End: 2024-07-26

## 2024-07-25 RX ORDER — CEFAZOLIN SODIUM/WATER 2 G/20 ML
2 SYRINGE (ML) INTRAVENOUS
Status: COMPLETED | OUTPATIENT
Start: 2024-07-25 | End: 2024-07-25

## 2024-07-25 RX ORDER — ACETAMINOPHEN 650 MG/1
650 SUPPOSITORY RECTAL EVERY 4 HOURS PRN
Status: DISCONTINUED | OUTPATIENT
Start: 2024-07-25 | End: 2024-07-28

## 2024-07-25 RX ORDER — NICOTINE POLACRILEX 4 MG
15-30 LOZENGE BUCCAL
Status: CANCELLED | OUTPATIENT
Start: 2024-07-25

## 2024-07-25 RX ORDER — METHOCARBAMOL 500 MG/1
500 TABLET, FILM COATED ORAL EVERY 6 HOURS PRN
Status: DISCONTINUED | OUTPATIENT
Start: 2024-07-25 | End: 2024-07-27

## 2024-07-25 RX ORDER — ASPIRIN 81 MG/1
162 TABLET, CHEWABLE ORAL DAILY
Status: DISCONTINUED | OUTPATIENT
Start: 2024-07-26 | End: 2024-07-31 | Stop reason: HOSPADM

## 2024-07-25 RX ORDER — ASPIRIN 81 MG/1
162 TABLET, CHEWABLE ORAL
Status: COMPLETED | OUTPATIENT
Start: 2024-07-25 | End: 2024-07-25

## 2024-07-25 RX ORDER — ONDANSETRON 2 MG/ML
INJECTION INTRAMUSCULAR; INTRAVENOUS PRN
Status: DISCONTINUED | OUTPATIENT
Start: 2024-07-25 | End: 2024-07-25

## 2024-07-25 RX ORDER — PROPOFOL 10 MG/ML
5-75 INJECTION, EMULSION INTRAVENOUS CONTINUOUS
Status: DISCONTINUED | OUTPATIENT
Start: 2024-07-25 | End: 2024-07-26

## 2024-07-25 RX ORDER — POTASSIUM CHLORIDE 29.8 MG/ML
20 INJECTION INTRAVENOUS ONCE
Status: COMPLETED | OUTPATIENT
Start: 2024-07-25 | End: 2024-07-25

## 2024-07-25 RX ORDER — POLYETHYLENE GLYCOL 3350 17 G/17G
17 POWDER, FOR SOLUTION ORAL DAILY
Status: DISCONTINUED | OUTPATIENT
Start: 2024-07-26 | End: 2024-07-27

## 2024-07-25 RX ORDER — ONDANSETRON 4 MG/1
4 TABLET, ORALLY DISINTEGRATING ORAL EVERY 6 HOURS PRN
Status: DISCONTINUED | OUTPATIENT
Start: 2024-07-25 | End: 2024-07-31 | Stop reason: HOSPADM

## 2024-07-25 RX ORDER — NALOXONE HYDROCHLORIDE 0.4 MG/ML
0.4 INJECTION, SOLUTION INTRAMUSCULAR; INTRAVENOUS; SUBCUTANEOUS
Status: DISCONTINUED | OUTPATIENT
Start: 2024-07-25 | End: 2024-07-31 | Stop reason: HOSPADM

## 2024-07-25 RX ORDER — SODIUM CHLORIDE, SODIUM LACTATE, POTASSIUM CHLORIDE, CALCIUM CHLORIDE 600; 310; 30; 20 MG/100ML; MG/100ML; MG/100ML; MG/100ML
INJECTION, SOLUTION INTRAVENOUS CONTINUOUS PRN
Status: DISCONTINUED | OUTPATIENT
Start: 2024-07-25 | End: 2024-07-25

## 2024-07-25 RX ORDER — PHENYLEPHRINE HCL IN 0.9% NACL 50MG/250ML
.1-6 PLASTIC BAG, INJECTION (ML) INTRAVENOUS CONTINUOUS
Status: DISCONTINUED | OUTPATIENT
Start: 2024-07-25 | End: 2024-07-25 | Stop reason: HOSPADM

## 2024-07-25 RX ORDER — HYDROMORPHONE HYDROCHLORIDE 1 MG/ML
0.2 INJECTION, SOLUTION INTRAMUSCULAR; INTRAVENOUS; SUBCUTANEOUS
Status: DISCONTINUED | OUTPATIENT
Start: 2024-07-25 | End: 2024-07-28

## 2024-07-25 RX ORDER — PROTAMINE SULFATE 10 MG/ML
INJECTION, SOLUTION INTRAVENOUS PRN
Status: DISCONTINUED | OUTPATIENT
Start: 2024-07-25 | End: 2024-07-25

## 2024-07-25 RX ORDER — GABAPENTIN 100 MG/1
100 CAPSULE ORAL
Status: COMPLETED | OUTPATIENT
Start: 2024-07-25 | End: 2024-07-25

## 2024-07-25 RX ORDER — ACETAMINOPHEN 325 MG/1
650 TABLET ORAL EVERY 4 HOURS PRN
Status: DISCONTINUED | OUTPATIENT
Start: 2024-07-28 | End: 2024-07-28

## 2024-07-25 RX ORDER — EPINEPHRINE 0.1 MG/ML
INJECTION INTRAVENOUS PRN
Status: DISCONTINUED | OUTPATIENT
Start: 2024-07-25 | End: 2024-07-25

## 2024-07-25 RX ORDER — HEPARIN SODIUM 5000 [USP'U]/.5ML
5000 INJECTION, SOLUTION INTRAVENOUS; SUBCUTANEOUS EVERY 8 HOURS
Status: DISCONTINUED | OUTPATIENT
Start: 2024-07-26 | End: 2024-07-31 | Stop reason: HOSPADM

## 2024-07-25 RX ORDER — DEXTROSE MONOHYDRATE 25 G/50ML
25-50 INJECTION, SOLUTION INTRAVENOUS
Status: CANCELLED | OUTPATIENT
Start: 2024-07-25

## 2024-07-25 RX ORDER — ACETAMINOPHEN 325 MG/1
975 TABLET ORAL EVERY 8 HOURS
Status: COMPLETED | OUTPATIENT
Start: 2024-07-25 | End: 2024-07-28

## 2024-07-25 RX ORDER — CALCIUM GLUCONATE 20 MG/ML
2 INJECTION, SOLUTION INTRAVENOUS
Status: DISCONTINUED | OUTPATIENT
Start: 2024-07-25 | End: 2024-07-28

## 2024-07-25 RX ORDER — CALCIUM GLUCONATE 20 MG/ML
1 INJECTION, SOLUTION INTRAVENOUS
Status: DISCONTINUED | OUTPATIENT
Start: 2024-07-25 | End: 2024-07-28

## 2024-07-25 RX ORDER — NICARDIPINE HCL-0.9% SOD CHLOR 1 MG/10 ML
SYRINGE (ML) INTRAVENOUS PRN
Status: DISCONTINUED | OUTPATIENT
Start: 2024-07-25 | End: 2024-07-25

## 2024-07-25 RX ORDER — LIDOCAINE 40 MG/G
CREAM TOPICAL
Status: DISCONTINUED | OUTPATIENT
Start: 2024-07-25 | End: 2024-07-25 | Stop reason: HOSPADM

## 2024-07-25 RX ORDER — LIDOCAINE HYDROCHLORIDE 20 MG/ML
INJECTION, SOLUTION INFILTRATION; PERINEURAL PRN
Status: DISCONTINUED | OUTPATIENT
Start: 2024-07-25 | End: 2024-07-25

## 2024-07-25 RX ORDER — OXYCODONE HYDROCHLORIDE 5 MG/1
5 TABLET ORAL EVERY 4 HOURS PRN
Status: DISCONTINUED | OUTPATIENT
Start: 2024-07-25 | End: 2024-07-27

## 2024-07-25 RX ORDER — HYDROMORPHONE HYDROCHLORIDE 1 MG/ML
0.4 INJECTION, SOLUTION INTRAMUSCULAR; INTRAVENOUS; SUBCUTANEOUS
Status: DISCONTINUED | OUTPATIENT
Start: 2024-07-25 | End: 2024-07-28

## 2024-07-25 RX ORDER — HEPARIN SODIUM 1000 [USP'U]/ML
INJECTION, SOLUTION INTRAVENOUS; SUBCUTANEOUS PRN
Status: DISCONTINUED | OUTPATIENT
Start: 2024-07-25 | End: 2024-07-25

## 2024-07-25 RX ORDER — CEFAZOLIN SODIUM 1 G/3ML
1 INJECTION, POWDER, FOR SOLUTION INTRAMUSCULAR; INTRAVENOUS EVERY 8 HOURS
Status: COMPLETED | OUTPATIENT
Start: 2024-07-26 | End: 2024-07-26

## 2024-07-25 RX ORDER — PROPOFOL 10 MG/ML
INJECTION, EMULSION INTRAVENOUS CONTINUOUS PRN
Status: DISCONTINUED | OUTPATIENT
Start: 2024-07-25 | End: 2024-07-25

## 2024-07-25 RX ORDER — AMOXICILLIN 250 MG
1 CAPSULE ORAL 2 TIMES DAILY
Status: DISCONTINUED | OUTPATIENT
Start: 2024-07-25 | End: 2024-07-27

## 2024-07-25 RX ORDER — NOREPINEPHRINE BITARTRATE 0.06 MG/ML
.01-.4 INJECTION, SOLUTION INTRAVENOUS CONTINUOUS
Status: DISCONTINUED | OUTPATIENT
Start: 2024-07-25 | End: 2024-07-26

## 2024-07-25 RX ORDER — LIDOCAINE 40 MG/G
CREAM TOPICAL
Status: DISCONTINUED | OUTPATIENT
Start: 2024-07-25 | End: 2024-07-31 | Stop reason: HOSPADM

## 2024-07-25 RX ORDER — DEXMEDETOMIDINE HYDROCHLORIDE 4 UG/ML
.1-1.2 INJECTION, SOLUTION INTRAVENOUS CONTINUOUS
Status: DISCONTINUED | OUTPATIENT
Start: 2024-07-25 | End: 2024-07-26

## 2024-07-25 RX ORDER — ASPIRIN 81 MG/1
81 TABLET, CHEWABLE ORAL
Status: COMPLETED | OUTPATIENT
Start: 2024-07-25 | End: 2024-07-25

## 2024-07-25 RX ORDER — ACETAMINOPHEN 325 MG/1
975 TABLET ORAL ONCE
Status: COMPLETED | OUTPATIENT
Start: 2024-07-25 | End: 2024-07-25

## 2024-07-25 RX ADMIN — Medication 7.5 G: at 13:19

## 2024-07-25 RX ADMIN — NICARDIPINE HYDROCHLORIDE 2.5 MG/HR: 0.2 INJECTION, SOLUTION INTRAVENOUS at 20:46

## 2024-07-25 RX ADMIN — HYDROMORPHONE HYDROCHLORIDE 0.2 MG: 1 INJECTION, SOLUTION INTRAMUSCULAR; INTRAVENOUS; SUBCUTANEOUS at 20:20

## 2024-07-25 RX ADMIN — NOREPINEPHRINE BITARTRATE 0.03 MCG/KG/MIN: 1 INJECTION, SOLUTION, CONCENTRATE INTRAVENOUS at 16:36

## 2024-07-25 RX ADMIN — HEPARIN SODIUM 45000 UNITS: 1000 INJECTION INTRAVENOUS; SUBCUTANEOUS at 14:53

## 2024-07-25 RX ADMIN — NITROGLYCERIN 100 MCG: 10 INJECTION INTRAVENOUS at 13:54

## 2024-07-25 RX ADMIN — EPINEPHRINE 0.05 MCG/KG/MIN: 1 INJECTION INTRAMUSCULAR; INTRAVENOUS; SUBCUTANEOUS at 17:17

## 2024-07-25 RX ADMIN — Medication 100 MCG: at 17:19

## 2024-07-25 RX ADMIN — DEXMEDETOMIDINE HYDROCHLORIDE 0.6 MCG/KG/HR: 400 INJECTION INTRAVENOUS at 20:39

## 2024-07-25 RX ADMIN — EPINEPHRINE 10 MCG: 0.1 INJECTION INTRACARDIAC; INTRAVENOUS at 12:53

## 2024-07-25 RX ADMIN — FENTANYL CITRATE 250 MCG: 50 INJECTION INTRAMUSCULAR; INTRAVENOUS at 13:45

## 2024-07-25 RX ADMIN — Medication 200 MG: at 18:15

## 2024-07-25 RX ADMIN — FAMOTIDINE 20 MG: 20 TABLET ORAL at 08:02

## 2024-07-25 RX ADMIN — Medication 2 G: at 13:13

## 2024-07-25 RX ADMIN — ASPIRIN 81 MG CHEWABLE TABLET 162 MG: 81 TABLET CHEWABLE at 23:31

## 2024-07-25 RX ADMIN — MIDAZOLAM 2 MG: 1 INJECTION INTRAMUSCULAR; INTRAVENOUS at 12:51

## 2024-07-25 RX ADMIN — NOREPINEPHRINE BITARTRATE 6.4 MCG: 1 INJECTION, SOLUTION, CONCENTRATE INTRAVENOUS at 14:07

## 2024-07-25 RX ADMIN — FENTANYL CITRATE 250 MCG: 50 INJECTION INTRAMUSCULAR; INTRAVENOUS at 13:51

## 2024-07-25 RX ADMIN — Medication 50 MG: at 14:29

## 2024-07-25 RX ADMIN — METOPROLOL TARTRATE 12.5 MG: 25 TABLET, FILM COATED ORAL at 08:03

## 2024-07-25 RX ADMIN — PROPOFOL 50 MCG/KG/MIN: 10 INJECTION, EMULSION INTRAVENOUS at 17:52

## 2024-07-25 RX ADMIN — ONDANSETRON 4 MG: 2 INJECTION INTRAMUSCULAR; INTRAVENOUS at 18:07

## 2024-07-25 RX ADMIN — SODIUM CHLORIDE, SODIUM GLUCONATE, SODIUM ACETATE, POTASSIUM CHLORIDE AND MAGNESIUM CHLORIDE: 526; 502; 368; 37; 30 INJECTION, SOLUTION INTRAVENOUS at 12:44

## 2024-07-25 RX ADMIN — NICARDIPINE HYDROCHLORIDE 5 MCG/KG/MIN: 0.2 INJECTION, SOLUTION INTRAVENOUS at 14:00

## 2024-07-25 RX ADMIN — Medication 100 MCG: at 13:58

## 2024-07-25 RX ADMIN — Medication 100 MCG: at 14:03

## 2024-07-25 RX ADMIN — LIDOCAINE HYDROCHLORIDE 80 MG: 20 INJECTION, SOLUTION INFILTRATION; PERINEURAL at 12:53

## 2024-07-25 RX ADMIN — INSULIN HUMAN 1.5 UNITS/HR: 1 INJECTION, SOLUTION INTRAVENOUS at 16:25

## 2024-07-25 RX ADMIN — Medication 50 MG: at 15:41

## 2024-07-25 RX ADMIN — ACETAMINOPHEN 975 MG: 325 TABLET, FILM COATED ORAL at 23:31

## 2024-07-25 RX ADMIN — PROPOFOL 30 MG: 10 INJECTION, EMULSION INTRAVENOUS at 13:47

## 2024-07-25 RX ADMIN — PROTAMINE SULFATE 250 MG: 10 INJECTION, SOLUTION INTRAVENOUS at 17:22

## 2024-07-25 RX ADMIN — HYDROMORPHONE HYDROCHLORIDE 0.4 MG: 1 INJECTION, SOLUTION INTRAMUSCULAR; INTRAVENOUS; SUBCUTANEOUS at 18:59

## 2024-07-25 RX ADMIN — SODIUM CHLORIDE, POTASSIUM CHLORIDE, SODIUM LACTATE AND CALCIUM CHLORIDE: 600; 310; 30; 20 INJECTION, SOLUTION INTRAVENOUS at 13:15

## 2024-07-25 RX ADMIN — FENTANYL CITRATE 250 MCG: 50 INJECTION INTRAMUSCULAR; INTRAVENOUS at 14:05

## 2024-07-25 RX ADMIN — Medication 100 MG: at 12:53

## 2024-07-25 RX ADMIN — ACETAMINOPHEN 975 MG: 325 TABLET, FILM COATED ORAL at 08:02

## 2024-07-25 RX ADMIN — Medication 100 MCG: at 14:27

## 2024-07-25 RX ADMIN — Medication 100 MCG: at 15:01

## 2024-07-25 RX ADMIN — PROPOFOL 40 MG: 10 INJECTION, EMULSION INTRAVENOUS at 12:53

## 2024-07-25 RX ADMIN — OXYCODONE HYDROCHLORIDE 10 MG: 10 TABLET ORAL at 23:51

## 2024-07-25 RX ADMIN — FENTANYL CITRATE 250 MCG: 50 INJECTION INTRAMUSCULAR; INTRAVENOUS at 12:53

## 2024-07-25 RX ADMIN — NITROGLYCERIN 100 MCG: 10 INJECTION INTRAVENOUS at 13:52

## 2024-07-25 RX ADMIN — ASPIRIN 162 MG: 81 TABLET ORAL at 08:01

## 2024-07-25 RX ADMIN — POTASSIUM CHLORIDE 20 MEQ: 29.8 INJECTION, SOLUTION INTRAVENOUS at 20:12

## 2024-07-25 RX ADMIN — Medication 2 G: at 17:13

## 2024-07-25 RX ADMIN — Medication 100 MCG: at 17:20

## 2024-07-25 RX ADMIN — CHLORHEXIDINE GLUCONATE 0.12% ORAL RINSE 10 ML: 1.2 LIQUID ORAL at 08:03

## 2024-07-25 RX ADMIN — AMINOCAPROIC ACID 1.25 G/HR: 250 INJECTION, SOLUTION INTRAVENOUS at 14:21

## 2024-07-25 RX ADMIN — HYDRALAZINE HYDROCHLORIDE 10 MG: 20 INJECTION INTRAMUSCULAR; INTRAVENOUS at 20:29

## 2024-07-25 RX ADMIN — NITROGLYCERIN 200 MCG: 10 INJECTION INTRAVENOUS at 13:56

## 2024-07-25 RX ADMIN — GABAPENTIN 100 MG: 100 CAPSULE ORAL at 08:02

## 2024-07-25 RX ADMIN — Medication 200 MCG: at 15:05

## 2024-07-25 ASSESSMENT — ACTIVITIES OF DAILY LIVING (ADL)
ADLS_ACUITY_SCORE: 20
ADLS_ACUITY_SCORE: 18
ADLS_ACUITY_SCORE: 20
ADLS_ACUITY_SCORE: 18
ADLS_ACUITY_SCORE: 20
ADLS_ACUITY_SCORE: 18
ADLS_ACUITY_SCORE: 18
ADLS_ACUITY_SCORE: 20
ADLS_ACUITY_SCORE: 20
ADLS_ACUITY_SCORE: 18

## 2024-07-25 ASSESSMENT — ENCOUNTER SYMPTOMS
SEIZURES: 0
DYSRHYTHMIAS: 0

## 2024-07-25 ASSESSMENT — LIFESTYLE VARIABLES: TOBACCO_USE: 1

## 2024-07-25 ASSESSMENT — COPD QUESTIONNAIRES: COPD: 0

## 2024-07-25 NOTE — OP NOTE
OPERATIVE DATE: 7/25/2024      PRE-OPERATIVE DIAGNOSIS:  1) Coronary artery disease  Patient Active Problem List   Diagnosis    DJD (degenerative joint disease)    Backache    Baker's cyst    Benign neoplasm of colon    Degeneration of lumbar or lumbosacral intervertebral disc    Family history of diabetes mellitus    Neural foraminal stenosis of cervical spine    Obesity    Plantar wart of left foot    Spinal stenosis, lumbar    Other synovitis and tenosynovitis    Tobacco user    Lyme disease    Spondylosis of thoracic region without myelopathy or radiculopathy    Hx of Lyme disease on 8/3/2020    PAD (peripheral artery disease) (H24)    Coronary artery disease of native artery of native heart with stable angina pectoris (H24)    Positive cardiac stress test on 3/5/2024    Elevated coronary artery calcium score    Left carotid stenosis       POST-OPERATIVE DIAGNOSIS:  1) Coronary artery disease  Patient Active Problem List   Diagnosis    DJD (degenerative joint disease)    Backache    Baker's cyst    Benign neoplasm of colon    Degeneration of lumbar or lumbosacral intervertebral disc    Family history of diabetes mellitus    Neural foraminal stenosis of cervical spine    Obesity    Plantar wart of left foot    Spinal stenosis, lumbar    Other synovitis and tenosynovitis    Tobacco user    Lyme disease    Spondylosis of thoracic region without myelopathy or radiculopathy    Hx of Lyme disease on 8/3/2020    PAD (peripheral artery disease) (H24)    Coronary artery disease of native artery of native heart with stable angina pectoris (H24)    Positive cardiac stress test on 3/5/2024    Elevated coronary artery calcium score    Left carotid stenosis         PROCEDURE:  PROCEDURE PERFORMED:  1.  Coronary artery bypass grafting x 3    - reversed saphenous vein graft to the obtuse marginal branch of the left circumflex coronary artery   - reversed saphenous vein graft to the diagonal branch of the left anterior  descending coronary artery   - pedicled left internal mammary artery to left anterior descending coronary artery  2.  Endoscopic vein harvest of the greater saphenous vein from the left lower extremity.  3. Transesophageal echocardiogram    SURGEON: Kit Mason MD    ASSISTANT: Trent Zayas MD; PITER Lopez surgical assist was necessary to scrub this case due to complexity of the case and need for endoscopic vein harvest skills.    ANESTHESIA: GETA    ESTIMATED BLOOD LOSS: 1000cc    OPERATIVE FINDINGS:    1) The left internal mammary artery was 2 mm in diameter and had excellent flow.    2) The greater saphenous vein from the left lower extremity was 4-5 mm in diameter and suitable for conduit.    3) The ascending aorta was free of calcified plaque.    4) The right posterior descending coronary artery was 1 mm in diameter and not suitable for bypass.  5) The obtuse marginal branch of the left circumflex coronary artery was 1.5 mm in diameter and free of disease at the site of anastomosis.   6) The diagonal branch of the left anterior descending coronary artery was 1.5 mm in  diameter and free of disease at the site anastomosis.    7) The left anterior descending coronary artery 2 mm in diameter and free of disease at the site of anastomosis.    8) After reperfusion and defibrillation, sinus rhythm resumed.    9) Left ventricular function was 30% preoperatively and unchanged after bypass on low-dose inotropic support.    INDICATIONS:  LYNDA DALLAS is a 68 year old male admitted with coronary artery disease.  We were asked to evaluate for CABG.  Risks and benefits of the operation were explained to the patient and their family including, but not limited to, bleeding, infection, stroke and even death.  They understood these risks and agreed to proceed electively.    OPERATIVE REPORT:  The patient was transferred to the operating room and positioned supine on the OR table.  General  anesthesia was initiated by the anesthesia team.  Endotracheal intubation and central venous access was performed by anesthesia.  The patients neck, chest, abdomen and bilateral lower extremities were clipped, prepped and draped in sterile fashion.  A pre-procedure time-out was performed confirming the correct patient, correct site and correct procedure.    Median sternotomy and lower leg incisions were made.  The left internal mammary artery and left greater saphenous vein were harvested for conduit.   The patient was heparinized with 400mg/kg IV heparin.  Ascending aorta and right atrial appendage were cannulated for bypass.  Cardiopulmonary bypass was initiated with good flows.  Antegrade and retrograde cardioplegia catheters were placed.  The ascending aorta was cross clamped.  The heart was arrested with 1000cc of antegrade cold blood cardioplegia.  An additional 300cc of retrograde cardioplegia was administered.    The following grafts were constructed in end-to-side fashion using running 7-0 Prolene:    - A reversed saphenous vein graft was sewn to the mid obtuse marginal branch of the left circumflex coronary artery  - A reversed saphenous vein graft was sewn to the mid diagonal branch of the left anterior descending coronary artery.     The pedicled left internal mammary artery was sewn to the mid left anterior descending coronary artery in end-to-side fashion using running 7-0 Prolene.      The proximal anastomoses of th vein grafts were constructed on the ascending aorta in end-to-side fashion using running 6-0 Prolene under a single crossclamp.      Retrograde hot shot was administered.  Aortic cross clamp was removed.  The heart was resuscitated.  IV calcium was administered.  Lungs were ventilated bilaterally.  The  patient was weaned from cardipulmonary bypass.  Remaining pump blood volume was returned via the arterial cannula.  Cannulas were removed and sites were made hemostatic with prolene  sutures.  Heparin was reversed with protamine.  Mediastinal and pleural chest tubes were placed.  The sternum was approximated with stainless steel sternal wires.  The wound was closed using stratafix sutures.    The patient was then transferred from the operating bed to an ICU bed and transferred to the ICU in critical, but stable, condition.    All needle, sponge and instrument counts were correct at the end of the case.    Kit Mason  Cardiothoracic Surgery  Pager: [phone]

## 2024-07-25 NOTE — ANESTHESIA CARE TRANSFER NOTE
Patient: Modesto Lehman    Procedure: Procedure(s):  Median Sternotomy, On Cardiopulmonary Bypass Graft, CORONARY ARTERY BYPASS GRAFT X_3_, Left AND RIGHTGreater Saphenous Vein [place], Left Internal Mammary Artery [place], Intra-operative Transesophageal Echocardiogram per Anesthesia.       Diagnosis: CAD (coronary artery disease) [I25.10]  Diagnosis Additional Information: No value filed.    Anesthesia Type:   General     Note:    Oropharynx: endotracheal tube in place and ventilatory support  Level of Consciousness: iatrogenic sedation        Dentition: dentition unchanged  Vital Signs Stable: post-procedure vital signs reviewed and stable  Report to RN Given: handoff report given  Patient transferred to: ICU    ICU Handoff: Call for PAUSE to initiate/utilize ICU HANDOFF, Identified Patient, Identified Responsible Provider, Reviewed the Pertinent Medical History, Discussed Surgical Course, Reviewed Intra-OP Anesthesia Management and Issues during Anesthesia, Set Expectations for Post Procedure Period and Allowed Opportunity for Questions and Acknowledgement of Understanding      Vitals:  Vitals Value Taken Time   BP     Temp     Pulse     Resp     SpO2         Electronically Signed By: CHIN Soto CRNA  July 25, 2024  6:28 PM

## 2024-07-25 NOTE — BRIEF OP NOTE
[facility]    Brief Operative Note    Pre-operative diagnosis: CAD (coronary artery disease) [I25.10]  Post-operative diagnosis Same as pre-operative diagnosis    Procedure: Median Sternotomy, On Cardiopulmonary Bypass Graft, CORONARY ARTERY BYPASS GRAFT X_3_, Left AND RIGHTGreater Saphenous Vein [place], Left Internal Mammary Artery [place], Intra-operative Transesophageal Echocardiogram per Anesthesia., N/A - Chest    Surgeon: Surgeons and Role:     * Kit Mason MD - Primary     * Carlito Botello PA-C - Assisting     * Trent Zayas MD - Fellow - Assisting  Anesthesia: General   Estimated Blood Loss: 1000 ml    Drains: One pleural, two mediastinal tubes  Specimens: * No specimens in log *  Findings:   CABGx3 (LIMA-LAD, SVG-OM, SVG-Diag).  Complications: None.  Implants: * No implants in log *

## 2024-07-25 NOTE — ANESTHESIA PROCEDURE NOTES
Arterial Line Procedure Note    Pre-Procedure   Staff -        Anesthesiologist:  Reza Johnson MD       Performed By: anesthesiologist       Pre-Anesthestic Checklist: patient identified, IV checked, risks and benefits discussed, informed consent, monitors and equipment checked, pre-op evaluation and at physician/surgeon's request  Timeout:       Correct Patient: Yes        Correct Procedure: Yes        Correct Site: Yes        Correct Position: Yes   Line Placement:   This line was placed Pre Induction starting at 7/25/2024 10:30 AM and ending at 7/25/2024 10:40 AM  Procedure   Procedure: arterial line       Laterality: left       Insertion Site: brachial.  Sterile Prep        Standard elements of sterile barrier followed       Skin prep: Chloraprep  Insertion/Injection        Technique: ultrasound guided and Seldinger Technique        1. Ultrasound was used to evaluate the access site.       2. Artery evaluated via ultrasound for patency/adequacy.       3. Using real-time ultrasound the needle/catheter was observed entering the artery/vein.       Catheter Type/Size: 20 G, 12 cm  Narrative         Secured by: suture       Tegaderm dressing used.       Complications: None apparent,        Arterial waveform: Yes        IBP within 10% of NIBP: Yes

## 2024-07-25 NOTE — ANESTHESIA PROCEDURE NOTES
Central Line/PA Catheter Placement    Pre-Procedure   Staff -        Resident/Fellow: Steven Logan MD       Performed By: fellow       Location: OR       Pre-Anesthestic Checklist: patient identified, IV checked, site marked, risks and benefits discussed, informed consent, monitors and equipment checked, pre-op evaluation and at physician/surgeon's request  Timeout:       Correct Patient: Yes        Correct Procedure: Yes        Correct Site: Yes        Correct Position: Yes        Correct Laterality: Yes   Line Placement:   This line was placed Post Induction    Procedure   Procedure: central line       Laterality: right       Insertion Site: internal jugular.       Patient Position: Trendelenburg  Sterile Prep        All elements of maximal sterile barrier technique followed       Patient Prep/Sterile Barriers: draped, hand hygiene, gloves , hat , mask , draped, gown, sterile gel and probe cover       Skin prep: Chloraprep  Insertion/Injection        Technique: ultrasound guided and Seldinger Technique        1. Ultrasound was used to evaluate the access site.       2. Vein evaluated via ultrasound for patency/adequacy.       3. Using real-time ultrasound the needle/catheter was observed entering the artery/vein.       Introducer Type: 9 Fr, 2-lumen MAC        Type: PA/CVC with Introducer       Catheter Size: 9 Fr       Catheter Length: 11.5       Number of Lumens: double lumen       PA Catheter Type: CCO         Appropriate RV, RA and PA waveforms noted:  Yes  Narrative         Secured by: suture       Tegaderm and Biopatch dressing used.       Complications: None apparent,        blood aspirated from all lumens,        All lumens flushed: Yes       Verification method: Ultrasound, Placement to be verified post-op and NICOLE

## 2024-07-25 NOTE — H&P
CV ICU H&P        CO-MORBIDITIES:   Patient Active Problem List   Diagnosis    DJD (degenerative joint disease)    Backache    Baker's cyst    Benign neoplasm of colon    Degeneration of lumbar or lumbosacral intervertebral disc    Family history of diabetes mellitus    Neural foraminal stenosis of cervical spine    Obesity    Plantar wart of left foot    Spinal stenosis, lumbar    Other synovitis and tenosynovitis    Tobacco user    Lyme disease    Spondylosis of thoracic region without myelopathy or radiculopathy    Hx of Lyme disease on 8/3/2020    PAD (peripheral artery disease) (H24)    Coronary artery disease of native artery of native heart with stable angina pectoris (H24)    Positive cardiac stress test on 3/5/2024    Elevated coronary artery calcium score    Left carotid stenosis       ASSESSMENT: Modesto Lehman is a 68 year old male with PMH of CAD, CHFrEF (30-35%), HLD, PAD (s/p bilateral popliteal bypass), Carotid disease (s/p  and chronic occlusion of DARIUSZ), chronic back pain who underwent CABG x 3 by Dr. Hernández .    Arrives from the CVICU intubated and sedated on prop. On 0.01 epi gtt for inotropic support. CABG x3 (LIMA->LAD, SVG->OM, SVG-> diag, nothing to RCA), 3 CT placed. Bypass time 116 min,  during bypass), off bypass required shock x1. Required 300 ASHLI, 205 cell saver, 1.2 L crystalloid. Access L axillary a line, RIJ MAC/PA, PIV . Reversed. No pacer wires.         PLAN:  Neuro/ pain/ sedation:  #Acute Postoperative pain  - Pain: Scheduled tylenol. PRN tylenol, oxycodone, dilaudid.  - Sedation: propofol gtt, precedex gtt, wean as tolerated   - hold PTA Gabapentin 100 mg TID     Pulmonary care:   # Postoperative ventilation management  # Tobacco Dependence  - Intubated, ventilated  - Titrate supplemental oxygen to maintain saturation>92%.  - Pulmonary hygiene: Incentive spirometer every 15- 30 minutes when awake, flutter valve, C&DB    Cardiovascular:    #CAD s/p CABG x 3  on 7/25  #CHFrEF, 30-35%  # Hyperlipidemia  # PAD s/p bilateral popliteal bypass  # Carotid artery disease s/p left carotid endarterectomy. Chronic occlusion of DARIUSZ.  Recent echo on 4/23/2024 with LVEF of 30-35%. There is moderate diffuse hypokinesis with akinesis of the basal-mid inferolateral and basal inferior segments indicative of multivessel CAD.  Pre CPB: EF 30-35%, mild MR  Post CPB: LVEF 40%.  Normal RV function, mild MR.   - Monitor hemodynamic status.   - Goal MAP>65, SBP<130  - Statin hold (PTA atorvastatin)   - BB hold  - ASA: start tomorrow  - Epi gtt wean as tolerated  - Nicardipine gtt PRN    GI care/ Nutrition:   - NPO, BSSE post extubation   - OG if stays intubated   - PPI  - Continue bowel regimen: miralax, senna    Renal/ Fluid Balance/ Electrolytes:   BL creat appears to be ~ 0.85 to 0.9  - Strict I/O, daily weights  - Avoid/limit nephrotoxins as able  - Replete lytes PRN per protocol    Endocrine:    #Stress induced hyperglycemia  Preop A1c 5.9% 5/1/24  - Insulin gtt  - Goal BG <180 for optimal healing    ID/ Antibiotics:  #Stress induced leukocytosis  - To complete perioperative regimen  - Continue to monitor fever curve, WBC and inflammatory markers as appropriate    Heme:     #Stress induced leukocytosis  #Acute blood loss anemia  #Acute blood loss thrombocytopenia  No s/sx active bleeding  - Continue to monitor  - CBC     MSK/ Skin:  #Sternotomy  #Surgical Incision  - Sternal precautions  - Postoperative incision management per protocol  - PT/OT/CR    Prophylaxis:    - DVT: mechanical. SQH to start tomorrow.  - GI: PPO    Lines/ tubes/ drains:  - Left Arterial Line  - ETT  - CT  - [facility] CVC/PA catheter  - Antoine    Disposition: CVICU    Patient seen and case discussed with CVICU staff.       Clinically Significant Risk Factors Present on Admission                # Drug Induced Platelet Defect: home medication list includes an antiplatelet medication    # Chronic heart failure with reduced  "ejection fraction: last echo with EF <40%        # Overweight: Estimated body mass index is 25.82 kg/m  as calculated from the following:    Height as of this encounter: 1.753 m (5' 9\").    Weight as of this encounter: 79.3 kg (174 lb 13.2 oz).                Lee Ann Reynolds DO   Anesthesiology Resident, PGY-3/CA-2       ====================================    HPI:   Modesto Lehman is a 68 year old male with PMH of CAD, CHFrEF (30-35%), HLD, PAD (s/p bilateral popliteal bypass), Carotid disease (s/p  and chronic occlusion of DARIUSZ), hx of tobacco use, who underwent CABG x 3 by Dr. Hernández .    Intubated and sedated.     PAST MEDICAL HISTORY:   Past Medical History:   Diagnosis Date    Allergy status to analgesic agent     No reported medication allergies    Anesthesia of skin     No  problems with anaesthesia    CAD (coronary artery disease)     Elevated coronary artery calcium score     History of recurrent pneumonia     No Comments Provided    Hyperlipidemia     No Comments Provided    Low back pain     pain with bilateral leg and disc injuries.    Personal history of nicotine dependence     Personal history of other medical treatment (CODE)     No blood transfusions    PVD (peripheral vascular disease) (H24)        PAST SURGICAL HISTORY:   Past Surgical History:   Procedure Laterality Date    ANGIOPLASTY      Right leg stenting and bypass, Left also    COLONOSCOPY  2010    Q 10yrs.    COLONOSCOPY N/A 2022    6 tubular adenomas, 5 year follow up, 2027    CV CORONARY ANGIOGRAM N/A 4/15/2024    Procedure: Coronary Angiogram;  Surgeon: Toni Sellers MD;  Location:  [facility]ATH [facility]    ENDARTERECTOMY CAROTID Left 2024    Procedure: Left Carotid Endarterectomy with bovine pericardial patch angioplasty. Intraoperative Ultrasound. Intraoperative EEG monitoring;  Surgeon: Rasheed Sam MD;  Location:  OR    EXTRACTION(S) DENTAL      recently removed    OTHER " SURGICAL HISTORY      [id number],PREMALIG/BENIGN SKIN LESION EXCISION, removed from chest    OTHER SURGICAL HISTORY      [id number],ANESTHESIA ALERT,No  problems with anaesthesia       FAMILY HISTORY:   Family History   Problem Relation Age of Onset    Diabetes Mother         Diabetes    Heart Disease Mother         Heart Disease    Other - See Comments Father         COPD, CD    Heart Disease Brother         Heart Disease    Other - See Comments Brother         killed by drunk  at age 19.    Substance Abuse Other         Alcohol/Drug, No hx of chemical dependency.    Diabetes Other         Diabetes    Heart Disease Other         Heart Disease    Anesthesia Reaction No family hx of     Clotting Disorder No family hx of     Bleeding Disorder No family hx of        SOCIAL HISTORY:   Social History     Tobacco Use    Smoking status: Former     Current packs/day: 0.25     Average packs/day: 0.3 packs/day for 14.7 years (3.7 ttl pk-yrs)     Types: Cigars, Cigarettes     Start date: 11/4/2009    Smokeless tobacco: Never    Tobacco comments:     Quit smoking: quit 6/20/17 (cigarettes); smokes cigars 2 puffs cigar week   Substance Use Topics    Alcohol use: Yes     Comment: 2 vodka 3-4x week         OBJECTIVE:   1. VITAL SIGNS:   Temp:  [98  F (36.7  C)] 98  F (36.7  C)  Pulse:  [80] 80  Resp:  [20] 20  BP: (118)/(79) 118/79  Cuff Mean (mmHg):  [89] 89  SpO2:  [96 %] 96 %    Resp: 20        2. INTAKE/ OUTPUT:   No intake/output data recorded.       3. PHYSICAL EXAMINATION:   General: sedated  Neuro: sedated  Resp: intubated, ventilated  CV: RRR  Abdomen: Soft, non-distended   Incisions: c/d/i  Extremities: warm and well perfused  CT: To suction, serosang output, no airleak, crepitus    4. INVESTIGATIONS:   Arterial Blood Gases   Recent Labs   Lab 07/25/24  1328   PH 7.35   PCO2 45   PO2 179*   HCO3 25     Complete Blood Count   Recent Labs   Lab 07/25/24  1328   HGB 15.3     Basic Metabolic Panel  Recent Labs   Lab  07/25/24  1328 07/25/24  0612     --    POTASSIUM 3.6  --    GLC 90 105*     Liver Function Tests  No lab results found in last 7 days.  Pancreatic Enzymes  No lab results found in last 7 days.  Coagulation Profile  No lab results found in last 7 days.      5. RADIOLOGY:   Recent Results (from the past 24 hour(s))   NICOLE with Report    Narrative    Diana De Oliveira APRN CRNA     7/25/2024 11:17 AM  Procedures         =========================================

## 2024-07-25 NOTE — ANESTHESIA PROCEDURE NOTES
Daily Progress Note      Heidi Villeda  [id number]  1940 11/11/2021  6:54 AM      Assessment & Plan     Assessment:      1.  Nausea and vomiting of uncertain etiology may be an acute gastritis that is resolving.    2.  Jazmine-Mayes tear with active bleeding and clot noted; status post injection of epinephrine and gold probe coagulation.    3.  Melena last night.    4.  Coronary artery disease    Stage III chronic kidney disease       Plan:    1.  Advance to a soft diet today; monitor stool; I am going to give her some MiraLAX to clean out or flush out the old blood I have kept the Lovenox on hold today.  We shall continue Carafate liquid p.o.        Subjective   Interval History:     The patient states she feels well she has no throat pain chest pain or epigastric pain.  We reviewed the findings.  She wanted to know if medication could heal the tear.  Her hemoglobin has declined from about 12-1/2-10-1/2 and we shall monitor it daily    Objective    Vital signs in last 24 hours:  Temp:  [97.5 °F (36.4 °C)-98.6 °F (37 °C)] 98.2 °F (36.8 °C)  Heart Rate:  [] 77  Resp:  [16-21] 16  BP: ()/(49-72) 101/55    Intake/Output last 3 shifts:  I/O last 3 completed shifts:  In: 800 [P.O.:600; I.V.:200]  Out: -   Intake/Output this shift:  No intake/output data recorded.     Physical Exam:   Patient is awake and oriented.  HEENT: No icterus  Neck: Supple  Lungs: Clear  Heart: S1 and S2 normal  Abdomen: Soft, nontender.  No mass.  No peritoneal signs.    Medications:  • sucralfate  1 g Oral 4x Daily AC & HS   • [Held by provider] enoxaparin  1 mg/kg Subcutaneous BID   • amLODIPine  10 mg Oral Daily   • anastrozole  1 mg Oral Daily   • aspirin  81 mg Oral Daily   • atorvastatin  10 mg Oral Daily   • ketotifen  1 drop Both Eyes BID   • [Held by provider] clopidogrel  75 mg Oral Daily   • donepezil  10 mg Oral Nightly   • enalapril  2.5 mg Oral Daily   • hydrALAZINE  25 mg Oral Q8H   • metoPROLOL succinate  100 mg  Perioperative NICOLE Procedure Note    Staff -        Anesthesiologist:  Elvis Mckinley MD       Resident/Fellow: Steven Logan MD       Performed By: fellow  Preanesthesia Checklist:  Patient identified, IV assessed, risks and benefits discussed, monitors and equipment assessed, procedure being performed at surgeon's request and anesthesia consent obtained.    NICOLE Probe Insertion    Probe Status PRE Insertion: NO obvious damage  Probe type:  Adult 3D  Bite block used:   Soft  Insertion Technique: Easy, no oropharyngeal manipulation  Insertion complications: None obvious  Billing Report:NICOLE report by Anesthesiologist (See Separate Report note)  Probe Status POST Removal: NO obvious damage    NICOLE Report  General Procedure Information  Images for this study have been archived.  Modalities: 2D, 3D, CW Doppler, PW Doppler and Color flow mapping  Echocardiographic and Doppler Measurements  Right Ventricle:  Cavity size normal.    Hypertrophy not present.   Thrombus not present.    Global function normal.     Left Ventricle:  Cavity size dilated.    Hypertrophy present.   Thrombus not present.   Global Function moderately impaired.   Ejection Fraction 35%.      Ventricular Regional Function:  1- Basal Anteroseptal:  hypokinetic  2- Basal Anterior:  hypokinetic  3- Basal Anterolateral:  hypokinetic  4- Basal Inferolateral:  hypokinetic  5- Basal Inferior:  hypokinetic  6- Basal Inferoseptal:  hypokinetic  7- Mid Anteroseptal:  hypokinetic  8- Mid Anterior:  hypokinetic  9- Mid Anterolateral:  hypokinetic  10- Mid Inferolateral:  hypokinetic  11- Mid Inferior:  hypokinetic  12- Mid Inferoseptal:  hypokinetic  13- Apical Anterior:  hypokinetic  14- Apical Lateral:  hypokinetic  15- Apical Inferior:  hypokinetic  16- Apical Septal:  hypokinetic  17- Kent:  hypokinetic    Valves  Aortic Valve: Annulus normal.  Stenosis not present.  Regurgitation +1.  Leaflets normal.  Leaflet motions normal.    Mitral Valve: Annulus normal.   Oral Daily   • linaGLIPtin  5 mg Oral Daily   • pantoprazole  40 mg Oral Daily   • QUEtiapine  25 mg Oral QHS   • isosorbide mononitrate  120 mg Oral Daily       ondansetron, dexamethasone, meperidine (DEMEROL) injection, ePHEDrine, acetaminophen, albuterol, sodium chloride     Results:  Admission on 11/08/2021   Component Date Value   • GLUCOSE, BEDSIDE - POINT* 11/08/2021 161*   • Sodium 11/08/2021 136    • Potassium 11/08/2021 4.6    • Chloride 11/08/2021 102    • Carbon Dioxide 11/08/2021 27    • Anion Gap 11/08/2021 12    • Glucose 11/08/2021 120*   • BUN 11/08/2021 17    • Creatinine 11/08/2021 1.15*   • Glomerular Filtration Ra* 11/08/2021 52*   • BUN/ Creatinine Ratio 11/08/2021 15    • Calcium 11/08/2021 11.3*   • Bilirubin, Total 11/08/2021 0.6    • GOT/AST 11/08/2021 39*   • GPT/ALT 11/08/2021 30    • Alkaline Phosphatase 11/08/2021 241*   • Albumin 11/08/2021 4.1    • Protein, Total 11/08/2021 8.7*   • Globulin 11/08/2021 4.6*   • A/G Ratio 11/08/2021 0.9*   • Lipase 11/08/2021 98    • Troponin I, High Sensiti* 11/08/2021 86*   • WBC 11/08/2021 6.4    • RBC 11/08/2021 5.18    • HGB 11/08/2021 13.6    • HCT 11/08/2021 43.1    • MCV 11/08/2021 83.2    • MCH 11/08/2021 26.3    • MCHC 11/08/2021 31.6*   • RDW-CV 11/08/2021 15.7*   • RDW-SD 11/08/2021 47.0    • PLT 11/08/2021 398    • NRBC 11/08/2021 0    • Neutrophil, Percent 11/08/2021 86    • Lymphocytes, Percent 11/08/2021 10    • Mono, Percent 11/08/2021 4    • Eosinophils, Percent 11/08/2021 0    • Basophils, Percent 11/08/2021 0    • Immature Granulocytes 11/08/2021 0    • Absolute Neutrophils 11/08/2021 5.5    • Absolute Lymphocytes 11/08/2021 0.6*   • Absolute Monocytes 11/08/2021 0.2*   • Absolute Eosinophils  11/08/2021 0.0    • Absolute Basophils 11/08/2021 0.0    • Absolute Immmature Granu* 11/08/2021 0.0    • Ventricular Rate EKG/Min* 11/08/2021 75    • Atrial Rate (BPM) 11/08/2021 75    • ND-Interval (MSEC) 11/08/2021 176    • QRS-Interval  Stenosis not present.  Regurgitation +2.  Leaflets normal.  Leaflet motions normal.    Tricuspid Valve: Annulus normal.  Stenosis not present.  Regurgitation +1.  Leaflets normal.  Leaflet motions normal.    Pulmonic Valve: Annulus normal.  Stenosis not present.  Regurgitation absent.      Aorta: Ascending Aorta: Size normal.  Dissection not present.  Plaque thickness less than 3 mm.  Mobile plaque not present.    Aortic Arch: Size normal.    Dissection not present.   Plaque thickness greater than 3 mm.   Mobile plaque not present.    Descending Aorta: Size normal.    Dissection not present.   Plaque thickness greater than 3 mm.   Mobile plaque not present.      Right Atrium:  Size normal.   Spontaneous echo contrast not present.   Thrombus not present.   Tumor not present.   Device present.   Left Atrium: Size normal.  Spontaneous echo contrast not present.  Thrombus not present.  Tumor not present.  Device not present.    Left atrial appendage normal.   Other Atria Findings:  PAC seen traversing RA into the RV and terminating in main PA  Atrial Septum: Intra-atrial septal morphology normal.       Ventricular Septum: Intra-ventricular septum morphology normal.      Diastolic Function Measurements:  Diastolic Dysfunction Grade= I.  E=  ms.  A=  ms.  E/A Ratio= .  DT=  ms.  S/D= .  IVRT= ms.  Other Findings:   Pericardium:  normal. Pleural Effusion:  none. Pulmonary Arteries:  normal. Pulmonary Venous Flow:  normal. Cornoary sinus catheter present. Coronary sinus size (mm):  7.   Post Intervention Findings  Procedure(s) performed:  CABG.  Regional wall motion:. Surgeon(s) notified of all postintervention findings: Yes (Notified in real time).             Post Intervention comments: Post CPB: Mildly improved LVEF, estimated at 40%.  Normal RV function.  MR continues to be mild.  No significant change in other valve function.  No evidence of aortic dissection.  Surgeon notified of all findings in real  (MSEC) 11/08/2021 142    • QT-Interval (MSEC) 11/08/2021 458    • QTc 11/08/2021 512    • P Axis (Degrees) 11/08/2021 48    • R Axis (Degrees) 11/08/2021 -44    • T Axis (Degrees) 11/08/2021 135    • REPORT TEXT 11/08/2021                      Value:Normal sinus rhythm  Left axis deviation  Left bundle branch block  T wave abnormality in the lateral precordial leads now present  Abnormal ECG  When compared with ECG of  25-OCT-2021 15:28,  Left bundle branch block  is now  present  Criteria for  Septal infarct  are no longer  present  Criteria for  Inferior infarct  are no longer  present  Confirmed by BARBIE BYERS MD ([id number]) on 11/8/2021 12:02:57 PM     • Extra Tube 11/08/2021 Hold for Add Ons    • Troponin I, High Sensiti* 11/08/2021 119*   • Ventricular Rate EKG/Min* 11/08/2021 72    • Atrial Rate (BPM) 11/08/2021 72    • OK-Interval (MSEC) 11/08/2021 210    • QRS-Interval (MSEC) 11/08/2021 136    • QT-Interval (MSEC) 11/08/2021 424    • QTc 11/08/2021 464    • P Axis (Degrees) 11/08/2021 51    • R Axis (Degrees) 11/08/2021 -34    • T Axis (Degrees) 11/08/2021 106    • REPORT TEXT 11/08/2021                      Value:Sinus rhythm  with 1st degree AV block  Left axis deviation  Left bundle branch block  Abnormal ECG  When compared with ECG of  08-NOV-2021 11:43,  OK interval  has increased  T wave inversion no longer evident in  Anterior leads  QT has shortened  Confirmed by ESTUARDO WASHINGTON MD ([id number]) on 11/9/2021 7:41:15 AM     • Rapid SARS-COV-2 by PCR 11/08/2021 Not Detected    • Isolation Guidelines 11/08/2021     • Procedural Comment 11/08/2021     • Ventricular Rate EKG/Min* 11/08/2021 87    • Atrial Rate (BPM) 11/08/2021 87    • OK-Interval (MSEC) 11/08/2021 194    • QRS-Interval (MSEC) 11/08/2021 134    • QT-Interval (MSEC) 11/08/2021 408    • QTc 11/08/2021 491    • P Axis (Degrees) 11/08/2021 62    • R Axis (Degrees) 11/08/2021 -9    • T Axis (Degrees) 11/08/2021 70    • REPORT TEXT 11/08/2021                      time..    Echocardiogram Comments        Value:Normal sinus rhythm  Left bundle branch block  Abnormal ECG  When compared with ECG of  08-NOV-2021 16:02,  ST elevation now present in  Inferior leads  Nonspecific T wave abnormality no longer evident in  Inferior leads  T wave inversion less evident in  Lateral leads  Confirmed by ESTUARDO WASHINGTON MD ([id number]) on 11/9/2021 1:44:26 PM     • Prothrombin Time 11/08/2021 10.3    • INR 11/08/2021 1.0    • PTT 11/08/2021 <21*   • WBC 11/08/2021 5.8    • RBC 11/08/2021 5.41*   • HGB 11/08/2021 14.4    • HCT 11/08/2021 46.1    • MCV 11/08/2021 85.2    • MCH 11/08/2021 26.6    • MCHC 11/08/2021 31.2*   • PLT 11/08/2021 329    • RDW-CV 11/08/2021 15.9*   • RDW-SD 11/08/2021 48.9    • NRBC 11/08/2021 0    • PTT 11/09/2021 24    • WBC 11/09/2021 3.7*   • RBC 11/09/2021 4.71    • HGB 11/09/2021 12.5    • HCT 11/09/2021 38.8    • MCV 11/09/2021 82.4    • MCH 11/09/2021 26.5    • MCHC 11/09/2021 32.2    • PLT 11/09/2021 305    • RDW-CV 11/09/2021 15.8*   • RDW-SD 11/09/2021 47.0    • NRBC 11/09/2021 0    • Extra Tube 11/08/2021 Hold for Add Ons    • Extra Tube 11/08/2021 Hold for Add Ons    • Ventricular Rate EKG/Min* 11/09/2021 91    • Atrial Rate (BPM) 11/09/2021 91    • GA-Interval (MSEC) 11/09/2021 166    • QRS-Interval (MSEC) 11/09/2021 134    • QT-Interval (MSEC) 11/09/2021 414    • QTc 11/09/2021 509    • P Axis (Degrees) 11/09/2021 45    • R Axis (Degrees) 11/09/2021 -64    • T Axis (Degrees) 11/09/2021 103    • REPORT TEXT 11/09/2021                      Value:Normal sinus rhythm  Left axis deviation  Left bundle branch block  Abnormal ECG  When compared with ECG of  08-NOV-2021 17:21,  QRS axis  shifted left  ST less elevated in  Anterior leads  T wave inversion more evident in  Lateral leads  Confirmed by ESTUARDO WASHINGTON MD ([id number]) on 11/9/2021 1:50:19 PM     • GLUCOSE, BEDSIDE - POINT* 11/09/2021 162*   • GLUCOSE, BEDSIDE - POINT* 11/09/2021 96    • GLUCOSE, BEDSIDE - POINT* 11/09/2021 120*   • GLUCOSE, BEDSIDE - POINT*  11/09/2021 105*   • PTT 11/10/2021 28    • WBC 11/10/2021 4.7    • RBC 11/10/2021 3.80*   • HGB 11/10/2021 10.2*   • HCT 11/10/2021 31.6*   • MCV 11/10/2021 83.2    • MCH 11/10/2021 26.8    • MCHC 11/10/2021 32.3    • PLT 11/10/2021 292    • RDW-CV 11/10/2021 15.9*   • RDW-SD 11/10/2021 48.0    • NRBC 11/10/2021 0    • GLUCOSE, BEDSIDE - POINT* 11/09/2021 118*   • Sodium 11/10/2021 140    • Potassium 11/10/2021 3.6    • Chloride 11/10/2021 107    • Carbon Dioxide 11/10/2021 29    • Anion Gap 11/10/2021 8*   • Glucose 11/10/2021 106*   • BUN 11/10/2021 33*   • Creatinine 11/10/2021 1.54*   • Glomerular Filtration Ra* 11/10/2021 37*   • BUN/ Creatinine Ratio 11/10/2021 21    • Calcium 11/10/2021 10.1    • Bilirubin, Total 11/10/2021 0.5    • GOT/AST 11/10/2021 31    • GPT/ALT 11/10/2021 18    • Alkaline Phosphatase 11/10/2021 159*   • Albumin 11/10/2021 2.9*   • Protein, Total 11/10/2021 6.1*   • Globulin 11/10/2021 3.2    • A/G Ratio 11/10/2021 0.9*   • Magnesium 11/10/2021 2.6*   • Prothrombin Time 11/10/2021 10.9    • INR 11/10/2021 1.0    • GLUCOSE, BEDSIDE - POINT* 11/10/2021 82    • GLUCOSE, BEDSIDE - POINT* 11/10/2021 98    • GLUCOSE, BEDSIDE - POINT* 11/10/2021 100*        CT ABDOMEN PELVIS W CONTRAST - IV contrast only    Result Date: 11/8/2021  Narrative: EXAM:  CT ABDOMEN PELVIS W CONTRAST CLINICAL INDICATION: Abdominal pain.  Nausea and vomiting. COMPARISON:  CT abdomen and pelvis 10/26/2021. TECHNIQUE: Axial CT images of the abdomen and pelvis were obtained after intravenous administration of 75 mL Omnipaque 300.  Multiplanar reformatted images were provided and reviewed. mA and/or kVp was adjusted for patient size. FINDINGS:  Mild bilateral lower lobe dependent atelectatic changes.  No basilar pericardial or pleural effusion. Liver is normal in morphology.  Intrahepatic and extrahepatic biliary duct dilation, with extrahepatic common bile duct measuring up to 1.2 cm is not significantly changed.  Spleen  is normal in size.  Fatty atrophy of the pancreas is unchanged.  Adrenal glands appear normal.  Kidneys are symmetric in size and enhancement.  No hydronephrosis bilaterally.  Stomach, small bowel, and colon are normal in caliber without evidence of obstruction.  There is mild wall thickening of the gastric antrum. Incidentally noted duodenal diverticulum.  Normal appendix.  Scattered sigmoid diverticulosis without diverticulitis. Aortoiliac atherosclerotic calcification.  No enlarged abdominal or retroperitoneal lymph nodes.  No free fluid in the abdomen. Urinary bladder appears normal.  Uterus is surgically absent.  No free fluid in the pelvis.  No enlarged pelvic or inguinal lymph nodes.  No acute soft tissue abnormality.  No acute osseous abnormality.     Impression: Thickening of the gastric antrum may reflect gastritis or peptic ulcer disease.  Consider correlation with upper endoscopy. Intrahepatic and extrahepatic biliary duct dilatation has not significantly changed and is likely related to post cholecystectomy state.  Consider correlation with liver function tests. Electronically Signed by: AMY MARTINS M.D. Signed on: 11/8/2021 8:11 PM     CT ABDOMEN PELVIS W CONTRAST - IV contrast only    Result Date: 10/26/2021  Narrative: Patient: DANAY CARUSO  Time Out: 01:31 Exam(s): CT ABDOMEN + PELVIS With Contrast IV Amt: 70 ml Omnipaque 300 EXAM: CT Abdomen and Pelvis With Intravenous Contrast CLINICAL HISTORY: Reason for exam: Peritonitis or perforation suspected. Nausea with vomiting, unspecified. TECHNIQUE: Axial computed tomography images of the abdomen and pelvis with intravenous contrast.  Dose reduction techniques were utilized. CONTRAST: Patient received 70 ml Omnipaque 300 of IV contrast  COMPARISON: No relevant prior studies available. FINDINGS: Heart size is normal.  There is no pericardial effusion.  There is subsegmental atelectasis at the lung bases. The gallbladder is surgically absent.  There is  intrahepatic and extrahepatic biliary dilatation with common bile duct measuring 12 mm.  No calcified duct stone is identified. The spleen is normal.  Pancreas is atrophic.  Adrenal glands are normal. Kidneys enhance symmetrically.  There is no hydronephrosis.  A simple right kidney cyst measures 2 cm; no further follow-up is required.  There are 2 nonobstructing left kidney stones, the largest measuring 4 mm. There is mild aortoiliac atherosclerosis without aneurysm or dissection.  There is no lymphadenopathy. There is no evidence of small or large bowel obstruction.  There are incidentally noted duodenal diverticula.  The appendix is normal.  There is sigmoid diverticulosis without acute diverticulitis.  Wall thickening is noted in the gastric antrum. There is no ascites, free air, or intra-abdominal abscess. Uterus is surgically absent.  Urinary bladder is normal. Bones are osteopenic.  There is no acute fracture or dislocation.     Impression: 1.  No evidence of bowel perforation or peritonitis. 2.  Wall thickening in the gastric antrum suggesting gastritis. 3.  Nonobstructing left kidney stones.  No hydronephrosis. 4.  Biliary dilatation in the setting of cholecystectomy.  Findings may reflect reservoir effect.  However, if there is laboratory evidence of biliary obstruction, consider MRCP for further evaluation. Electronically signed by Ana Paula Pepe M.D. on 10 26 21 at 01:31    XR CHEST PA AND LATERAL 2 VIEWS    Result Date: 10/25/2021  Narrative: History: Chest Pain Exam: XR CHEST PA AND LATERAL 2 VIEWS. Comparison: Chest radiograph 06/15/2021. Findings: Normal heart size. Lungs are hyperinflated, consistent with COPD. No focal consolidation. No pleural effusion or pneumothorax.  No acute or aggressive bony abnormalities.     Impression: Impression: COPD.  No acute cardiopulmonary process. Electronically Signed by: AMY MARTINS M.D. Signed on: 10/25/2021 6:53 PM     US LIVER GALLBLADDER PANCREAS    Result  Date: 10/26/2021  Narrative: EXAM: US LIVER GALLBLADDER PANCREAS CLINICAL INDICATION: Abdominal pain. COMPARISON: CT abdomen pelvis 05/04/2020 FINDINGS: Liver demonstrates normal parenchymal echogenicity.  Normal hepatic contour.  Mild intrahepatic biliary duct dilatation is similar to prior. Common bile duct measures up to 13 mm a similar to prior.  Gallbladder is surgically absent.  Pancreas is partially obscured by bowel gas.  Visualized pancreas appears normal.  Main portal vein is patent and demonstrates hepatopedal flow.  Hepatic veins are patent on color Doppler images. No right hydronephrosis.     Impression: No acute abnormality.  Gallbladder surgically absent.  Intrahepatic and extrahepatic biliary duct dilatation is nonsignificantly changed and likely reflects post cholecystectomy changes. Electronically Signed by: AMY MARTINS M.D. Signed on: 10/26/2021 12:01 AM     EGD    Result Date: 11/10/2021  Narrative: Impression Overall Impression: #1 Jazmine-Mayes tear with clot sticking out and oozing noted with hematin in the stomach. 2.  2 cm hiatal hernia 36-38. 3.  Nonobstructive Schatzki ring at 36 Recommendation Follow up with me Indication None Post-Op Dx See Impression Section Staff Staff Role Sandoval Quesada MD Anesthesiologist Cierra Live RN Circulator Renny Chan MD Gastroenterologist Medications See Anesthesia Record. Totals unavailable because the procedure time range is not set Preprocedure A history and physical has been performed, and patient medication allergies have been reviewed. The patient's tolerance of previous anesthesia has been reviewed. The risks and benefits of the procedure and the sedation options and risks were discussed with the patient. All questions were answered and informed consent obtained. Details of the Procedure The patient underwent monitored anesthesia care, which was administered by an anesthesia professional. The patient's blood pressure, heart  rate, level of consciousness, oxygen and respirations were monitored throughout the procedure. The scope was introduced through the mouth and advanced to the second part of the duodenum. Retroflexion was performed in the cardia. Prior to the procedure, the patient's H. Pylori status was unknown. The patient experienced no blood loss. The procedure was not difficult. The patient tolerated the procedure well. There were no apparent complications. Findings & Interventions Small sliding hiatal hernia (type I hiatal hernia) without Herminio lesions present - GE junction 36 cm from the incisors, diaphragmatic impression 38 cm from the incisors Jazmine-Mayes tear measuring 1 cm reaching the muscular layer oozing blood; stigmata (38 cm from the incisors); induced coagulation with bipolar cautery; injected 10 mL of epinephrine to address bleeding. After injecting 10 mL of 1 in 10,000 epinephrine into approximately three quarters the circumference of the tear, I used the gold probe to coagulate the area first in a circumferential fashion and then right in the center where the clot was with good coag to coagulation.  No oozing occurred at all and I was able to jet lavaged it without provoking any bleeding Non-obstructing Schatzki ring in the Z-line There was a hematin coating the entire stomach and there was some fresh blood oozing in the proximal stomach.  All this was lavaged off the stomach basically appeared normal except for the tear at the diaphragmatic hiatus level.  There was a clot sticking out of it.  I jet lavaged the clot but could not dislodge it I then elected to inject and then treated as noted above.  The patient will remain on a clear liquid diet and we shall give her liquid Carafate today and the next 7 days Specimens ID Type Source Tests Collected by Time A : gastric bx Tissue Stomach SURGICAL PATHOLOGY Renny Chan MD 11/10/2021 [id number]     EEG    Result Date: 10/25/2021  Narrative: Bob SZYMANSKI  MD Connor     10/26/2021  2:00 PM ELECTROENCEPHALOGRAM (EEG) REPORT Date of EEG: 10/25/2021 Patient name:Heidi Villeda : 1940 MRN: [id number] Ordering doctor: Bob Ryan MD METHODS: Twenty-four electrodes were applied according to the 10-20-electrode placement system and one EKG channel monitoring, monopolar and bipolar montages are routinely utilized. The record was obtained on a digital system. OBJECT: Patient information: 81 year old right-hand dominant female with past medical history of HTN, DM, breast cancer, HLD and CAD s/p stent who presented to the neurology clinic for seizure evaluation of memory decline which started ~ 2-3 years The EEG was requested to assess for epileptiform activity. State(s) of consciousness:  Awake to sleep Relevant medications:  None FINDINGS: 1. Background: During wakefulness there was a posterior background of regular, reactive, symmetrical, moderate voltage, 8 Hz activity and 20-30 microvolt amplitude with an anterior background of lower voltage, faster frequencies. During drowsiness, there was symmetrical slowing and attenuation of the waking background as well as the occurrence of symmetrical vertex sharp waves. 2. Abnormalities:  a. Occasional diffuse background slowing of 7Hz activity 3. Activation Procedures: a. Intermittent photic stimulation deferred b. Hyperventilation deferred c. Sleep deprivation 4. Events: None 5. ECG: The ECG was predominantly normal sinus rhythm. IMPRESSION: This EEG is an abnormal study recorded in the awake to sleep state. Mild generalized slowig of the background activity likely consistent with patient;s history of neurodegenerative disease and dementia. No interictal or lateralized slowing. No seizure was recorded. The absence of epileptiform abnormalities does not preclude a diagnosis of seizure Report covers Start: 10:14 on 10/25/2021 End: 11:06 on 10/25/2021 Reading Physician: Bob Ryan MD Neurology- Epilepsy  [facility] [address] [place] [address]     Cath/PV Case    Result Date: 10/29/2021  Narrative: · Mid LAD to Dist LAD lesion with 50% stenosis. · Prox RCA lesion with 60% stenosis. · Mid Cx to Dist Cx lesion with 60% stenosis.  I have been giving her nitro because of symptoms since I did a cardiac cath on her few days ago and today when I went in to stent the LAD it looks like that segment in the distal LAD seems that the stenosis is much less consistent that the possibility of spasm or clot that moved and I took multiple views there was stenosis in the mid LAD which less likely to be angiographically significant and also IFR through it was normal and the same thing the left circumflex the stenosis in the left circumflex also has resolved totally resolved which was angiographically significant and that also could be spasm but shaggy looking segment in the mid circumflex with distal eccentric stenosis and I did IFR also that did not seem to be angiographically significant on the right coronary artery December showed lack 60% stenosis in the proximal segment I will treat her medically        LAST MRI:  === 03/27/21 ===    MRI BRAIN WO CONTRAST    - Narrative -  PROCEDURE INFORMATION:  Exam: MR Head Without Contrast  Exam date and time: 3/28/2021 2:25 AM  Age: 80 years old  Clinical indication: Headache not specified; Patient HX: PT states that she has intermittent sharp pains on the sides of her head but they come and go and they don't last very long. No complaints of any other symptoms    TECHNIQUE:  Imaging protocol: MR of the head without contrast.    COMPARISON:  CT HEAD WO CONTRAST 3/27/2021 7:29 PM    FINDINGS:  Brain: There is no restricted diffusion to suggest acute or subacute ischemic infarct. No acute intracranial hemorrhage or abnormal extra-axial fluid collection. Patchy T2/FLAIR high signal within the subcortical and deep white matter on a background of  age-related atrophy.  Chronic lacunar infarct left cerebellar folia.  Cerebral ventricles: Ex vacuo dilatation of the ventricles. No hydrocephalus.  Bones/joints: T1 marrow signal of the skull and upper cervical spine is appropriate for age.  Paranasal sinuses: Normal as visualized. No acute sinusitis.  Mastoid air cells: The mastoid air cells are clear.  Orbital cavity: Unremarkable.  Soft tissues: Unremarkable.    - Impression -  1. No acute intracranial abnormality.  2. Chronic parenchymal changes on a background of age-related atrophy is most compatible with microangiopathic disease.    Electronically Signed by: IMTIAZ UMANA M.D.  Signed on: 03/28/2021 05:48 AM      === 08/26/19 ===    MRI ADVOCATE PROCEDURE    - Narrative -  Accession #    [id number]    EXAMINATION: Magnetic resonance imaging (MRI) of the brain without contrast    HISTORY: 79 years Female memory loss.    TECHNIQUE: MRI of the brain was performed without contrast according to standard protocol.    COMPARISON: MRI brain 10/29/2015, CT head 09/23/2017    FINDINGS:    No evidence of acute or chronic hemorrhage is identified. No evidence of acute cerebral  infarction is seen. There is moderate generalized cerebral volume loss, without a definite  lobar predilection, with compensatory enlargement of the ventricles and sulci.  Moderate  hippocampal atrophy.  No mass effect or midline shift is seen. Periventricular white matter  FLAIR hyperintensities likely represent sequelae of chronic small vessel ischemic disease.  Remote lacunar infarct in the left cerebellar hemisphere. The corpus callosum and sella appear  normal. The posterior fossa, brainstem, and craniocervical junction appear normal.    The visualized portions of the orbits, paranasal sinuses, and mastoids appear normal.  Loss  of the normal flow-void within the V3 and V4 vertebral artery segments on axial T2-weighted  sequences overall unremarkable appearance on coronal T2-weighted sequences.  Normal flow  voids  are demonstrated in the carotid arteries and basilar artery. The calvarium and visualized  cervical spine appear normal.    - Impression -  1.  No acute intracranial abnormality.    2.  Moderate generalized volume loss with moderate hippocampal atrophy.  Overall, the degree of  volume loss has not significantly progressed from prior examination 10/29/2015.    3.  Loss of the normal flow-void within the V3 and V4 vertebral artery segments on axial T2-  weighted sequences with overall unremarkable appearance on coronal T2-weighted sequences.  Findings are favored to represent vessel tortuosity rather than vascular occlusion.  Consider  further evaluation with CT angiogram to assess for vessel patency if clinically indicated.    -----  F I N A L  -----    Transcribed By: MICK  08/27/19 8:07 am    Dictated By:            CAROL CASSIDY M.D.    Electronically Reviewed and Approved By:           CAROL CASSIDY M.D.  08/27/19 8:15 am      === 01/25/19 ===    MRI ADVOCATE PROCEDURE    Addendum 2/5/2019  7:35 AM -------------------------------------------------  AMENDMENT: 2/4/2019   Lam Montgomery D.O.  This patient is status post right mastectomy.    Amended BI-RADS: 0 Indeterminate    -----  F I N A L  -----    Transcribed By: MICK  01/25/19 12:35 pm    Dictated By:            LAM FARMER DO    LAST CT:  === 11/08/21 ===    CT ABDOMEN PELVIS W CONTRAST    - Narrative -  EXAM:  CT ABDOMEN PELVIS W CONTRAST    CLINICAL INDICATION: Abdominal pain.  Nausea and vomiting.    COMPARISON:  CT abdomen and pelvis 10/26/2021.    TECHNIQUE: Axial CT images of the abdomen and pelvis were obtained after  intravenous administration of 75 mL Omnipaque 300.  Multiplanar reformatted  images were provided and reviewed. mA and/or kVp was adjusted for patient  size.    FINDINGS:    Mild bilateral lower lobe dependent atelectatic changes.  No basilar  pericardial or pleural effusion.    Liver is normal in morphology.   Intrahepatic and extrahepatic biliary duct  dilation, with extrahepatic common bile duct measuring up to 1.2 cm is not  significantly changed.  Spleen is normal in size.  Fatty atrophy of the  pancreas is unchanged.  Adrenal glands appear normal.  Kidneys are  symmetric in size and enhancement.  No hydronephrosis bilaterally.    Stomach, small bowel, and colon are normal in caliber without evidence of  obstruction.  There is mild wall thickening of the gastric antrum.  Incidentally noted duodenal diverticulum.  Normal appendix.  Scattered  sigmoid diverticulosis without diverticulitis.    Aortoiliac atherosclerotic calcification.  No enlarged abdominal or  retroperitoneal lymph nodes.  No free fluid in the abdomen.    Urinary bladder appears normal.  Uterus is surgically absent.  No free  fluid in the pelvis.  No enlarged pelvic or inguinal lymph nodes.    No acute soft tissue abnormality.  No acute osseous abnormality.    - Impression -  Thickening of the gastric antrum may reflect gastritis or peptic ulcer  disease.  Consider correlation with upper endoscopy.    Intrahepatic and extrahepatic biliary duct dilatation has not significantly  changed and is likely related to post cholecystectomy state.  Consider  correlation with liver function tests.    Electronically Signed by: AMY MARTINS M.D.  Signed on: 11/8/2021 8:11 PM      === 10/25/21 ===    CT ABDOMEN PELVIS W CONTRAST    - Narrative -  Patient: DANAY CARUSO  Time Out: 01:31  Exam(s): CT ABDOMEN + PELVIS With Contrast IV Amt: 70 ml Omnipaque 300    EXAM:  CT Abdomen and Pelvis With Intravenous Contrast    CLINICAL HISTORY:  Reason for exam: Peritonitis or perforation suspected. Nausea with  vomiting, unspecified.    TECHNIQUE:  Axial computed tomography images of the abdomen and pelvis with  intravenous contrast.  Dose reduction techniques were utilized.    CONTRAST:  Patient received 70 ml Omnipaque 300 of IV contrast    COMPARISON:  No relevant prior  studies available.    FINDINGS:  Heart size is normal.  There is no pericardial effusion.  There is  subsegmental atelectasis at the lung bases.    The gallbladder is surgically absent.  There is intrahepatic and  extrahepatic biliary dilatation with common bile duct measuring 12 mm.  No calcified duct stone is identified.    The spleen is normal.  Pancreas is atrophic.  Adrenal glands are normal.    Kidneys enhance symmetrically.  There is no hydronephrosis.  A simple  right kidney cyst measures 2 cm; no further follow-up is required.  There  are 2 nonobstructing left kidney stones, the largest measuring 4 mm.    There is mild aortoiliac atherosclerosis without aneurysm or dissection.  There is no lymphadenopathy.    There is no evidence of small or large bowel obstruction.  There are  incidentally noted duodenal diverticula.  The appendix is normal.  There  is sigmoid diverticulosis without acute diverticulitis.  Wall thickening  is noted in the gastric antrum.    There is no ascites, free air, or intra-abdominal abscess.    Uterus is surgically absent.  Urinary bladder is normal.    Bones are osteopenic.  There is no acute fracture or dislocation.    - Impression -  1.  No evidence of bowel perforation or peritonitis.  2.  Wall thickening in the gastric antrum suggesting gastritis.  3.  Nonobstructing left kidney stones.  No hydronephrosis.  4.  Biliary dilatation in the setting of cholecystectomy.  Findings may  reflect reservoir effect.  However, if there is laboratory evidence of  biliary obstruction, consider MRCP for further evaluation.        Electronically signed by Ana Paula Pepe M.D. on 10 26 21 at 01:31      === 07/21/21 ===    CT HEAD WO CONTRAST    - Narrative -  EXAM: CT HEAD WO CONTRAST    CLINICAL INDICATION:  81 years OLD Female WITH HISTORY OF Mental status  change, unknown cause.    COMPARISON: 03/27/2021    TECHNIQUE: 2.5 mm noncontrast axial images  obtained.  CT dose reduction  technique was  utilized.    FINDINGS: No evidence of acute intracranial hemorrhage. No hydrocephalus.  Within the limits of this noncontrast study, There is no evidence of  intracranial mass, mass effect, or midline shift.  No extra-axial fluid  collections are seen.  Mild generalized atrophy.  Mild periventricular and  subcortical white matter chronic small vessel ischemic changes.  Vascular  calcifications seen.    The gray-white matter differentiation is intact.    No acute calvarial fracture is seen.    The mastoid air cells are clear. The visualized paranasal sinuses are  clear.    - Impression -  No evidence of acute intracranial hemorrhage, hydrocephalus, or midline  shift.  Mild generalized atrophy.  Mild periventricular and subcortical  white matter chronic small vessel ischemic changes    Electronically Signed by: AYE GOINS MD  Signed on: 7/21/2021 5:48 PM    LAST U/S:  === 10/25/21 ===    US LIVER GALLBLADDER PANCREAS    - Narrative -  EXAM: US LIVER GALLBLADDER PANCREAS    CLINICAL INDICATION: Abdominal pain.    COMPARISON: CT abdomen pelvis 05/04/2020    FINDINGS:    Liver demonstrates normal parenchymal echogenicity.  Normal hepatic  contour.  Mild intrahepatic biliary duct dilatation is similar to prior.  Common bile duct measures up to 13 mm a similar to prior.  Gallbladder is  surgically absent.    Pancreas is partially obscured by bowel gas.  Visualized pancreas appears  normal.    Main portal vein is patent and demonstrates hepatopedal flow.  Hepatic  veins are patent on color Doppler images.    No right hydronephrosis.    - Impression -  No acute abnormality.    Gallbladder surgically absent.  Intrahepatic and extrahepatic biliary duct  dilatation is nonsignificantly changed and likely reflects post  cholecystectomy changes.      Electronically Signed by: AMY MARTINS M.D.  Signed on: 10/26/2021 12:01 AM      === 02/24/21 ===    US KIDNEY BILATERAL    - Narrative -  EXAM:  US KIDNEY BILATERAL    CLINICAL  INDICATION:  Chronic kidney disease.    TECHNIQUE:  Renal ultrasound was performed using color and grayscale  imaging.    COMPARISON:  CT on 05/04/2020.    FINDINGS:    Examination is limited due to patient body habitus.  Within this  limitation, the right kidney measures 9.7 cm in length, and the left kidney  measures 9.6 cm in length.  There is no hydronephrosis or perinephric  fluid.  Few punctate nonobstructive stones in the left kidney are seen.  The kidneys are mildly echogenic in appearance bilaterally.  There is a  simple cystic lesion in the periphery of the right kidney which measures  approximately 1.1 x 1.9 x 1.1 cm in size.    The urinary bladder is contracted, limiting its assessment.    - Impression -  1.  Mildly echogenic kidneys bilaterally, compatible with medical renal  disease.  2.  No hydronephrosis in either kidney.  Few punctate nonobstructive stones  in the left kidney are noted.  3.  1.9 x 1.1 cm simple appearing cyst in the periphery of the right  kidney.    Electronically Signed by: LESTER RODRIGUEZ M.D.  Signed on: 2/25/2021 7:26 PM      === 08/12/20 ===    US ADVOCATE PROCEDURE    - Narrative -  Accession #    [id number]    EXAM:US THYROID    CLINICAL HISTORY: 80 years of age, Female, thyroid nodules.    TECHNIQUE:  Grayscale sonographic views of the thyroid gland were acquired and submitted for review. Color  Doppler interrogation was also performed.    COMPARISON:  Thyroid ultrasound dated 8/19/2019.    FINDINGS:  There is a heterogeneous background echogenicity. Color Doppler interrogation demonstrates  adequate flow.  Right thyroid lobe measures 3.7 x 2.3 x 2.8 cm .  Left thyroid lobe measures 3.8 x 1.6 cm .  Isthmus measures 0.2 cm .    Lobular, markedly heterogeneous background of the right thyroid parenchyma, which makes  evaluation and direct comparison of the nodules challenging. Dominant nodules as follows:    R1  Size: 1.4 x 1.7 x 1.7 cm, previously 1.5 x 1.7 x 1.6  cm.  Location: Inferior right thyroid lobe  Composition: Mixed Cystic and Solid (1 point)  Echogenicity: Hyper or isoechoic (1 point)  Shape: Wider than tall (0 points)  Margin: Smooth (0 points)  Echogenic Foci: None (0 points)  TIRADS: TR2 (2 points) - not suspicious    R2  Size: 1.4 x 1.1 x 1.4 cm, previously 1.4 x 1.2 x 1.2  Location: Mid to inferior right thyroid lobe.  Composition: Solid or almost completely solid (2 points)  Echogenicity: Hypoechoic (2 point)  Shape: Wider than tall (0 points)  Margin: Smooth (0 points)  Echogenic Foci: None (0 points)  TIRADS: TR4 (4-6 points) - moderately suspicious    A 1.0 cm nodule in the left thyroid lobe has a benign spongiform appearance and is unchanged.    - Impression -  Enlarged right thyroid lobe with stable nodules.    A 1.4 cm nodule at the mid to inferior right thyroid lobe is stable corresponding with  TR4,  moderately suspicious.  Recommendations:  =>1 cm follow up at 1, 2, 3 and 5 years.  =>1.5 cm FNA      Accession #    [id number]    Recommendations are based on the 2017 ACR Thyroid Imaging, Reporting And Data System (TI-RADS)  -----  F I N A L  -----    Transcribed By: MICK  08/13/20 12:47 pm    Dictated By:            GABBI, ANDREA CRAWFORD MD    Electronically Reviewed and Approved By:           GABBI, ANDREA CRAWFORD MD  08/13/20 12:58 pm          TY Chan MD

## 2024-07-25 NOTE — ANESTHESIA PROCEDURE NOTES
Airway         Procedure Start/Stop Times: 7/25/2024 12:59 PM  Staff -        Other Anesthesia Staff: Zainab Mcneil       Performed By: SRNAIndications and Patient Condition       Indications for airway management: nneka-procedural       Induction type:intravenous       Mask difficulty assessment: 1 - vent by mask    Final Airway Details       Final airway type: endotracheal airway       Successful airway: ETT - single  Endotracheal Airway Details        ETT size (mm): 8.0       Cuffed: yes       Successful intubation technique: direct laryngoscopy       DL Blade Type: Malagon 2       Grade View of Cords: 1       Adjucts: stylet       Position: Right       Measured from: gums/teeth       Secured at (cm): 23       Bite block used: Oral Airway    Post intubation assessment        Placement verified by: capnometry, equal breath sounds and chest rise        Number of attempts at approach: 1       Number of other approaches attempted: 0       Secured with: commercial tube [place]       Ease of procedure: easy       Dentition: Intact    Medication(s) Administered   Medication Administration Time: 7/25/2024 12:59 PM

## 2024-07-25 NOTE — ANESTHESIA POSTPROCEDURE EVALUATION
Patient: Modesto Lehman    Procedure: Procedure(s):  Median Sternotomy, On Cardiopulmonary Bypass Graft, CORONARY ARTERY BYPASS GRAFT X_3_, Left AND RIGHTGreater Saphenous Vein [place], Left Internal Mammary Artery [place], Intra-operative Transesophageal Echocardiogram per Anesthesia.       Anesthesia Type:  General    Note:  Disposition: ICU            ICU Sign Out: Anesthesiologist/ICU physician sign out WAS performed   Postop Pain Control: Uneventful            Sign Out: Well controlled pain   PONV: No   Neuro/Psych: Uneventful            Sign Out: Acceptable/Baseline neuro status   Airway/Respiratory: Uneventful            Sign Out: AIRWAY IN SITU/Resp. Support               Airway in situ/Resp. Support: ETT                 Reason: Planned Pre-op   CV/Hemodynamics: Uneventful            Sign Out: Acceptable CV status; No obvious hypovolemia; No obvious fluid overload   Other NRE: NONE   DID A NON-ROUTINE EVENT OCCUR? No    Event details/Postop Comments:  Patient transported directly from the OR to the ICU sedated and intubated.  Paralytic reversed prior to leaving the OR.  Vitals stable on epi 0.01, NE 0.01.  Report given to ICU.           Last vitals:  Vitals:    07/25/24 0552 07/25/24 0555   BP: 118/79 118/79   Pulse: 80    Resp: 20    Temp: 36.7  C (98  F)    SpO2: 96% 96%       Electronically Signed By: Shavon Owen MD  July 25, 2024  6:41 PM

## 2024-07-25 NOTE — ANESTHESIA PROCEDURE NOTES
Acute Normovolemic Hemodilution Note    Pre-Procedure   Staff -        Anesthesiologist:  Elvis Mckinley MD       Resident/Fellow: Steven Logan MD       Performed By: fellow       Location: In OR after induction       Pre-Anesthestic Checklist: patient identified, IV checked, site marked, risks and benefits discussed, informed consent, monitors and equipment checked, pre-op evaluation and at physician/surgeon's request   Procedure: Normovolemic Hemodilution  Date/Time of Collection:  7/25/2024 1:30 PM

## 2024-07-25 NOTE — ANESTHESIA PREPROCEDURE EVALUATION
Anesthesia Pre-Procedure Evaluation    Patient: Modesto Lehman   MRN: [id number] : 1955        Procedure : Procedure(s):  CORONARY ARTERY BYPASS GRAFT AND ALL INDICATED PROCEDURES          Past Medical History:   Diagnosis Date    Allergy status to analgesic agent     No reported medication allergies    Anesthesia of skin     No  problems with anaesthesia    CAD (coronary artery disease)     Elevated coronary artery calcium score     History of recurrent pneumonia     No Comments Provided    Hyperlipidemia     No Comments Provided    Low back pain     pain with bilateral leg and disc injuries.    Personal history of nicotine dependence     Personal history of other medical treatment (CODE)     No blood transfusions    PVD (peripheral vascular disease) (H24)       Past Surgical History:   Procedure Laterality Date    ANGIOPLASTY      Right leg stenting and bypass, Left also    COLONOSCOPY  2010    Q 10yrs.    COLONOSCOPY N/A 2022    6 tubular adenomas, 5 year follow up, 2027    CV CORONARY ANGIOGRAM N/A 4/15/2024    Procedure: Coronary Angiogram;  Surgeon: Toni Sellers MD;  Location:  [facility]    ENDARTERECTOMY CAROTID Left 2024    Procedure: Left Carotid Endarterectomy with bovine pericardial patch angioplasty. Intraoperative Ultrasound. Intraoperative EEG monitoring;  Surgeon: Rasheed Sam MD;  Location:  OR    EXTRACTION(S) DENTAL      recently removed    OTHER SURGICAL HISTORY      [id number],PREMALIG/BENIGN SKIN LESION EXCISION, removed from chest    OTHER SURGICAL HISTORY      2084,ANESTHESIA ALERT,No  problems with anaesthesia      No Known Allergies   Social History     Tobacco Use    Smoking status: Former     Current packs/day: 0.25     Average packs/day: 0.3 packs/day for 14.7 years (3.7 ttl pk-yrs)     Types: Cigars, Cigarettes     Start date: 2009    Smokeless tobacco: Never    Tobacco comments:     Quit smoking: quit 17  (cigarettes); smokes cigars 2 puffs cigar week   Substance Use Topics    Alcohol use: Yes     Comment: 2 vodka 3-4x week      Wt Readings from Last 1 Encounters:   07/25/24 79.3 kg (174 lb 13.2 oz)        Anesthesia Evaluation   Pt has had prior anesthetic. Type: General and MAC.    No history of anesthetic complications       ROS/MED HX  ENT/Pulmonary: Comment: Reports 2 puffs of cigar twice weekly  Smokes marijuana twice weekly    (+)                tobacco use, Current use,   patient smoked within 24 hours,                 (-) asthma, COPD, NELLIE risk factors and recent URI   Neurologic: Comment: Chronic occlusion of right carotid, high grade left lesion s/p recent CEA   (-) no seizures and no CVA   Cardiovascular: Comment: PAD s/p Bilateral popliteal bypass  S/p left CEA 6/25/24      (+) Dyslipidemia - Peripheral Vascular Disease-- Other and Carotid Stenosis.  CAD -  - -   Taking blood thinners Pt has not received instructions: Instructions Given to patient: Will remain on ASA up to DOS. CHF etiology: ischemic Last EF: 30-35% date: 4/16/24                        Previous cardiac testing   Echo: Date: 4/23/24 Results:    Stress Test:  Date: 2013 Results:    ECG Reviewed:  Date: 4/30/24 Results:  Sinus rhythm   Possible Left atrial enlargement   Minimal voltage criteria for LVH, may be normal variant ( Walker product )   ST & T wave abnormality, consider lateral ischemia   Abnormal ECG     Cath:  Date: 4/15/24 Results:   (-) angina, FORD, arrhythmias and angina   METS/Exercise Tolerance: 3 - Able to walk 1-2 blocks without stopping Comment: METS currently ~4 due to restrictions from cardiology. Does all of cooking and cares for his wife, gardening.    Hematologic:  - neg hematologic  ROS  (-) history of blood clots and history of blood transfusion   Musculoskeletal:       GI/Hepatic:    (-) GERD and esophageal disease   Renal/Genitourinary:    (-) renal disease   Endo:     (+)               Obesity,    (-) Type II  "DM, thyroid disease and chronic steroid usage   Psychiatric/Substance Use:     (+)     Recreational drug usage: Cannabis. (-) psychiatric history   Infectious Disease: Comment: Hx of Lyme Disease   (-) Recent Fever   Malignancy:  - neg malignancy ROS     Other:  - neg other ROS    (+)  , H/O Chronic Pain (back, h/o multiple back injuries),         Physical Exam    Airway        Mallampati: II   TM distance: > 3 FB   Neck ROM: full   Mouth opening: > 3 cm    Respiratory Devices and Support         Dental     Comment: Nothing loose per patient. Partial removed.     (+) Multiple visibly decayed, broken teeth      Cardiovascular          Rhythm and rate: regular and normal     Pulmonary       Comment: Baseline breathing and cough per patient.     (+) rhonchi and wheezes           OUTSIDE LABS:  CBC:   Lab Results   Component Value Date    WBC 7.8 07/08/2024    WBC 11.3 (H) 06/26/2024    HGB 14.7 07/08/2024    HGB 13.9 06/26/2024    HCT 43.7 07/08/2024    HCT 41.9 06/26/2024     07/08/2024     06/26/2024     BMP:   Lab Results   Component Value Date     07/08/2024     06/26/2024    POTASSIUM 4.2 07/08/2024    POTASSIUM 4.8 06/26/2024    CHLORIDE 100 07/08/2024    CHLORIDE 103 06/26/2024    CO2 29 07/08/2024    CO2 26 06/26/2024    BUN 12.5 07/08/2024    BUN 11.6 06/26/2024    CR 0.95 07/08/2024    CR 0.86 06/26/2024     (H) 07/25/2024     (H) 07/08/2024     COAGS:   Lab Results   Component Value Date    PTT 29 07/08/2024    INR 1.02 07/08/2024     POC: No results found for: \"BGM\", \"HCG\", \"HCGS\"  HEPATIC:   Lab Results   Component Value Date    ALBUMIN 4.3 07/08/2024    PROTTOTAL 7.0 07/08/2024    ALT 49 07/08/2024    AST 41 07/08/2024    ALKPHOS 88 07/08/2024    BILITOTAL 0.2 07/08/2024     OTHER:   Lab Results   Component Value Date    PH 7.38 06/25/2024    LACT 0.9 06/25/2024    A1C 5.9 (H) 05/01/2024    DANE 9.2 07/08/2024    MAG 2.1 07/08/2024    CRP 0.7 (H) 07/31/2020    SED 25 " "(H) 07/31/2020       Anesthesia Plan    ASA Status:  4    NPO Status:  Will be NPO Appropriate at ...    Anesthesia Type: General.     - Airway: ETT      Maintenance: Balanced.   Techniques and Equipment:     - Lines/Monitors: Arterial Line, Central Line, PAC, CVP, NIRS, BIS, NICOLE            NICOLE Absolute Contra-indication: NONE            NICOLE Relative Contra-indication: NONE     - Blood: T&S     - Drips/Meds: Norepi, Epinephrine, Vasopressin     Consents    Anesthesia Plan(s) and associated risks, benefits, and realistic alternatives discussed. Questions answered and patient/representative(s) expressed understanding.     - Discussed: Risks, Benefits and Alternatives for BOTH SEDATION and the PROCEDURE were discussed     - Discussed with:  Patient      - Extended Intubation/Ventilatory Support Discussed: Yes.      - Patient is DNR/DNI Status: No     Use of blood products discussed: Yes.     - Discussed with: Patient.     - Consented: consented to blood products     Postoperative Care            Comments:               Reza Johnson MD    I have reviewed the pertinent notes and labs in the chart from the past 30 days and (re)examined the patient.  Any updates or changes from those notes are reflected in this note.             # Drug Induced Platelet Defect: home medication list includes an antiplatelet medication  # Overweight: Estimated body mass index is 25.82 kg/m  as calculated from the following:    Height as of this encounter: 1.753 m (5' 9\").    Weight as of this encounter: 79.3 kg (174 lb 13.2 oz).      "

## 2024-07-26 ENCOUNTER — APPOINTMENT (OUTPATIENT)
Dept: GENERAL RADIOLOGY | Facility: CLINIC | Age: 69
DRG: 235 | End: 2024-07-26
Payer: MEDICARE

## 2024-07-26 ENCOUNTER — APPOINTMENT (OUTPATIENT)
Dept: GENERAL RADIOLOGY | Facility: CLINIC | Age: 69
DRG: 235 | End: 2024-07-26
Attending: SURGERY
Payer: MEDICARE

## 2024-07-26 ENCOUNTER — APPOINTMENT (OUTPATIENT)
Dept: OCCUPATIONAL THERAPY | Facility: CLINIC | Age: 69
DRG: 235 | End: 2024-07-26
Attending: SURGERY
Payer: MEDICARE

## 2024-07-26 LAB
ALBUMIN SERPL BCG-MCNC: 4 G/DL (ref 3.5–5.2)
ALBUMIN SERPL BCG-MCNC: 4.1 G/DL (ref 3.5–5.2)
ALP SERPL-CCNC: 71 U/L (ref 40–150)
ALP SERPL-CCNC: 74 U/L (ref 40–150)
ALT SERPL W P-5'-P-CCNC: 27 U/L (ref 0–70)
ALT SERPL W P-5'-P-CCNC: 28 U/L (ref 0–70)
ANION GAP SERPL CALCULATED.3IONS-SCNC: 10 MMOL/L (ref 7–15)
ANION GAP SERPL CALCULATED.3IONS-SCNC: 9 MMOL/L (ref 7–15)
AST SERPL W P-5'-P-CCNC: 56 U/L (ref 0–45)
AST SERPL W P-5'-P-CCNC: 57 U/L (ref 0–45)
ATRIAL RATE - MUSE: 91 BPM
ATRIAL RATE - MUSE: 93 BPM
BASE EXCESS BLDV CALC-SCNC: 0.9 MMOL/L (ref -3–3)
BASE EXCESS BLDV CALC-SCNC: 2.5 MMOL/L (ref -3–3)
BILIRUB SERPL-MCNC: 0.4 MG/DL
BILIRUB SERPL-MCNC: 0.5 MG/DL
BUN SERPL-MCNC: 15.5 MG/DL (ref 8–23)
BUN SERPL-MCNC: 16.5 MG/DL (ref 8–23)
CA-I BLD-MCNC: 4.5 MG/DL (ref 4.4–5.2)
CALCIUM SERPL-MCNC: 8.2 MG/DL (ref 8.8–10.4)
CALCIUM SERPL-MCNC: 8.6 MG/DL (ref 8.8–10.4)
CHLORIDE SERPL-SCNC: 105 MMOL/L (ref 98–107)
CHLORIDE SERPL-SCNC: 106 MMOL/L (ref 98–107)
CREAT SERPL-MCNC: 0.73 MG/DL (ref 0.67–1.17)
CREAT SERPL-MCNC: 0.81 MG/DL (ref 0.67–1.17)
DIASTOLIC BLOOD PRESSURE - MUSE: NORMAL MMHG
DIASTOLIC BLOOD PRESSURE - MUSE: NORMAL MMHG
EGFRCR SERPLBLD CKD-EPI 2021: >90 ML/MIN/1.73M2
EGFRCR SERPLBLD CKD-EPI 2021: >90 ML/MIN/1.73M2
ERYTHROCYTE [DISTWIDTH] IN BLOOD BY AUTOMATED COUNT: 14.7 % (ref 10–15)
GLUCOSE BLDC GLUCOMTR-MCNC: 106 MG/DL (ref 70–99)
GLUCOSE BLDC GLUCOMTR-MCNC: 124 MG/DL (ref 70–99)
GLUCOSE BLDC GLUCOMTR-MCNC: 125 MG/DL (ref 70–99)
GLUCOSE BLDC GLUCOMTR-MCNC: 134 MG/DL (ref 70–99)
GLUCOSE BLDC GLUCOMTR-MCNC: 137 MG/DL (ref 70–99)
GLUCOSE BLDC GLUCOMTR-MCNC: 147 MG/DL (ref 70–99)
GLUCOSE BLDC GLUCOMTR-MCNC: 148 MG/DL (ref 70–99)
GLUCOSE BLDC GLUCOMTR-MCNC: 161 MG/DL (ref 70–99)
GLUCOSE BLDC GLUCOMTR-MCNC: 186 MG/DL (ref 70–99)
GLUCOSE SERPL-MCNC: 134 MG/DL (ref 70–99)
GLUCOSE SERPL-MCNC: 156 MG/DL (ref 70–99)
HCO3 BLDV-SCNC: 26 MMOL/L (ref 21–28)
HCO3 BLDV-SCNC: 29 MMOL/L (ref 21–28)
HCO3 SERPL-SCNC: 25 MMOL/L (ref 22–29)
HCO3 SERPL-SCNC: 26 MMOL/L (ref 22–29)
HCT VFR BLD AUTO: 39.5 % (ref 40–53)
HGB BLD-MCNC: 13.4 G/DL (ref 13.3–17.7)
INTERPRETATION ECG - MUSE: NORMAL
INTERPRETATION ECG - MUSE: NORMAL
MAGNESIUM SERPL-MCNC: 2.4 MG/DL (ref 1.7–2.3)
MCH RBC QN AUTO: 31.8 PG (ref 26.5–33)
MCHC RBC AUTO-ENTMCNC: 33.9 G/DL (ref 31.5–36.5)
MCV RBC AUTO: 94 FL (ref 78–100)
O2/TOTAL GAS SETTING VFR VENT: 21 %
O2/TOTAL GAS SETTING VFR VENT: 3 %
OXYHGB MFR BLDV: 67 % (ref 70–75)
OXYHGB MFR BLDV: 77 % (ref 70–75)
P AXIS - MUSE: 47 DEGREES
P AXIS - MUSE: 59 DEGREES
PCO2 BLDV: 43 MM HG (ref 40–50)
PCO2 BLDV: 50 MM HG (ref 40–50)
PH BLDV: 7.37 [PH] (ref 7.32–7.43)
PH BLDV: 7.4 [PH] (ref 7.32–7.43)
PHOSPHATE SERPL-MCNC: 3 MG/DL (ref 2.5–4.5)
PLATELET # BLD AUTO: 154 10E3/UL (ref 150–450)
PO2 BLDV: 36 MM HG (ref 25–47)
PO2 BLDV: 45 MM HG (ref 25–47)
POTASSIUM SERPL-SCNC: 3.8 MMOL/L (ref 3.4–5.3)
POTASSIUM SERPL-SCNC: 4.2 MMOL/L (ref 3.4–5.3)
PR INTERVAL - MUSE: 140 MS
PR INTERVAL - MUSE: 156 MS
PROT SERPL-MCNC: 6.2 G/DL (ref 6.4–8.3)
PROT SERPL-MCNC: 6.3 G/DL (ref 6.4–8.3)
QRS DURATION - MUSE: 100 MS
QRS DURATION - MUSE: 108 MS
QT - MUSE: 382 MS
QT - MUSE: 422 MS
QTC - MUSE: 474 MS
QTC - MUSE: 519 MS
R AXIS - MUSE: -44 DEGREES
R AXIS - MUSE: -59 DEGREES
RBC # BLD AUTO: 4.21 10E6/UL (ref 4.4–5.9)
SAO2 % BLDV: 68.8 % (ref 70–75)
SAO2 % BLDV: 79 % (ref 70–75)
SODIUM SERPL-SCNC: 139 MMOL/L (ref 135–145)
SODIUM SERPL-SCNC: 142 MMOL/L (ref 135–145)
SYSTOLIC BLOOD PRESSURE - MUSE: NORMAL MMHG
SYSTOLIC BLOOD PRESSURE - MUSE: NORMAL MMHG
T AXIS - MUSE: 51 DEGREES
T AXIS - MUSE: 79 DEGREES
VENTRICULAR RATE- MUSE: 91 BPM
VENTRICULAR RATE- MUSE: 93 BPM
WBC # BLD AUTO: 15.9 10E3/UL (ref 4–11)

## 2024-07-26 PROCEDURE — 83735 ASSAY OF MAGNESIUM: CPT | Performed by: SURGERY

## 2024-07-26 PROCEDURE — 71045 X-RAY EXAM CHEST 1 VIEW: CPT | Mod: 26 | Performed by: RADIOLOGY

## 2024-07-26 PROCEDURE — 80053 COMPREHEN METABOLIC PANEL: CPT

## 2024-07-26 PROCEDURE — 250N000011 HC RX IP 250 OP 636: Performed by: THORACIC SURGERY (CARDIOTHORACIC VASCULAR SURGERY)

## 2024-07-26 PROCEDURE — 250N000012 HC RX MED GY IP 250 OP 636 PS 637: Performed by: THORACIC SURGERY (CARDIOTHORACIC VASCULAR SURGERY)

## 2024-07-26 PROCEDURE — 250N000011 HC RX IP 250 OP 636: Performed by: SURGERY

## 2024-07-26 PROCEDURE — 250N000013 HC RX MED GY IP 250 OP 250 PS 637

## 2024-07-26 PROCEDURE — 84100 ASSAY OF PHOSPHORUS: CPT | Performed by: SURGERY

## 2024-07-26 PROCEDURE — 74018 RADEX ABDOMEN 1 VIEW: CPT | Mod: 26 | Performed by: STUDENT IN AN ORGANIZED HEALTH CARE EDUCATION/TRAINING PROGRAM

## 2024-07-26 PROCEDURE — 97165 OT EVAL LOW COMPLEX 30 MIN: CPT | Mod: GO | Performed by: OCCUPATIONAL THERAPIST

## 2024-07-26 PROCEDURE — 71045 X-RAY EXAM CHEST 1 VIEW: CPT

## 2024-07-26 PROCEDURE — 99291 CRITICAL CARE FIRST HOUR: CPT | Mod: 24 | Performed by: STUDENT IN AN ORGANIZED HEALTH CARE EDUCATION/TRAINING PROGRAM

## 2024-07-26 PROCEDURE — 250N000013 HC RX MED GY IP 250 OP 250 PS 637: Performed by: SURGERY

## 2024-07-26 PROCEDURE — 97535 SELF CARE MNGMENT TRAINING: CPT | Mod: GO | Performed by: OCCUPATIONAL THERAPIST

## 2024-07-26 PROCEDURE — 200N000002 HC R&B ICU UMMC

## 2024-07-26 PROCEDURE — 93005 ELECTROCARDIOGRAM TRACING: CPT

## 2024-07-26 PROCEDURE — 85027 COMPLETE CBC AUTOMATED: CPT

## 2024-07-26 PROCEDURE — 999N000065 XR ABDOMEN PORT 1 VIEW

## 2024-07-26 PROCEDURE — 74018 RADEX ABDOMEN 1 VIEW: CPT

## 2024-07-26 PROCEDURE — 97530 THERAPEUTIC ACTIVITIES: CPT | Mod: GO | Performed by: OCCUPATIONAL THERAPIST

## 2024-07-26 PROCEDURE — 250N000011 HC RX IP 250 OP 636

## 2024-07-26 PROCEDURE — 82805 BLOOD GASES W/O2 SATURATION: CPT

## 2024-07-26 PROCEDURE — 82330 ASSAY OF CALCIUM: CPT | Performed by: SURGERY

## 2024-07-26 RX ORDER — NICOTINE POLACRILEX 4 MG
15-30 LOZENGE BUCCAL
Status: DISCONTINUED | OUTPATIENT
Start: 2024-07-26 | End: 2024-07-31 | Stop reason: HOSPADM

## 2024-07-26 RX ORDER — FUROSEMIDE 10 MG/ML
40 INJECTION INTRAMUSCULAR; INTRAVENOUS ONCE
Status: COMPLETED | OUTPATIENT
Start: 2024-07-26 | End: 2024-07-26

## 2024-07-26 RX ORDER — LIDOCAINE 4 G/G
3 PATCH TOPICAL
Status: DISCONTINUED | OUTPATIENT
Start: 2024-07-26 | End: 2024-07-31 | Stop reason: HOSPADM

## 2024-07-26 RX ORDER — ATORVASTATIN CALCIUM 80 MG/1
80 TABLET, FILM COATED ORAL DAILY
Status: DISCONTINUED | OUTPATIENT
Start: 2024-07-26 | End: 2024-07-31 | Stop reason: HOSPADM

## 2024-07-26 RX ORDER — SIMETHICONE 80 MG
80 TABLET,CHEWABLE ORAL EVERY 6 HOURS PRN
Status: DISCONTINUED | OUTPATIENT
Start: 2024-07-26 | End: 2024-07-31 | Stop reason: HOSPADM

## 2024-07-26 RX ORDER — NICOTINE POLACRILEX 4 MG
15-30 LOZENGE BUCCAL
Status: DISCONTINUED | OUTPATIENT
Start: 2024-07-26 | End: 2024-07-26

## 2024-07-26 RX ORDER — POTASSIUM CHLORIDE 29.8 MG/ML
20 INJECTION INTRAVENOUS ONCE
Status: COMPLETED | OUTPATIENT
Start: 2024-07-26 | End: 2024-07-26

## 2024-07-26 RX ORDER — DEXTROSE MONOHYDRATE 25 G/50ML
25-50 INJECTION, SOLUTION INTRAVENOUS
Status: DISCONTINUED | OUTPATIENT
Start: 2024-07-26 | End: 2024-07-31 | Stop reason: HOSPADM

## 2024-07-26 RX ORDER — DEXTROSE MONOHYDRATE 25 G/50ML
25-50 INJECTION, SOLUTION INTRAVENOUS
Status: DISCONTINUED | OUTPATIENT
Start: 2024-07-26 | End: 2024-07-26

## 2024-07-26 RX ADMIN — HYDROMORPHONE HYDROCHLORIDE 0.2 MG: 1 INJECTION, SOLUTION INTRAMUSCULAR; INTRAVENOUS; SUBCUTANEOUS at 10:22

## 2024-07-26 RX ADMIN — CEFAZOLIN 1 G: 1 INJECTION, POWDER, FOR SOLUTION INTRAMUSCULAR; INTRAVENOUS at 08:48

## 2024-07-26 RX ADMIN — SENNOSIDES AND DOCUSATE SODIUM 1 TABLET: 8.6; 5 TABLET ORAL at 19:38

## 2024-07-26 RX ADMIN — ACETAMINOPHEN 975 MG: 325 TABLET, FILM COATED ORAL at 16:47

## 2024-07-26 RX ADMIN — FUROSEMIDE 40 MG: 10 INJECTION, SOLUTION INTRAVENOUS at 11:26

## 2024-07-26 RX ADMIN — HYDROMORPHONE HYDROCHLORIDE 0.4 MG: 1 INJECTION, SOLUTION INTRAMUSCULAR; INTRAVENOUS; SUBCUTANEOUS at 21:20

## 2024-07-26 RX ADMIN — LIDOCAINE 3 PATCH: 4 PATCH TOPICAL at 19:38

## 2024-07-26 RX ADMIN — ACETAMINOPHEN 975 MG: 325 TABLET, FILM COATED ORAL at 23:32

## 2024-07-26 RX ADMIN — INSULIN ASPART 1 UNITS: 100 INJECTION, SOLUTION INTRAVENOUS; SUBCUTANEOUS at 16:59

## 2024-07-26 RX ADMIN — NICARDIPINE HYDROCHLORIDE 5 MG/HR: 0.2 INJECTION, SOLUTION INTRAVENOUS at 18:07

## 2024-07-26 RX ADMIN — METHOCARBAMOL 500 MG: 500 TABLET ORAL at 20:24

## 2024-07-26 RX ADMIN — Medication 12.5 MG: at 12:58

## 2024-07-26 RX ADMIN — HEPARIN SODIUM 5000 UNITS: 5000 INJECTION, SOLUTION INTRAVENOUS; SUBCUTANEOUS at 11:26

## 2024-07-26 RX ADMIN — OXYCODONE HYDROCHLORIDE 10 MG: 10 TABLET ORAL at 19:37

## 2024-07-26 RX ADMIN — SIMETHICONE 80 MG: 80 TABLET, CHEWABLE ORAL at 12:58

## 2024-07-26 RX ADMIN — ACETAMINOPHEN 975 MG: 325 TABLET, FILM COATED ORAL at 07:39

## 2024-07-26 RX ADMIN — OXYCODONE HYDROCHLORIDE 10 MG: 10 TABLET ORAL at 23:32

## 2024-07-26 RX ADMIN — OXYCODONE HYDROCHLORIDE 10 MG: 10 TABLET ORAL at 04:07

## 2024-07-26 RX ADMIN — CEFAZOLIN 1 G: 1 INJECTION, POWDER, FOR SOLUTION INTRAMUSCULAR; INTRAVENOUS at 16:48

## 2024-07-26 RX ADMIN — HYDROMORPHONE HYDROCHLORIDE 0.2 MG: 1 INJECTION, SOLUTION INTRAMUSCULAR; INTRAVENOUS; SUBCUTANEOUS at 02:39

## 2024-07-26 RX ADMIN — SENNOSIDES AND DOCUSATE SODIUM 1 TABLET: 8.6; 5 TABLET ORAL at 07:39

## 2024-07-26 RX ADMIN — OXYCODONE HYDROCHLORIDE 10 MG: 10 TABLET ORAL at 12:58

## 2024-07-26 RX ADMIN — POLYETHYLENE GLYCOL 3350 17 G: 17 POWDER, FOR SOLUTION ORAL at 07:39

## 2024-07-26 RX ADMIN — ASPIRIN 81 MG CHEWABLE TABLET 162 MG: 81 TABLET CHEWABLE at 07:39

## 2024-07-26 RX ADMIN — METHOCARBAMOL 500 MG: 500 TABLET ORAL at 14:11

## 2024-07-26 RX ADMIN — CEFAZOLIN 1 G: 1 INJECTION, POWDER, FOR SOLUTION INTRAMUSCULAR; INTRAVENOUS at 01:11

## 2024-07-26 RX ADMIN — HYDROMORPHONE HYDROCHLORIDE 0.4 MG: 1 INJECTION, SOLUTION INTRAMUSCULAR; INTRAVENOUS; SUBCUTANEOUS at 16:48

## 2024-07-26 RX ADMIN — POTASSIUM CHLORIDE 20 MEQ: 29.8 INJECTION, SOLUTION INTRAVENOUS at 01:58

## 2024-07-26 RX ADMIN — HYDROMORPHONE HYDROCHLORIDE 0.4 MG: 1 INJECTION, SOLUTION INTRAMUSCULAR; INTRAVENOUS; SUBCUTANEOUS at 14:11

## 2024-07-26 RX ADMIN — METHOCARBAMOL 500 MG: 500 TABLET ORAL at 01:31

## 2024-07-26 RX ADMIN — PANTOPRAZOLE SODIUM 40 MG: 40 TABLET, DELAYED RELEASE ORAL at 07:39

## 2024-07-26 RX ADMIN — ATORVASTATIN CALCIUM 80 MG: 80 TABLET, FILM COATED ORAL at 12:58

## 2024-07-26 RX ADMIN — OXYCODONE HYDROCHLORIDE 10 MG: 10 TABLET ORAL at 08:48

## 2024-07-26 RX ADMIN — HEPARIN SODIUM 5000 UNITS: 5000 INJECTION, SOLUTION INTRAVENOUS; SUBCUTANEOUS at 19:38

## 2024-07-26 ASSESSMENT — ACTIVITIES OF DAILY LIVING (ADL)
ADLS_ACUITY_SCORE: 31
ADLS_ACUITY_SCORE: 29
ADLS_ACUITY_SCORE: 31
ADLS_ACUITY_SCORE: 29
ADLS_ACUITY_SCORE: 31
ADLS_ACUITY_SCORE: 31
ADLS_ACUITY_SCORE: 29
ADLS_ACUITY_SCORE: 31
ADLS_ACUITY_SCORE: 26
PREVIOUS_RESPONSIBILITIES: MEAL PREP;HOUSEKEEPING;LAUNDRY;SHOPPING;YARDWORK;MEDICATION MANAGEMENT;FINANCES;DRIVING
ADLS_ACUITY_SCORE: 31
ADLS_ACUITY_SCORE: 29
ADLS_ACUITY_SCORE: 29
ADLS_ACUITY_SCORE: 31
ADLS_ACUITY_SCORE: 31
ADLS_ACUITY_SCORE: 29
ADLS_ACUITY_SCORE: 31
DEPENDENT_IADLS:: INDEPENDENT
ADLS_ACUITY_SCORE: 31
ADLS_ACUITY_SCORE: 29
ADLS_ACUITY_SCORE: 31

## 2024-07-26 NOTE — PROGRESS NOTES
CV ICU PROGRESS NOTE        ASSESSMENT: Modesto Lehman is a 68 year old male with PMH of CAD, CHFrEF (30-35%), HLD, PAD (s/p bilateral popliteal bypass), Carotid disease (s/p  and chronic occlusion of DARIUSZ), chronic back pain who underwent CABG x 3 by Dr. Hernández .     CO-MORBIDITIES:   Coronary artery disease of native artery of native heart with stable angina pectoris (H24)  (primary encounter diagnosis)    CHANGES and MAJOR Updates  - Extubated overnight  - Large air bubble seen on CXR, plan to place NG.  - Diurese, goal neg 500 to 1L  - Start BB, wean nicardipine.    PLAN:  Neuro/pain/sedation:  - Monitor neurological status. Notify the MD for any acute changes in exam.  # Acute Postoperative pain  - Pain: Scheduled tylenol. PRN tylenol, oxycodone, dilaudid.  - Resume PTA Gabapentin 100 mg TID     Pulmonary care:   # Acute hypoxic insufficiency  # Tobacco dependence  - Goal SpO2 > 92%  - Incentive spirometer Q15-30 minutes when awake.  - Wean FiO2 as able.     Cardiovascular:    # CAD s/p CABG x 3 on   # CHFrEF, 30-35%  # Hyperlipidemia  # PAD s/p bilateral popliteal bypass  # Carotid artery disease s/p left carotid endarterectomy. Chronic occlusion of DARIUSZ.  - Monitor hemodynamic status.   - Goal MAP>65, SBP<130  - Nicardipine at 2.5 this AM for MAP goals  - PA catheter in place, CI > 2.2. Will remove PA today, keep MAC while in nicardipine.  - PTA atorvastatin resumed.  - BB -- Start metop 12.5 BID with RN hold parameters  -      GI care:   # At risk for protein malnutrition  # Gastric distension  - NPO except ice chips and medications  - Nutrition consulted, appreciate recommendations  - Bowel regimen: MiraLAX, senna  - Large air bubble seen in gastric body on AM CXR. Abdominal film obtained, plan to place decompressive NG. Limit PO intake to sips and chips. Patient is belching, denies gastric pain.   - Simethicone PRN    Renal/Fluid/Electrolytes:    # Volume status  # Electrolyte  "derangement  - ICU electrolyte replacement protocol  - Strict I&Os  - I&O goal: negative 500 to 1L. Lasix 40 x1 this AM    Endocrine:    #Stress induced hyperglycemia  Preop A1c 5.9% 5/1/24  - Insulin gtt --> sliding scale insulin today  - Goal BG <180 for optimal healing    ID/Antibiotics:  # Postoperative prophylactic antibiotics  - To complete periop regimen  - Monitor fever curve, WBC and inflammatory markers as appropriate.     Heme:     #Stress induced leukocytosis  #Acute blood loss anemia  #Acute blood loss thrombocytopenia  - Continue to monitor  - CBC daily.      MSK/Skin:     # Surgical incision  # Sternotomy  - Continue diligent skin care to prevent further breakdown    Prophylaxis:    - VTE: SCD's, heparin 5000u sq  - Bowel regimen: PPI, MiraLAX, senna    Lines/ tubes/ drains:  - RIJ CVC, PA catheter -- remove PA  - Arterial Line  - CTs x3  - Antoine    Disposition:  - CVICU    Patient seen, findings and plan discussed with CVICU staff and cardiothoracic surgeons.    Total Critical Care time spent by me, excluding procedures, was 45 minutes.    CHIN Riddle CNP      Clinically Significant Risk Factors          # Hypocalcemia: Lowest Ca = 8.2 mg/dL in last 2 days, will monitor and replace as appropriate      # Coagulation Defect: INR = 1.24 (Ref range: 0.85 - 1.15) and/or PTT = 31 Seconds (Ref range: 22 - 38 Seconds), will monitor for bleeding     # Chronic heart failure with reduced ejection fraction: last echo with EF <40%           # Overweight: Estimated body mass index is 26.79 kg/m  as calculated from the following:    Height as of this encounter: 1.753 m (5' 9\").    Weight as of this encounter: 82.3 kg (181 lb 6.4 oz)., PRESENT ON ADMISSION                     ====================================    TODAY'S PROGRESS  SUBJECTIVE  Overall course reviewed. C/o belching, some emesis this AM. Large gastric air bubble seen on film this morning. Some acute pain localized to sternum, upper " back. No numbness / tingling.      OBJECTIVE  1. VITAL SIGNS  Temp:  [98.2  F (36.8  C)-99.3  F (37.4  C)] 99.3  F (37.4  C)  Pulse:  [] 107  Resp:  [11-18] 14  BP: (141)/(63) 141/63  MAP:  [70 mmHg-128 mmHg] 78 mmHg  Arterial Line BP: (107-130)/(53-95) 123/59  FiO2 (%):  [50 %] 50 %  SpO2:  [97 %-100 %] 100 %  Vent Mode: (S) CPAP/PS  FiO2 (%): 50 %  PEEP (cm H2O): 5 cmH2O  Pressure Support (cm H2O): 5 cmH2O  Resp: 14      2. INTAKE/ OUTPUT  I/O last 3 completed shifts:  In: 4868.39 [P.O.:2280; I.V.:1933.39; Other:505; IV Piggyback:150]  Out: 1790 [Urine:1190; Chest Tube:600]    3. PHYSICAL EXAMINATION    General: NAD, sitting up in volodymyr.  Neuro: A&Ox3  Resp: nasal cannula in place. Course to clear lung sounds  CV: sinus rhythm  Abdomen: Soft, non-distended, non-tender. Belching  Incisions: c/d/i  Extremities: warm and well perfused. Trace edema  CT: To suction, output, no airleak      4. INVESTIGATIONS  Arterial Blood Gases   Recent Labs   Lab 07/25/24  2324 07/25/24  2153 07/25/24  1840 07/25/24  1733   PH 7.39 7.39 7.35 7.34*   PCO2 45 43 47* 44   PO2 95 119* 79* 369*   HCO3 27 26 26 24     Complete Blood Count   Recent Labs   Lab 07/26/24  0411 07/25/24  1837 07/25/24  1749 07/25/24  1733 07/25/24  1713   WBC 15.9* 22.5*  --   --   --    HGB 13.4 13.8  --  12.8* 11.6*    153 156  --   --      Basic Metabolic Panel  Recent Labs   Lab 07/26/24  0604 07/26/24  0430 07/26/24  0411 07/26/24  0247 07/25/24  2346 07/25/24  2325 07/25/24  1950 07/25/24 1837 07/25/24 1834 07/25/24  1733   NA  --   --  139  --   --  142  --  142  --  140   POTASSIUM  --   --  4.2  --   --  3.8  --  3.7  --  3.7   CHLORIDE  --   --  105  --   --  106  --  105  --   --    CO2  --   --  25  --   --  26  --  24  --   --    BUN  --   --  16.5  --   --  15.5  --  13.4  --   --    CR  --   --  0.73  --   --  0.81  --  0.89  --   --    * 125* 134* 137*   < > 156*   < > 158*   < > 191*    < > = values in this interval not  "displayed.     Liver Function Tests  Recent Labs   Lab 07/26/24  0411 07/25/24  2325 07/25/24  1837 07/25/24  1749   AST 57* 56* 43  --    ALT 27 28 25  --    ALKPHOS 71 74 72  --    BILITOTAL 0.4 0.5 0.4  --    ALBUMIN 4.0 4.1 3.8  --    INR  --   --  1.24* 1.43*     Pancreatic Enzymes  No lab results found in last 7 days.  Coagulation Profile  Recent Labs   Lab 07/25/24  1837 07/25/24  1749   INR 1.24* 1.43*   PTT 31 29     Lactate  Invalid input(s): \"LACTATE\"    5. RADIOLOGY  Recent Results (from the past 24 hour(s))   NICOLE with Report    Narrative    Elvis Mckinley MD     7/25/2024  6:51 PM  Perioperative NICOLE Procedure Note    Staff -        Anesthesiologist:  Elvis Mckinley MD       Resident/Fellow: Steven Logan MD       Performed By: fellow  Preanesthesia Checklist:  Patient identified, IV assessed, risks and   benefits discussed, monitors and equipment assessed, procedure being   performed at surgeon's request and anesthesia consent obtained.    NICOLE Probe Insertion    Probe Status PRE Insertion: NO obvious damage  Probe type:  Adult 3D  Bite block used:   Soft  Insertion Technique: Easy, no oropharyngeal manipulation  Insertion complications: None obvious  Billing Report:NICOLE report by Anesthesiologist (See Separate Report note)  Probe Status POST Removal: NO obvious damage    NICOLE Report  General Procedure Information  Images for this study have been archived.  Modalities: 2D, 3D, CW Doppler, PW Doppler and Color flow mapping  Echocardiographic and Doppler Measurements  Right Ventricle:  Cavity size normal.    Hypertrophy not present.     Thrombus not present.    Global function normal.     Left Ventricle:  Cavity size dilated.    Hypertrophy present.   Thrombus   not present.   Global Function moderately impaired.   Ejection Fraction   35%.      Ventricular Regional Function:  1- Basal Anteroseptal:  hypokinetic  2- Basal Anterior:  hypokinetic  3- Basal Anterolateral:  hypokinetic  4- Basal Inferolateral:  " hypokinetic  5- Basal Inferior:  hypokinetic  6- Basal Inferoseptal:  hypokinetic  7- Mid Anteroseptal:  hypokinetic  8- Mid Anterior:  hypokinetic  9- Mid Anterolateral:  hypokinetic  10- Mid Inferolateral:  hypokinetic  11- Mid Inferior:  hypokinetic  12- Mid Inferoseptal:  hypokinetic  13- Apical Anterior:  hypokinetic  14- Apical Lateral:  hypokinetic  15- Apical Inferior:  hypokinetic  16- Apical Septal:  hypokinetic  17- [place]:  hypokinetic    Valves  Aortic Valve: Annulus normal.  Stenosis not present.  Regurgitation +1.    Leaflets normal.  Leaflet motions normal.    Mitral Valve: Annulus normal.  Stenosis not present.  Regurgitation +2.    Leaflets normal.  Leaflet motions normal.    Tricuspid Valve: Annulus normal.  Stenosis not present.  Regurgitation +1.    Leaflets normal.  Leaflet motions normal.    Pulmonic Valve: Annulus normal.  Stenosis not present.  Regurgitation   absent.      Aorta: Ascending Aorta: Size normal.  Dissection not present.  Plaque   thickness less than 3 mm.  Mobile plaque not present.    Aortic Arch: Size normal.    Dissection not present.   Plaque thickness   greater than 3 mm.   Mobile plaque not present.    Descending Aorta: Size normal.    Dissection not present.   Plaque   thickness greater than 3 mm.   Mobile plaque not present.      Right Atrium:  Size normal.   Spontaneous echo contrast not present.     Thrombus not present.   Tumor not present.   Device present.   Left Atrium: Size normal.  Spontaneous echo contrast not present.    Thrombus not present.  Tumor not present.  Device not present.    Left atrial appendage normal.   Other Atria Findings:  PAC seen traversing RA into the RV and terminating   in main PA  Atrial Septum: Intra-atrial septal morphology normal.       Ventricular Septum: Intra-ventricular septum morphology normal.      Diastolic Function Measurements:  Diastolic Dysfunction Grade= I.  E=  ms.  A=  ms.  E/A Ratio= .  DT=  ms.    S/D= .  IVRT=  ms.  Other Findings:   Pericardium:  normal. Pleural Effusion:  none. Pulmonary Arteries:    normal. Pulmonary Venous Flow:  normal. Cornoary sinus catheter present.   Coronary sinus size (mm):  7.   Post Intervention Findings  Procedure(s) performed:  CABG.  Regional wall motion:. Surgeon(s) notified   of all postintervention findings: Yes (Notified in real time).             Post Intervention comments: Post CPB: Mildly improved LVEF, estimated at   40%.  Normal RV function.  MR continues to be mild.  No significant change   in other valve function.  No evidence of aortic dissection.  Surgeon   notified of all findings in real time..    Echocardiogram Comments   XR Chest Port 1 View    Narrative    Exam: XR CHEST PORT 1 VIEW, 7/25/2024 7:12 PM    Comparison: Radiograph 7/8/2024, CT 4/30/2024    History: Post Op CVTS Surgery    Findings:  Portable AP view of the chest. Endotracheal tube tip projects over the  mid thoracic trachea. Right IJ Show Low-Rosa catheter tip projects over  the proximal right pulmonary artery. Enteric tube is partially  visualized. Postsurgical changes of the chest with median sternotomy  wires. Mediastinal drains in place. Left basilar chest tube present.  Mild prominence of cardiomediastinal silhouette likely postsurgical.    Trachea is midline. Increased streaky perihilar and bibasilar  pulmonary opacities. No pleural effusion or appreciable pneumothorax.      Impression    Impression:   1. New postsurgical changes of the chest. Endotracheal tube tip  projects over the mid thoracic trachea. Additional support devices as  noted above.  2. Increased streaky perihilar and bibasilar pulmonary opacities,  likely edema and/or atelectasis.  3.. Mild prominence of cardiomediastinal silhouette likely  postsurgical.    I have personally reviewed the examination and initial interpretation  and I agree with the findings.    HESHAM REDDY MD         SYSTEM ID:  [id number]

## 2024-07-26 NOTE — PLAN OF CARE
Goal Outcome Evaluation:      Plan of Care Reviewed With: patient, spouse, child    Overall Patient Progress: improvingOverall Patient Progress: improving    Outcome Evaluation: swan removed, NG placed for decompression, diuresed, insulin gtt DCed    ICU End of Shift Summary. See flowsheets for vital signs and detailed assessment.    Changes this shift: Moderate/severe pain, managed with PRN oxy, dilaudid, and robaxin. States having a lot of anxiety. Started on metoprolol, nicardipine titrated per SBP less than 140 and MAP goal 65-85. [place] pulled, last CI 2.4, CVP 11. MAC in place. Temp 100.4, CTICU notified. scheduled tylenol given. RA. Excessive belching in AM leading to small emesis. NG placed for decompression. 1.5L out for shift. Made NPO, ice chips and meds OK. PRN simethicone given. Insulin gtt Dced, started on sliding scale. Antoine in place for strict I&Os, 40mg lasix given with good output.     Plan: Manage pain. Titrate off nicardipine as able, possible delining tomorrow.

## 2024-07-26 NOTE — PLAN OF CARE
Goal Outcome Evaluation:      Plan of Care Reviewed With: patient, child    Overall Patient Progress: improvingOverall Patient Progress: improving    Outcome Evaluation: DCP pending PT/OT eval. Patient hopes to DC to son Trevor's home: [address] [place] [id number]    Kiley Guerrero, RNCC  Care Management Department   Covering 4A ICU RNCC  Available by Gustavo

## 2024-07-26 NOTE — PLAN OF CARE
Major Shift Events:  Pt weaned off of sedation during shift and then extubated at 2022, patient put on 3 liters nasal cannula. Patient maintaining O2 sat and ABGs.     Patient requiring Nicardipine to keep systolic below 130.    0400 MARY numbers: CVP 6, PA 23/8, CI 2.9, svO2 77    Inulin drip @ 1    Nicardipine @ 2.5      Plan: Wean nicardipine and increase activity/ diet as tolerated  For vital signs and complete assessments, please see documentation flowsheets.

## 2024-07-26 NOTE — PROGRESS NOTES
"CLINICAL NUTRITION SERVICES - BRIEF NOTE    Received provider consult for nutrition education with comments post op cardiovascular surgery (automatic consult on post-op order set). S/p CABGx3 on 7/25. Nutrition education to be completed as able/appropriate (as pt s/p CABG and/or initial VAD).    RD will follow per LOS protocol or if re-consulted.     Mylene Parikh, MS, RD, LD  Available on [facility] - \"4A Clinical Dietitian\"  Weekend/Holiday RD - \"Weekend Clinical Dietitian\"  "

## 2024-07-26 NOTE — CONSULTS
Care Management Initial Consult    General Information  Assessment completed with: Patient, Children, Trevor and Gardenia  Type of CM/SW Visit: Initial Assessment    Primary Care Provider verified and updated as needed: No   Readmission within the last 30 days: no previous admission in last 30 days      Reason for Consult: care coordination/care conference  Advance Care Planning: Advance Care Planning Reviewed: no concerns identified          Communication Assessment  Patient's communication style: spoken language (English or Bilingual)    Hearing Difficulty or Deaf: no   Wear Glasses or Blind: no    Cognitive  Cognitive/Neuro/Behavioral: .WDL except, all  Level of Consciousness: alert  Arousal Level: opens eyes spontaneously  Orientation: oriented x 4 (mikael)  Mood/Behavior: calm, cooperative  Best Language: 0 - No aphasia  Speech: clear, spontaneous, logical    Living Environment:   People in home: spouse  Avelina  Current living Arrangements: [place] [address]     Able to return to prior arrangements:  (Pending PT/OT. Plan is for patient and his wife to stay with son, Trevor for 1-2 weeks.)       Family/Social Support:  Care provided by: self  Provides care for: no one, spouse  Marital Status:   Wife, Avelina; Son, Trevor; Daughter, Gardenia  Description of Support System: Supportive, Involved    Support Assessment: Adequate social supports    Current Resources:   Patient receiving home care services: No  Community Resources: None  Equipment currently used at home: none  Supplies currently used at home: None    Employment/Financial:  Employment Status: retired     Financial Concerns: none        Does the patient's insurance plan have a 3 day qualifying hospital stay waiver?  No      Functional Status:  Prior to admission patient needed assistance:   Dependent ADLs:: Independent  Dependent IADLs:: Independent     Values/Beliefs:  Spiritual, Cultural Beliefs, Synagogue Practices, Values that  affect care: no               Care Management Follow Up    Length of Stay (days): 1  Expected Discharge Date: 07/30/2024  Concerns to be Addressed:       Patient plan of care discussed at interdisciplinary rounds: No    Anticipated Discharge Disposition: [facility] home: [address] [place] [id number] ,Outpatient Rehab (Cardiac)  Anticipated Discharge Services: None  Anticipated Discharge DME: None        Additional Information:  Patient s/p CABG x 3 by Dr. Hernández 07/25. Previously independent living with wife, Avelina. Avelina had kidney transplant 15 months ago that is now failing - patient has been helping her with her health issues and driving her to appointments prior to admission. One son lives nearby them. Two other children (Trevor, Gardenia) live 2.5 hours away. Wife and children have been staying locally in hotel.     OT recs OP CR.  Per patient and family, pt/wife will discharge to Trevor sanchez's home for 1-2 weeks: [id number] [place] [id number]. He will provide ride.     Care management will follow for any discharge needs.          Kiley Guerrero, RNCC  Care Management Department   Covering 4A ICU RNCC  Available by Gustavo

## 2024-07-26 NOTE — PROGRESS NOTES
[facility], Procedure Note          Extubation:       Modesto Lehman  MRN# [id number]   July 25, 2024, 10:23 PM         Patient extubated at: July 25, 2024, 10:15 PM   Supplemental Oxygen: Via nasal cannula at 3 liters per minute   Cough: The cough is strong   Secretion Mode: Able to clear   Secretion Amount: Moderate amount, moderately thick and clear in color   Respiratory Exam:: Breath sounds: good aeration     Location: bilaterally   Skin Exam:: Patient color: natural   Patient Status: Currently appears comfortable   Arterial Blood Gasses: pH Arterial (no units)   Date Value   07/25/2024 7.39     pH Arterial POCT (no units)   Date Value   07/25/2024 7.34 (L)     pO2 Arterial (mm Hg)   Date Value   07/25/2024 119 (H)     pO2 Arterial POCT (mm Hg)   Date Value   07/25/2024 369 (H)     pCO2 Arterial (mm Hg)   Date Value   07/25/2024 43     pCO2 Arterial POCT (mm Hg)   Date Value   07/25/2024 44     Bicarbonate Arterial (mmol/L)   Date Value   07/25/2024 26     Bicarbonate Arterial POCT (mmol/L)   Date Value   07/25/2024 24            Recorded by Jesse Waldron, RT

## 2024-07-27 ENCOUNTER — APPOINTMENT (OUTPATIENT)
Dept: GENERAL RADIOLOGY | Facility: CLINIC | Age: 69
DRG: 235 | End: 2024-07-27
Attending: THORACIC SURGERY (CARDIOTHORACIC VASCULAR SURGERY)
Payer: MEDICARE

## 2024-07-27 ENCOUNTER — APPOINTMENT (OUTPATIENT)
Dept: OCCUPATIONAL THERAPY | Facility: CLINIC | Age: 69
DRG: 235 | End: 2024-07-27
Attending: THORACIC SURGERY (CARDIOTHORACIC VASCULAR SURGERY)
Payer: MEDICARE

## 2024-07-27 ENCOUNTER — APPOINTMENT (OUTPATIENT)
Dept: GENERAL RADIOLOGY | Facility: CLINIC | Age: 69
DRG: 235 | End: 2024-07-27
Attending: STUDENT IN AN ORGANIZED HEALTH CARE EDUCATION/TRAINING PROGRAM
Payer: MEDICARE

## 2024-07-27 LAB
ALBUMIN SERPL BCG-MCNC: 4 G/DL (ref 3.5–5.2)
ALP SERPL-CCNC: 78 U/L (ref 40–150)
ALT SERPL W P-5'-P-CCNC: 22 U/L (ref 0–70)
ANION GAP SERPL CALCULATED.3IONS-SCNC: 11 MMOL/L (ref 7–15)
ANION GAP SERPL CALCULATED.3IONS-SCNC: 13 MMOL/L (ref 7–15)
AST SERPL W P-5'-P-CCNC: 40 U/L (ref 0–45)
BILIRUB SERPL-MCNC: 0.6 MG/DL
BUN SERPL-MCNC: 26.6 MG/DL (ref 8–23)
BUN SERPL-MCNC: 37.6 MG/DL (ref 8–23)
CA-I BLD-MCNC: 4.1 MG/DL (ref 4.4–5.2)
CA-I BLD-MCNC: 4.2 MG/DL (ref 4.4–5.2)
CALCIUM SERPL-MCNC: 8.5 MG/DL (ref 8.8–10.4)
CALCIUM SERPL-MCNC: 8.8 MG/DL (ref 8.8–10.4)
CHLORIDE SERPL-SCNC: 96 MMOL/L (ref 98–107)
CHLORIDE SERPL-SCNC: 99 MMOL/L (ref 98–107)
CREAT SERPL-MCNC: 0.82 MG/DL (ref 0.67–1.17)
CREAT SERPL-MCNC: 0.95 MG/DL (ref 0.67–1.17)
EGFRCR SERPLBLD CKD-EPI 2021: 88 ML/MIN/1.73M2
EGFRCR SERPLBLD CKD-EPI 2021: >90 ML/MIN/1.73M2
ERYTHROCYTE [DISTWIDTH] IN BLOOD BY AUTOMATED COUNT: 14.5 % (ref 10–15)
GLUCOSE BLDC GLUCOMTR-MCNC: 154 MG/DL (ref 70–99)
GLUCOSE BLDC GLUCOMTR-MCNC: 167 MG/DL (ref 70–99)
GLUCOSE BLDC GLUCOMTR-MCNC: 168 MG/DL (ref 70–99)
GLUCOSE BLDC GLUCOMTR-MCNC: 186 MG/DL (ref 70–99)
GLUCOSE SERPL-MCNC: 163 MG/DL (ref 70–99)
GLUCOSE SERPL-MCNC: 218 MG/DL (ref 70–99)
HCO3 SERPL-SCNC: 26 MMOL/L (ref 22–29)
HCO3 SERPL-SCNC: 27 MMOL/L (ref 22–29)
HCT VFR BLD AUTO: 37.2 % (ref 40–53)
HGB BLD-MCNC: 13 G/DL (ref 13.3–17.7)
MAGNESIUM SERPL-MCNC: 2.3 MG/DL (ref 1.7–2.3)
MAGNESIUM SERPL-MCNC: 2.4 MG/DL (ref 1.7–2.3)
MCH RBC QN AUTO: 32.2 PG (ref 26.5–33)
MCHC RBC AUTO-ENTMCNC: 34.9 G/DL (ref 31.5–36.5)
MCV RBC AUTO: 92 FL (ref 78–100)
PHOSPHATE SERPL-MCNC: 2.7 MG/DL (ref 2.5–4.5)
PHOSPHATE SERPL-MCNC: 3.8 MG/DL (ref 2.5–4.5)
PLATELET # BLD AUTO: 145 10E3/UL (ref 150–450)
POTASSIUM SERPL-SCNC: 3.8 MMOL/L (ref 3.4–5.3)
POTASSIUM SERPL-SCNC: 4 MMOL/L (ref 3.4–5.3)
PROT SERPL-MCNC: 6.7 G/DL (ref 6.4–8.3)
RBC # BLD AUTO: 4.04 10E6/UL (ref 4.4–5.9)
SODIUM SERPL-SCNC: 136 MMOL/L (ref 135–145)
SODIUM SERPL-SCNC: 136 MMOL/L (ref 135–145)
TROPONIN T SERPL HS-MCNC: 175 NG/L
TROPONIN T SERPL HS-MCNC: 198 NG/L
WBC # BLD AUTO: 19.2 10E3/UL (ref 4–11)

## 2024-07-27 PROCEDURE — 120N000005 HC R&B MS OVERFLOW UMMC

## 2024-07-27 PROCEDURE — 71045 X-RAY EXAM CHEST 1 VIEW: CPT

## 2024-07-27 PROCEDURE — 84100 ASSAY OF PHOSPHORUS: CPT | Performed by: STUDENT IN AN ORGANIZED HEALTH CARE EDUCATION/TRAINING PROGRAM

## 2024-07-27 PROCEDURE — 83735 ASSAY OF MAGNESIUM: CPT | Performed by: STUDENT IN AN ORGANIZED HEALTH CARE EDUCATION/TRAINING PROGRAM

## 2024-07-27 PROCEDURE — 74018 RADEX ABDOMEN 1 VIEW: CPT | Mod: 26 | Performed by: RADIOLOGY

## 2024-07-27 PROCEDURE — 84484 ASSAY OF TROPONIN QUANT: CPT

## 2024-07-27 PROCEDURE — 93010 ELECTROCARDIOGRAM REPORT: CPT | Mod: GC | Performed by: INTERNAL MEDICINE

## 2024-07-27 PROCEDURE — 36415 COLL VENOUS BLD VENIPUNCTURE: CPT | Performed by: STUDENT IN AN ORGANIZED HEALTH CARE EDUCATION/TRAINING PROGRAM

## 2024-07-27 PROCEDURE — 74018 RADEX ABDOMEN 1 VIEW: CPT

## 2024-07-27 PROCEDURE — 71045 X-RAY EXAM CHEST 1 VIEW: CPT | Mod: 26 | Performed by: RADIOLOGY

## 2024-07-27 PROCEDURE — 97110 THERAPEUTIC EXERCISES: CPT | Mod: GO | Performed by: OCCUPATIONAL THERAPIST

## 2024-07-27 PROCEDURE — 250N000013 HC RX MED GY IP 250 OP 250 PS 637: Performed by: STUDENT IN AN ORGANIZED HEALTH CARE EDUCATION/TRAINING PROGRAM

## 2024-07-27 PROCEDURE — 250N000013 HC RX MED GY IP 250 OP 250 PS 637

## 2024-07-27 PROCEDURE — 250N000011 HC RX IP 250 OP 636

## 2024-07-27 PROCEDURE — 258N000003 HC RX IP 258 OP 636

## 2024-07-27 PROCEDURE — 250N000013 HC RX MED GY IP 250 OP 250 PS 637: Performed by: SURGERY

## 2024-07-27 PROCEDURE — 82330 ASSAY OF CALCIUM: CPT | Performed by: SURGERY

## 2024-07-27 PROCEDURE — 99233 SBSQ HOSP IP/OBS HIGH 50: CPT | Mod: 24 | Performed by: STUDENT IN AN ORGANIZED HEALTH CARE EDUCATION/TRAINING PROGRAM

## 2024-07-27 PROCEDURE — 250N000011 HC RX IP 250 OP 636: Performed by: SURGERY

## 2024-07-27 PROCEDURE — 93005 ELECTROCARDIOGRAM TRACING: CPT

## 2024-07-27 PROCEDURE — 82330 ASSAY OF CALCIUM: CPT | Performed by: STUDENT IN AN ORGANIZED HEALTH CARE EDUCATION/TRAINING PROGRAM

## 2024-07-27 PROCEDURE — 80053 COMPREHEN METABOLIC PANEL: CPT

## 2024-07-27 PROCEDURE — 85027 COMPLETE CBC AUTOMATED: CPT

## 2024-07-27 PROCEDURE — 999N000128 HC STATISTIC PERIPHERAL IV START W/O US GUIDANCE

## 2024-07-27 PROCEDURE — 250N000011 HC RX IP 250 OP 636: Performed by: STUDENT IN AN ORGANIZED HEALTH CARE EDUCATION/TRAINING PROGRAM

## 2024-07-27 RX ORDER — FUROSEMIDE 10 MG/ML
40 INJECTION INTRAMUSCULAR; INTRAVENOUS ONCE
Status: COMPLETED | OUTPATIENT
Start: 2024-07-27 | End: 2024-07-27

## 2024-07-27 RX ORDER — OXYCODONE HYDROCHLORIDE 10 MG/1
10 TABLET ORAL EVERY 6 HOURS PRN
Status: DISCONTINUED | OUTPATIENT
Start: 2024-07-27 | End: 2024-07-31 | Stop reason: HOSPADM

## 2024-07-27 RX ORDER — OXYCODONE HYDROCHLORIDE 5 MG/1
5 TABLET ORAL EVERY 6 HOURS PRN
Status: DISCONTINUED | OUTPATIENT
Start: 2024-07-27 | End: 2024-07-31 | Stop reason: HOSPADM

## 2024-07-27 RX ORDER — METHOCARBAMOL 500 MG/1
500 TABLET, FILM COATED ORAL EVERY 6 HOURS
Status: DISCONTINUED | OUTPATIENT
Start: 2024-07-27 | End: 2024-07-28

## 2024-07-27 RX ORDER — BISACODYL 10 MG
10 SUPPOSITORY, RECTAL RECTAL DAILY
Status: DISCONTINUED | OUTPATIENT
Start: 2024-07-27 | End: 2024-07-27

## 2024-07-27 RX ORDER — COLCHICINE 0.6 MG/1
0.6 TABLET ORAL 2 TIMES DAILY
Status: DISCONTINUED | OUTPATIENT
Start: 2024-07-27 | End: 2024-07-31 | Stop reason: HOSPADM

## 2024-07-27 RX ORDER — OXYCODONE HYDROCHLORIDE 5 MG/1
5 TABLET ORAL EVERY 6 HOURS PRN
Status: DISCONTINUED | OUTPATIENT
Start: 2024-07-27 | End: 2024-07-27

## 2024-07-27 RX ORDER — POLYETHYLENE GLYCOL 3350 17 G/17G
17 POWDER, FOR SOLUTION ORAL 2 TIMES DAILY
Status: DISCONTINUED | OUTPATIENT
Start: 2024-07-27 | End: 2024-07-31 | Stop reason: HOSPADM

## 2024-07-27 RX ORDER — AMOXICILLIN 250 MG
2 CAPSULE ORAL 2 TIMES DAILY
Status: DISCONTINUED | OUTPATIENT
Start: 2024-07-27 | End: 2024-07-31 | Stop reason: HOSPADM

## 2024-07-27 RX ORDER — OXYCODONE HYDROCHLORIDE 10 MG/1
10 TABLET ORAL EVERY 6 HOURS PRN
Status: DISCONTINUED | OUTPATIENT
Start: 2024-07-27 | End: 2024-07-27

## 2024-07-27 RX ORDER — BISACODYL 10 MG
10 SUPPOSITORY, RECTAL RECTAL DAILY
Status: COMPLETED | OUTPATIENT
Start: 2024-07-27 | End: 2024-07-28

## 2024-07-27 RX ORDER — METOPROLOL TARTRATE 25 MG/1
25 TABLET, FILM COATED ORAL 2 TIMES DAILY
Status: DISCONTINUED | OUTPATIENT
Start: 2024-07-27 | End: 2024-07-31

## 2024-07-27 RX ORDER — METOCLOPRAMIDE HYDROCHLORIDE 5 MG/ML
5 INJECTION INTRAMUSCULAR; INTRAVENOUS EVERY 8 HOURS SCHEDULED
Status: COMPLETED | OUTPATIENT
Start: 2024-07-27 | End: 2024-07-27

## 2024-07-27 RX ADMIN — OXYCODONE HYDROCHLORIDE 10 MG: 10 TABLET ORAL at 03:41

## 2024-07-27 RX ADMIN — NICARDIPINE HYDROCHLORIDE 2.5 MG/HR: 0.2 INJECTION, SOLUTION INTRAVENOUS at 02:16

## 2024-07-27 RX ADMIN — SODIUM CHLORIDE, POTASSIUM CHLORIDE, SODIUM LACTATE AND CALCIUM CHLORIDE 250 ML: 600; 310; 30; 20 INJECTION, SOLUTION INTRAVENOUS at 02:07

## 2024-07-27 RX ADMIN — ACETAMINOPHEN 975 MG: 325 TABLET, FILM COATED ORAL at 08:15

## 2024-07-27 RX ADMIN — ATORVASTATIN CALCIUM 80 MG: 80 TABLET, FILM COATED ORAL at 08:15

## 2024-07-27 RX ADMIN — HYDROMORPHONE HYDROCHLORIDE 0.4 MG: 1 INJECTION, SOLUTION INTRAMUSCULAR; INTRAVENOUS; SUBCUTANEOUS at 23:52

## 2024-07-27 RX ADMIN — INSULIN ASPART 1 UNITS: 100 INJECTION, SOLUTION INTRAVENOUS; SUBCUTANEOUS at 17:39

## 2024-07-27 RX ADMIN — OXYCODONE HYDROCHLORIDE 10 MG: 10 TABLET ORAL at 15:00

## 2024-07-27 RX ADMIN — HEPARIN SODIUM 5000 UNITS: 5000 INJECTION, SOLUTION INTRAVENOUS; SUBCUTANEOUS at 19:41

## 2024-07-27 RX ADMIN — HYDROMORPHONE HYDROCHLORIDE 0.4 MG: 1 INJECTION, SOLUTION INTRAMUSCULAR; INTRAVENOUS; SUBCUTANEOUS at 06:12

## 2024-07-27 RX ADMIN — OXYCODONE HYDROCHLORIDE 10 MG: 10 TABLET ORAL at 08:15

## 2024-07-27 RX ADMIN — METHOCARBAMOL 500 MG: 500 TABLET ORAL at 19:39

## 2024-07-27 RX ADMIN — METHOCARBAMOL 500 MG: 500 TABLET ORAL at 02:07

## 2024-07-27 RX ADMIN — FUROSEMIDE 40 MG: 10 INJECTION, SOLUTION INTRAVENOUS at 01:24

## 2024-07-27 RX ADMIN — HYDROMORPHONE HYDROCHLORIDE 0.2 MG: 1 INJECTION, SOLUTION INTRAMUSCULAR; INTRAVENOUS; SUBCUTANEOUS at 19:36

## 2024-07-27 RX ADMIN — HYDROMORPHONE HYDROCHLORIDE 0.4 MG: 1 INJECTION, SOLUTION INTRAMUSCULAR; INTRAVENOUS; SUBCUTANEOUS at 08:11

## 2024-07-27 RX ADMIN — POLYETHYLENE GLYCOL 3350 17 G: 17 POWDER, FOR SOLUTION ORAL at 08:15

## 2024-07-27 RX ADMIN — SENNOSIDES AND DOCUSATE SODIUM 2 TABLET: 8.6; 5 TABLET ORAL at 19:39

## 2024-07-27 RX ADMIN — HEPARIN SODIUM 5000 UNITS: 5000 INJECTION, SOLUTION INTRAVENOUS; SUBCUTANEOUS at 03:41

## 2024-07-27 RX ADMIN — COLCHICINE 0.6 MG: 0.6 TABLET, FILM COATED ORAL at 19:54

## 2024-07-27 RX ADMIN — HYDROMORPHONE HYDROCHLORIDE 0.4 MG: 1 INJECTION, SOLUTION INTRAMUSCULAR; INTRAVENOUS; SUBCUTANEOUS at 00:17

## 2024-07-27 RX ADMIN — ASPIRIN 81 MG CHEWABLE TABLET 162 MG: 81 TABLET CHEWABLE at 08:15

## 2024-07-27 RX ADMIN — POLYETHYLENE GLYCOL 3350 17 G: 17 POWDER, FOR SOLUTION ORAL at 19:37

## 2024-07-27 RX ADMIN — METHOCARBAMOL 500 MG: 500 TABLET ORAL at 08:14

## 2024-07-27 RX ADMIN — INSULIN ASPART 1 UNITS: 100 INJECTION, SOLUTION INTRAVENOUS; SUBCUTANEOUS at 08:27

## 2024-07-27 RX ADMIN — METHOCARBAMOL 500 MG: 500 TABLET ORAL at 14:27

## 2024-07-27 RX ADMIN — BISACODYL 10 MG: 10 SUPPOSITORY RECTAL at 11:19

## 2024-07-27 RX ADMIN — METOPROLOL TARTRATE 25 MG: 25 TABLET, FILM COATED ORAL at 19:39

## 2024-07-27 RX ADMIN — METOCLOPRAMIDE 5 MG: 5 INJECTION, SOLUTION INTRAMUSCULAR; INTRAVENOUS at 14:28

## 2024-07-27 RX ADMIN — SENNOSIDES AND DOCUSATE SODIUM 1 TABLET: 8.6; 5 TABLET ORAL at 08:15

## 2024-07-27 RX ADMIN — CALCIUM GLUCONATE 1 G: 20 INJECTION, SOLUTION INTRAVENOUS at 04:04

## 2024-07-27 RX ADMIN — POTASSIUM & SODIUM PHOSPHATES POWDER PACK 280-160-250 MG 1 PACKET: 280-160-250 PACK at 08:15

## 2024-07-27 RX ADMIN — METOCLOPRAMIDE 5 MG: 5 INJECTION, SOLUTION INTRAMUSCULAR; INTRAVENOUS at 08:46

## 2024-07-27 RX ADMIN — LIDOCAINE 3 PATCH: 4 PATCH TOPICAL at 19:43

## 2024-07-27 RX ADMIN — COLCHICINE 0.6 MG: 0.6 TABLET, FILM COATED ORAL at 14:27

## 2024-07-27 RX ADMIN — ACETAMINOPHEN 975 MG: 325 TABLET, FILM COATED ORAL at 14:27

## 2024-07-27 RX ADMIN — HEPARIN SODIUM 5000 UNITS: 5000 INJECTION, SOLUTION INTRAVENOUS; SUBCUTANEOUS at 11:41

## 2024-07-27 RX ADMIN — INSULIN ASPART 1 UNITS: 100 INJECTION, SOLUTION INTRAVENOUS; SUBCUTANEOUS at 11:51

## 2024-07-27 RX ADMIN — ACETAMINOPHEN 975 MG: 325 TABLET, FILM COATED ORAL at 23:52

## 2024-07-27 RX ADMIN — POTASSIUM & SODIUM PHOSPHATES POWDER PACK 280-160-250 MG 1 PACKET: 280-160-250 PACK at 14:28

## 2024-07-27 RX ADMIN — METOPROLOL TARTRATE 25 MG: 25 TABLET, FILM COATED ORAL at 08:16

## 2024-07-27 RX ADMIN — SIMETHICONE 226 ML: 125 CAPSULE, LIQUID FILLED ORAL at 13:08

## 2024-07-27 ASSESSMENT — ACTIVITIES OF DAILY LIVING (ADL)
ADLS_ACUITY_SCORE: 31
ADLS_ACUITY_SCORE: 30
ADLS_ACUITY_SCORE: 28
ADLS_ACUITY_SCORE: 31
ADLS_ACUITY_SCORE: 28
ADLS_ACUITY_SCORE: 31
ADLS_ACUITY_SCORE: 28
ADLS_ACUITY_SCORE: 31
ADLS_ACUITY_SCORE: 28
ADLS_ACUITY_SCORE: 28
ADLS_ACUITY_SCORE: 30
ADLS_ACUITY_SCORE: 31
ADLS_ACUITY_SCORE: 30
ADLS_ACUITY_SCORE: 30
ADLS_ACUITY_SCORE: 31
ADLS_ACUITY_SCORE: 31
ADLS_ACUITY_SCORE: 30

## 2024-07-27 NOTE — PROGRESS NOTES
I socially visited Mr. Lehman after his CABG with Dr. Mason. He is feeling better today.  His neck incision is soft without hematoma. Neurologically intact. I discussed that he is nearly due for follow up with me but we will coordinate based on when he is ready to leave the hospital.    Please feel free to contact the vascular surgery team/fellow on-call with any questions or concerns

## 2024-07-27 NOTE — PROGRESS NOTES
"Brief CVICU Note    Paged by RN re tachycardia    Patient sustaining in 120s. Reports post-surgical chest pain, especially with movement of upper extremities. Denies crushing cp, sob, radiation to left arm or jaw. No diaphoresis, light-headedness, or nausea.     /77   Pulse (!) 126   Temp 100.4  F (38  C)   Resp 28   Ht 1.753 m (5' 9\")   Wt 82.3 kg (181 lb 6.4 oz)   SpO2 94%   BMI 26.79 kg/m       Laying in bed, appears comfortable, NGT in place, draining gastric fluid  Sinus tach on monitor, HR 120s, MAP ~70  NLB on RA  Abdo soft, moderately distended, non tender to palpation    ECG demonstrated ST elevation in leads II, V2-V5, some elevation in V5-V6 as well. Discussed with cardiology fellow. No acute concern for STEMI at this point.   Will trend troponins and repeat ECG in 30-45min to assess for further changes.    Kelsey Stone MD PGY3  N General Surgery   "

## 2024-07-27 NOTE — PROGRESS NOTES
CV ICU PROGRESS NOTE        ASSESSMENT: Modesto Lehman is a 68 year old male with PMH of CAD, CHFrEF (30-35%), HLD, PAD (s/p bilateral popliteal bypass), Carotid disease (s/p  and chronic occlusion of DARIUSZ), chronic back pain who underwent CABG x 3 by Dr. Hernández .     CO-MORBIDITIES:   Coronary artery disease of native artery of native heart with stable angina pectoris (H24)  (primary encounter diagnosis)    OVERNIGHT EVENTS:  - EKG overnight showing STEMI, trop elevated at 198  - Ongoing high NG output     CHANGES and MAJOR Updates  - Trop down to 175, stop trending   - KUB this AM with ongoing large gastric distention  - NG to continuous suction   - Hold on diuresis   - Ongoing tachycardia   - Increase metoprolol to 25mg BID, wean nicardipine.  - Reglan x 3 for ongoing gastric distention   - Dulcolax, Enema given NPO   - Remove lines and transfer to floor   - Remove ramirez   - Schedule robaxin q6h, wean PRN oxy to PRN q6h     PLAN:  Neuro/pain/sedation:  - Monitor neurological status. Notify the MD for any acute changes in exam.  # Acute Postoperative pain  - Pain: Scheduled tylenol, robaxin. PRN tylenol, oxycodone, dilaudid.    Pulmonary care:   # Acute hypoxic insufficiency  # Tobacco dependence  - Goal SpO2 > 92%  - Incentive spirometer Q15-30 minutes when awake.  - Wean FiO2 as able.     Cardiovascular:    # CAD s/p CABG x 3 on   # CHFrEF, 30-35%  # Hyperlipidemia  # PAD s/p bilateral popliteal bypass  # Carotid artery disease s/p left carotid endarterectomy. Chronic occlusion of DARIUSZ.  - Monitor hemodynamic status.   - Goal MAP>65, SBP<140  - Nicardipine this AM for MAP goals  - PTA atorvastatin resumed.  - Increase metop 25 BID    -      - Please alert/page vascular surgery before discharge per vascular surgery to see inpatient     GI care:   # NPO status   # Gastric distension  - Nutrition consulted, appreciate recommendations  - Bowel regimen: MiraLAX, senna  - Ongoing high NG  "tube output; Continue NPO   - KUB this AM with ongoing distention; NG to full suction    - Simethicone PRN  - Reglan x 3 doses   - Ensure BM, ok for suppository given NPO status   - Will consider NJ tube in coming day/s   - KUB tomorrow     Renal/Fluid/Electrolytes:    # Volume status  # Electrolyte derangement  - ICU electrolyte replacement protocol  - Strict I&Os  - I&O goal: even     Endocrine:    #Stress induced hyperglycemia  Preop A1c 5.9% 5/1/24  - sliding scale insulin   - Goal BG <180 for optimal healing    ID/Antibiotics:  # Postoperative prophylactic antibiotics  - To complete periop regimen  - Monitor fever curve, WBC and inflammatory markers as appropriate.     Heme:     #Stress induced leukocytosis  #Acute blood loss anemia  #Acute blood loss thrombocytopenia  - Continue to monitor  - CBC daily.      MSK/Skin:     # Surgical incision  # Sternotomy  - Continue diligent skin care to prevent further breakdown    Prophylaxis:    - VTE: SCD's, heparin 5000u sq  - Bowel regimen: PPI, MiraLAX, senna    Lines/ tubes/ drains:  - RIJ CVC   - Arterial Line  - CTs x3  - Antoine --> Remove     Disposition:  - CVICU    Patient seen, findings and plan discussed with CVICU staff and cardiothoracic surgeons.    Total Critical Care time spent by me, excluding procedures, was 45 minutes.    Matthew Gibbons PA-C      Clinically Significant Risk Factors          # Hypocalcemia: Lowest Ca = 8.2 mg/dL in last 2 days, will monitor and replace as appropriate        # Coagulation Defect: INR = 1.24 (Ref range: 0.85 - 1.15) and/or PTT = 31 Seconds (Ref range: 22 - 38 Seconds), will monitor for bleeding     # Acute heart failure with reduced ejection fraction: last echo with EF <40% and receiving IV diuretics           # Overweight: Estimated body mass index is 26.79 kg/m  as calculated from the following:    Height as of this encounter: 1.753 m (5' 9\").    Weight as of this encounter: 82.3 kg (181 lb 6.4 oz)., PRESENT ON " ADMISSION     # Financial/Environmental Concerns: none                  ====================================    TODAY'S PROGRESS  SUBJECTIVE  Overall course reviewed. High NG output. Large gastric air bubble seen on film this morning. Some acute pain localized to sternum, upper back. Net even today. Increase PO metoprolol.     OBJECTIVE  1. VITAL SIGNS  Temp:  [99.1  F (37.3  C)-100.6  F (38.1  C)] 99.9  F (37.7  C)  Pulse:  [100-128] 100  Resp:  [12-29] 14  BP: ()/(61-97) 83/61  MAP:  [68 mmHg-144 mmHg] 74 mmHg  Arterial Line BP: ()/() 104/61  SpO2:  [91 %-97 %] 96 %  Resp: 14      2. INTAKE/ OUTPUT  I/O last 3 completed shifts:  In: 2302.54 [P.O.:1200; I.V.:792.54; NG/GT:60; IV Piggyback:250]  Out: 4565 [Urine:1785; Emesis/NG output:2550; Chest Tube:230]    3. PHYSICAL EXAMINATION    General: NAD, sitting up in volodymyr.  Neuro: A&Ox3  Resp: nasal cannula in place. Course to clear lung sounds  CV: sinus rhythm  Abdomen: Soft, softly-distended, non-tender.   Incisions: c/d/i  Extremities: warm and well perfused. Trace edema  CT: To suction, output, no airleak      4. INVESTIGATIONS  Arterial Blood Gases   Recent Labs   Lab 07/25/24  2324 07/25/24  2153 07/25/24  1840 07/25/24  1733   PH 7.39 7.39 7.35 7.34*   PCO2 45 43 47* 44   PO2 95 119* 79* 369*   HCO3 27 26 26 24     Complete Blood Count   Recent Labs   Lab 07/27/24  0248 07/26/24  0411 07/25/24  1837 07/25/24  1749 07/25/24  1733   WBC 19.2* 15.9* 22.5*  --   --    HGB 13.0* 13.4 13.8  --  12.8*   * 154 153 156  --      Basic Metabolic Panel  Recent Labs   Lab 07/27/24  0827 07/27/24  0248 07/26/24  2124 07/26/24  1659 07/26/24  0430 07/26/24  0411 07/25/24  2346 07/25/24  2325 07/25/24  1950 07/25/24  1837   NA  --  136  --   --   --  139  --  142  --  142   POTASSIUM  --  3.8  --   --   --  4.2  --  3.8  --  3.7   CHLORIDE  --  99  --   --   --  105  --  106  --  105   CO2  --  26  --   --   --  25  --  26  --  24   BUN  --  26.6*  --    "--   --  16.5  --  15.5  --  13.4   CR  --  0.82  --   --   --  0.73  --  0.81  --  0.89   * 218* 186* 147*   < > 134*   < > 156*   < > 158*    < > = values in this interval not displayed.     Liver Function Tests  Recent Labs   Lab 07/27/24  0248 07/26/24  0411 07/25/24  2325 07/25/24 1837 07/25/24  1749   AST 40 57* 56* 43  --    ALT 22 27 28 25  --    ALKPHOS 78 71 74 72  --    BILITOTAL 0.6 0.4 0.5 0.4  --    ALBUMIN 4.0 4.0 4.1 3.8  --    INR  --   --   --  1.24* 1.43*     Pancreatic Enzymes  No lab results found in last 7 days.  Coagulation Profile  Recent Labs   Lab 07/25/24 1837 07/25/24  1749   INR 1.24* 1.43*   PTT 31 29     Lactate  Invalid input(s): \"LACTATE\"    5. RADIOLOGY  Recent Results (from the past 24 hour(s))   NICOLE with Report    Narrative    Elvis Mckinley MD     7/25/2024  6:51 PM  Perioperative NICOLE Procedure Note    Staff -        Anesthesiologist:  Elvis Mckinley MD       Resident/Fellow: Steven Logan MD       Performed By: fellow  Preanesthesia Checklist:  Patient identified, IV assessed, risks and   benefits discussed, monitors and equipment assessed, procedure being   performed at surgeon's request and anesthesia consent obtained.    NICOLE Probe Insertion    Probe Status PRE Insertion: NO obvious damage  Probe type:  Adult 3D  Bite block used:   Soft  Insertion Technique: Easy, no oropharyngeal manipulation  Insertion complications: None obvious  Billing Report:NICOLE report by Anesthesiologist (See Separate Report note)  Probe Status POST Removal: NO obvious damage    NICOLE Report  General Procedure Information  Images for this study have been archived.  Modalities: 2D, 3D, CW Doppler, PW Doppler and Color flow mapping  Echocardiographic and Doppler Measurements  Right Ventricle:  Cavity size normal.    Hypertrophy not present.     Thrombus not present.    Global function normal.     Left Ventricle:  Cavity size dilated.    Hypertrophy present.   Thrombus   not present.   Global Function " moderately impaired.   Ejection Fraction   35%.      Ventricular Regional Function:  1- Basal Anteroseptal:  hypokinetic  2- Basal Anterior:  hypokinetic  3- Basal Anterolateral:  hypokinetic  4- Basal Inferolateral:  hypokinetic  5- Basal Inferior:  hypokinetic  6- Basal Inferoseptal:  hypokinetic  7- Mid Anteroseptal:  hypokinetic  8- Mid Anterior:  hypokinetic  9- Mid Anterolateral:  hypokinetic  10- Mid Inferolateral:  hypokinetic  11- Mid Inferior:  hypokinetic  12- Mid Inferoseptal:  hypokinetic  13- Apical Anterior:  hypokinetic  14- Apical Lateral:  hypokinetic  15- Apical Inferior:  hypokinetic  16- Apical Septal:  hypokinetic  17- [place]:  hypokinetic    Valves  Aortic Valve: Annulus normal.  Stenosis not present.  Regurgitation +1.    Leaflets normal.  Leaflet motions normal.    Mitral Valve: Annulus normal.  Stenosis not present.  Regurgitation +2.    Leaflets normal.  Leaflet motions normal.    Tricuspid Valve: Annulus normal.  Stenosis not present.  Regurgitation +1.    Leaflets normal.  Leaflet motions normal.    Pulmonic Valve: Annulus normal.  Stenosis not present.  Regurgitation   absent.      Aorta: Ascending Aorta: Size normal.  Dissection not present.  Plaque   thickness less than 3 mm.  Mobile plaque not present.    Aortic Arch: Size normal.    Dissection not present.   Plaque thickness   greater than 3 mm.   Mobile plaque not present.    Descending Aorta: Size normal.    Dissection not present.   Plaque   thickness greater than 3 mm.   Mobile plaque not present.      Right Atrium:  Size normal.   Spontaneous echo contrast not present.     Thrombus not present.   Tumor not present.   Device present.   Left Atrium: Size normal.  Spontaneous echo contrast not present.    Thrombus not present.  Tumor not present.  Device not present.    Left atrial appendage normal.   Other Atria Findings:  PAC seen traversing RA into the RV and terminating   in main PA  Atrial Septum: Intra-atrial septal morphology  normal.       Ventricular Septum: Intra-ventricular septum morphology normal.      Diastolic Function Measurements:  Diastolic Dysfunction Grade= I.  E=  ms.  A=  ms.  E/A Ratio= .  DT=  ms.    S/D= .  IVRT= ms.  Other Findings:   Pericardium:  normal. Pleural Effusion:  none. Pulmonary Arteries:    normal. Pulmonary Venous Flow:  normal. Cornoary sinus catheter present.   Coronary sinus size (mm):  7.   Post Intervention Findings  Procedure(s) performed:  CABG.  Regional wall motion:. Surgeon(s) notified   of all postintervention findings: Yes (Notified in real time).             Post Intervention comments: Post CPB: Mildly improved LVEF, estimated at   40%.  Normal RV function.  MR continues to be mild.  No significant change   in other valve function.  No evidence of aortic dissection.  Surgeon   notified of all findings in real time..    Echocardiogram Comments   XR Chest Port 1 View    Narrative    Exam: XR CHEST PORT 1 VIEW, 7/25/2024 7:12 PM    Comparison: Radiograph 7/8/2024, CT 4/30/2024    History: Post Op CVTS Surgery    Findings:  Portable AP view of the chest. Endotracheal tube tip projects over the  mid thoracic trachea. Right IJ [place] catheter tip projects over  the proximal right pulmonary artery. Enteric tube is partially  visualized. Postsurgical changes of the chest with median sternotomy  wires. Mediastinal drains in place. Left basilar chest tube present.  Mild prominence of cardiomediastinal silhouette likely postsurgical.    Trachea is midline. Increased streaky perihilar and bibasilar  pulmonary opacities. No pleural effusion or appreciable pneumothorax.      Impression    Impression:   1. New postsurgical changes of the chest. Endotracheal tube tip  projects over the mid thoracic trachea. Additional support devices as  noted above.  2. Increased streaky perihilar and bibasilar pulmonary opacities,  likely edema and/or atelectasis.  3.. Mild prominence of cardiomediastinal silhouette  likely  postsurgical.    I have personally reviewed the examination and initial interpretation  and I agree with the findings.    HESHAM REDDY MD         SYSTEM ID:  [id number]

## 2024-07-27 NOTE — PROGRESS NOTES
[id number]:     Neuro: A&Ox4. Chronic numbness to bilateral legs-where scar tissue is, not feet.  Cardiac: ST; 100-120's. VSS.   Respiratory: Sating  95-97% on 2LNC.  GI/: Adequate urine output. LBM today after enema per pt-small and hard. No BM this shift- pt attempted BM and output was collected as it looked like mucous and tissue and is blood tinged- team notified.   Diet/appetite: NPO- ice chips only.  Activity:  Assist of 1, up to chair and in halls.  Pain: Denied needing pain meds during this shift. Chronic upper back pain and chest pain from incision medicated ealier.   Skin: Mediastinal incision- UZAIR & CDI. Rt internal jugular dressing in place. Bilateral lower extremity graft sites- CDI & UZAIR. Lidocaine patces on upper back, bilat chest, & bilat lower abd.  LDA's: PIV to rt upper arm and rt lower arm- both SL. NG tube- low con't suction- 100ml output since arrival to unit at 1450.  Endo: YARELI   RN Managed Protocols: Magnesium, Potassium and Phosphorus- not replaced- redraw in the am.   Updates: Heating pad and commode ordered.  Plan: Continue with POC. Notify primary team, CVTS, with changes.

## 2024-07-27 NOTE — PLAN OF CARE
Goal Outcome Evaluation:      Plan of Care Reviewed With: patient    Overall Patient Progress: no changeOverall Patient Progress: no change    Outcome Evaluation: 2L NC. Nicardipine at 2.5 mg/h to maintain SBP<130 and MAP 65-85. Titrating to cuff pressures d/t positional arterial line. Tmax 100.6. Lasix 40 mg given for low UOP, but HR increased to 120's. 250 mL LR bolus over 1h. -120's this am. UOP increased to 50 mL/h. NG to LIS. Endorsing chest/incisional, abdominal, and back pain throughout the night. PRN oxycodone, dilaudid, and robaxin given overnight. Pain slowly improving per patient. 12 lead EKG done and serial troponins reviewed with mary. EKG changes and elevated troponin related to surgery per resident. iCa replaced.

## 2024-07-27 NOTE — PLAN OF CARE
"[id number] Goal Outcome Evaluation:         Overall Patient Progress: improving       Pt was admitted to unit @ 1451, accompanied by CCRN & son. Initial skin assessment was done by  & YOEL Sexton.    VS:  /78 (BP Location: Left arm)   Pulse (!) 131   Temp 97.5  F (36.4  C) (Oral)   Resp 20   Ht 1.753 m (5' 9\")   Wt 82.3 kg (181 lb 6.4 oz)   SpO2 93%   BMI 26.79 kg/m      Cardiac:  Sinus Tachy w/ , denied CP, cool BLE   Respiratory:  Sating >92% on 2L NC, denied SOB @ rest, FORD w/ activity, LSCTA & diminished @ bilat bases   GI/:  No BM this shift, urinal @ bedside,  mL clear, ahndy   Neuro:  A&O x4, denied new numbness or tingling   Pain:  Back pain managed w/ PRN oxy 10 mg & PRN IV Dilaudid 0.2 mg for breakthrough pain   Activity:  SBA-A1 w/ GB; pt walked in koehler w/ OT this shift    Skin:  Pls see flowsheet for details   Dressin R PIV dressing CDI;   CT dressing w/ mod drainage noted;   Lidocaine patces on upper back, bilat chest, & bilat lower abd   Diet:  NPO except for ice chips & meds; denied N/V   LDA:  2 R PIV SL;   NG LCWS;   3 CT -20 mmHg   Equipment:  IV pole, IV pump, & personal belongings   Plan:  Cont POC   Additional Info:         "

## 2024-07-28 ENCOUNTER — APPOINTMENT (OUTPATIENT)
Dept: GENERAL RADIOLOGY | Facility: CLINIC | Age: 69
DRG: 235 | End: 2024-07-28
Attending: THORACIC SURGERY (CARDIOTHORACIC VASCULAR SURGERY)
Payer: MEDICARE

## 2024-07-28 ENCOUNTER — APPOINTMENT (OUTPATIENT)
Dept: OCCUPATIONAL THERAPY | Facility: CLINIC | Age: 69
DRG: 235 | End: 2024-07-28
Attending: THORACIC SURGERY (CARDIOTHORACIC VASCULAR SURGERY)
Payer: MEDICARE

## 2024-07-28 ENCOUNTER — APPOINTMENT (OUTPATIENT)
Dept: GENERAL RADIOLOGY | Facility: CLINIC | Age: 69
DRG: 235 | End: 2024-07-28
Payer: MEDICARE

## 2024-07-28 ENCOUNTER — APPOINTMENT (OUTPATIENT)
Dept: GENERAL RADIOLOGY | Facility: CLINIC | Age: 69
DRG: 235 | End: 2024-07-28
Attending: STUDENT IN AN ORGANIZED HEALTH CARE EDUCATION/TRAINING PROGRAM
Payer: MEDICARE

## 2024-07-28 LAB
ALBUMIN SERPL BCG-MCNC: 3.7 G/DL (ref 3.5–5.2)
ALP SERPL-CCNC: 80 U/L (ref 40–150)
ALT SERPL W P-5'-P-CCNC: 23 U/L (ref 0–70)
ANION GAP SERPL CALCULATED.3IONS-SCNC: 10 MMOL/L (ref 7–15)
AST SERPL W P-5'-P-CCNC: 36 U/L (ref 0–45)
BILIRUB SERPL-MCNC: 0.5 MG/DL
BUN SERPL-MCNC: 29.6 MG/DL (ref 8–23)
BUN SERPL-MCNC: 33.9 MG/DL (ref 8–23)
BUN SERPL-MCNC: 33.9 MG/DL (ref 8–23)
CA-I BLD-MCNC: 4.2 MG/DL (ref 4.4–5.2)
CALCIUM SERPL-MCNC: 8.2 MG/DL (ref 8.8–10.4)
CALCIUM SERPL-MCNC: 8.5 MG/DL (ref 8.8–10.4)
CALCIUM SERPL-MCNC: 8.5 MG/DL (ref 8.8–10.4)
CHLORIDE SERPL-SCNC: 95 MMOL/L (ref 98–107)
CHLORIDE SERPL-SCNC: 96 MMOL/L (ref 98–107)
CHLORIDE SERPL-SCNC: 96 MMOL/L (ref 98–107)
CREAT SERPL-MCNC: 0.66 MG/DL (ref 0.67–1.17)
CREAT SERPL-MCNC: 0.73 MG/DL (ref 0.67–1.17)
CREAT SERPL-MCNC: 0.73 MG/DL (ref 0.67–1.17)
EGFRCR SERPLBLD CKD-EPI 2021: >90 ML/MIN/1.73M2
ERYTHROCYTE [DISTWIDTH] IN BLOOD BY AUTOMATED COUNT: 14.4 % (ref 10–15)
GLUCOSE BLDC GLUCOMTR-MCNC: 114 MG/DL (ref 70–99)
GLUCOSE BLDC GLUCOMTR-MCNC: 115 MG/DL (ref 70–99)
GLUCOSE BLDC GLUCOMTR-MCNC: 118 MG/DL (ref 70–99)
GLUCOSE BLDC GLUCOMTR-MCNC: 132 MG/DL (ref 70–99)
GLUCOSE SERPL-MCNC: 117 MG/DL (ref 70–99)
GLUCOSE SERPL-MCNC: 117 MG/DL (ref 70–99)
GLUCOSE SERPL-MCNC: 126 MG/DL (ref 70–99)
HCO3 SERPL-SCNC: 30 MMOL/L (ref 22–29)
HCO3 SERPL-SCNC: 31 MMOL/L (ref 22–29)
HCO3 SERPL-SCNC: 31 MMOL/L (ref 22–29)
HCT VFR BLD AUTO: 32.9 % (ref 40–53)
HGB BLD-MCNC: 11 G/DL (ref 13.3–17.7)
HGB BLD-MCNC: 11.2 G/DL (ref 13.3–17.7)
MAGNESIUM SERPL-MCNC: 2.4 MG/DL (ref 1.7–2.3)
MAGNESIUM SERPL-MCNC: 2.6 MG/DL (ref 1.7–2.3)
MCH RBC QN AUTO: 32 PG (ref 26.5–33)
MCHC RBC AUTO-ENTMCNC: 34 G/DL (ref 31.5–36.5)
MCV RBC AUTO: 94 FL (ref 78–100)
PHOSPHATE SERPL-MCNC: 2.7 MG/DL (ref 2.5–4.5)
PLATELET # BLD AUTO: 126 10E3/UL (ref 150–450)
POTASSIUM SERPL-SCNC: 3.2 MMOL/L (ref 3.4–5.3)
POTASSIUM SERPL-SCNC: 3.5 MMOL/L (ref 3.4–5.3)
POTASSIUM SERPL-SCNC: 3.5 MMOL/L (ref 3.4–5.3)
PROT SERPL-MCNC: 6.5 G/DL (ref 6.4–8.3)
RBC # BLD AUTO: 3.5 10E6/UL (ref 4.4–5.9)
SODIUM SERPL-SCNC: 135 MMOL/L (ref 135–145)
SODIUM SERPL-SCNC: 137 MMOL/L (ref 135–145)
SODIUM SERPL-SCNC: 137 MMOL/L (ref 135–145)
WBC # BLD AUTO: 14.5 10E3/UL (ref 4–11)

## 2024-07-28 PROCEDURE — 250N000013 HC RX MED GY IP 250 OP 250 PS 637

## 2024-07-28 PROCEDURE — S5010 5% DEXTROSE AND 0.45% SALINE: HCPCS | Performed by: PHYSICIAN ASSISTANT

## 2024-07-28 PROCEDURE — 36415 COLL VENOUS BLD VENIPUNCTURE: CPT | Performed by: PHYSICIAN ASSISTANT

## 2024-07-28 PROCEDURE — 74019 RADEX ABDOMEN 2 VIEWS: CPT

## 2024-07-28 PROCEDURE — 250N000013 HC RX MED GY IP 250 OP 250 PS 637: Performed by: PHYSICIAN ASSISTANT

## 2024-07-28 PROCEDURE — 120N000005 HC R&B MS OVERFLOW UMMC

## 2024-07-28 PROCEDURE — 71045 X-RAY EXAM CHEST 1 VIEW: CPT | Mod: 26 | Performed by: RADIOLOGY

## 2024-07-28 PROCEDURE — 74018 RADEX ABDOMEN 1 VIEW: CPT | Mod: 26 | Performed by: RADIOLOGY

## 2024-07-28 PROCEDURE — 258N000003 HC RX IP 258 OP 636: Performed by: PHYSICIAN ASSISTANT

## 2024-07-28 PROCEDURE — 74018 RADEX ABDOMEN 1 VIEW: CPT

## 2024-07-28 PROCEDURE — 250N000013 HC RX MED GY IP 250 OP 250 PS 637: Performed by: THORACIC SURGERY (CARDIOTHORACIC VASCULAR SURGERY)

## 2024-07-28 PROCEDURE — 97535 SELF CARE MNGMENT TRAINING: CPT | Mod: GO

## 2024-07-28 PROCEDURE — 80048 BASIC METABOLIC PNL TOTAL CA: CPT | Performed by: STUDENT IN AN ORGANIZED HEALTH CARE EDUCATION/TRAINING PROGRAM

## 2024-07-28 PROCEDURE — 74019 RADEX ABDOMEN 2 VIEWS: CPT | Mod: 26 | Performed by: RADIOLOGY

## 2024-07-28 PROCEDURE — 71045 X-RAY EXAM CHEST 1 VIEW: CPT

## 2024-07-28 PROCEDURE — 250N000013 HC RX MED GY IP 250 OP 250 PS 637: Performed by: STUDENT IN AN ORGANIZED HEALTH CARE EDUCATION/TRAINING PROGRAM

## 2024-07-28 PROCEDURE — 36415 COLL VENOUS BLD VENIPUNCTURE: CPT | Performed by: SURGERY

## 2024-07-28 PROCEDURE — 82040 ASSAY OF SERUM ALBUMIN: CPT

## 2024-07-28 PROCEDURE — 83735 ASSAY OF MAGNESIUM: CPT | Performed by: STUDENT IN AN ORGANIZED HEALTH CARE EDUCATION/TRAINING PROGRAM

## 2024-07-28 PROCEDURE — 80053 COMPREHEN METABOLIC PANEL: CPT | Performed by: STUDENT IN AN ORGANIZED HEALTH CARE EDUCATION/TRAINING PROGRAM

## 2024-07-28 PROCEDURE — 84100 ASSAY OF PHOSPHORUS: CPT | Performed by: STUDENT IN AN ORGANIZED HEALTH CARE EDUCATION/TRAINING PROGRAM

## 2024-07-28 PROCEDURE — 85018 HEMOGLOBIN: CPT | Performed by: PHYSICIAN ASSISTANT

## 2024-07-28 PROCEDURE — 250N000011 HC RX IP 250 OP 636: Performed by: SURGERY

## 2024-07-28 PROCEDURE — 250N000013 HC RX MED GY IP 250 OP 250 PS 637: Performed by: SURGERY

## 2024-07-28 PROCEDURE — 85049 AUTOMATED PLATELET COUNT: CPT

## 2024-07-28 PROCEDURE — 36415 COLL VENOUS BLD VENIPUNCTURE: CPT | Performed by: STUDENT IN AN ORGANIZED HEALTH CARE EDUCATION/TRAINING PROGRAM

## 2024-07-28 PROCEDURE — 82330 ASSAY OF CALCIUM: CPT | Performed by: SURGERY

## 2024-07-28 RX ORDER — GABAPENTIN 100 MG/1
100 CAPSULE ORAL 3 TIMES DAILY
Status: DISCONTINUED | OUTPATIENT
Start: 2024-07-28 | End: 2024-07-31 | Stop reason: HOSPADM

## 2024-07-28 RX ORDER — ACETAMINOPHEN 325 MG/1
650 TABLET ORAL
Status: DISCONTINUED | OUTPATIENT
Start: 2024-07-28 | End: 2024-07-31 | Stop reason: HOSPADM

## 2024-07-28 RX ORDER — HYDROMORPHONE HCL IN WATER/PF 6 MG/30 ML
0.2 PATIENT CONTROLLED ANALGESIA SYRINGE INTRAVENOUS EVERY 4 HOURS PRN
Status: DISCONTINUED | OUTPATIENT
Start: 2024-07-28 | End: 2024-07-29

## 2024-07-28 RX ORDER — HYDROXYZINE HYDROCHLORIDE 50 MG/1
50 TABLET, FILM COATED ORAL EVERY 6 HOURS PRN
Status: DISCONTINUED | OUTPATIENT
Start: 2024-07-28 | End: 2024-07-28

## 2024-07-28 RX ORDER — ACETAMINOPHEN 325 MG/1
650 TABLET ORAL EVERY 4 HOURS PRN
Status: DISCONTINUED | OUTPATIENT
Start: 2024-07-28 | End: 2024-07-30

## 2024-07-28 RX ORDER — DEXTROSE MONOHYDRATE AND SODIUM CHLORIDE 5; .45 G/100ML; G/100ML
INJECTION, SOLUTION INTRAVENOUS CONTINUOUS
Status: DISCONTINUED | OUTPATIENT
Start: 2024-07-28 | End: 2024-07-30

## 2024-07-28 RX ORDER — HYDROXYZINE HYDROCHLORIDE 25 MG/1
25 TABLET, FILM COATED ORAL EVERY 6 HOURS PRN
Status: DISCONTINUED | OUTPATIENT
Start: 2024-07-28 | End: 2024-07-31 | Stop reason: HOSPADM

## 2024-07-28 RX ORDER — POTASSIUM CHLORIDE 20MEQ/15ML
20 LIQUID (ML) ORAL ONCE
Status: COMPLETED | OUTPATIENT
Start: 2024-07-28 | End: 2024-07-28

## 2024-07-28 RX ORDER — CYCLOBENZAPRINE HCL 10 MG
10 TABLET ORAL 3 TIMES DAILY
Status: DISCONTINUED | OUTPATIENT
Start: 2024-07-28 | End: 2024-07-31 | Stop reason: HOSPADM

## 2024-07-28 RX ADMIN — HYDROMORPHONE HYDROCHLORIDE 0.4 MG: 1 INJECTION, SOLUTION INTRAMUSCULAR; INTRAVENOUS; SUBCUTANEOUS at 04:17

## 2024-07-28 RX ADMIN — HEPARIN SODIUM 5000 UNITS: 5000 INJECTION, SOLUTION INTRAVENOUS; SUBCUTANEOUS at 11:15

## 2024-07-28 RX ADMIN — METHOCARBAMOL 500 MG: 500 TABLET ORAL at 08:47

## 2024-07-28 RX ADMIN — GABAPENTIN 100 MG: 100 CAPSULE ORAL at 20:22

## 2024-07-28 RX ADMIN — LIDOCAINE 3 PATCH: 4 PATCH TOPICAL at 20:21

## 2024-07-28 RX ADMIN — POTASSIUM CHLORIDE 20 MEQ: 1.5 SOLUTION ORAL at 08:47

## 2024-07-28 RX ADMIN — CYCLOBENZAPRINE 10 MG: 10 TABLET, FILM COATED ORAL at 12:26

## 2024-07-28 RX ADMIN — OXYCODONE HYDROCHLORIDE 10 MG: 10 TABLET ORAL at 01:32

## 2024-07-28 RX ADMIN — ASPIRIN 81 MG CHEWABLE TABLET 162 MG: 81 TABLET CHEWABLE at 08:47

## 2024-07-28 RX ADMIN — ATORVASTATIN CALCIUM 80 MG: 80 TABLET, FILM COATED ORAL at 08:47

## 2024-07-28 RX ADMIN — HEPARIN SODIUM 5000 UNITS: 5000 INJECTION, SOLUTION INTRAVENOUS; SUBCUTANEOUS at 04:18

## 2024-07-28 RX ADMIN — ACETAMINOPHEN 650 MG: 325 TABLET, FILM COATED ORAL at 21:05

## 2024-07-28 RX ADMIN — POLYETHYLENE GLYCOL 3350 17 G: 17 POWDER, FOR SOLUTION ORAL at 08:48

## 2024-07-28 RX ADMIN — METHOCARBAMOL 500 MG: 500 TABLET ORAL at 01:30

## 2024-07-28 RX ADMIN — SENNOSIDES AND DOCUSATE SODIUM 2 TABLET: 8.6; 5 TABLET ORAL at 08:47

## 2024-07-28 RX ADMIN — COLCHICINE 0.6 MG: 0.6 TABLET, FILM COATED ORAL at 20:22

## 2024-07-28 RX ADMIN — HYDROMORPHONE HYDROCHLORIDE 0.4 MG: 1 INJECTION, SOLUTION INTRAMUSCULAR; INTRAVENOUS; SUBCUTANEOUS at 02:05

## 2024-07-28 RX ADMIN — BISACODYL 10 MG: 10 SUPPOSITORY RECTAL at 14:50

## 2024-07-28 RX ADMIN — COLCHICINE 0.6 MG: 0.6 TABLET, FILM COATED ORAL at 08:47

## 2024-07-28 RX ADMIN — ACETAMINOPHEN 650 MG: 325 TABLET, FILM COATED ORAL at 18:08

## 2024-07-28 RX ADMIN — GABAPENTIN 100 MG: 100 CAPSULE ORAL at 14:50

## 2024-07-28 RX ADMIN — SENNOSIDES AND DOCUSATE SODIUM 2 TABLET: 8.6; 5 TABLET ORAL at 20:22

## 2024-07-28 RX ADMIN — METOPROLOL TARTRATE 25 MG: 25 TABLET, FILM COATED ORAL at 20:22

## 2024-07-28 RX ADMIN — METOPROLOL TARTRATE 25 MG: 25 TABLET, FILM COATED ORAL at 08:47

## 2024-07-28 RX ADMIN — Medication 10 MG: at 20:22

## 2024-07-28 RX ADMIN — HEPARIN SODIUM 5000 UNITS: 5000 INJECTION, SOLUTION INTRAVENOUS; SUBCUTANEOUS at 20:21

## 2024-07-28 RX ADMIN — CYCLOBENZAPRINE 10 MG: 10 TABLET, FILM COATED ORAL at 20:22

## 2024-07-28 RX ADMIN — POTASSIUM & SODIUM PHOSPHATES POWDER PACK 280-160-250 MG 1 PACKET: 280-160-250 PACK at 08:47

## 2024-07-28 RX ADMIN — POLYETHYLENE GLYCOL 3350 17 G: 17 POWDER, FOR SOLUTION ORAL at 20:22

## 2024-07-28 RX ADMIN — DEXTROSE AND SODIUM CHLORIDE: 5; 450 INJECTION, SOLUTION INTRAVENOUS at 11:15

## 2024-07-28 RX ADMIN — POTASSIUM & SODIUM PHOSPHATES POWDER PACK 280-160-250 MG 1 PACKET: 280-160-250 PACK at 12:26

## 2024-07-28 RX ADMIN — ACETAMINOPHEN 975 MG: 325 TABLET, FILM COATED ORAL at 08:47

## 2024-07-28 RX ADMIN — ACETAMINOPHEN 650 MG: 325 TABLET, FILM COATED ORAL at 12:26

## 2024-07-28 ASSESSMENT — ACTIVITIES OF DAILY LIVING (ADL)
ADLS_ACUITY_SCORE: 31
ADLS_ACUITY_SCORE: 30
ADLS_ACUITY_SCORE: 31
ADLS_ACUITY_SCORE: 30
ADLS_ACUITY_SCORE: 30
ADLS_ACUITY_SCORE: 31

## 2024-07-28 NOTE — PROGRESS NOTES
Cardiovascular Surgery Progress Note  2024         Assessment and Plan:     Modesto Lehman is a 68 year old male with a PMH of CAD, CHFrEF (30-35%), HLD, PAD (s/p bilateral popliteal bypass), Carotid disease (s/p  and chronic occlusion of DARIUSZ), chronic back pain. He was referred to Dr. Mason by his cardiologist after outpatient angiogram demonstrated severe 3-vessel coronary artery disease. Patient is now s/p planned CABG x3 on 2024 with Dr. Mason.    Cardiovascular:   Hx of CAD - s/p CABG x 3 on 24   HFrEF (EF 30-35%)  Hyperlipidemia  PAD s/p bilateral popliteal bypass  Carotid artery disease s/p left carotid endarterectomy. Chronic occlusion of DARIUSZ.  Pericarditis   HD stable, in NSR, sinus tachycardia. MAPS 80-95  Most recent echo showed LV EF 30-35%  -  mg daily  - PTA atorvastatin 80 mg daily resumed   - metoprolol tartrate 25 mg BID, plan to transition to Toprol XL prior to discharge for GDMT   - Diuresis as below  - EKG with diffuse ST elevation c/w pericarditis, troponins down-trending, continue colchicine 0.6 mg BID  - repeat echo prior to discharge to reassess EF  - Please alert/page vascular surgery before discharge per vascular surgery to see inpatient     Chest tubes: mediastinal x2, left pleural - continue to suction   TPW: capped    Pulmonary:  Tobacco dependence   Extubated POD 0 to 3 lpm via NC. Now saturating well on 1-2 lpm.   - Supplemental O2 PRN to keep sats > 92%. Wean off as tolerated.  - Pulm hygiene, IS, activity and deep breathing    Neurology / Psych:  Acute post-operative pain   Pain well controlled with current regimen:  - Scheduled: tylenol, robaxin. Discontinue robaxin in favor of flexeril  per patient request. Add gabapentin 100 mg TID (takes PRN PTA).  - PRN: tylenol, oxycodone, dilaudid. Reduce dilaudid dose and frequency  in an effort to limit opioids in the setting of ileus. Add icy hot patches PRN.  - melatonin 10 mg daily for sleep       / Renal / Fluid / Electrolytes:  BL creat ~ 0.8-0.9, most recent creatinine stable and WNL; adequate UOP.   FLUID STATUS: Pre-op weight 174 lb, weight today 171 lb; I/O past 24 hours: -1.2 L.  - Diuresis: hold today; patient appears near euvolemic   - Replete lytes per protocol  - Strict I/O, daily weights  - Avoid/limit nephrotoxins as able    GI / Nutrition:   Post-operative ileus  Gastric distension   - AXR 7/27 with significant gastric distention, AXR today pending - preliminary read shows increased distended loops of small and large bowel, likely adynamic ileus   - NPO with sips and ice chips  - D5NS started 7/28 as maintenance fluid in the setting of ileus/NPO status   - NG to full suction, consider NJ in the coming days  - Continue bowel regimen, last BM 7/27 after enema per patient report and nursing note  - simethicone PRN  - Reglan x2 doses 7/27  - repeat suppository 7/28   - strongly encourage frequent ambulation and limiting opioids/anticholinergics      Endocrine:  Stress induced hyperglycemia   Hgb A1c 5.9 (5/1/24)  - Initially managed on insulin drip postop, transitioned to medium resistance sliding scale; goal BG <180 for optimal healing    Infectious Disease:  Stress induced leukocytosis  WBC 14.5 (down-trending, from 19.2), remains afebrile, no signs or symptoms of infection  - Completed perioperative antibiotics  - Continue to monitor fever curve, CBC    Hematology:   Acute blood loss anemia and thrombocytopenia  Hgb 11.2 and down-trending; Plt 126k and down-trending, no signs or symptoms of active bleeding  - CBC daily  - repeat hgb    Anticoagulation:   -  mg only     MSK/Skin:  Sternotomy  Surgical incision  - Sternal precautions  - Incisional cares per protocol    Prophylaxis:   - Stress ulcer prophylaxis: Pantoprazole 40 mg daily for 30 days  - DVT prophylaxis: Subcutaneous heparin, SCD    Disposition:   - Transferred to  on 7/27 evening   - Therapies recommending discharge to  "home with assist    Discussed with Dr. Mason through written and/or verbal communication.    Medically Ready for Discharge: Anticipated in 2-4 Days      Clinically Significant Risk Factors          # Hypocalcemia: Lowest Ca = 8.5 mg/dL in last 2 days, will monitor and replace as appropriate       # Thrombocytopenia: Lowest platelets = 126 in last 2 days, will monitor for bleeding      # Acute heart failure with reduced ejection fraction: last echo with EF <40% and receiving IV diuretics        #Precipitous drop in Hgb/Hct: Lowest Hgb this hospitalization: 11.2 g/dL. Will continue to monitor and treat/transfuse as appropriate.     # Overweight: Estimated body mass index is 25.31 kg/m  as calculated from the following:    Height as of this encounter: 1.753 m (5' 9\").    Weight as of this encounter: 77.7 kg (171 lb 6.4 oz)., PRESENT ON ADMISSION       # Financial/Environmental Concerns: none         Efrain Chun PA-C  Cardiothoracic Surgery    12:22 PM  July 28, 2024  pager [phone]          Interval History:     No overnight events.  Receiving IV dilaudid overnight for incisional and back pain (reports a history of chronic back pain).  Has not slept well since surgery per patient report.   Reports mild abdominal fullness this morning. He reports \"rumbling\" in his abdomen and is passing gas. He confirms he did have a BM yesterday after enema was given. No nausea or vomiting.  Breathing well without complaints.   He declined therapy this morning because he was too tired. I encouraged him to ambulate multiple times daily, especially in the setting of his ileus.   Ambulating in room independently.  Denies chest pain, palpitations, dizziness, syncopal symptoms, fevers, chills, myalgias, or sternal popping/clicking.         Physical Exam:     /72 (BP Location: Right arm)   Pulse 99   Temp 97.8  F (36.6  C) (Oral)   Resp 16   Ht 1.753 m (5' 9\")   Wt 77.7 kg (171 lb 6.4 oz)   SpO2 92%   BMI 25.31 kg/m      Gen: " A&Ox4, NAD  Neuro: no focal deficits   CV: RRR, normal S1 S2, no murmurs, rubs or gallops. No JVD  Pulm: CTA, no wheezing or rhonchi, normal breathing on 1-2 lpm  Abd: mildly distended, non-tender, BS present, soft, non-tender, no peritoneal signs  Ext: no peripheral edema  Incision: clean, dry, intact, no erythema, sternum stable  Tubes/drain sites: dressing clean and dry, serosanguinous output, no air leak. 24 hr output 240 mL.          Data:    Imaging:  reviewed recent imaging    Recent Results (from the past 24 hour(s))   XR Abdomen Port 1 View    Impression    RESIDENT PRELIMINARY INTERPRETATION  IMPRESSION:   Increased air distended loops of small and large bowel, likely  representing adynamic ileus.        Labs:  Recent Labs   Lab 07/28/24  0805 07/28/24  0554 07/27/24  2204 07/27/24  1709 07/27/24  1559 07/27/24  0827 07/27/24  0248 07/26/24  0430 07/26/24  0411 07/25/24  1950 07/25/24  1837 07/25/24  1834 07/25/24  1749   WBC  --  14.5*  --   --   --   --  19.2*  --  15.9*  --  22.5*   < >  --    HGB  --  11.2*  --   --   --   --  13.0*  --  13.4  --  13.8   < >  --    MCV  --  94  --   --   --   --  92  --  94  --  94   < >  --    PLT  --  126*  --   --   --   --  145*  --  154  --  153  --  156   INR  --   --   --   --   --   --   --   --   --   --  1.24*  --  1.43*   NA  --  137  137  --   --  136  --  136  --  139   < > 142  --   --    POTASSIUM  --  3.5  3.5  --   --  4.0  --  3.8  --  4.2   < > 3.7  --   --    CHLORIDE  --  96*  96*  --   --  96*  --  99  --  105   < > 105  --   --    CO2  --  31*  31*  --   --  27  --  26  --  25   < > 24  --   --    BUN  --  33.9*  33.9*  --   --  37.6*  --  26.6*  --  16.5   < > 13.4  --   --    CR  --  0.73  0.73  --   --  0.95  --  0.82  --  0.73   < > 0.89  --   --    ANIONGAP  --  10  10  --   --  13  --  11  --  9   < > 13  --   --    DANE  --  8.5*  8.5*  --   --  8.8  --  8.5*  --  8.2*   < > 8.3*  --   --    * 117*  117* 168*   < > 163*   <  > 218*   < > 134*   < > 158*   < >  --    ALBUMIN  --  3.7  --   --   --   --  4.0  --  4.0   < > 3.8  --   --    PROTTOTAL  --  6.5  --   --   --   --  6.7  --  6.2*   < > 6.0*  --   --    BILITOTAL  --  0.5  --   --   --   --  0.6  --  0.4   < > 0.4  --   --    ALKPHOS  --  80  --   --   --   --  78  --  71   < > 72  --   --    ALT  --  23  --   --   --   --  22  --  27   < > 25  --   --    AST  --  36  --   --   --   --  40  --  57*   < > 43  --   --     < > = values in this interval not displayed.      GLUCOSE:   Recent Labs   Lab 07/28/24  0805 07/28/24  0554 07/27/24  2204 07/27/24  1709 07/27/24  1559 07/27/24  1151   * 117*  117* 168* 154* 163* 167*

## 2024-07-28 NOTE — PLAN OF CARE
No acute events overnight. Pt reporting moderate to severe pain in lower back and chest overnight. PRN Dilaudid 0.4 mg given x3 and Oxy 10 mg given x1 overnight with decent effect. VSS with mild tachycardia throughout shift. Pt on 2L NC. Minimal output from CT. NG to LIS. Pt able to take pills by mouth. No BM overnight. Continue POC.

## 2024-07-29 ENCOUNTER — APPOINTMENT (OUTPATIENT)
Dept: GENERAL RADIOLOGY | Facility: CLINIC | Age: 69
DRG: 235 | End: 2024-07-29
Attending: PHYSICIAN ASSISTANT
Payer: MEDICARE

## 2024-07-29 ENCOUNTER — APPOINTMENT (OUTPATIENT)
Dept: OCCUPATIONAL THERAPY | Facility: CLINIC | Age: 69
DRG: 235 | End: 2024-07-29
Attending: THORACIC SURGERY (CARDIOTHORACIC VASCULAR SURGERY)
Payer: MEDICARE

## 2024-07-29 ENCOUNTER — APPOINTMENT (OUTPATIENT)
Dept: GENERAL RADIOLOGY | Facility: CLINIC | Age: 69
DRG: 235 | End: 2024-07-29
Attending: THORACIC SURGERY (CARDIOTHORACIC VASCULAR SURGERY)
Payer: MEDICARE

## 2024-07-29 LAB
ALBUMIN SERPL BCG-MCNC: 3.3 G/DL (ref 3.5–5.2)
ALP SERPL-CCNC: 80 U/L (ref 40–150)
ALT SERPL W P-5'-P-CCNC: 35 U/L (ref 0–70)
ANION GAP SERPL CALCULATED.3IONS-SCNC: 12 MMOL/L (ref 7–15)
ANION GAP SERPL CALCULATED.3IONS-SCNC: 12 MMOL/L (ref 7–15)
ANION GAP SERPL CALCULATED.3IONS-SCNC: 9 MMOL/L (ref 7–15)
AST SERPL W P-5'-P-CCNC: 55 U/L (ref 0–45)
ATRIAL RATE - MUSE: 119 BPM
ATRIAL RATE - MUSE: 123 BPM
BILIRUB SERPL-MCNC: 0.5 MG/DL
BUN SERPL-MCNC: 15.8 MG/DL (ref 8–23)
BUN SERPL-MCNC: 21.4 MG/DL (ref 8–23)
BUN SERPL-MCNC: 21.4 MG/DL (ref 8–23)
CALCIUM SERPL-MCNC: 8 MG/DL (ref 8.8–10.4)
CALCIUM SERPL-MCNC: 8 MG/DL (ref 8.8–10.4)
CALCIUM SERPL-MCNC: 8.2 MG/DL (ref 8.8–10.4)
CHLORIDE SERPL-SCNC: 94 MMOL/L (ref 98–107)
CHLORIDE SERPL-SCNC: 94 MMOL/L (ref 98–107)
CHLORIDE SERPL-SCNC: 95 MMOL/L (ref 98–107)
CREAT SERPL-MCNC: 0.64 MG/DL (ref 0.67–1.17)
CREAT SERPL-MCNC: 0.64 MG/DL (ref 0.67–1.17)
CREAT SERPL-MCNC: 0.67 MG/DL (ref 0.67–1.17)
DIASTOLIC BLOOD PRESSURE - MUSE: NORMAL MMHG
DIASTOLIC BLOOD PRESSURE - MUSE: NORMAL MMHG
EGFRCR SERPLBLD CKD-EPI 2021: >90 ML/MIN/1.73M2
ERYTHROCYTE [DISTWIDTH] IN BLOOD BY AUTOMATED COUNT: 14 % (ref 10–15)
GLUCOSE BLDC GLUCOMTR-MCNC: 132 MG/DL (ref 70–99)
GLUCOSE BLDC GLUCOMTR-MCNC: 133 MG/DL (ref 70–99)
GLUCOSE BLDC GLUCOMTR-MCNC: 185 MG/DL (ref 70–99)
GLUCOSE SERPL-MCNC: 117 MG/DL (ref 70–99)
GLUCOSE SERPL-MCNC: 130 MG/DL (ref 70–99)
GLUCOSE SERPL-MCNC: 130 MG/DL (ref 70–99)
HCO3 SERPL-SCNC: 31 MMOL/L (ref 22–29)
HCT VFR BLD AUTO: 30.7 % (ref 40–53)
HGB BLD-MCNC: 10.6 G/DL (ref 13.3–17.7)
INTERPRETATION ECG - MUSE: NORMAL
INTERPRETATION ECG - MUSE: NORMAL
LACTATE SERPL-SCNC: 1 MMOL/L (ref 0.7–2)
MAGNESIUM SERPL-MCNC: 2.3 MG/DL (ref 1.7–2.3)
MAGNESIUM SERPL-MCNC: 2.3 MG/DL (ref 1.7–2.3)
MCH RBC QN AUTO: 32 PG (ref 26.5–33)
MCHC RBC AUTO-ENTMCNC: 34.5 G/DL (ref 31.5–36.5)
MCV RBC AUTO: 93 FL (ref 78–100)
P AXIS - MUSE: 57 DEGREES
P AXIS - MUSE: 73 DEGREES
PLATELET # BLD AUTO: 144 10E3/UL (ref 150–450)
POTASSIUM SERPL-SCNC: 2.8 MMOL/L (ref 3.4–5.3)
POTASSIUM SERPL-SCNC: 2.9 MMOL/L (ref 3.4–5.3)
POTASSIUM SERPL-SCNC: 3.2 MMOL/L (ref 3.4–5.3)
PR INTERVAL - MUSE: 134 MS
PR INTERVAL - MUSE: 134 MS
PROT SERPL-MCNC: 6.1 G/DL (ref 6.4–8.3)
QRS DURATION - MUSE: 94 MS
QRS DURATION - MUSE: 96 MS
QT - MUSE: 346 MS
QT - MUSE: 352 MS
QTC - MUSE: 486 MS
QTC - MUSE: 503 MS
R AXIS - MUSE: -43 DEGREES
R AXIS - MUSE: -43 DEGREES
RBC # BLD AUTO: 3.31 10E6/UL (ref 4.4–5.9)
SODIUM SERPL-SCNC: 135 MMOL/L (ref 135–145)
SODIUM SERPL-SCNC: 137 MMOL/L (ref 135–145)
SODIUM SERPL-SCNC: 137 MMOL/L (ref 135–145)
SYSTOLIC BLOOD PRESSURE - MUSE: NORMAL MMHG
SYSTOLIC BLOOD PRESSURE - MUSE: NORMAL MMHG
T AXIS - MUSE: 55 DEGREES
T AXIS - MUSE: 58 DEGREES
VENTRICULAR RATE- MUSE: 119 BPM
VENTRICULAR RATE- MUSE: 123 BPM
WBC # BLD AUTO: 9.6 10E3/UL (ref 4–11)

## 2024-07-29 PROCEDURE — 80053 COMPREHEN METABOLIC PANEL: CPT

## 2024-07-29 PROCEDURE — 250N000013 HC RX MED GY IP 250 OP 250 PS 637: Performed by: SURGERY

## 2024-07-29 PROCEDURE — 71045 X-RAY EXAM CHEST 1 VIEW: CPT | Mod: 77

## 2024-07-29 PROCEDURE — 80048 BASIC METABOLIC PNL TOTAL CA: CPT | Performed by: THORACIC SURGERY (CARDIOTHORACIC VASCULAR SURGERY)

## 2024-07-29 PROCEDURE — 250N000013 HC RX MED GY IP 250 OP 250 PS 637

## 2024-07-29 PROCEDURE — 99254 IP/OBS CNSLTJ NEW/EST MOD 60: CPT | Mod: 24 | Performed by: SURGERY

## 2024-07-29 PROCEDURE — 71045 X-RAY EXAM CHEST 1 VIEW: CPT | Mod: 26 | Performed by: STUDENT IN AN ORGANIZED HEALTH CARE EDUCATION/TRAINING PROGRAM

## 2024-07-29 PROCEDURE — 85027 COMPLETE CBC AUTOMATED: CPT

## 2024-07-29 PROCEDURE — 250N000011 HC RX IP 250 OP 636: Performed by: THORACIC SURGERY (CARDIOTHORACIC VASCULAR SURGERY)

## 2024-07-29 PROCEDURE — 83735 ASSAY OF MAGNESIUM: CPT | Performed by: STUDENT IN AN ORGANIZED HEALTH CARE EDUCATION/TRAINING PROGRAM

## 2024-07-29 PROCEDURE — 250N000013 HC RX MED GY IP 250 OP 250 PS 637: Performed by: THORACIC SURGERY (CARDIOTHORACIC VASCULAR SURGERY)

## 2024-07-29 PROCEDURE — 74019 RADEX ABDOMEN 2 VIEWS: CPT | Mod: 26 | Performed by: RADIOLOGY

## 2024-07-29 PROCEDURE — 36415 COLL VENOUS BLD VENIPUNCTURE: CPT | Performed by: PHYSICIAN ASSISTANT

## 2024-07-29 PROCEDURE — 71045 X-RAY EXAM CHEST 1 VIEW: CPT | Mod: 26 | Performed by: RADIOLOGY

## 2024-07-29 PROCEDURE — 36415 COLL VENOUS BLD VENIPUNCTURE: CPT | Performed by: THORACIC SURGERY (CARDIOTHORACIC VASCULAR SURGERY)

## 2024-07-29 PROCEDURE — 36415 COLL VENOUS BLD VENIPUNCTURE: CPT | Performed by: STUDENT IN AN ORGANIZED HEALTH CARE EDUCATION/TRAINING PROGRAM

## 2024-07-29 PROCEDURE — 258N000003 HC RX IP 258 OP 636: Performed by: PHYSICIAN ASSISTANT

## 2024-07-29 PROCEDURE — 250N000011 HC RX IP 250 OP 636: Performed by: SURGERY

## 2024-07-29 PROCEDURE — 71045 X-RAY EXAM CHEST 1 VIEW: CPT

## 2024-07-29 PROCEDURE — 97535 SELF CARE MNGMENT TRAINING: CPT | Mod: GO

## 2024-07-29 PROCEDURE — S5010 5% DEXTROSE AND 0.45% SALINE: HCPCS | Performed by: PHYSICIAN ASSISTANT

## 2024-07-29 PROCEDURE — 250N000013 HC RX MED GY IP 250 OP 250 PS 637: Performed by: PHYSICIAN ASSISTANT

## 2024-07-29 PROCEDURE — 74019 RADEX ABDOMEN 2 VIEWS: CPT

## 2024-07-29 PROCEDURE — 82374 ASSAY BLOOD CARBON DIOXIDE: CPT | Performed by: STUDENT IN AN ORGANIZED HEALTH CARE EDUCATION/TRAINING PROGRAM

## 2024-07-29 PROCEDURE — 83605 ASSAY OF LACTIC ACID: CPT | Performed by: PHYSICIAN ASSISTANT

## 2024-07-29 PROCEDURE — 120N000005 HC R&B MS OVERFLOW UMMC

## 2024-07-29 PROCEDURE — 250N000013 HC RX MED GY IP 250 OP 250 PS 637: Performed by: STUDENT IN AN ORGANIZED HEALTH CARE EDUCATION/TRAINING PROGRAM

## 2024-07-29 PROCEDURE — 999N000147 HC STATISTIC PT IP EVAL DEFER: Performed by: PHYSICAL THERAPIST

## 2024-07-29 RX ORDER — POTASSIUM CHLORIDE 750 MG/1
40 TABLET, EXTENDED RELEASE ORAL ONCE
Status: COMPLETED | OUTPATIENT
Start: 2024-07-29 | End: 2024-07-29

## 2024-07-29 RX ORDER — POTASSIUM CHLORIDE 750 MG/1
20 TABLET, EXTENDED RELEASE ORAL ONCE
Status: COMPLETED | OUTPATIENT
Start: 2024-07-29 | End: 2024-07-29

## 2024-07-29 RX ORDER — POTASSIUM CHLORIDE 7.45 MG/ML
10 INJECTION INTRAVENOUS
Status: COMPLETED | OUTPATIENT
Start: 2024-07-29 | End: 2024-07-29

## 2024-07-29 RX ORDER — POTASSIUM CHLORIDE 7.45 MG/ML
10 INJECTION INTRAVENOUS
Status: COMPLETED | OUTPATIENT
Start: 2024-07-30 | End: 2024-07-30

## 2024-07-29 RX ADMIN — POTASSIUM CHLORIDE 10 MEQ: 7.46 INJECTION, SOLUTION INTRAVENOUS at 18:50

## 2024-07-29 RX ADMIN — GABAPENTIN 100 MG: 100 CAPSULE ORAL at 14:15

## 2024-07-29 RX ADMIN — METOPROLOL TARTRATE 25 MG: 25 TABLET, FILM COATED ORAL at 10:21

## 2024-07-29 RX ADMIN — COLCHICINE 0.6 MG: 0.6 TABLET, FILM COATED ORAL at 10:26

## 2024-07-29 RX ADMIN — ACETAMINOPHEN 650 MG: 325 TABLET, FILM COATED ORAL at 18:29

## 2024-07-29 RX ADMIN — POTASSIUM CHLORIDE 10 MEQ: 7.46 INJECTION, SOLUTION INTRAVENOUS at 17:43

## 2024-07-29 RX ADMIN — ACETAMINOPHEN 650 MG: 325 TABLET, FILM COATED ORAL at 04:12

## 2024-07-29 RX ADMIN — GABAPENTIN 100 MG: 100 CAPSULE ORAL at 19:40

## 2024-07-29 RX ADMIN — SENNOSIDES AND DOCUSATE SODIUM 2 TABLET: 8.6; 5 TABLET ORAL at 10:21

## 2024-07-29 RX ADMIN — POTASSIUM CHLORIDE 10 MEQ: 7.46 INJECTION, SOLUTION INTRAVENOUS at 19:55

## 2024-07-29 RX ADMIN — CYCLOBENZAPRINE 10 MG: 10 TABLET, FILM COATED ORAL at 06:06

## 2024-07-29 RX ADMIN — HEPARIN SODIUM 5000 UNITS: 5000 INJECTION, SOLUTION INTRAVENOUS; SUBCUTANEOUS at 12:13

## 2024-07-29 RX ADMIN — ACETAMINOPHEN 650 MG: 325 TABLET, FILM COATED ORAL at 10:20

## 2024-07-29 RX ADMIN — GABAPENTIN 100 MG: 100 CAPSULE ORAL at 10:21

## 2024-07-29 RX ADMIN — POTASSIUM CHLORIDE 10 MEQ: 7.46 INJECTION, SOLUTION INTRAVENOUS at 14:15

## 2024-07-29 RX ADMIN — ATORVASTATIN CALCIUM 80 MG: 80 TABLET, FILM COATED ORAL at 10:20

## 2024-07-29 RX ADMIN — ACETAMINOPHEN 650 MG: 325 TABLET, FILM COATED ORAL at 14:15

## 2024-07-29 RX ADMIN — POLYETHYLENE GLYCOL 3350 17 G: 17 POWDER, FOR SOLUTION ORAL at 19:40

## 2024-07-29 RX ADMIN — CYCLOBENZAPRINE 10 MG: 10 TABLET, FILM COATED ORAL at 18:29

## 2024-07-29 RX ADMIN — CYCLOBENZAPRINE 10 MG: 10 TABLET, FILM COATED ORAL at 12:17

## 2024-07-29 RX ADMIN — DEXTROSE AND SODIUM CHLORIDE: 5; 450 INJECTION, SOLUTION INTRAVENOUS at 00:11

## 2024-07-29 RX ADMIN — DEXTROSE AND SODIUM CHLORIDE: 5; 450 INJECTION, SOLUTION INTRAVENOUS at 12:13

## 2024-07-29 RX ADMIN — POTASSIUM CHLORIDE 10 MEQ: 7.46 INJECTION, SOLUTION INTRAVENOUS at 15:12

## 2024-07-29 RX ADMIN — HEPARIN SODIUM 5000 UNITS: 5000 INJECTION, SOLUTION INTRAVENOUS; SUBCUTANEOUS at 19:40

## 2024-07-29 RX ADMIN — ACETAMINOPHEN 650 MG: 325 TABLET, FILM COATED ORAL at 21:45

## 2024-07-29 RX ADMIN — HEPARIN SODIUM 5000 UNITS: 5000 INJECTION, SOLUTION INTRAVENOUS; SUBCUTANEOUS at 04:13

## 2024-07-29 RX ADMIN — SENNOSIDES AND DOCUSATE SODIUM 2 TABLET: 8.6; 5 TABLET ORAL at 19:39

## 2024-07-29 RX ADMIN — POLYETHYLENE GLYCOL 3350 17 G: 17 POWDER, FOR SOLUTION ORAL at 10:22

## 2024-07-29 RX ADMIN — POTASSIUM CHLORIDE 40 MEQ: 750 TABLET, EXTENDED RELEASE ORAL at 06:18

## 2024-07-29 RX ADMIN — METOPROLOL TARTRATE 25 MG: 25 TABLET, FILM COATED ORAL at 19:39

## 2024-07-29 RX ADMIN — POTASSIUM CHLORIDE 20 MEQ: 750 TABLET, EXTENDED RELEASE ORAL at 10:19

## 2024-07-29 RX ADMIN — POTASSIUM CHLORIDE 10 MEQ: 7.46 INJECTION, SOLUTION INTRAVENOUS at 16:38

## 2024-07-29 RX ADMIN — COLCHICINE 0.6 MG: 0.6 TABLET, FILM COATED ORAL at 19:39

## 2024-07-29 RX ADMIN — LIDOCAINE 3 PATCH: 4 PATCH TOPICAL at 19:40

## 2024-07-29 RX ADMIN — ASPIRIN 81 MG CHEWABLE TABLET 162 MG: 81 TABLET CHEWABLE at 10:21

## 2024-07-29 RX ADMIN — Medication 10 MG: at 19:40

## 2024-07-29 ASSESSMENT — ACTIVITIES OF DAILY LIVING (ADL)
ADLS_ACUITY_SCORE: 30

## 2024-07-29 NOTE — CONSULTS
EGS Surgery Consult  2024    Modesto Lehman  : 1955    Date of Service: 2024 4:09 PM  Reason for consult: concern for high NG output    Assessment and Plan:  Modesto Lehman is a 68 year old male on POD 4 s/p CABG x 3. Pt is passing gas and having bowel movements. Denies N/V and abdominal pain. NG output is clear, non bilious. High NG output likely 2/2 to ice chips. Abdominal exam is completely benign. He does not have an ileus or SBO. May remove NG and give patient clear liquids.    - Trend and replace electrolytes as needed  - Minimize opioid narcotics  - Remove NG  - Start CLD tonight  - EGS will see patient tomorrow      Discussed with Dr. Eddie Crow.    Resident Attestation   I, Marva Farah MD, was present with the medical student who participated in the service and in the documentation of the note. I have verified the history and personally performed the physical exam and medical decision making. I agree with the assessment and plan of care as documented in the note and have edited where appropriate.      Marva Farah MD  General Surgery PGY-2  Date of Service: 2024        History of Present Illness:    Modesto Lehman is a 68 year old male with a PMH of CAD, CHFrEF (30-35%), HLD, PAD (s/p bilateral popliteal bypass), Carotid disease (s/p  and chronic occlusion of DARIUSZ), chronic back pain on POD4 from CABG x3. General surgery is consulted for concerns for post-op ileus given patient's high NG output and lack of BM. Pt reports NG was placed 3 days ago given distention. Reports he was not having nausea or vomiting. Today, he denies any abdominal pain. He reports he is passing gas. He had a bowel movement on the  after an enema, also had another BM yesterday. He denies any nausea or vomiting with the NG tube clamped for meds. He has been eating ice chips. Reports he has had about two-three 12-14oz cups today. Reports he has been getting out of bed  and walking. No other concerns.     Past Medical History:  Past Medical History:   Diagnosis Date    Allergy status to analgesic agent     No reported medication allergies    Anesthesia of skin     No  problems with anaesthesia    CAD (coronary artery disease)     Elevated coronary artery calcium score     History of recurrent pneumonia     No Comments Provided    Hyperlipidemia     No Comments Provided    Low back pain     pain with bilateral leg and disc injuries.    Personal history of nicotine dependence     Personal history of other medical treatment (CODE)     No blood transfusions    PVD (peripheral vascular disease) (H24)        Past Surgical History  Past Surgical History:   Procedure Laterality Date    ANGIOPLASTY      Right leg stenting and bypass, Left also    BYPASS GRAFT ARTERY CORONARY N/A 7/25/2024    Procedure: Median Sternotomy, On Cardiopulmonary Bypass Graft, CORONARY ARTERY BYPASS GRAFT X_3_, Left AND RIGHTGreater Saphenous Vein [place], Left Internal Mammary Artery [place], Intra-operative Transesophageal Echocardiogram per Anesthesia.;  Surgeon: Kit Mason MD;  Location: [facility] OR    COLONOSCOPY  08/23/2010    Q 10yrs.    COLONOSCOPY N/A 02/24/2022    6 tubular adenomas, 5 year follow up, 2/24/2027    CV CORONARY ANGIOGRAM N/A 4/15/2024    Procedure: Coronary Angiogram;  Surgeon: Toni Sellers MD;  Location:  [facility]    ENDARTERECTOMY CAROTID Left 6/25/2024    Procedure: Left Carotid Endarterectomy with bovine pericardial patch angioplasty. Intraoperative Ultrasound. Intraoperative EEG monitoring;  Surgeon: Rasheed Sam MD;  Location:  OR    EXTRACTION(S) DENTAL      recently removed    OTHER SURGICAL HISTORY      [id number],PREMALIG/BENIGN SKIN LESION EXCISION, removed from chest    OTHER SURGICAL HISTORY      [id number],ANESTHESIA ALERT,No  problems with anaesthesia       Family History:  Family History   Problem Relation Age of Onset    Diabetes Mother          Diabetes    Heart Disease Mother         Heart Disease    Other - See Comments Father         COPD, CD    Heart Disease Brother         Heart Disease    Other - See Comments Brother         killed by drunk  at age 19.    Substance Abuse Other         Alcohol/Drug, No hx of chemical dependency.    Diabetes Other         Diabetes    Heart Disease Other         Heart Disease    Anesthesia Reaction No family hx of     Clotting Disorder No family hx of     Bleeding Disorder No family hx of        Social History:  Social History     Socioeconomic History    Marital status:      Spouse name: Not on file    Number of children: Not on file    Years of education: Not on file    Highest education level: Not on file   Occupational History    Not on file   Tobacco Use    Smoking status: Former     Current packs/day: 0.25     Average packs/day: 0.3 packs/day for 14.7 years (3.7 ttl pk-yrs)     Types: Cigars, Cigarettes     Start date: 11/4/2009    Smokeless tobacco: Never    Tobacco comments:     Quit smoking: quit 6/20/17 (cigarettes); smokes cigars 2 puffs cigar week   Vaping Use    Vaping status: Never Used   Substance and Sexual Activity    Alcohol use: Yes     Comment: 2 vodka 3-4x week    Drug use: Yes     Types: Marijuana     Comment: Not daily 2x week    Sexual activity: Not Currently   Other Topics Concern    Not on file   Social History Narrative    Self employed as .   with 3 adult children.    No hx of chemical dependency.  Patient is a former smoker.   Alcohol Use - yes occ    Wife - Imelda Lehman     Social Determinants of Health     Financial Resource Strain: Low Risk  (2/16/2024)    Financial Resource Strain     Within the past 12 months, have you or your family members you live with been unable to get utilities (heat, electricity) when it was really needed?: No   Food Insecurity: Low Risk  (2/16/2024)    Food Insecurity     Within the past 12 months, did you worry that your food  "would run out before you got money to buy more?: No     Within the past 12 months, did the food you bought just not last and you didn t have money to get more?: No   Transportation Needs: Low Risk  (2/16/2024)    Transportation Needs     Within the past 12 months, has lack of transportation kept you from medical appointments, getting your medicines, non-medical meetings or appointments, work, or from getting things that you need?: No   Physical Activity: Sufficiently Active (2/16/2024)    Exercise Vital Sign     Days of Exercise per Week: 7 days     Minutes of Exercise per Session: 60 min   Stress: Stress Concern Present (2/16/2024)    [employer] [place] [employer] - Occupational Stress Questionnaire     Feeling of Stress : Very much   Social Connections: Not on file   Interpersonal Safety: Low Risk  (4/11/2024)    Interpersonal Safety     Do you feel physically and emotionally safe where you currently live?: Yes     Within the past 12 months, have you been hit, slapped, kicked or otherwise physically hurt by someone?: No     Within the past 12 months, have you been humiliated or emotionally abused in other ways by your partner or ex-partner?: No   Housing Stability: Low Risk  (2/16/2024)    Housing Stability     Do you have housing? : Yes     Are you worried about losing your housing?: No       Medications:  No current outpatient medications on file.       Allergies:   No Known Allergies    Review of Symptoms:  A 10 point review of symptoms has been conducted and is negative except for that mentioned in the above HPI.    Physical Exam:    Blood pressure 120/74, pulse 93, temperature 97.8  F (36.6  C), temperature source Oral, resp. rate 21, height 1.753 m (5' 9\"), weight 77.7 kg (171 lb 6.4 oz), SpO2 94%.  Gen:    Lying in bed in NAD, A&OX3  HEENT: Normocephalic and atraumatic  CV:  RRR  Pulm:  Non-labored breathing on room air  Abd:  Soft, non-tender, non-distended, NG tube clamped with clear " non-bilious output.  Ext:  Warm and well perfused, no obvious deformities    Labs:  CBC RESULTS:   Recent Labs   Lab Test 07/29/24  0444   WBC 9.6   RBC 3.31*   HGB 10.6*   HCT 30.7*   MCV 93   MCH 32.0   MCHC 34.5   RDW 14.0   *     Last Basic Metabolic Panel:  Lab Results   Component Value Date     07/29/2024     07/29/2024     07/31/2020      Lab Results   Component Value Date    POTASSIUM 2.8 07/29/2024    POTASSIUM 3.7 07/25/2024    POTASSIUM 4.1 12/01/2021    POTASSIUM 3.4 07/31/2020     Lab Results   Component Value Date    CHLORIDE 94 07/29/2024    CHLORIDE 94 07/29/2024    CHLORIDE 100 12/01/2021    CHLORIDE 109 07/31/2020     Lab Results   Component Value Date    DANE 8.0 07/29/2024    DANE 8.0 07/29/2024    DANE 8.2 07/31/2020     Lab Results   Component Value Date    CO2 31 07/29/2024    CO2 31 07/29/2024    CO2 31 12/01/2021    CO2 23 07/31/2020     Lab Results   Component Value Date    BUN 21.4 07/29/2024    BUN 21.4 07/29/2024    BUN 14 12/01/2021    BUN 9 07/31/2020     Lab Results   Component Value Date    CR 0.64 07/29/2024    CR 0.64 07/29/2024    CR 0.80 07/31/2020     Lab Results   Component Value Date     07/29/2024    GLC 87 12/01/2021     07/31/2020     Mg 2.3  No phos  Lactate 1.0    Imaging:  XR Abdomen 2 Views    Result Date: 7/29/2024  EXAMINATION:  XR ABDOMEN 2 VIEWS 7/29/2024 1:47 PM. COMPARISON: Radiograph 7/28/2024, 7/27/2024. HISTORY:  monitor ileus FINDINGS: Upright and supine views of the abdomen. Enteric tube tip and sidehole project over the stomach. Decreased air distended loops of bowel throughout the abdomen compared to 7/28/2024. No free air. No pneumatosis or portal venous gas. Please see same day chest radiograph for dictation of thoracic findings.     IMPRESSION: Decreased air distended loops of bowel throughout the abdomen compared to 7/28/2024. I have personally reviewed the examination and initial interpretation and I agree with the  findings. ULI HURST MD   SYSTEM ID:  [id number]    XR Abdomen 2 Views    Result Date: 7/28/2024  EXAMINATION:  XR ABDOMEN 2 VIEWS 7/28/2024 2:38 PM. COMPARISON: Standing radiograph, 7/27/2024. HISTORY:  for ileus and/or free air FINDINGS: Upright and supine views of the abdomen. Enteric tube tip and sidehole projected over the stomach. Persistent air distended loops of bowel throughout the abdomen. No free air. No pneumatosis or portal venous gas.     IMPRESSION: Persistent air distended loops of bowel throughout the abdomen. No free air. I have personally reviewed the examination and initial interpretation and I agree with the findings. ULI HURST MD   SYSTEM ID:  [id number]    XR Abdomen Port 1 View    Result Date: 7/28/2024  EXAMINATION:  XR ABDOMEN PORT 1 VIEW 7/28/2024 8:58 AM. COMPARISON: Radiograph 7/27/2024, 7/26/2024. HISTORY:  assess distention FINDINGS: 2 views of the abdomen. Increased air distended loops of bowel present throughout the abdomen, involving small and large bowel. No definite pneumatosis or portal venous gas. Enteric tube tip and sidehole projected over the stomach. Vascular stents in place.     IMPRESSION: Increased air distended loops of small and large bowel, likely representing adynamic ileus. I have personally reviewed the examination and initial interpretation and I agree with the findings. CHIQUITA MAURICIO MD   SYSTEM ID:  [id number]    XR Abdomen Port 1 View    Result Date: 7/27/2024  EXAM: XR ABDOMEN PORT 1 VIEW  7/27/2024 8:47 AM HISTORY:  High NG output   TECHNIQUE: Single frontal radiograph of the abdomen COMPARISON:  7/26/2024 FINDINGS: Supine radiograph of the abdomen. Gastric tube projects over the stomach. Mediastinal and left basilar drains project over the field of view. Right iliac stent. The colon and small bowel do not appear dilated. The stomach appears distended with air. No portal venous gas or pneumatosis.     IMPRESSION: The stomach appears significantly  distended with air. The remainder of the small bowel and colon appears nondistended. I have personally reviewed the examination and initial interpretation and I agree with the findings. CHIQUITA MAURICIO MD   SYSTEM ID:  [id number]

## 2024-07-29 NOTE — PROGRESS NOTES
CLINICAL NUTRITION SERVICES - ASSESSMENT NOTE     Nutrition Prescription    RECOMMENDATIONS FOR MDs/PROVIDERS TO ORDER:  Diet adv v nutrition support(PN) within 2-3 days.  Monitor lytes and replace PRN  Optimize bowel regimen  Diet progression as medically appropriate  Consider thiamine 100 mg for 7-10 days when initiating nutrition.   Malnutrition Status:    Unable to determine due to unable to complete NFPE and history, will follow up    Recommendations already ordered by Registered Dietitian (RD):  None currently    Future/Additional Recommendations:  Heart Healthy Education when appropriate  Consider snacks/supplements as diet progresses. Monitor PO adequacy    If ileus prolonged, consider parenteral nutrition to help meet nutritional needs. Currently only has peripheral lines. If ok with 2L fluid can consider PPN for short term. Otherwise recommend placement of central line for TPN. Rec considering parenteral nutrition for patients at 7 days NPO for those not previously malnourished.  If POC is to initiate short-term peripheral parenteral nutrition:  -Recommend only using Peripheral PN for short-term nutrition support, ~1-2 week duration  -If anticipated that patient will need PN support for >2 weeks, consider central line placement.     Dosing weight: 77.7 kg  Access: peripheral line     Initial parameters (per day)  ClinimixE 4.25% AA, 5% dextrose concentration (peripheral line)    Volume: Rate of 83 mL/hr (1992 mL/day)  Dextrose: 100 g (amount per Clinimix at 83 mL/hr)  AA: 85 g (amount per Clinimix at 83 mL/hr)  Lipids: 250 mL 20%, 5 days per week [M-F]     Dextrose titration:   None needed with peripheral Clinimix     Additives: Standard Infuvite and Trace minerals + 100 mg B1/Thiamine x7 days      - PPN will provide: 1992 mL volume, 100g Dex daily (340 kcal, GIR .9 mg/kg/min), 85 g AA daily (1.1 gm/kg/day, 340 kcal), and 250 ml 20% IV lipids 5-days per week (M-F) for total provision of 1037 Kcals (13  "Kcal/kg/day), 34% Kcals from fat/lipids.        REASON FOR ASSESSMENT  Modesto Lehman is a/an 68 year old male assessed by the dietitian for NPO/Clear Liquids    Patient admitted for  s/p planned CABG x3 on 2024 with Dr. Mason.     MEDICAL HISTORY  CAD, CHFrEF (30-35%), HLD, PAD (s/p bilateral popliteal bypass), Carotid disease (s/p  and chronic occlusion of DARIUSZ), chronic back pain.     NUTRITION HISTORY   Attempted room visit. Pt busy with other providers at time of visit. AXR  with significant gastric distention, AXR today pending - preliminary read shows increased distended loops of small and large bowel, likely adynamic ileus. NG to suction. Per RN, pt is passing gas but no BM noted yet.    CURRENT NUTRITION ORDERS  Diet: NPO    Intake/Tolerance: No documented intakes to assess.    LABS  K+ 2.8  Cl 94  CO2 31  Cre .64  AST 55  Ionized calcium 4.2  Glu 130      MEDICATIONS  Lipitor  Enema  Insulin  Melatonin  Miralax  Senna-docusate  D5 in 1/ NS @ 75 ml/hr    ANTHROPOMETRICS  Height: 175.3 cm (5' 9\")  Most Recent Weight: 77.7 kg (171 lb 6.4 oz)    IBW: 72.7 kg  Body mass index is 25.31 kg/m . BMI Category: Overweight BMI 25-29.9  Weight History:  4.4 kg (5%) weight loss over 3 months.  Wt Readings from Last 20 Encounters:   24 77.7 kg (171 lb 6.4 oz)   24 81.6 kg (180 lb)   24 81.7 kg (180 lb 1.9 oz)   24 83.9 kg (185 lb)   24 83.9 kg (185 lb)   24 82.3 kg (181 lb 6.4 oz)   24 82.1 kg (181 lb)   24 81.8 kg (180 lb 4.8 oz)   04/15/24 80.1 kg (176 lb 9.4 oz)   24 82.6 kg (182 lb)   24 82.5 kg (181 lb 12.8 oz)   23 81.6 kg (179 lb 12.8 oz)   23 82.8 kg (182 lb 9.6 oz)   22 82.6 kg (182 lb)   21 82.6 kg (182 lb)   20 88.9 kg (196 lb)   20 89.8 kg (198 lb)   20 90.2 kg (198 lb 12.8 oz)   20 88.6 kg (195 lb 4 oz)   20 89.8 kg (198 lb)         ASSESSED NUTRITION NEEDS  Dosing Weight: 77.7 " kg (Actual BW)   Estimated Energy Needs: 6200-7865 kcals/day (25 - 30 kcals/kg)  Justification: Maintenance  Estimated Protein Needs:  grams protein/day (1.2 - 1.5 grams of pro/kg)  Justification: Post-op  Estimated Fluid Needs: (1 mL/kcal)   Justification: Maintenance    PHYSICAL FINDINGS  See malnutrition section below.    Danilo Score: 18  Per EMR: Skin  Skin WDL: .WDL except  Skin WDL: .WDL except (left neck incision scar)  Skin Color: pale  Redness blanchable location: Sacrum/Coccyx  Skin Integrity: other (see comments)  Other (see comments): Midsternal incision, CT sites, graft sites  Device Skin Interventions Taken: No adjustments needed    MALNUTRITION  % Intake: Unable to assess  % Weight Loss: Weight loss does not meet criteria for malnutrition   Subcutaneous Fat Loss: Unable to assess   Muscle Loss: Unable to assess  Fluid Accumulation/Edema: None noted  Malnutrition Diagnosis: Unable to determine due to unable to complete NFPE and history, will follow up    NUTRITION DIAGNOSIS  Inadequate oral intake related to ileus as evidenced by NPO status.      INTERVENTIONS  Implementation  Nutrition Education: will be provided if nutrition education needs arise.    Monitor diet progression/bowel function      Goals  Diet adv v nutrition support within 2-3 days.        Monitoring/Evaluation  Progress toward goals will be monitored and evaluated per protocol.  Mary Ramirez MS, RD, LD, [facility]    6C (beds 0488-0401) + 7C (beds 2024-9498) + ED   Available in [facility] by name or unit dietitian

## 2024-07-29 NOTE — PLAN OF CARE
PT: Orders received. Chart reviewed and discussed with care team.  PT not indicated due to lack of skilled PT needs. Pt has been ambulating hallways with SBA, no specific gait deficits or concerns to require skilled PT involvement per discussion with OT.  Defer discharge recommendations to OT/CR.  Will complete orders.

## 2024-07-29 NOTE — PROGRESS NOTES
Cardiovascular Surgery Progress Note  2024         Assessment and Plan:     Modesto Lehman is a 68 year old male with a PMH of CAD, CHFrEF (30-35%), HLD, PAD (s/p bilateral popliteal bypass), Carotid disease (s/p  and chronic occlusion of DARIUSZ), chronic back pain. He was referred to Dr. Mason by his cardiologist after outpatient angiogram demonstrated severe 3-vessel coronary artery disease. Patient is now s/p planned CABG x3 on 2024 with Dr. Mason.    Cardiovascular:   Hx of CAD - s/p CABG x 3 on 24   HFrEF (EF 30-35%)  Hyperlipidemia  PAD s/p bilateral popliteal bypass  Carotid artery disease s/p left carotid endarterectomy. Chronic occlusion of DARIUSZ.  Pericarditis   HD stable, in NSR/sinus tachycardia. MAPS 79-92  Most recent echo showed LV EF 30-35%  -  mg daily  - PTA atorvastatin 80 mg daily resumed   - metoprolol tartrate 25 mg BID, plan to transition to Toprol XL prior to discharge for GDMT   - Diuresis as below  - EKG with diffuse ST elevation c/w pericarditis, troponins down-trending, continue colchicine 0.6 mg BID  - repeat echo prior to discharge to reassess EF  - Please alert/page vascular surgery before discharge per vascular surgery to see inpatient     Chest tubes: mediastinal x2, left pleural - removed   TPW: None    Pulmonary:  Tobacco dependence   Extubated POD 0 to 3 lpm via NC. Now saturating well on RA.   - Supplemental O2 PRN to keep sats > 92%. Wean off as tolerated.  - Pulm hygiene, IS, activity and deep breathing    Neurology / Psych:  Acute post-operative pain   Pain well controlled with current regimen:  - Scheduled: tylenol, Flexeril (in favor of robaxin per patient request), gabapentin 100 mg TID (takes PRN PTA).  - PRN: tylenol, oxycodone, icy hot patches  - melatonin 10 mg daily for sleep      / Renal / Fluid / Electrolytes:  Hypokalemia secondary to GI loss   BL creat ~ 0.8-0.9, most recent creatinine stable and WNL; adequate UOP.   FLUID  STATUS: Pre-op weight 174 lb, weight today 171 lb; I/O past 24 hours: -2.3 L.  - Diuresis: hold today; patient appears euvolemic   - Replete lytes per protocol  - Strict I/O, daily weights  - Avoid/limit nephrotoxins as able    GI / Nutrition:   Post-operative ileus  Gastric distension   - AXRs with significant gastric distention and dilated loops of bowel, continue daily AXRs to monitor   - NPO with sips and ice chips  - D5NS started 7/28 as maintenance fluid in the setting of ileus/NPO status  - consider TPN if still NPO after 7 days per discussion with RD  - NG to continuous suction, ongoing high output  - Continue bowel regimen, last BM 7/27 after enema per patient report and nursing note  - simethicone PRN  - Reglan x2 doses 7/27  - repeat suppository 7/28   - strongly encourage frequent ambulation and limiting opioids/anticholinergics    - consult general surgery 7/29 for ongoing ileus/high NG output, appreciate recommendations      Endocrine:  Stress induced hyperglycemia   Hgb A1c 5.9 (5/1/24)  - Initially managed on insulin drip postop, transitioned to medium resistance sliding scale; goal BG <180 for optimal healing    Infectious Disease:  Stress induced leukocytosis  WBC WNL, remains afebrile, no signs or symptoms of infection  - Completed perioperative antibiotics  - Continue to monitor fever curve, CBC    Hematology:   Acute blood loss anemia and thrombocytopenia  Hgb 10.6 and down-trending; Plt 144k and up-trending, no signs or symptoms of active bleeding  - CBC daily    Anticoagulation:   -  mg only     MSK/Skin:  Sternotomy  Surgical incision  - Sternal precautions  - Incisional cares per protocol    Prophylaxis:   - Stress ulcer prophylaxis: Pantoprazole 40 mg daily for 30 days  - DVT prophylaxis: Subcutaneous heparin, SCD    Disposition:   - Transferred to  on 7/27 evening   - Therapies recommending discharge to home with assist    Discussed with Dr. Mason through written and/or verbal  "communication.    Medically Ready for Discharge: Anticipated in 2-4 Days      Clinically Significant Risk Factors        # Hypokalemia: Lowest K = 2.8 mmol/L in last 2 days, will replace as needed   # Hypocalcemia: Lowest Ca = 8 mg/dL in last 2 days, will monitor and replace as appropriate     # Hypoalbuminemia: Lowest albumin = 3.3 g/dL at 7/29/2024  4:44 AM, will monitor as appropriate   # Thrombocytopenia: Lowest platelets = 126 in last 2 days, will monitor for bleeding      # Acute heart failure with reduced ejection fraction: last echo with EF <40% and receiving IV diuretics        #Precipitous drop in Hgb/Hct: Lowest Hgb this hospitalization: 10.6 g/dL. Will continue to monitor and treat/transfuse as appropriate.     # Overweight: Estimated body mass index is 25.31 kg/m  as calculated from the following:    Height as of this encounter: 1.753 m (5' 9\").    Weight as of this encounter: 77.7 kg (171 lb 6.4 oz).        # Financial/Environmental Concerns: none   # History of CABG: noted on surgical history       Efrain Chun PA-C  Cardiothoracic Surgery    2:19 PM  July 29, 2024  pager [phone]          Interval History:     No overnight events.  Reports ongoing mild abdominal discomfort this morning. He reports \"rumbling\" in his abdomen and is passing gas. No nausea or vomiting.  Patient noted some bright red blood on the edge of the commode after intense straining to have a BM this morning. He reports history of hemorrhoids. Will monitor for recurrence.   Breathing well without complaints.   He ambulated once yesterday. I again encouraged him to ambulate multiple times daily, especially in the setting of his ileus.   Denies chest pain, palpitations, dizziness, syncopal symptoms, fevers, chills, myalgias, or sternal popping/clicking.         Physical Exam:     /70 (BP Location: Right arm)   Pulse 96   Temp 98.4  F (36.9  C) (Axillary)   Resp 20   Ht 1.753 m (5' 9\")   Wt 77.7 kg (171 lb 6.4 oz)   SpO2 93% "   BMI 25.31 kg/m      Gen: A&Ox4, NAD  Neuro: no focal deficits   CV: RRR, normal S1 S2, no murmurs, rubs or gallops. No JVD  Pulm: CTA, no wheezing or rhonchi, normal breathing on RA  Abd: non- distended, non-tender, BS present, soft; no peritoneal signs, rebound, or guarding  Ext: no peripheral edema  Incision: clean, dry, intact, no erythema, sternum stable  Tubes/drain sites: dressing clean and dry, serosanguinous output, no air leak. 24 hr output 190 mL.          Data:    Imaging:  reviewed recent imaging    Recent Results (from the past 24 hour(s))   XR Abdomen 2 Views    Narrative    EXAMINATION:  XR ABDOMEN 2 VIEWS 7/28/2024 2:38 PM.    COMPARISON: Standing radiograph, 7/27/2024.    HISTORY:  for ileus and/or free air    FINDINGS:   Upright and supine views of the abdomen. Enteric tube tip and sidehole  projected over the stomach. Persistent air distended loops of bowel  throughout the abdomen. No free air. No pneumatosis or portal venous  gas.       Impression    IMPRESSION: Persistent air distended loops of bowel throughout the  abdomen. No free air.    I have personally reviewed the examination and initial interpretation  and I agree with the findings.    ULI HURST MD         SYSTEM ID:  [id number]   XR Chest Port 1 View    Narrative    Exam: XR CHEST PORT 1 VIEW, 7/29/2024 6:37 AM    Indication: s/p CABG w/ CTs    Comparison: Chest radiograph 7/28/2024    Findings:   Portable AP semiupright views of the chest were obtained. Postsurgical  changes of the chest with intact median sternotomy wires. Left basilar  chest tube and mediastinal drains in place. Enteric tube tip in  sidehole project over the stomach. Stable cardiomediastinal  silhouette. Clear costophrenic angles. No pneumothorax. Continued  elevation of the left hemidiaphragm with persistent streaky left  basilar opacities, not substantially unchanged.      Impression    Impression:   1. Stable support devices.  2. Continued streaky left  basilar opacities, likely atelectasis.    I have personally reviewed the examination and initial interpretation  and I agree with the findings.    MELY PRABHAKAR DO         SYSTEM ID:  [id number]   XR Chest Port 1 View    Narrative    Portable chest 7/29/2024 at 1217 hours    INDICATION: Post chest tube pull    COMPARISON: 0608 hours earlier this morning    FINDINGS: Left hemidiaphragm remains elevated. The previous left  thoracostomy tube has now been removed. No discrete pneumothorax.  Median sternotomy again present. NG/OG tube beyond inferior margin of  the image. Continued mild bandlike opacities in the left lower lung  zone consistent with subsegmental atelectasis are present.  Heart size normal.    Impression    IMPRESSION: No pneumothorax post left thoracostomy tube removal.    ION HERNANDEZ MD         SYSTEM ID:  [id number]   XR Abdomen 2 Views    Narrative    EXAMINATION:  XR ABDOMEN 2 VIEWS 7/29/2024 1:47 PM.    COMPARISON: Radiograph 7/28/2024, 7/27/2024.    HISTORY:  monitor ileus    FINDINGS: Upright and supine views of the abdomen. Enteric tube tip  and sidehole project over the stomach. Decreased air distended loops  of bowel throughout the abdomen compared to 7/28/2024. No free air. No  pneumatosis or portal venous gas. Please see same day chest radiograph  for dictation of thoracic findings.      Impression    IMPRESSION: Decreased air distended loops of bowel throughout the  abdomen compared to 7/28/2024.     I have personally reviewed the examination and initial interpretation  and I agree with the findings.    ULI HURST MD         SYSTEM ID:  [id number]        Labs:  Recent Labs   Lab 07/29/24  1305 07/29/24  1108 07/29/24  0444 07/28/24  2109 07/28/24  1839 07/28/24  1611 07/28/24  1229 07/28/24  1207 07/28/24  0805 07/28/24  0554 07/27/24  0827 07/27/24  0248 07/25/24  1950 07/25/24  1837 07/25/24  1834 07/25/24  1749   WBC  --   --  9.6  --   --   --   --   --   --  14.5*  --  19.2*   < >  22.5*  --   --    HGB  --   --  10.6*  --   --   --   --  11.0*  --  11.2*  --  13.0*   < > 13.8  --   --    MCV  --   --  93  --   --   --   --   --   --  94  --  92   < > 94  --   --    PLT  --   --  144*  --   --   --   --   --   --  126*  --  145*   < > 153  --  156   INR  --   --   --   --   --   --   --   --   --   --   --   --   --  1.24*  --  1.43*   NA  --   --  137  137  --   --  135  --   --   --  137  137   < > 136   < > 142  --   --    POTASSIUM 2.8*  --  2.8*  2.8*  --   --  3.2*  --   --   --  3.5  3.5   < > 3.8   < > 3.7  --   --    CHLORIDE  --   --  94*  94*  --   --  95*  --   --   --  96*  96*   < > 99   < > 105  --   --    CO2  --   --  31*  31*  --   --  30*  --   --   --  31*  31*   < > 26   < > 24  --   --    BUN  --   --  21.4  21.4  --   --  29.6*  --   --   --  33.9*  33.9*   < > 26.6*   < > 13.4  --   --    CR  --   --  0.64*  0.64*  --   --  0.66*  --   --   --  0.73  0.73   < > 0.82   < > 0.89  --   --    ANIONGAP  --   --  12  12  --   --  10  --   --   --  10  10   < > 11   < > 13  --   --    DANE  --   --  8.0*  8.0*  --   --  8.2*  --   --   --  8.5*  8.5*   < > 8.5*   < > 8.3*  --   --    GLC  --  132* 130*  130* 114*   < > 126*   < >  --    < > 117*  117*   < > 218*   < > 158*   < >  --    ALBUMIN  --   --  3.3*  --   --   --   --   --   --  3.7  --  4.0   < > 3.8  --   --    PROTTOTAL  --   --  6.1*  --   --   --   --   --   --  6.5  --  6.7   < > 6.0*  --   --    BILITOTAL  --   --  0.5  --   --   --   --   --   --  0.5  --  0.6   < > 0.4  --   --    ALKPHOS  --   --  80  --   --   --   --   --   --  80  --  78   < > 72  --   --    ALT  --   --  35  --   --   --   --   --   --  23  --  22   < > 25  --   --    AST  --   --  55*  --   --   --   --   --   --  36  --  40   < > 43  --   --     < > = values in this interval not displayed.      GLUCOSE:   Recent Labs   Lab 07/29/24  1108 07/29/24  0444 07/28/24  2109 07/28/24  1839 07/28/24  1611 07/28/24  1229   GLC  132* 130*  130* 114* 118* 126* 132*

## 2024-07-29 NOTE — DISCHARGE INSTRUCTIONS
AFTER YOU GO HOME FROM YOUR HEART SURGERY  (Coronary artery bypass grafting 7/25/2024)    You had a sternotomy, avoid lifting anything greater than ten pounds for 8 weeks after surgery and then less than 20 pounds for an additional 4 weeks. Do not reach backwards or use arms to push out of chair. Do not let people pull on your arms to assist with standing. Avoid twisting or reaching too far across your body.  Avoid strenuous activities such as bowling, vacuuming, raking, shoveling, golf or tennis for 12 weeks after your surgery. It is okay to resume sex if you feel comfortable in doing so. You may have to try different positions with your partner.  Splint your chest incision by hugging a pillow or bringing your arms across your chest when coughing or sneezing. Please try to sleep on your back for the first 4-6 weeks to avoid extra stress on your sternum (breastbone) while it is healing.     No driving for 4 weeks after surgery or while on pain medication    Shower or wash your incisions twice daily with soap and water (or as instructed), pat dry. Keep wound clean and dry, showers are okay after discharge, but don't let spray hit directly on incision. No baths or swimming for 1 month. Cover chest tube sites with gauze until they stop draining, then leave open to air. It is not abnormal for chest tube sites to drain yellowish/clear fluid for up to 2-3 weeks after surgery.   Watch for signs of infection: increased redness, tenderness, warmth or any drainage from sternum incision.  Also a temperature > 100.5 F or chills. Call your surgeon or primary care provider's office immediately. Remove any skin glue left on incisions after 10-14 days. This will not affect your incision and can speed up healing.    Exercise is very important in your recovery. Please follow the guidelines set up for you in your cardiac rehab classes at the hospital. If outpatient cardiac rehab was ordered for you, we highly recommend you  participate. If you have problems arranging your cardiac rehab, please call [phone] for all locations, with the exception of [place], please call [phone] and [place], please call [phone].    Avoid sitting for prolonged periods of time, try to walk every hour during the day. If you have a leg incision, elevate your leg often when you are not walking.    Check your weight when you get home from the hospital and continue to check it daily through your recovery for at least a month. If you notice a weight gain of 2-3 pounds in a week, notify your primary care physician, cardiologist or surgeon.    Bowel activity may be slow after surgery. If necessary, you may take an over the counter laxative such as Milk of Magnesia or Miralax. You may have stool softeners prescribed (docusate sodium, Senokot). We recommend using stool softeners while using narcotics for pain (oxycodone/percocet, hydrocodone/vicodin, hydromorphone/dilaudid).      Wean OFF of narcotics (oxycodone, dilaudid, hydrocodone) as soon as possible. You should continue taking acetaminophen as long as you have any surgical pain as the first choice for pain control and add narcotics as necessary for pain to be tolerable.      DENTAL VISITS AFTER SURGERY  If you have had your heart valve repaired or replaced, we do not recommend having any dental work done for 6 months and you will need to take an antibiotic prior to dental visits from now on.  Please notify your dentist before any procedure for the proper treatment needed. The antibiotic is taken by mouth one hour prior to visit. This includes routine cleanings.    DO NOT SMOKE.  IF YOU NEED HELP QUITTING, PLEASE TALK WITH YOUR CARDIOLOGIST OR PRIMARY DOCTOR.    REGARDING PRESCRIPTION REFILLS.  If you need a refill on your pain medication contact us to discuss your pain and a possible one time refill.   All other medications will be adjusted, discontinued and re-filled by your primary care  physician and/or your cardiologist as they were prior to your surgery. We have given you enough for one to three month with possibly one refill.    POST-OPERATIVE CLINIC VISITS  You have a follow up visit with [facility] Surgery Advance Care Practitioners in 1-2 weeks at the [facility].  You will then return to the care of your primary provider and your cardiologist. Future medication refills should come from your PCP or Cardiologist.   You should see your primary care provider in 2-4 weeks after discharge.   It is important to see your cardiologist about 4-6 weeks after discharge.    If you do not hear from a  in 7 days, please call [phone] (choose option 1) and request to be seen with a general cardiologist or someone that you have seen in the past.   If there is a need to return to see CT Surgery, please call our  [facility] at [phone].     SURGICAL QUESTIONS  Please call Cresencio Welch, Debra Osman, Margaret Lam, or Miriam Vu with surgical recovery and medication questions; phone numbers are listed below.  They will assist you with your needs and contact other surgery care team members as indicated.    On weekends or after hours, please call [phone] and ask the  to page the Cardiothoracic Surgery fellow on call.      Thank you,    Your Cardiothoracic Surgery Team   Cresencio Welch RN Care Coordinator - [phone]  Debra Osman, RN Care Coordinator - [phone]   Miriam Luong RN Care Coordinator - [phone]   Maeve Lam, RN Care Coordinator - [phone]      Heart-Healthy Eating Nutrition Information  (Nutrition Care Manual)  Limit saturated fats and trans-fats:  -Foods high in saturated fats include fatty meat, poultry skin, steinberg, sausage, whole milk, cream, and butter.  -Instead of butter or stick margarine, try reduced-fat, whipped, or liquid spreads. Or use healthy oils (see below).  -Artificial trans-fats (bad fats) are now limited in  the [place] food supply.     Eat more omega-3 fats (heart-healthy fats):  -Good choices include salmon, tuna, mackerel, and sardines. Aim to eat fish twice a week.  -Other foods with omega-3 fats include walnuts and canola and soybean oil.  -Flaxseed is another source of omega-3 fats. Have it as flaxseed oil or ground flaxseed.    Get 20 g to 30 g dietary fiber per day:  -Fruits, vegetables, whole grains, and dried beans are good sources of fiber:  -Aim for 5 cups of fruits and vegetables per day.  -Have 3 ounces (oz) of whole grain foods every day.    Choose monounsaturated fats: Olive oil, canola oil, avocado, and unsalted nuts. Keep in mind, fats have additional calorie. Have more servings of these good fats if your goal is for weight gain or eat these foods in moderation, but choose more often than saturated fats, if your goal is to lose weight.     Limit desserts, sweets, and added sugar.    -Plan to eat more plant-based meals, using beans and soy foods for protein.  -Talk with your dietitian or doctor about what a healthy weight is for you. Set goals to reach and maintain that weight.  -Talk with your health care team to find out what types of physical activity are best for you. Set a plan to get about 30 minutes of exercise on most days.    Tips for Reducing Sodium (Salt)  Although sodium is important for your body to function, too much sodium can be harmful for people with high blood pressure.    As sodium and fluid buildup in your tissues and bloodstream, your blood pressure increases. High blood pressure may cause damage to other organs and increase your risk for a stroke.    Even if you take a pill for blood pressure or a water pill (diuretic) to remove fluid, it is still important to have less salt in your diet. Ask your doctor what amount of sodium is right for you.    -Avoid processed foods. Eat more fresh foods.  -Fresh fruits and vegetables are naturally low in sodium, as well as frozen  vegetables and fruits that have no added juices or sauces.  -Fresh meats are lower in sodium than processed meats, such as steinberg, sausage, and hotdogs. Read the nutrition label or ask your  to help you find a fresh meat that is low in sodium.    -Eat less salt at the table and when cooking.  -A single teaspoon of table salt has 2,300 mg of sodium.  -Leave the salt out of recipes for pasta, casseroles, and soups.  -Ask your RDN how to cook your favorite recipes without sodium    Be a smart .  -Look for food packages that say  salt-free  or  sodium-free.  These items contain less than 5 milligrams of sodium per serving.  - Very low-sodium  products contain less than 35 milligrams of sodium per serving.  - Low-sodium  products contain less than 140 milligrams of sodium per serving.  -Beware of  reduced salt  or  reduced sodium  products. These items may still be high in sodium. Check the nutrition label.    Add flavors to your food without adding sodium.  -Try lemon juice, lime juice, fruit juice or vinegar.  -Dry or fresh herbs add flavor. Try basil, bay leaf, dill, rosemary, parsley, shaw, dry mustard, nutmeg, thyme,and paprika.  -Pepper, red pepper flakes, and cayenne pepper can add spice to your meals without adding sodium. Hot sauce contains sodium, but if you use just a drop or two, it will not add up to much.  -Buy a sodium-free seasoning blend or make your own at home - do not buy salt substitute, as this contains significant amounts of potassium.    Food Group Foods Recommended  Grains: Whole grain breads and cereals, including oats and barley; Pasta, especially whole wheat or other whole grain types; Brown rice; Low-fat crackers and pretzels    Vegetables: Fresh, frozen, or canned vegetables without added fat or salt    Fruits: Fresh, frozen, canned, or dried fruit    Milk: Nonfat (skim), low-fat, or 1% fat milk or buttermilk; Nonfat or low-fat yogurt or cottage cheese; Fat-free and low-fat  cheese    Meat and Other Protein Foods: Lean cuts of beef and pork (loin, leg, round, extra lean hamburger); Skinless poultry; Fish; Venison and other wild game; Dried beans and peas; Nuts and nut butters; Meat alternatives made with soy or textured vegetable protein; Eggs; Cold cuts made with lean meat or soy protein    Fats and Oils: Unsaturated oils (olive, peanut, soy, sunflower, canola); Soft or liquid margarines and vegetable oil spreads; Salad dressings; Seeds and nuts; Avocado    Food Group Foods Not Recommended  Grains: High-fat bakery products, such as doughnuts, biscuits, croissants, Maltese pastries, pies, cookies; Snacks made with partially hydrogenated oils, including chips, cheese puffs, snack mixes, regular crackers, butter-flavored popcorn  Vegetables: Fried vegetables; Vegetables prepared with butter, cheese, or cream sauce  Fruits: Fried fruits; Fruits served with butter or cream  Milk: Whole milk; 2% fat milk; Whole milk yogurt or ice cream; Cream; Half-&- half; Cream cheese; Sour cream; Cheese  Meat and Other Protein Food: Higher-fat cuts of meats (ribs, t-bone steak, regular hamburger); Victor; Sausage; Cold cuts, such as salami or bologna; Corned beef; Hot dogs; Organ meats (liver, brains, sweetbreads); Poultry with skin; Fried meat, poultry, and fish   Fats and Oils Butter: Stick margarine; Shortening; Partially hydrogenated oils; Tropical oils (coconut, palm, and palm kernel oils)    Sample One-Day Menu  Breakfast  1/2 cup apple juice  3/4 cup oatmeal  1 cup fat-free milk  1 small banana  1 cup brewed coffee  Lunch  2 slices whole-wheat bread  2 oz lean deli turkey breast  1 oz low-fat Swiss cheese  2 slices tomato  2 lettuce leaves  1 pear  1 cup nonfat milk  Afternoon Snack  1 oz trail mix (with nuts, seeds, raisins)  1 cup blueberries  1 cup nonfat milk  Evening Meal  3 oz broiled fish  1 cup brown rice  1 tsp margarine  1 medium stalk broccoli  1 medium carrot  1 cup tossed salad  1/8 cup  [place], for salad  1 tablespoon olive oil and vinegar dressing  1 small whole-wheat roll  1 tsp margarine  1/2 cup nonfat frozen yogurt  1/4 cup blueberries, with frozen yogurt  1 cup tea    [facility]  Ph: [phone]  Fax: [phone]      [facility]  Ph: [phone]  F: [phone]  OP CR referral sent      Primary Care appointment made:  Laurie Campos MD  Tuesday August 13 2:45  [address]  [facility]  Ph: [phone]  F: [phone]

## 2024-07-29 NOTE — PLAN OF CARE
"I: Monitored vitals and assessed pt status.   Changes during this shift: Had blood mixed in urine earlier, patient reported that he strained while in the commode, updated provider of occurrence. K 2.8 at recheck, IV potassium replacement completed 5/6    Running: D5 + .45 NaCl @ 75 ml/hr     Vitals: /74 (BP Location: Right arm)   Pulse 93   Temp 97.8  F (36.6  C) (Oral)   Resp 21   Ht 1.753 m (5' 9\")   Wt 77.7 kg (171 lb 6.4 oz)   SpO2 94%   BMI 25.31 kg/m      A:   Neuro: Ax4 denies headache, dizziness, lightheadedness, numbness and tingling. calls appropriately   Cardiac: SR-  Afebrile, VSS. Denies chest pain.   Respiratory: sating >95 on room air. denies SOB. LS diminished at bases bilaterally.   Diet/appetite: Clear liquid diet  Endocrine: BS check AC & HS. Pt on sliding scale insulin   GI/:  Dark brown watery BM this shift, passing gas. NG removed. Denies abdominal pain. Adequate urine output.   Activity: SBA, ambulated twice in hallways  Skin: chest tubes removed, dressing CDI, sternal incision UZAIR.   Line:  2 PIV upper forearm SL, hand infusing  Goal Outcome Evaluation:      Plan of Care Reviewed With: patient, child      Outcome Evaluation: Ambulated in hallway, gas passed, no BM yet      "

## 2024-07-29 NOTE — PLAN OF CARE
"Status 2991-5843    /80 (BP Location: Left arm)   Pulse 109   Temp 97.3  F (36.3  C) (Oral)   Resp 16   Ht 1.753 m (5' 9\")   Wt 77.7 kg (171 lb 6.4 oz)   SpO2 93%   BMI 25.31 kg/m      Neuro: A&O x4.  Respiratory: Clear/diminished lung sounds. Satting >90% on RA.  Cardiac: ST on tele. HR in 110s.  GI/: NPO, ice chips only. NG on low-continuous suction. Voiding. No BM this shift, though passing gas.  Skin: No new deficits. CT dressing changed by [facility].  VS: VSS, afebrile.  LDA: PIV x2. D51/2NS infusing at 75mL/hr. NG to LCS. CT sites x3 to 2 atriums.   Labs: Electrolytes replaced per protocol.  Pain: Incisional pain, on scheduled tylenol, flexeril, and gabapentin. Avoid narcotics d/t ileus.  Mobility: Up SBA, walked in hallways.  Social: Family visited at bedside.    Plan: Continue with plan of care, report changes to [facility].    "

## 2024-07-30 ENCOUNTER — APPOINTMENT (OUTPATIENT)
Dept: GENERAL RADIOLOGY | Facility: CLINIC | Age: 69
DRG: 235 | End: 2024-07-30
Attending: THORACIC SURGERY (CARDIOTHORACIC VASCULAR SURGERY)
Payer: MEDICARE

## 2024-07-30 ENCOUNTER — APPOINTMENT (OUTPATIENT)
Dept: OCCUPATIONAL THERAPY | Facility: CLINIC | Age: 69
DRG: 235 | End: 2024-07-30
Attending: THORACIC SURGERY (CARDIOTHORACIC VASCULAR SURGERY)
Payer: MEDICARE

## 2024-07-30 ENCOUNTER — APPOINTMENT (OUTPATIENT)
Dept: GENERAL RADIOLOGY | Facility: CLINIC | Age: 69
DRG: 235 | End: 2024-07-30
Attending: PHYSICIAN ASSISTANT
Payer: MEDICARE

## 2024-07-30 ENCOUNTER — APPOINTMENT (OUTPATIENT)
Dept: CARDIOLOGY | Facility: CLINIC | Age: 69
DRG: 235 | End: 2024-07-30
Attending: PHYSICIAN ASSISTANT
Payer: MEDICARE

## 2024-07-30 LAB
ALBUMIN SERPL BCG-MCNC: 3.2 G/DL (ref 3.5–5.2)
ALP SERPL-CCNC: 86 U/L (ref 40–150)
ALT SERPL W P-5'-P-CCNC: 50 U/L (ref 0–70)
ANION GAP SERPL CALCULATED.3IONS-SCNC: 9 MMOL/L (ref 7–15)
AST SERPL W P-5'-P-CCNC: 58 U/L (ref 0–45)
BILIRUB SERPL-MCNC: 0.5 MG/DL
BUN SERPL-MCNC: 9.3 MG/DL (ref 8–23)
CALCIUM SERPL-MCNC: 7.9 MG/DL (ref 8.8–10.4)
CHLORIDE SERPL-SCNC: 100 MMOL/L (ref 98–107)
CREAT SERPL-MCNC: 0.64 MG/DL (ref 0.67–1.17)
EGFRCR SERPLBLD CKD-EPI 2021: >90 ML/MIN/1.73M2
ERYTHROCYTE [DISTWIDTH] IN BLOOD BY AUTOMATED COUNT: 13.7 % (ref 10–15)
GLUCOSE BLDC GLUCOMTR-MCNC: 113 MG/DL (ref 70–99)
GLUCOSE BLDC GLUCOMTR-MCNC: 116 MG/DL (ref 70–99)
GLUCOSE BLDC GLUCOMTR-MCNC: 118 MG/DL (ref 70–99)
GLUCOSE BLDC GLUCOMTR-MCNC: 122 MG/DL (ref 70–99)
GLUCOSE SERPL-MCNC: 163 MG/DL (ref 70–99)
HCO3 SERPL-SCNC: 27 MMOL/L (ref 22–29)
HCT VFR BLD AUTO: 31 % (ref 40–53)
HGB BLD-MCNC: 10.6 G/DL (ref 13.3–17.7)
LVEF ECHO: NORMAL
MAGNESIUM SERPL-MCNC: 2.2 MG/DL (ref 1.7–2.3)
MAGNESIUM SERPL-MCNC: 2.3 MG/DL (ref 1.7–2.3)
MCH RBC QN AUTO: 31.6 PG (ref 26.5–33)
MCHC RBC AUTO-ENTMCNC: 34.2 G/DL (ref 31.5–36.5)
MCV RBC AUTO: 93 FL (ref 78–100)
PLATELET # BLD AUTO: 174 10E3/UL (ref 150–450)
POTASSIUM SERPL-SCNC: 3.6 MMOL/L (ref 3.4–5.3)
POTASSIUM SERPL-SCNC: 3.6 MMOL/L (ref 3.4–5.3)
PROT SERPL-MCNC: 5.9 G/DL (ref 6.4–8.3)
RBC # BLD AUTO: 3.35 10E6/UL (ref 4.4–5.9)
SODIUM SERPL-SCNC: 136 MMOL/L (ref 135–145)
WBC # BLD AUTO: 9.1 10E3/UL (ref 4–11)

## 2024-07-30 PROCEDURE — 258N000003 HC RX IP 258 OP 636: Performed by: PHYSICIAN ASSISTANT

## 2024-07-30 PROCEDURE — 85027 COMPLETE CBC AUTOMATED: CPT

## 2024-07-30 PROCEDURE — C8929 TTE W OR WO FOL WCON,DOPPLER: HCPCS

## 2024-07-30 PROCEDURE — 80053 COMPREHEN METABOLIC PANEL: CPT | Performed by: THORACIC SURGERY (CARDIOTHORACIC VASCULAR SURGERY)

## 2024-07-30 PROCEDURE — 93306 TTE W/DOPPLER COMPLETE: CPT | Mod: 26 | Performed by: STUDENT IN AN ORGANIZED HEALTH CARE EDUCATION/TRAINING PROGRAM

## 2024-07-30 PROCEDURE — 250N000011 HC RX IP 250 OP 636: Performed by: SURGERY

## 2024-07-30 PROCEDURE — 99233 SBSQ HOSP IP/OBS HIGH 50: CPT | Mod: 24 | Performed by: SURGERY

## 2024-07-30 PROCEDURE — 74019 RADEX ABDOMEN 2 VIEWS: CPT | Mod: 26 | Performed by: RADIOLOGY

## 2024-07-30 PROCEDURE — 83735 ASSAY OF MAGNESIUM: CPT | Performed by: STUDENT IN AN ORGANIZED HEALTH CARE EDUCATION/TRAINING PROGRAM

## 2024-07-30 PROCEDURE — 74019 RADEX ABDOMEN 2 VIEWS: CPT

## 2024-07-30 PROCEDURE — 250N000013 HC RX MED GY IP 250 OP 250 PS 637: Performed by: STUDENT IN AN ORGANIZED HEALTH CARE EDUCATION/TRAINING PROGRAM

## 2024-07-30 PROCEDURE — 999N000208 ECHOCARDIOGRAM COMPLETE

## 2024-07-30 PROCEDURE — 250N000013 HC RX MED GY IP 250 OP 250 PS 637: Performed by: THORACIC SURGERY (CARDIOTHORACIC VASCULAR SURGERY)

## 2024-07-30 PROCEDURE — 36415 COLL VENOUS BLD VENIPUNCTURE: CPT | Performed by: THORACIC SURGERY (CARDIOTHORACIC VASCULAR SURGERY)

## 2024-07-30 PROCEDURE — 255N000002 HC RX 255 OP 636: Performed by: INTERNAL MEDICINE

## 2024-07-30 PROCEDURE — 71045 X-RAY EXAM CHEST 1 VIEW: CPT | Mod: 26 | Performed by: RADIOLOGY

## 2024-07-30 PROCEDURE — S5010 5% DEXTROSE AND 0.45% SALINE: HCPCS | Performed by: PHYSICIAN ASSISTANT

## 2024-07-30 PROCEDURE — 250N000013 HC RX MED GY IP 250 OP 250 PS 637: Performed by: PHYSICIAN ASSISTANT

## 2024-07-30 PROCEDURE — 120N000005 HC R&B MS OVERFLOW UMMC

## 2024-07-30 PROCEDURE — 250N000013 HC RX MED GY IP 250 OP 250 PS 637

## 2024-07-30 PROCEDURE — 250N000011 HC RX IP 250 OP 636: Performed by: THORACIC SURGERY (CARDIOTHORACIC VASCULAR SURGERY)

## 2024-07-30 PROCEDURE — 36415 COLL VENOUS BLD VENIPUNCTURE: CPT | Performed by: STUDENT IN AN ORGANIZED HEALTH CARE EDUCATION/TRAINING PROGRAM

## 2024-07-30 PROCEDURE — 97535 SELF CARE MNGMENT TRAINING: CPT | Mod: GO

## 2024-07-30 PROCEDURE — 71045 X-RAY EXAM CHEST 1 VIEW: CPT

## 2024-07-30 PROCEDURE — 250N000013 HC RX MED GY IP 250 OP 250 PS 637: Performed by: SURGERY

## 2024-07-30 PROCEDURE — 97110 THERAPEUTIC EXERCISES: CPT | Mod: GO

## 2024-07-30 PROCEDURE — 82040 ASSAY OF SERUM ALBUMIN: CPT

## 2024-07-30 RX ORDER — POTASSIUM CHLORIDE 750 MG/1
20 TABLET, EXTENDED RELEASE ORAL ONCE
Status: COMPLETED | OUTPATIENT
Start: 2024-07-30 | End: 2024-07-30

## 2024-07-30 RX ADMIN — POTASSIUM CHLORIDE 10 MEQ: 7.46 INJECTION, SOLUTION INTRAVENOUS at 03:04

## 2024-07-30 RX ADMIN — ATORVASTATIN CALCIUM 80 MG: 80 TABLET, FILM COATED ORAL at 08:34

## 2024-07-30 RX ADMIN — METOPROLOL TARTRATE 25 MG: 25 TABLET, FILM COATED ORAL at 20:58

## 2024-07-30 RX ADMIN — ACETAMINOPHEN 650 MG: 325 TABLET, FILM COATED ORAL at 18:19

## 2024-07-30 RX ADMIN — METOPROLOL TARTRATE 25 MG: 25 TABLET, FILM COATED ORAL at 08:34

## 2024-07-30 RX ADMIN — POTASSIUM CHLORIDE 20 MEQ: 750 TABLET, EXTENDED RELEASE ORAL at 08:34

## 2024-07-30 RX ADMIN — POLYETHYLENE GLYCOL 3350 17 G: 17 POWDER, FOR SOLUTION ORAL at 08:35

## 2024-07-30 RX ADMIN — POTASSIUM CHLORIDE 10 MEQ: 7.46 INJECTION, SOLUTION INTRAVENOUS at 02:05

## 2024-07-30 RX ADMIN — POTASSIUM CHLORIDE 10 MEQ: 7.46 INJECTION, SOLUTION INTRAVENOUS at 05:06

## 2024-07-30 RX ADMIN — ACETAMINOPHEN 650 MG: 325 TABLET, FILM COATED ORAL at 13:01

## 2024-07-30 RX ADMIN — Medication 10 MG: at 20:58

## 2024-07-30 RX ADMIN — CYCLOBENZAPRINE 10 MG: 10 TABLET, FILM COATED ORAL at 18:19

## 2024-07-30 RX ADMIN — POTASSIUM CHLORIDE 10 MEQ: 7.46 INJECTION, SOLUTION INTRAVENOUS at 01:07

## 2024-07-30 RX ADMIN — LIDOCAINE 3 PATCH: 4 PATCH TOPICAL at 21:11

## 2024-07-30 RX ADMIN — ASPIRIN 81 MG CHEWABLE TABLET 162 MG: 81 TABLET CHEWABLE at 08:34

## 2024-07-30 RX ADMIN — GABAPENTIN 100 MG: 100 CAPSULE ORAL at 20:58

## 2024-07-30 RX ADMIN — COLCHICINE 0.6 MG: 0.6 TABLET, FILM COATED ORAL at 20:58

## 2024-07-30 RX ADMIN — GABAPENTIN 100 MG: 100 CAPSULE ORAL at 08:33

## 2024-07-30 RX ADMIN — CYCLOBENZAPRINE 10 MG: 10 TABLET, FILM COATED ORAL at 13:12

## 2024-07-30 RX ADMIN — POTASSIUM CHLORIDE 10 MEQ: 7.46 INJECTION, SOLUTION INTRAVENOUS at 04:03

## 2024-07-30 RX ADMIN — HYDROXYZINE HYDROCHLORIDE 25 MG: 25 TABLET ORAL at 02:04

## 2024-07-30 RX ADMIN — ACETAMINOPHEN 650 MG: 325 TABLET, FILM COATED ORAL at 21:33

## 2024-07-30 RX ADMIN — DEXTROSE AND SODIUM CHLORIDE: 5; 450 INJECTION, SOLUTION INTRAVENOUS at 00:17

## 2024-07-30 RX ADMIN — SENNOSIDES AND DOCUSATE SODIUM 2 TABLET: 8.6; 5 TABLET ORAL at 08:34

## 2024-07-30 RX ADMIN — HEPARIN SODIUM 5000 UNITS: 5000 INJECTION, SOLUTION INTRAVENOUS; SUBCUTANEOUS at 20:58

## 2024-07-30 RX ADMIN — ACETAMINOPHEN 650 MG: 325 TABLET, FILM COATED ORAL at 06:06

## 2024-07-30 RX ADMIN — COLCHICINE 0.6 MG: 0.6 TABLET, FILM COATED ORAL at 08:34

## 2024-07-30 RX ADMIN — POTASSIUM CHLORIDE 10 MEQ: 7.46 INJECTION, SOLUTION INTRAVENOUS at 00:17

## 2024-07-30 RX ADMIN — HEPARIN SODIUM 5000 UNITS: 5000 INJECTION, SOLUTION INTRAVENOUS; SUBCUTANEOUS at 13:01

## 2024-07-30 RX ADMIN — CYCLOBENZAPRINE 10 MG: 10 TABLET, FILM COATED ORAL at 06:06

## 2024-07-30 RX ADMIN — HUMAN ALBUMIN MICROSPHERES AND PERFLUTREN 6 ML: 10; .22 INJECTION, SOLUTION INTRAVENOUS at 15:07

## 2024-07-30 RX ADMIN — HEPARIN SODIUM 5000 UNITS: 5000 INJECTION, SOLUTION INTRAVENOUS; SUBCUTANEOUS at 04:03

## 2024-07-30 ASSESSMENT — ACTIVITIES OF DAILY LIVING (ADL)
ADLS_ACUITY_SCORE: 27
ADLS_ACUITY_SCORE: 30
ADLS_ACUITY_SCORE: 27
ADLS_ACUITY_SCORE: 30
ADLS_ACUITY_SCORE: 28
ADLS_ACUITY_SCORE: 27
ADLS_ACUITY_SCORE: 30
ADLS_ACUITY_SCORE: 27
ADLS_ACUITY_SCORE: 27
ADLS_ACUITY_SCORE: 30
ADLS_ACUITY_SCORE: 27

## 2024-07-30 NOTE — PLAN OF CARE
Vital signs:  Temp: 98  F (36.7  C) Temp src: Oral BP: 115/65 Pulse: 97   Resp: 16 SpO2: 100 % O2 Device: None (Room air)     Time of care: 0700-1930    D: CABG x3 on 7/25    I/A: A0x4. NSR on tele. On RA. BP stable and WNL. Chronic back pain treated with tylenol, flexeril.   Regular diet started, tolerating well. +1 BM today. Voiding spontaneously. Independent in room. Ambulating with family in halls frequently.     P: Possible discharge tomorrow. Continue to monitor Pt status and report changes to [facility]

## 2024-07-30 NOTE — PROGRESS NOTES
Cardiovascular Surgery Progress Note  2024         Assessment and Plan:     Modesto Lehman is a 68 year old male with a PMH of CAD, CHFrEF (30-35%), HLD, PAD (s/p bilateral popliteal bypass), Carotid disease (s/p  and chronic occlusion of DARIUSZ), chronic back pain. He was referred to Dr. Mason by his cardiologist after outpatient angiogram demonstrated severe 3-vessel coronary artery disease. Patient is now s/p planned CABG x3 on 2024 with Dr. Mason.    Cardiovascular:   Hx of CAD - s/p CABG x 3 on 24   HFrEF (EF 30-35%)  Hyperlipidemia  PAD s/p bilateral popliteal bypass  Carotid artery disease s/p left carotid endarterectomy. Chronic occlusion of DARIUSZ.  Pericarditis   HD stable, in NSR/sinus tachycardia. MAPS 79-92  Most recent echo showed LV EF 30-35%  -  mg daily  - PTA atorvastatin 80 mg daily resumed   - metoprolol tartrate 25 mg BID, plan to transition to Toprol XL prior to discharge for GDMT   - Diuresis as below  - EKG with diffuse ST elevation c/w pericarditis, troponins down-trending, continue colchicine 0.6 mg BID  - repeat echo prior to discharge to reassess EF, ordered   - Please alert/page vascular surgery before discharge per vascular surgery to see inpatient, vascular paged     Chest tubes: mediastinal x2, left pleural - removed , CXR stable  TPW: None    Pulmonary:  Tobacco dependence   Extubated POD 0 to 3 lpm via NC. Now saturating well on RA.   - Supplemental O2 PRN to keep sats > 92%. Wean off as tolerated.  - Pulm hygiene, IS, activity and deep breathing    Neurology / Psych:  Acute post-operative pain   Pain well controlled with current regimen:  - Scheduled: tylenol, Flexeril (in favor of robaxin per patient request), gabapentin 100 mg TID (takes PRN PTA).  - PRN: tylenol, oxycodone, icy hot patches  - melatonin 10 mg daily for sleep      / Renal / Fluid / Electrolytes:  Hypokalemia secondary to GI loss   BL creat ~ 0.8-0.9, most recent  creatinine stable and WNL; adequate UOP.   FLUID STATUS: Pre-op weight 174 lb, weight today 170 lb; I/O past 24 hours: -1 L.  - Diuresis: hold today; patient appears euvolemic   - Replete lytes per protocol  - Strict I/O, daily weights  - Avoid/limit nephrotoxins as able    GI / Nutrition:   Post-operative ileus  Gastric distension   - AXRs with significant gastric distention and dilated loops of bowel, continue daily AXRs to monitor   - NPO with sips and ice chips  - D5NS started 7/28 as maintenance fluid in the setting of ileus/NPO status  - consider TPN if still NPO after 7 days per discussion with RD  - NG to continuous suction, ongoing high output  - Continue bowel regimen, + BM 7/29  - simethicone PRN  - Reglan x2 doses 7/27  - repeat suppository 7/28   - strongly encourage frequent ambulation and limiting opioids/anticholinergics    - consult general surgery 7/29 for ongoing ileus/high NG output, no evidence of ileus or SBO. Recommended discontinuing NG, advance to [facility]. Tolerated and transitioned to regular diet, discussed with general surgery.      Endocrine:  Stress induced hyperglycemia   Hgb A1c 5.9 (5/1/24)  - Initially managed on insulin drip postop, transitioned to medium resistance sliding scale; goal BG <180 for optimal healing    Infectious Disease:  Stress induced leukocytosis  WBC WNL, remains afebrile, no signs or symptoms of infection  - Completed perioperative antibiotics  - Continue to monitor fever curve, CBC    Hematology:   Acute blood loss anemia and thrombocytopenia  Hgb stable; Plt WNL, no signs or symptoms of active bleeding  - CBC daily    Anticoagulation:   -  mg only     MSK/Skin:  Sternotomy  Surgical incision  - Sternal precautions  - Incisional cares per protocol    Prophylaxis:   - Stress ulcer prophylaxis: Pantoprazole 40 mg daily for 30 days  - DVT prophylaxis: Subcutaneous heparin, SCD    Disposition:   - Transferred to  on 7/27 evening   - Therapies recommending  "discharge to home with assist    Discussed with Dr. Mason through written and/or verbal communication.    Medically Ready for Discharge: Anticipated Tomorrow pending echo      Clinically Significant Risk Factors        # Hypokalemia: Lowest K = 2.8 mmol/L in last 2 days, will replace as needed   # Hypocalcemia: Lowest Ca = 7.9 mg/dL in last 2 days, will monitor and replace as appropriate     # Hypoalbuminemia: Lowest albumin = 3.2 g/dL at 7/30/2024  5:10 AM, will monitor as appropriate        # Chronic heart failure with reduced ejection fraction: last echo with EF <40%        #Precipitous drop in Hgb/Hct: Lowest Hgb this hospitalization: 10.6 g/dL. Will continue to monitor and treat/transfuse as appropriate.     # Overweight: Estimated body mass index is 25.18 kg/m  as calculated from the following:    Height as of this encounter: 1.753 m (5' 9\").    Weight as of this encounter: 77.3 kg (170 lb 8 oz).        # Financial/Environmental Concerns: none   # History of CABG: noted on surgical history       Carlito Botello PA-C  Cardiothoracic Surgery  p: [phone]          Interval History:     No overnight events.  Reports his only abdominal pain is from being hungry. Denies any distention, pain, tenderness. Has been passing gas and having bowel movements. Denies any nausea or vomiting with clear liquid diet. Would really like to eat a regular diet.   Patient noted some bright red blood on the edge of the commode after intense straining to have a BM yesterday, none reported today. He reports history of hemorrhoids. Will monitor for recurrence.   Breathing well without complaints.   Denies chest pain, palpitations, dizziness, syncopal symptoms, fevers, chills, myalgias, or sternal popping/clicking.         Physical Exam:     BP 98/69 (BP Location: Right arm)   Pulse 89   Temp 97.9  F (36.6  C) (Oral)   Resp 16   Ht 1.753 m (5' 9\")   Wt 77.3 kg (170 lb 8 oz)   SpO2 96%   BMI 25.18 kg/m      Gen: A&Ox4, " NAD  Neuro: no focal deficits   CV: RRR, normal S1 S2, no murmurs, rubs or gallops. No JVD  Pulm: CTA, no wheezing or rhonchi, normal breathing on RA  Abd: non- distended, non-tender, BS present, soft; no peritoneal signs, rebound, or guarding  Ext: no peripheral edema  Incision: clean, dry, intact, no erythema, sternum stable  Tubes/drain sites: dressing clean and dry          Data:    Imaging:  reviewed recent imaging    Recent Results (from the past 24 hour(s))   XR Abdomen 2 Views    Narrative    EXAMINATION:  XR ABDOMEN 2 VIEWS 7/29/2024 1:47 PM.    COMPARISON: Radiograph 7/28/2024, 7/27/2024.    HISTORY:  monitor ileus    FINDINGS: Upright and supine views of the abdomen. Enteric tube tip  and sidehole project over the stomach. Decreased air distended loops  of bowel throughout the abdomen compared to 7/28/2024. No free air. No  pneumatosis or portal venous gas. Please see same day chest radiograph  for dictation of thoracic findings.      Impression    IMPRESSION: Decreased air distended loops of bowel throughout the  abdomen compared to 7/28/2024.     I have personally reviewed the examination and initial interpretation  and I agree with the findings.    ULI HURST MD         SYSTEM ID:  [id number]   XR Chest Port 1 View    Narrative    Exam: Chest radiograph, 7/30/2024 6:02 AM    Indication: Status post CABG with chest tubes    Comparison: Chest radiograph 7/29/2024    Findings:   Postsurgical changes in the chest with intact sternotomy wires and  mediastinal surgical clips.    Stable cardiomediastinal silhouette. Continued asymmetric elevation of  the left hemidiaphragm. Clear costophrenic angles. No pneumothorax.  Mild streaky left basilar opacities, unchanged. Osseous structures are  unchanged.      Impression    Impression:   1. Continued mild left basilar opacities, likely atelectasis.   2. Asymmetric elevation of the left hemidiaphragm.       I have personally reviewed the examination and initial  interpretation  and I agree with the findings.    ARTURO LOERA MD         SYSTEM ID:  [id number]   XR Abdomen 2 Views    Narrative    EXAMINATION:  XR ABDOMEN 2 VIEWS 7/30/2024     COMPARISON: Radiograph 7/29/2024    HISTORY: monitor ileus.    TECHNIQUE: Upright and supine views of the abdomen.    FINDINGS: Nasogastric tube removed. Mildly improved prominent  gas-filled loops of small and large bowel, for example the transverse  colon measures 5.9 cm on today's exam, previously 6.1 cm.  The lung  bases are unremarkable. Surgical changes in the lower mediastinum  partially visualized. Stable right vascular stent in the lower  retroperitoneum and pelvis. Asymmetric elevation of the left  hemidiaphragm, stable. Please see separate chest radiographs.      Impression    IMPRESSION:   Decreased air distended loops of bowel throughout the abdomen when  compared to 7/29/2024.    I have personally reviewed the examination and initial interpretation  and I agree with the findings.    RAZ AVILES MD         SYSTEM ID:  [id number]        Labs:  Recent Labs   Lab 07/30/24  1153 07/30/24  0807 07/30/24  0510 07/30/24  0201 07/29/24  2316 07/29/24  1747 07/29/24  1556 07/29/24  1108 07/29/24  0444 07/28/24  1229 07/28/24  1207 07/28/24  0805 07/28/24  0554 07/25/24  1950 07/25/24  1837 07/25/24  1834 07/25/24  1749   WBC  --   --  9.1  --   --   --   --   --  9.6  --   --   --  14.5*   < > 22.5*  --   --    HGB  --   --  10.6*  --   --   --   --   --  10.6*  --  11.0*  --  11.2*   < > 13.8  --   --    MCV  --   --  93  --   --   --   --   --  93  --   --   --  94   < > 94  --   --    PLT  --   --  174  --   --   --   --   --  144*  --   --   --  126*   < > 153  --  156   INR  --   --   --   --   --   --   --   --   --   --   --   --   --   --  1.24*  --  1.43*   NA  --   --  136  --   --   --  135  --  137  137   < >  --   --  137  137   < > 142  --   --    POTASSIUM  --   --  3.6  3.6  --  2.9*  --  3.2*   < > 2.8*   2.8*   < >  --   --  3.5  3.5   < > 3.7  --   --    CHLORIDE  --   --  100  --   --   --  95*  --  94*  94*   < >  --   --  96*  96*   < > 105  --   --    CO2  --   --  27  --   --   --  31*  --  31*  31*   < >  --   --  31*  31*   < > 24  --   --    BUN  --   --  9.3  --   --   --  15.8  --  21.4  21.4   < >  --   --  33.9*  33.9*   < > 13.4  --   --    CR  --   --  0.64*  --   --   --  0.67  --  0.64*  0.64*   < >  --   --  0.73  0.73   < > 0.89  --   --    ANIONGAP  --   --  9  --   --   --  9  --  12  12   < >  --   --  10  10   < > 13  --   --    DANE  --   --  7.9*  --   --   --  8.2*  --  8.0*  8.0*   < >  --   --  8.5*  8.5*   < > 8.3*  --   --    * 122* 163*   < >  --    < > 117*   < > 130*  130*   < >  --    < > 117*  117*   < > 158*   < >  --    ALBUMIN  --   --  3.2*  --   --   --   --   --  3.3*  --   --   --  3.7   < > 3.8  --   --    PROTTOTAL  --   --  5.9*  --   --   --   --   --  6.1*  --   --   --  6.5   < > 6.0*  --   --    BILITOTAL  --   --  0.5  --   --   --   --   --  0.5  --   --   --  0.5   < > 0.4  --   --    ALKPHOS  --   --  86  --   --   --   --   --  80  --   --   --  80   < > 72  --   --    ALT  --   --  50  --   --   --   --   --  35  --   --   --  23   < > 25  --   --    AST  --   --  58*  --   --   --   --   --  55*  --   --   --  36   < > 43  --   --     < > = values in this interval not displayed.      GLUCOSE:   Recent Labs   Lab 07/30/24  1153 07/30/24  0807 07/30/24  0510 07/30/24  0201 07/29/24  2131 07/29/24  1747   * 122* 163* 118* 185* 133*

## 2024-07-30 NOTE — PROGRESS NOTES
Surgery Progress Note  07/30/2024       Subjective:  - Patient is doing well overall. Denies abdominal pain and nausea. NG tube removed. Tolerating regular diet.      Objective:  Temp:  [97.3  F (36.3  C)-98  F (36.7  C)] 97.9  F (36.6  C)  Pulse:  [] 89  Resp:  [16-22] 16  BP: ()/(64-74) 98/69  SpO2:  [93 %-100 %] 96 %    I/O last 3 completed shifts:  In: 3023.75 [P.O.:1040; I.V.:1983.75]  Out: 2075 [Urine:1725; Emesis/NG output:350]      Gen: Awake, alert, NAD  Resp: NLB on RA  Abd: soft, nondistended, nontender  Ext: WWP, no edema     Labs:  Recent Labs   Lab 07/30/24  0510 07/29/24  0444 07/28/24  1207 07/28/24  0554   WBC 9.1 9.6  --  14.5*   HGB 10.6* 10.6* 11.0* 11.2*    144*  --  126*       Recent Labs   Lab 07/30/24  1153 07/30/24  0807 07/30/24  0510 07/30/24  0201 07/29/24  2316 07/29/24  1747 07/29/24  1556 07/29/24  1108 07/29/24  0444 07/28/24  0805 07/28/24  0554 07/27/24  1709 07/27/24  1559 07/27/24  0827 07/27/24  0556   NA  --   --  136  --   --   --  135  --  137  137   < > 137  137  --  136  --   --    POTASSIUM  --   --  3.6  3.6  --  2.9*  --  3.2*   < > 2.8*  2.8*   < > 3.5  3.5  --  4.0  --   --    CHLORIDE  --   --  100  --   --   --  95*  --  94*  94*   < > 96*  96*  --  96*  --   --    CO2  --   --  27  --   --   --  31*  --  31*  31*   < > 31*  31*  --  27  --   --    BUN  --   --  9.3  --   --   --  15.8  --  21.4  21.4   < > 33.9*  33.9*  --  37.6*  --   --    CR  --   --  0.64*  --   --   --  0.67  --  0.64*  0.64*   < > 0.73  0.73  --  0.95  --   --    * 122* 163*   < >  --    < > 117*   < > 130*  130*   < > 117*  117*   < > 163*   < >  --    DANE  --   --  7.9*  --   --   --  8.2*  --  8.0*  8.0*   < > 8.5*  8.5*  --  8.8  --   --    MAG  --   --  2.3  --   --   --  2.3  --  2.3   < > 2.6*  --  2.4*  --  2.3   PHOS  --   --   --   --   --   --   --   --   --   --  2.7  --  3.8  --  2.7    < > = values in this interval not displayed.        Imaging:    XR Abdomen 2 Views    Result Date: 2024  IMPRESSION: Decreased air distended loops of bowel throughout the abdomen when compared to 2024. I have personally reviewed the examination and initial interpretation and I agree with the findings. RAZ AVILES MD   SYSTEM ID:  [id number]    XR Abdomen 2 Views    Result Date: 2024  IMPRESSION: Decreased air distended loops of bowel throughout the abdomen compared to 2024. I have personally reviewed the examination and initial interpretation and I agree with the findings. ULI HURST MD   SYSTEM ID:  [id number]    Assessment/Plan:   Modesto Lehman is a 68 year old male with a PMH of CAD, CHFrEF (30-35%), HLD, PAD (s/p bilateral popliteal bypass), Carotid disease (s/p  and chronic occlusion of DARIUSZ), chronic back pain now s/p CABG x3. General surgery is consulted for concerns for post-op ileus given patient's high NG output and lack of BM. Patient has since had mutliple BMs. Symptoms have resolved.     -No further general surgery concerns at time. General surgery will sign off. Please contact us with any questions.      Seen, examined, and discussed with chief resident, who will discuss with staff.  - - - - - - - - - - - - - - - - - -  Tootie Tang DO, MS   General Surgery, PGY 1

## 2024-07-30 NOTE — PROGRESS NOTES
Hours of Care: 1900 - 0700    Changed:   Pt received 60 mEq IV Kcl overnight, AM recheck 3.6  Running: D5/0.45% NaCl @ 75 mL/hr  Tele: SR/ST   Mobility: SBA    Neuro: A/O x 4. Call light appropriate. Able to make needs known.  Respiratory: On room air. Lung sounds clear. Denies shortness of breath at rest.  Cardiac: VSS.    GI/: Last BM 7/30, some blood in stool this AM - reported to CVTS. No report of N/V. Urinating adequate amounts of clear, yellow urine  Endo: Blood sugars ACHS.  Skin: See PCS for assessment and treatment of wounds and surgical incisions.  LDA: R PIV x2  Electrolytes: RN managed Mg, K.   Pain: managed with current pain regimen  Diet: clear liquids    P: Continue to follow POC and report changes to CVTS.

## 2024-07-31 ENCOUNTER — APPOINTMENT (OUTPATIENT)
Dept: GENERAL RADIOLOGY | Facility: CLINIC | Age: 69
DRG: 235 | End: 2024-07-31
Attending: THORACIC SURGERY (CARDIOTHORACIC VASCULAR SURGERY)
Payer: MEDICARE

## 2024-07-31 VITALS
BODY MASS INDEX: 25.27 KG/M2 | DIASTOLIC BLOOD PRESSURE: 62 MMHG | WEIGHT: 170.6 LBS | HEART RATE: 95 BPM | RESPIRATION RATE: 18 BRPM | HEIGHT: 69 IN | TEMPERATURE: 98 F | SYSTOLIC BLOOD PRESSURE: 98 MMHG | OXYGEN SATURATION: 97 %

## 2024-07-31 LAB
ANION GAP SERPL CALCULATED.3IONS-SCNC: 11 MMOL/L (ref 7–15)
BUN SERPL-MCNC: 10.8 MG/DL (ref 8–23)
CALCIUM SERPL-MCNC: 7.9 MG/DL (ref 8.8–10.4)
CHLORIDE SERPL-SCNC: 102 MMOL/L (ref 98–107)
CREAT SERPL-MCNC: 0.6 MG/DL (ref 0.67–1.17)
EGFRCR SERPLBLD CKD-EPI 2021: >90 ML/MIN/1.73M2
ERYTHROCYTE [DISTWIDTH] IN BLOOD BY AUTOMATED COUNT: 13.8 % (ref 10–15)
GLUCOSE BLDC GLUCOMTR-MCNC: 102 MG/DL (ref 70–99)
GLUCOSE SERPL-MCNC: 107 MG/DL (ref 70–99)
HCO3 SERPL-SCNC: 22 MMOL/L (ref 22–29)
HCT VFR BLD AUTO: 30.7 % (ref 40–53)
HGB BLD-MCNC: 10.6 G/DL (ref 13.3–17.7)
MAGNESIUM SERPL-MCNC: 2 MG/DL (ref 1.7–2.3)
MAGNESIUM SERPL-MCNC: 2.1 MG/DL (ref 1.7–2.3)
MCH RBC QN AUTO: 32 PG (ref 26.5–33)
MCHC RBC AUTO-ENTMCNC: 34.5 G/DL (ref 31.5–36.5)
MCV RBC AUTO: 93 FL (ref 78–100)
PLATELET # BLD AUTO: 204 10E3/UL (ref 150–450)
POTASSIUM SERPL-SCNC: 3.4 MMOL/L (ref 3.4–5.3)
POTASSIUM SERPL-SCNC: 3.9 MMOL/L (ref 3.4–5.3)
RBC # BLD AUTO: 3.31 10E6/UL (ref 4.4–5.9)
SODIUM SERPL-SCNC: 135 MMOL/L (ref 135–145)
WBC # BLD AUTO: 9.5 10E3/UL (ref 4–11)

## 2024-07-31 PROCEDURE — 83735 ASSAY OF MAGNESIUM: CPT | Performed by: STUDENT IN AN ORGANIZED HEALTH CARE EDUCATION/TRAINING PROGRAM

## 2024-07-31 PROCEDURE — 85027 COMPLETE CBC AUTOMATED: CPT

## 2024-07-31 PROCEDURE — 84132 ASSAY OF SERUM POTASSIUM: CPT | Performed by: THORACIC SURGERY (CARDIOTHORACIC VASCULAR SURGERY)

## 2024-07-31 PROCEDURE — 36415 COLL VENOUS BLD VENIPUNCTURE: CPT | Performed by: PHYSICIAN ASSISTANT

## 2024-07-31 PROCEDURE — 36415 COLL VENOUS BLD VENIPUNCTURE: CPT | Performed by: STUDENT IN AN ORGANIZED HEALTH CARE EDUCATION/TRAINING PROGRAM

## 2024-07-31 PROCEDURE — 36415 COLL VENOUS BLD VENIPUNCTURE: CPT | Performed by: THORACIC SURGERY (CARDIOTHORACIC VASCULAR SURGERY)

## 2024-07-31 PROCEDURE — 71045 X-RAY EXAM CHEST 1 VIEW: CPT

## 2024-07-31 PROCEDURE — 250N000013 HC RX MED GY IP 250 OP 250 PS 637

## 2024-07-31 PROCEDURE — 80048 BASIC METABOLIC PNL TOTAL CA: CPT | Performed by: PHYSICIAN ASSISTANT

## 2024-07-31 PROCEDURE — 250N000013 HC RX MED GY IP 250 OP 250 PS 637: Performed by: PHYSICIAN ASSISTANT

## 2024-07-31 PROCEDURE — 250N000013 HC RX MED GY IP 250 OP 250 PS 637: Performed by: STUDENT IN AN ORGANIZED HEALTH CARE EDUCATION/TRAINING PROGRAM

## 2024-07-31 PROCEDURE — 250N000013 HC RX MED GY IP 250 OP 250 PS 637: Performed by: THORACIC SURGERY (CARDIOTHORACIC VASCULAR SURGERY)

## 2024-07-31 PROCEDURE — 250N000013 HC RX MED GY IP 250 OP 250 PS 637: Performed by: SURGERY

## 2024-07-31 PROCEDURE — 250N000011 HC RX IP 250 OP 636: Performed by: SURGERY

## 2024-07-31 PROCEDURE — 71045 X-RAY EXAM CHEST 1 VIEW: CPT | Mod: 26 | Performed by: RADIOLOGY

## 2024-07-31 RX ORDER — METHOCARBAMOL 500 MG/1
500 TABLET, FILM COATED ORAL 4 TIMES DAILY PRN
Qty: 50 TABLET | Refills: 0 | Status: SHIPPED | OUTPATIENT
Start: 2024-07-31 | End: 2024-07-31

## 2024-07-31 RX ORDER — POLYETHYLENE GLYCOL 3350 17 G/17G
17 POWDER, FOR SOLUTION ORAL DAILY
Qty: 510 G | Refills: 0 | Status: SHIPPED | OUTPATIENT
Start: 2024-07-31

## 2024-07-31 RX ORDER — SIMETHICONE 80 MG
80 TABLET,CHEWABLE ORAL EVERY 6 HOURS PRN
Qty: 30 TABLET | Refills: 0 | Status: SHIPPED | OUTPATIENT
Start: 2024-07-31

## 2024-07-31 RX ORDER — LISINOPRIL 5 MG/1
5 TABLET ORAL DAILY
Status: DISCONTINUED | OUTPATIENT
Start: 2024-07-31 | End: 2024-07-31 | Stop reason: HOSPADM

## 2024-07-31 RX ORDER — PANTOPRAZOLE SODIUM 40 MG/1
40 TABLET, DELAYED RELEASE ORAL DAILY
Qty: 30 TABLET | Refills: 0 | Status: SHIPPED | OUTPATIENT
Start: 2024-07-31

## 2024-07-31 RX ORDER — METHOCARBAMOL 500 MG/1
500 TABLET, FILM COATED ORAL 4 TIMES DAILY PRN
Status: DISCONTINUED | OUTPATIENT
Start: 2024-07-31 | End: 2024-07-31 | Stop reason: HOSPADM

## 2024-07-31 RX ORDER — OXYCODONE HYDROCHLORIDE 5 MG/1
5 TABLET ORAL EVERY 6 HOURS PRN
Qty: 10 TABLET | Refills: 0 | Status: SHIPPED | OUTPATIENT
Start: 2024-07-31

## 2024-07-31 RX ORDER — COLCHICINE 0.6 MG/1
0.6 TABLET ORAL 2 TIMES DAILY
Qty: 60 TABLET | Refills: 0 | Status: SHIPPED | OUTPATIENT
Start: 2024-07-31

## 2024-07-31 RX ORDER — POTASSIUM CHLORIDE 750 MG/1
40 TABLET, EXTENDED RELEASE ORAL ONCE
Status: COMPLETED | OUTPATIENT
Start: 2024-07-31 | End: 2024-07-31

## 2024-07-31 RX ORDER — LISINOPRIL 5 MG/1
5 TABLET ORAL DAILY
Qty: 30 TABLET | Refills: 0 | Status: SHIPPED | OUTPATIENT
Start: 2024-08-01 | End: 2024-08-09

## 2024-07-31 RX ORDER — CYCLOBENZAPRINE HCL 10 MG
10 TABLET ORAL 3 TIMES DAILY PRN
Qty: 50 TABLET | Refills: 0 | Status: SHIPPED | OUTPATIENT
Start: 2024-07-31

## 2024-07-31 RX ORDER — PHENOL 1.4 %
10 AEROSOL, SPRAY (ML) MUCOUS MEMBRANE AT BEDTIME
Qty: 30 TABLET | Refills: 0 | Status: SHIPPED | OUTPATIENT
Start: 2024-07-31 | End: 2024-07-31

## 2024-07-31 RX ORDER — ACETAMINOPHEN 325 MG/1
650 TABLET ORAL
Qty: 50 TABLET | Refills: 0 | Status: SHIPPED | OUTPATIENT
Start: 2024-07-31

## 2024-07-31 RX ORDER — AMOXICILLIN 250 MG
2 CAPSULE ORAL 2 TIMES DAILY PRN
Qty: 50 TABLET | Refills: 0 | Status: SHIPPED | OUTPATIENT
Start: 2024-07-31

## 2024-07-31 RX ORDER — METOPROLOL SUCCINATE 50 MG/1
50 TABLET, EXTENDED RELEASE ORAL DAILY
Status: DISCONTINUED | OUTPATIENT
Start: 2024-07-31 | End: 2024-07-31 | Stop reason: HOSPADM

## 2024-07-31 RX ORDER — ASPIRIN 81 MG/1
162 TABLET, CHEWABLE ORAL DAILY
Qty: 60 TABLET | Refills: 0 | Status: SHIPPED | OUTPATIENT
Start: 2024-08-01 | End: 2024-10-04

## 2024-07-31 RX ORDER — METOPROLOL SUCCINATE 50 MG/1
50 TABLET, EXTENDED RELEASE ORAL DAILY
Qty: 30 TABLET | Refills: 0 | Status: SHIPPED | OUTPATIENT
Start: 2024-08-01 | End: 2024-09-16

## 2024-07-31 RX ORDER — HYDROXYZINE HYDROCHLORIDE 25 MG/1
25 TABLET, FILM COATED ORAL EVERY 6 HOURS PRN
Qty: 30 TABLET | Refills: 0 | Status: SHIPPED | OUTPATIENT
Start: 2024-07-31

## 2024-07-31 RX ADMIN — COLCHICINE 0.6 MG: 0.6 TABLET, FILM COATED ORAL at 09:03

## 2024-07-31 RX ADMIN — POTASSIUM CHLORIDE 40 MEQ: 750 TABLET, EXTENDED RELEASE ORAL at 09:00

## 2024-07-31 RX ADMIN — LISINOPRIL 5 MG: 5 TABLET ORAL at 08:59

## 2024-07-31 RX ADMIN — CYCLOBENZAPRINE 10 MG: 10 TABLET, FILM COATED ORAL at 05:17

## 2024-07-31 RX ADMIN — ACETAMINOPHEN 650 MG: 325 TABLET, FILM COATED ORAL at 08:59

## 2024-07-31 RX ADMIN — ACETAMINOPHEN 650 MG: 325 TABLET, FILM COATED ORAL at 14:58

## 2024-07-31 RX ADMIN — ATORVASTATIN CALCIUM 80 MG: 80 TABLET, FILM COATED ORAL at 08:59

## 2024-07-31 RX ADMIN — HYDROXYZINE HYDROCHLORIDE 25 MG: 25 TABLET ORAL at 01:51

## 2024-07-31 RX ADMIN — METOPROLOL SUCCINATE 50 MG: 50 TABLET, EXTENDED RELEASE ORAL at 09:01

## 2024-07-31 RX ADMIN — ACETAMINOPHEN 650 MG: 325 TABLET, FILM COATED ORAL at 05:17

## 2024-07-31 RX ADMIN — GABAPENTIN 100 MG: 100 CAPSULE ORAL at 09:00

## 2024-07-31 RX ADMIN — ASPIRIN 81 MG CHEWABLE TABLET 162 MG: 81 TABLET CHEWABLE at 08:59

## 2024-07-31 RX ADMIN — HEPARIN SODIUM 5000 UNITS: 5000 INJECTION, SOLUTION INTRAVENOUS; SUBCUTANEOUS at 12:11

## 2024-07-31 RX ADMIN — HEPARIN SODIUM 5000 UNITS: 5000 INJECTION, SOLUTION INTRAVENOUS; SUBCUTANEOUS at 05:17

## 2024-07-31 RX ADMIN — CYCLOBENZAPRINE 10 MG: 10 TABLET, FILM COATED ORAL at 12:12

## 2024-07-31 RX ADMIN — GABAPENTIN 100 MG: 100 CAPSULE ORAL at 14:58

## 2024-07-31 ASSESSMENT — ACTIVITIES OF DAILY LIVING (ADL)
ADLS_ACUITY_SCORE: 27

## 2024-07-31 NOTE — DISCHARGE SUMMARY
[facility], [place]   Cardiothoracic Surgery Hospital Discharge Summary     Modesto Lehman MRN# [id number]   Age: 68 year old YOB: 1955     Admitting Physician:  Kit Mason MD  Discharge Physician:  Carlito Botello PA-C  Primary Care Physician:        Laura Ref-Primary, Physician     DATE OF ADMISSION: 7/25/2024      DATE OF DISCHARGE: July 31, 2024     Admit Wt: 170 lbs  Discharge Wt: 174 lbs          Primary Diagnoses:   CAD - s/p CABG x3  Ischemic cardiomyopathy  Dilated cardiomyopathy  Pericarditis          Secondary Diagnoses:   Post-operative ileus, resolved  Hx of PAD - s/p bilateral fem-pop bypasses  Carotid artery disease - s/- left carotid endartectomy  Acute postoperative pain, improving  Stress induced hyperglycemia, resolved  Stress induced leukocytosis, resolved  Acute blood loss anemia, stable  Acute blood loss thrombocytopenia, resolved    PROCEDURES PERFORMED:   Date: 7/25/2024.  Surgeon: Dr. Kit Mason  1.  Coronary artery bypass grafting x 3               - reversed saphenous vein graft to the obtuse marginal branch of the left circumflex coronary artery              - reversed saphenous vein graft to the diagonal branch of the left anterior descending coronary artery              - pedicled left internal mammary artery to left anterior descending coronary artery  2.  Endoscopic vein harvest of the greater saphenous vein from the left lower extremity.  3. Transesophageal echocardiogram    INTRAOPERATIVE FINDINGS:    1) The left internal mammary artery was 2 mm in diameter and had excellent flow.    2) The greater saphenous vein from the left lower extremity was 4-5 mm in diameter and suitable for conduit.    3) The ascending aorta was free of calcified plaque.    4) The right posterior descending coronary artery was 1 mm in diameter and not suitable for bypass.  5) The obtuse marginal branch of the left circumflex coronary artery was  1.5 mm in diameter and free of disease at the site of anastomosis.   6) The diagonal branch of the left anterior descending coronary artery was 1.5 mm in  diameter and free of disease at the site anastomosis.    7) The left anterior descending coronary artery 2 mm in diameter and free of disease at the site of anastomosis.    8) After reperfusion and defibrillation, sinus rhythm resumed.    9) Left ventricular function was 30% preoperatively and unchanged after bypass on low-dose inotropic support.    PATHOLOGY RESULTS:    none     CULTURE RESULTS:    none    CONSULTS:    PT/OT  Intensivist  Vascular Surgery  General Surgery    BRIEF HISTORY OF ILLNESS:  CAD, CHFrEF (30-35%), HLD, PAD (s/p bilateral popliteal bypass), Carotid disease (s/p  and chronic occlusion of DARIUSZ), chronic back pain. He was referred to Dr. Mason by his cardiologist after outpatient angiogram demonstrated severe 3-vessel coronary artery disease.     HOSPITAL COURSE: Modesto Lehman is a 68 year old male who on 2024 underwent the above-named procedures and tolerated the operation well.     Postoperatively was admitted to the CVICU.  Patient was extubated within protocol on POD #0.  Blood pressure and cardiac index were managed with vasopressors and inotropic agents which were continuously weaned until no longer needed.  Patient was subsequently  transferred to the surgical telemetry floor on POD 2.    While on the surgical unit, the patient continued to progress well. Chest tubes and temporary pacemaker wires were removed when deemed appropriate. He was started on  mg, PTA atorvastatin, Toprol 50 mg daily and lisinopril 5 mg at discharge. His baseline post-operative echo was unremarkable other than LVEF of 30-35% which is stable from his pre-op echo. Lifevest was arranged at discharge.      Patient was fluid overloaded and did not require treatment with diuretics. He autodiuresed to below his dry weight and he appeared  "euvolemic on exam with edema or JVD. He will not discharge on any diuretic; will re-evaluate need in clinic follow-up.      Patient was transiently hyperglycemic and treated with insulin infusion then transitioned to sliding scale insulin per protocol. Blood sugars remained stable. No further glycemic control agents needed at this time.    Patient did develop a post-operative ileus and significant gastric distention and dilated loops of bowel. An NG was placed and he was started on D5 NS. His symptoms improved, he had return of normal bowel function and general surgery consulted without any further recommendations. Diet advanced to clears which he tolerated on 7/29 and regular diet on 7/30. He remained without abdominal pain, distention, tenderness, nausea or vomiting and x-rays remained stable/improved.    Vascular surgery saw patient a day prior to discharge given his recent carotid endarterectomy. He will follow-up with them in 1-2 weeks with carotid US prior.      Prior to discharge, his pain was controlled well, he was working well with therapies, able to perform most ADLs, ambulate without difficulty, and had full return of bowel and bladder function.  On July 31, 2024, he was discharged to home in stable condition. Follow up with cardiology and cardiac surgery have been arranged. Pt encouraged to follow up with PCP and cardiac rehab upon discharge.    Patient discharged on aspirin:  Yes 162 mg  Patient discharged on beta blocker: yes    Patient discharged on ACE Inhibitor/ARB: yes      Patient discharged on statin: yes          Discharge Disposition:     Discharged to home            Condition on Discharge:     Discharge condition: Stable   Discharge vitals: Blood pressure 98/62, pulse 95, temperature 98  F (36.7  C), temperature source Oral, resp. rate 18, height 1.753 m (5' 9\"), weight 77.4 kg (170 lb 9.6 oz), SpO2 97%.   Code status on discharge: Full Code     Vitals:    07/29/24 0500 07/30/24 0317 " 07/31/24 0524   Weight: 77.7 kg (171 lb 6.4 oz) 77.3 kg (170 lb 8 oz) 77.4 kg (170 lb 9.6 oz)       DAY OF DISCHARGE PHYSICAL EXAM:    Gen: A&Ox4, NAD  Neuro: no focal deficits   CV: RRR, normal S1 S2, no murmurs, rubs or gallops. No JVD  Pulm: Lungs CTA, no wheezing or rhonchi, normal breathing on RA  Abd: nondistended, normal BS, soft, nontender  Ext: No peripheral edema, moderate ecchymosis along both lower leg harvest sites.  Incision: clean, dry, intact, no erythema, sternum stable  Tubes/drain sites: dressing clean and dr    LABS  Most Recent 3 CBC's:  Recent Labs   Lab Test 07/31/24  0548 07/30/24  0510 07/29/24  0444   WBC 9.5 9.1 9.6   HGB 10.6* 10.6* 10.6*   MCV 93 93 93    174 144*      Most Recent 3 BMP's:  Recent Labs   Lab Test 07/31/24  1227 07/31/24  0548 07/30/24  2122 07/30/24  1153 07/30/24  0807 07/30/24  0510 07/29/24  1747 07/29/24  1556   NA  --  135  --   --   --  136  --  135   POTASSIUM 3.9 3.4  --   --   --  3.6  3.6   < > 3.2*   CHLORIDE  --  102  --   --   --  100  --  95*   CO2  --  22  --   --   --  27  --  31*   BUN  --  10.8  --   --   --  9.3  --  15.8   CR  --  0.60*  --   --   --  0.64*  --  0.67   ANIONGAP  --  11  --   --   --  9  --  9   DANE  --  7.9*  --   --   --  7.9*  --  8.2*   GLC  --  107* 113* 116*   < > 163*   < > 117*    < > = values in this interval not displayed.     Most Recent 2 LFT's:  Recent Labs   Lab Test 07/30/24  0510 07/29/24  0444   AST 58* 55*   ALT 50 35   ALKPHOS 86 80   BILITOTAL 0.5 0.5     Most Recent INR's and Anticoagulation Dosing History:  Anticoagulation Dose History          Latest Ref Rng & Units 5/1/2024 7/8/2024 7/25/2024   Recent Dosing and Labs   INR 0.85 - 1.15 1.06  1.02  1.24  1.43       Details          Multiple values from one day are sorted in reverse-chronological order             Most Recent 3 Troponin's:No lab results found.  Most Recent Cholesterol Panel:  Recent Labs   Lab Test 02/16/24  0943   CHOL 120   LDL 42   HDL  59   TRIG 94     Most Recent 6 Bacteria Isolates From Any Culture (See EPIC Reports for Culture Details):No lab results found.  Most Recent TSH, T4 and A1c Labs:  Recent Labs   Lab Test 24  0859   A1C 5.9*      Recent Labs   Lab 24  0548 24  2122 24  1153 24  0807 24  0510 24  0201   * 113* 116* 122* 163* 118*       Imaging:  Recent Results (from the past 24 hour(s))   Echo Complete   Result Value    LVEF  30-35% (moderately reduced)    [facility]    [id number]  [id number]  [id number]  [id number]^BAO^REESE^PAVEL     [facility]  Echocardiography Laboratory  [address] [place] [id number]     Name: LYNDA DALLAS  MRN: [id number]  : 1955  Study Date: 2024 03:08 PM  Age: 68 yrs  Gender: Male  Patient Location: [facility] [place]  Reason For Study: CABG  Ordering Physician: REESE GORE  Performed By: Cher Davenport     BSA: 1.9 m2  Height: 69 in  Weight: 170 lb  HR: 97  BP: 98/69 mmHg  ______________________________________________________________________________  Procedure  Complete Portable Echo Adult. Contrast Optison. Optison (NDC #4683-2894-15)  given intravenously. Patient was given 6 ml mixture of 3 ml Optison and 6 ml  saline. 3 ml wasted.  ______________________________________________________________________________  Interpretation Summary  Left ventricular function is decreased. The ejection fraction is 30-35%  (moderately reduced).  Right ventricular function, chamber size, wall motion, and thickness are  normal.  No significant valvular abnormalities present.  IVC diameter and respiratory changes fall into an intermediate range  suggesting an RA pressure of 8 mmHg.  No pericardial effusion is present.     This study was compared with the study from 2024. No significant changes  noted.  ______________________________________________________________________________  Left Ventricle  Left ventricular wall thickness  is normal. Left ventricular size cannot  evaluate. Left ventricular function is decreased. The ejection fraction is 30-  35% (moderately reduced). Grade I or early diastolic dysfunction. Moderate  diffuse hypokinesis is present.     Right Ventricle  Right ventricular function, chamber size, wall motion, and thickness are  normal.     Atria  Both atria appear normal.     Mitral Valve  The mitral valve is normal. Trace mitral insufficiency is present.     Aortic Valve  The valve leaflets are not well visualized. Mild aortic valve calcification is  present. On Doppler interrogation, there is no significant stenosis or  regurgitation.     Tricuspid Valve  Trace tricuspid insufficiency is present. The right ventricular systolic  pressure is 13mmHg above the right atrial pressure.     Pulmonic Valve  The valve leaflets are not well visualized. On Doppler interrogation, there is  no significant stenosis or regurgitation.     Vessels  The aorta root is normal. IVC diameter and respiratory changes fall into an  intermediate range suggesting an RA pressure of 8 mmHg.     Pericardium  No pericardial effusion is present.     Miscellaneous  No significant valvular abnormalities present.     Compared to Previous Study  This study was compared with the study from 2024 . No significant changes  noted.  ______________________________________________________________________________  MMode/2D Measurements & Calculations  Ao root diam: 3.2 cm  LVOT diam: 2.4 cm  LVOT area: 4.4 cm2  Ao root diam index Ht(cm/m): 1.8  Ao root diam index BSA (cm/m2): 1.6  EF Biplane: 30.4 %  RV Base: 3.8 cm  TAPSE: 1.4 cm     Doppler Measurements & Calculations  MV E max ion: 81.3 cm/sec  MV A max ion: 97.7 cm/sec  MV E/A: 0.83  MV dec slope: 824.4 cm/sec2  MV dec time: 0.10 sec  PA acc time: 0.12 sec  TR max ion: 179.9 cm/sec  TR max P.9 mmHg     Medial E/e': 15.9  RV S Ion: 12.1 cm/sec      ______________________________________________________________________________  Report approved by: Dr Sumit Burnham 07/30/2024 04:49 PM         XR Chest Port 1 View    Narrative    Exam: XR CHEST PORT 1 VIEW, 7/31/2024 6:37 AM    Indication: s/p CABG w/ CTs    Comparison: Chest radiograph 7/30/2024, abdomen radiograph 7/30/2024    Findings:   Portable AP semiupright chest was obtained. Sternotomy wires appear  intact. Stable cardiomediastinal silhouette. Clear costophrenic  angles. No pneumothorax. Mild streaky left greater than right basilar  opacities, unchanged. Lucency underlying the left hemidiaphragm with  superimposed loops of gas-filled colon. This appears similar compared  to abdomen radiograph from 7/30/2024, and is favored to represent  superimposed loops of colon rather than intraperitoneal free air.      Impression    Impression:   Continued mild left basilar opacities with asymmetric elevation of the  left hemidiaphragm, likely representing atelectasis.    I have personally reviewed the examination and initial interpretation  and I agree with the findings.    ULI HURST MD         SYSTEM ID:  [id number]     Most Recent 3 CBC's:  Recent Labs   Lab Test 07/31/24  0548 07/30/24  0510 07/29/24  0444   WBC 9.5 9.1 9.6   HGB 10.6* 10.6* 10.6*   MCV 93 93 93    174 144*      Most Recent 3 BMP's:  Recent Labs   Lab Test 07/31/24  1310 07/31/24  1227 07/31/24  0548 07/30/24  2122 07/30/24  0807 07/30/24  0510 07/29/24  1747 07/29/24  1556   NA  --   --  135  --   --  136  --  135   POTASSIUM  --  3.9 3.4  --   --  3.6  3.6   < > 3.2*   CHLORIDE  --   --  102  --   --  100  --  95*   CO2  --   --  22  --   --  27  --  31*   BUN  --   --  10.8  --   --  9.3  --  15.8   CR  --   --  0.60*  --   --  0.64*  --  0.67   ANIONGAP  --   --  11  --   --  9  --  9   DANE  --   --  7.9*  --   --  7.9*  --  8.2*   *  --  107* 113*   < > 163*   < > 117*    < > = values in this interval not displayed.      Most Recent 2 LFT's:  Recent Labs   Lab Test 07/30/24  0510 07/29/24  0444   AST 58* 55*   ALT 50 35   ALKPHOS 86 80   BILITOTAL 0.5 0.5     Most Recent INR's and Anticoagulation Dosing History:  Anticoagulation Dose History          Latest Ref Rng & Units 5/1/2024 7/8/2024 7/25/2024   Recent Dosing and Labs   INR 0.85 - 1.15 1.06  1.02  1.24  1.43       Details          Multiple values from one day are sorted in reverse-chronological order             Most Recent 3 Troponin's:No lab results found.  Most Recent Cholesterol Panel:  Recent Labs   Lab Test 02/16/24  0943   CHOL 120   LDL 42   HDL 59   TRIG 94     Most Recent 6 Bacteria Isolates From Any Culture (See EPIC Reports for Culture Details):No lab results found.  Most Recent TSH, T4 and A1c Labs:  Recent Labs   Lab Test 05/01/24  0859   A1C 5.9*          PRE-ADMISSION MEDICATIONS:  Medications Prior to Admission   Medication Sig Dispense Refill Last Dose    atorvastatin (LIPITOR) 80 MG tablet Take 1 tablet (80 mg) by mouth daily (Patient taking differently: Take 80 mg by mouth at bedtime) 90 tablet 3 7/24/2024 at 1800    gabapentin (NEURONTIN) 100 MG capsule Take 100 mg by mouth 3 times daily as needed (Pain)   More than a month    nitroGLYcerin (NITROSTAT) 0.4 MG sublingual tablet For chest pain place 1 tablet under the tongue every 5 minutes for 3 doses. If symptoms persist 5 minutes after 1st dose call 911. 25 tablet 3 More than a month    EPINEPHrine (ANY BX GENERIC EQUIV) 0.3 MG/0.3ML injection 2-pack Inject 0.3 mLs (0.3 mg) into the muscle once as needed for anaphylaxis 1 each 0     [DISCONTINUED] aspirin (ASA) 81 MG chewable tablet Take 1 tablet (81 mg) by mouth daily (Patient taking differently: Take 81 mg by mouth every morning) 90 tablet 3 7/24/2024 at 0800    [DISCONTINUED] ibuprofen (ADVIL/MOTRIN) 800 MG tablet Take 1 tablet (800 mg) by mouth 3 times daily as needed TAKE 1 TABLET BY MOUTH 3 TIMES DAILY WITH MEALS. 100 tablet 5 More than a month        DISCHARGE MEDICATIONS:   Current Discharge Medication List        START taking these medications    Details   acetaminophen (TYLENOL) 325 MG tablet Take 2 tablets (650 mg) by mouth every 4 hours (while awake)  Qty: 50 tablet, Refills: 0    Associated Diagnoses: Coronary artery disease of native artery of native heart with stable angina pectoris (H24)      colchicine (COLCRYS) 0.6 MG tablet Take 1 tablet (0.6 mg) by mouth or Feeding Tube 2 times daily  Qty: 60 tablet, Refills: 0    Associated Diagnoses: Coronary artery disease of native artery of native heart with stable angina pectoris (H24)      cyclobenzaprine (FLEXERIL) 10 MG tablet Take 1 tablet (10 mg) by mouth 3 times daily as needed for muscle spasms  Qty: 50 tablet, Refills: 0    Associated Diagnoses: Coronary artery disease of native artery of native heart with stable angina pectoris (H24)      hydrOXYzine HCl (ATARAX) 25 MG tablet Take 1 tablet (25 mg) by mouth every 6 hours as needed for other or anxiety (adjuvant pain)  Qty: 30 tablet, Refills: 0    Associated Diagnoses: Coronary artery disease of native artery of native heart with stable angina pectoris (H24)      lisinopril (ZESTRIL) 5 MG tablet Take 1 tablet (5 mg) by mouth daily  Qty: 30 tablet, Refills: 0    Associated Diagnoses: Coronary artery disease of native artery of native heart with stable angina pectoris (H24)      metoprolol succinate ER (TOPROL XL) 50 MG 24 hr tablet Take 1 tablet (50 mg) by mouth daily  Qty: 30 tablet, Refills: 0    Associated Diagnoses: Coronary artery disease of native artery of native heart with stable angina pectoris (H24)      oxyCODONE (ROXICODONE) 5 MG tablet Take 1 tablet (5 mg) by mouth every 6 hours as needed for moderate pain  Qty: 10 tablet, Refills: 0    Associated Diagnoses: Coronary artery disease of native artery of native heart with stable angina pectoris (H24)      pantoprazole (PROTONIX) 40 MG EC tablet Take 1 tablet (40 mg) by mouth daily For 30  days, then discontinue  Qty: 30 tablet, Refills: 0    Associated Diagnoses: Coronary artery disease of native artery of native heart with stable angina pectoris (H24)      polyethylene glycol (MIRALAX) 17 GM/Dose powder Take 17 g by mouth daily  Qty: 510 g, Refills: 0    Associated Diagnoses: Coronary artery disease of native artery of native heart with stable angina pectoris (H24)      senna-docusate (SENOKOT-S/PERICOLACE) 8.6-50 MG tablet Take 2 tablets by mouth 2 times daily as needed for constipation  Qty: 50 tablet, Refills: 0    Associated Diagnoses: Coronary artery disease of native artery of native heart with stable angina pectoris (H24)      simethicone (MYLICON) 80 MG chewable tablet Take 1 tablet (80 mg) by mouth every 6 hours as needed for cramping  Qty: 30 tablet, Refills: 0    Associated Diagnoses: Coronary artery disease of native artery of native heart with stable angina pectoris (H24)           CONTINUE these medications which have CHANGED    Details   aspirin (ASA) 81 MG chewable tablet Take 2 tablets (162 mg) by mouth or NG Tube daily  Qty: 60 tablet, Refills: 0    Associated Diagnoses: Coronary artery disease of native artery of native heart with stable angina pectoris (H24)           CONTINUE these medications which have NOT CHANGED    Details   atorvastatin (LIPITOR) 80 MG tablet Take 1 tablet (80 mg) by mouth daily  Qty: 90 tablet, Refills: 3    Associated Diagnoses: Elevated coronary artery calcium score; Peripheral vascular disease (H24)      gabapentin (NEURONTIN) 100 MG capsule Take 100 mg by mouth 3 times daily as needed (Pain)      nitroGLYcerin (NITROSTAT) 0.4 MG sublingual tablet For chest pain place 1 tablet under the tongue every 5 minutes for 3 doses. If symptoms persist 5 minutes after 1st dose call 911.  Qty: 25 tablet, Refills: 3    Associated Diagnoses: Elevated coronary artery calcium score; Positive cardiac stress test; Coronary artery disease of native artery of native heart  with stable angina pectoris (H24)      EPINEPHrine (ANY BX GENERIC EQUIV) 0.3 MG/0.3ML injection 2-pack Inject 0.3 mLs (0.3 mg) into the muscle once as needed for anaphylaxis  Qty: 1 each, Refills: 0           STOP taking these medications       ibuprofen (ADVIL/MOTRIN) 800 MG tablet Comments:   Reason for Stopping:                CC:s  No Ref-Primary, Physician      [facility]   Cardiothoracic Surgery  Office phone: [phone]  Office fax: [phone]

## 2024-07-31 NOTE — PROGRESS NOTES
CLINICAL NUTRITION SERVICES    Reason for Assessment:  Heart-healthy nutrition education, received consult.    Diet History:  Pt reports no history of receiving heart-healthy nutrition education in the past.      Nutrition Diagnosis:  Food- and nutrition-related knowledge deficit r/t no previous knowledge of heart-healthy diet AEB pt report of no previous formal heart-healthy nutrition education.    Nutrition Prescription/Recs:  Continue heart-healthy diet.      Interventions:  Nutrition Education  1. Provided brief - verbal instruction on a heart-healthy diet.  2. Provided handouts: Heart-Healthy Nutrition Therapy    Goals:    1. Pt will verbalize at least four foods high in saturated fat and five foods high in sodium.    2. Pt will list at least two interventions to make current meal plan more heart-healthy.     Follow-up:   Patient to ask any further nutrition-related questions before discharge. In addition, pt may request outpatient RD appointment.     Juliane Cochran MS/RD/LD/CNSC  6C (beds 3229-4748) + 7C (beds 5329-7594) + ED   Available in [employer] by name or unit dietitian  No longer available via pager

## 2024-07-31 NOTE — PLAN OF CARE
DISCHARGE                         No discharge date for patient encounter.  ----------------------------------------------------------------------------  Discharged to: Home  Via: Automobile  Accompanied by: Family  Discharge Instructions: diet, activity, medications, follow up appointments, when to call the MD, aftercare instructions, and what to watchout for (i.e. s/s of infection, increasing SOB, palpitations, chest pain,)  Prescriptions: To be filled by   [place]  [facility] per pt's request; medication list reviewed & sent with pt  Follow Up Appointments: arranged; information given  Belongings: All sent with pt  IV: out  Telemetry: off  Pt exhibits understanding of above discharge instructions; all questions answered.    Discharge Paperwork: Signed, copied, and sent home with patient.

## 2024-07-31 NOTE — PROGRESS NOTES
Care Management Discharge Note    Discharge Date: 07/31/2024       Discharge Disposition: Home, Outpatient Rehab (PT, OT, SLP, Cardiac or Pulmonary)    Discharge Services: None    Discharge DME: None    Discharge Transportation: family or friend will provide    Private pay costs discussed: Not applicable    Does the patient's insurance plan have a 3 day qualifying hospital stay waiver?  No    PAS Confirmation Code:    Patient/family educated on Medicare website which has current facility and service quality ratings:      Education Provided on the Discharge Plan:  yes  Persons Notified of Discharge Plans: patient  Patient/Family in Agreement with the Plan:  yes    Handoff Referral Completed: Yes    Additional Information:  RNCC updated that patient will need [employer] [facility] at discharge time, received order from provider Carlito Botello, notified liaison Hans Sauceda of need to set up. RNCC faxed orders, notes, Echo results to [facility]. Will update.      [employer] [facility]  Ph: [phone]  Fax: [phone]  PDF: [email or web address]  [facility]   Ph: [phone] (cell)  Delivering to patient by 5 pm    [facility]  Ph: [phone]  F: [phone]  OP CR referral sent      Primary Care appointment made:  Laurie Campos MD  Tuesday August 13 2:45  [address]  [facility]  Ph: [phone]  F: [phone]  AVS sent    JOSEFA SuarezN, BA, RN, CMSRN, RNCC  [facility]  Covering Units 6C Beds 5192-2456/OBS  Phone: [phone]  Available on Vocera search 6C 6502-14 RNCC or OBS RNCC  After Hours [phone]     6C Beds 3992-2871 SW Ph: [phone]     6B/CRNCC Weekend/holiday on Vocera or [phone]     [facility] RNCC ED/5 Ortho/5 Med/Surg [phone]     [facility] RNCC 6 Med/Surg 8A, [phone]     Observation SW and weekend/after hours phone: [phone]

## 2024-07-31 NOTE — CONSULTS
68 year old male presenting with systolic heart failure. CORE consult requested. [place] is currently admitted for CABG.    Modesto was provided with a CHF book.  I reviewed the importance of daily weights, 2 gm sodium diet, 2L fluid restriction and compliance with medications upon hospital discharge.  CORE contact phone numbers provided and patient is encouraged to call with any questions or concerns, including any weight gain or loss of 2 or more pounds in 24 hours or 5 or more pounds in 1 week.      Message sent to cardiology at [facility] for follow up appointment.  I will follow-up with the patient at that time, sooner if needed.  Thank you for the consult.    Lorna Turner RN BSN  Cardiology Nurse Coordinator - Heart Failure Clinic  [facility]  [phone] option 1 to schedule an appointment or leave a message for your care team

## 2024-07-31 NOTE — PLAN OF CARE
Neuro: Alert & Oriented x4. Calm, cooperative, and pleasant.  Cardiac/Tele: VSS. SR. Afebrile.  Respiratory: Sats >95% on room air.  GI/: Continent. No BM this shift. Last BM on 7/30.  Diet/Appetite: Regular diet. ACHS BG.   Skin: Sternal incision, old incision chest tube sites. Graft incisions on bilateral lower legs.       LDAs: 2 Right PIV-saline locked.  Activity: Independent to bathroom walks with family in the hallways.   Pain: Constant lower back pain - controlled with scheduled medication. PRN Atarax x1. Pt wishes to not take any narcotics.       P: Continue with plan of care and notify team with changes. Possible discharge today.     Shift: 1900 - 0730

## 2024-07-31 NOTE — PROGRESS NOTES
Cardiovascular Surgery Progress Note  2024         Assessment and Plan:     Modesto Lehman is a 68 year old male with a PMH of CAD, CHFrEF (30-35%), HLD, PAD (s/p bilateral popliteal bypass), Carotid disease (s/p  and chronic occlusion of DARIUSZ), chronic back pain. He was referred to Dr. Mason by his cardiologist after outpatient angiogram demonstrated severe 3-vessel coronary artery disease. Patient is now s/p planned CABG x3 on 2024 with Dr. Mason.    Cardiovascular:   Hx of CAD - s/p CABG x 3 on 24   HFrEF (EF 30-35%)  Ischemic cardiomyopathy  Dilated cardiomyopathy  Hyperlipidemia  PAD s/p bilateral popliteal bypass  Carotid artery disease s/p left carotid endarterectomy. Chronic occlusion of DARIUSZ.  Pericarditis   HD stable, in NSR/sinus tachycardia. MAPS 79-92  Most recent echo showed LV EF 30-35%  -  mg daily  - PTA atorvastatin 80 mg daily resumed   - metoprolol tartrate 25 mg BID transitioned to Toprol 50 mg daily  - Start lisinopril 5 mg daily for GDMT   - CORE clinic consulted  - Diuresis as below  - EKG with diffuse ST elevation c/w pericarditis, troponins down-trending, continue colchicine 0.6 mg BID  - Post-operative echo with LVEF 30-35%, otherwise unremarkable  - Will arrange for lifevest  - Please alert/page vascular surgery before discharge per vascular surgery to see inpatient, vascular paged , they will arrange follow-up    Chest tubes: mediastinal x2, left pleural - removed , CXR stable  TPW: None    Pulmonary:  Tobacco dependence   Extubated POD 0 to 3 lpm via NC. Now saturating well on RA.   - Supplemental O2 PRN to keep sats > 92%. Wean off as tolerated.  - Pulm hygiene, IS, activity and deep breathing    Neurology / Psych:  Acute post-operative pain   Pain well controlled with current regimen:  - Scheduled: tylenol, Flexeril (in favor of robaxin per patient request), gabapentin 100 mg TID (takes PRN PTA).  - PRN: tylenol, oxycodone, icy hot  patches  - melatonin 10 mg daily for sleep      / Renal / Fluid / Electrolytes:  Hypokalemia secondary to GI loss   BL creat ~ 0.8-0.9, most recent creatinine stable and WNL; adequate UOP.   FLUID STATUS: Pre-op weight 174 lb, weight today 170 lb; I/O past 24 hours: +800  - Diuresis: hold today; patient appears euvolemic, no edema, weight stable  - Replete lytes per protocol  - Strict I/O, daily weights  - Avoid/limit nephrotoxins as able    GI / Nutrition:   Post-operative ileus  Gastric distension   - AXRs with significant gastric distention and dilated loops of bowel, continue daily AXRs to monitor   - NPO with sips and ice chips  - D5NS started 7/28 as maintenance fluid in the setting of ileus/NPO status  - consider TPN if still NPO after 7 days per discussion with RD  - NG to continuous suction, ongoing high output  - Continue bowel regimen, + BM 7/30  - simethicone PRN  - Reglan x2 doses 7/27  - repeat suppository 7/28   - strongly encourage frequent ambulation and limiting opioids/anticholinergics    - consult general surgery 7/29 for ongoing ileus/high NG output, no evidence of ileus or SBO. Recommended discontinuing NG, advance to CLD. Tolerated and transitioned to regular diet, discussed with general surgery, they signed off    Endocrine:  Stress induced hyperglycemia   Hgb A1c 5.9 (5/1/24)  - Initially managed on insulin drip postop, transitioned to medium resistance sliding scale; goal BG <180 for optimal healing    Infectious Disease:  Stress induced leukocytosis  WBC WNL, remains afebrile, no signs or symptoms of infection  - Completed perioperative antibiotics  - Continue to monitor fever curve, CBC    Hematology:   Acute blood loss anemia and thrombocytopenia  Hgb stable; Plt WNL, no signs or symptoms of active bleeding  - CBC daily    Anticoagulation:   -  mg only     MSK/Skin:  Sternotomy  Surgical incision  - Sternal precautions  - Incisional cares per protocol    Prophylaxis:   - Stress  "ulcer prophylaxis: Pantoprazole 40 mg daily for 30 days  - DVT prophylaxis: Subcutaneous heparin, SCD    Disposition:   - Transferred to  on 7/27 evening   - Therapies recommending discharge to home with assist    Discussed with Dr. Mason through written and/or verbal communication.    Medically Ready for Discharge: Anticipated Today pending echo      Clinically Significant Risk Factors        # Hypokalemia: Lowest K = 2.8 mmol/L in last 2 days, will replace as needed   # Hypocalcemia: Lowest Ca = 7.9 mg/dL in last 2 days, will monitor and replace as appropriate     # Hypoalbuminemia: Lowest albumin = 3.2 g/dL at 7/30/2024  5:10 AM, will monitor as appropriate        # Chronic heart failure with reduced ejection fraction: last echo with EF <40%        #Precipitous drop in Hgb/Hct: Lowest Hgb this hospitalization: 10.6 g/dL. Will continue to monitor and treat/transfuse as appropriate.     # Overweight: Estimated body mass index is 25.19 kg/m  as calculated from the following:    Height as of this encounter: 1.753 m (5' 9\").    Weight as of this encounter: 77.4 kg (170 lb 9.6 oz).        # Financial/Environmental Concerns: none   # History of CABG: noted on surgical history       Carlito Botello PA-C  Cardiothoracic Surgery  p: [phone]          Interval History:     No overnight events.Feels good, ready to discharge today. States he's not sleeping well in the hospital  Denies any further abdominal pain or distention. Has been passing gas and having bowel movements. Denies any nausea or vomiting with regular.   Patient noted some bright red blood on the edge of the commode after intense straining to have a BM 2 days ago, none reported today. He reports history of hemorrhoids. Will monitor for recurrence.   Breathing well without complaints.   Denies chest pain, palpitations, dizziness, syncopal symptoms, fevers, chills, myalgias, or sternal popping/clicking.         Physical Exam:     BP 98/62 (BP Location: Right " "arm)   Pulse 95   Temp 98  F (36.7  C) (Oral)   Resp 18   Ht 1.753 m (5' 9\")   Wt 77.4 kg (170 lb 9.6 oz)   SpO2 97%   BMI 25.19 kg/m      Gen: A&Ox4, NAD  Neuro: no focal deficits   CV: RRR, normal S1 S2, no murmurs, rubs or gallops. No JVD  Pulm: CTA, no wheezing or rhonchi, normal breathing on RA  Abd: non- distended, non-tender, BS present, soft; no peritoneal signs, rebound, or guarding  Ext: no peripheral edema  Incision: clean, dry, intact, no erythema, sternum stable  Tubes/drain sites: dressing clean and dry          Data:    Imaging:  reviewed recent imaging    Recent Results (from the past 24 hour(s))   Echo Complete   Result Value    LVEF  30-35% (moderately reduced)    Narrative    [id number]  [id number]  [id number]  [id number]^BAO^REESE^PAVEL     [facility]  Echocardiography Laboratory  [address] [place] [id number]     Name: LYNDA DALLAS  MRN: [id number]  : 1955  Study Date: 2024 03:08 PM  Age: 68 yrs  Gender: Male  [place] [facility] [place]  Reason For Study: CABG  Ordering Physician: REESE GORE  Performed By: Cher Davenport     BSA: 1.9 m2  Height: 69 in  Weight: 170 lb  HR: 97  BP: 98/69 mmHg  ______________________________________________________________________________  Procedure  Complete Portable Echo Adult. Contrast Optison. Optison (NDC #6929-0680-26)  given intravenously. Patient was given 6 ml mixture of 3 ml Optison and 6 ml  saline. 3 ml wasted.  ______________________________________________________________________________  Interpretation Summary  Left ventricular function is decreased. The ejection fraction is 30-35%  (moderately reduced).  Right ventricular function, chamber size, wall motion, and thickness are  normal.  No significant valvular abnormalities present.  IVC diameter and respiratory changes fall into an intermediate range  suggesting an RA pressure of 8 mmHg.  No pericardial effusion is present.     This " study was compared with the study from 4/23/2024. No significant changes  noted.  ______________________________________________________________________________  Left Ventricle  Left ventricular wall thickness is normal. Left ventricular size cannot  evaluate. Left ventricular function is decreased. The ejection fraction is 30-  35% (moderately reduced). Grade I or early diastolic dysfunction. Moderate  diffuse hypokinesis is present.     Right Ventricle  Right ventricular function, chamber size, wall motion, and thickness are  normal.     Atria  Both atria appear normal.     Mitral Valve  The mitral valve is normal. Trace mitral insufficiency is present.     Aortic Valve  The valve leaflets are not well visualized. Mild aortic valve calcification is  present. On Doppler interrogation, there is no significant stenosis or  regurgitation.     Tricuspid Valve  Trace tricuspid insufficiency is present. The right ventricular systolic  pressure is 13mmHg above the right atrial pressure.     Pulmonic Valve  The valve leaflets are not well visualized. On Doppler interrogation, there is  no significant stenosis or regurgitation.     Vessels  The aorta root is normal. IVC diameter and respiratory changes fall into an  intermediate range suggesting an RA pressure of 8 mmHg.     Pericardium  No pericardial effusion is present.     Miscellaneous  No significant valvular abnormalities present.     Compared to Previous Study  This study was compared with the study from 4/23/2024 . No significant changes  noted.  ______________________________________________________________________________  MMode/2D Measurements & Calculations  Ao root diam: 3.2 cm  LVOT diam: 2.4 cm  LVOT area: 4.4 cm2  Ao root diam index Ht(cm/m): 1.8  Ao root diam index BSA (cm/m2): 1.6  EF Biplane: 30.4 %  RV Base: 3.8 cm  TAPSE: 1.4 cm     Doppler Measurements & Calculations  MV E max epifanio: 81.3 cm/sec  MV A max epifanio: 97.7 cm/sec  MV E/A: 0.83  MV dec slope:  824.4 cm/sec2  MV dec time: 0.10 sec  PA acc time: 0.12 sec  TR max ion: 179.9 cm/sec  TR max P.9 mmHg     Medial E/e': 15.9  RV S Ion: 12.1 cm/sec     ______________________________________________________________________________  Report approved by: Dr Sumit Burnham 2024 04:49 PM         XR Chest Port 1 View    Narrative    Exam: XR CHEST PORT 1 VIEW, 2024 6:37 AM    Indication: s/p CABG w/ CTs    Comparison: Chest radiograph 2024, abdomen radiograph 2024    Findings:   Portable AP semiupright chest was obtained. Sternotomy wires appear  intact. Stable cardiomediastinal silhouette. Clear costophrenic  angles. No pneumothorax. Mild streaky left greater than right basilar  opacities, unchanged. Lucency underlying the left hemidiaphragm with  superimposed loops of gas-filled colon. This appears similar compared  to abdomen radiograph from 2024, and is favored to represent  superimposed loops of colon rather than intraperitoneal free air.      Impression    Impression:   Continued mild left basilar opacities with asymmetric elevation of the  left hemidiaphragm, likely representing atelectasis.    I have personally reviewed the examination and initial interpretation  and I agree with the findings.    ULI HURST MD         SYSTEM ID:  [id number]        Labs:  Recent Labs   Lab 24  0548 24  2122 24  1153 24  0807 24  0510 24  0201 24  2316 24  1747 24  1556 24  1108 24  0444 24  1950 24  1837 24  1834 24  1749   WBC 9.5  --   --   --  9.1  --   --   --   --   --  9.6   < > 22.5*  --   --    HGB 10.6*  --   --   --  10.6*  --   --   --   --   --  10.6*   < > 13.8  --   --    MCV 93  --   --   --  93  --   --   --   --   --  93   < > 94  --   --      --   --   --  174  --   --   --   --   --  144*   < > 153  --  156   INR  --   --   --   --   --   --   --   --   --   --   --   --  1.24*  --   1.43*     --   --   --  136  --   --   --  135  --  137  137   < > 142  --   --    POTASSIUM 3.4  --   --   --  3.6  3.6  --  2.9*  --  3.2*   < > 2.8*  2.8*   < > 3.7  --   --    CHLORIDE 102  --   --   --  100  --   --   --  95*  --  94*  94*   < > 105  --   --    CO2 22  --   --   --  27  --   --   --  31*  --  31*  31*   < > 24  --   --    BUN 10.8  --   --   --  9.3  --   --   --  15.8  --  21.4  21.4   < > 13.4  --   --    CR 0.60*  --   --   --  0.64*  --   --   --  0.67  --  0.64*  0.64*   < > 0.89  --   --    ANIONGAP 11  --   --   --  9  --   --   --  9  --  12  12   < > 13  --   --    DANE 7.9*  --   --   --  7.9*  --   --   --  8.2*  --  8.0*  8.0*   < > 8.3*  --   --    * 113* 116*   < > 163*   < >  --    < > 117*   < > 130*  130*   < > 158*   < >  --    ALBUMIN  --   --   --   --  3.2*  --   --   --   --   --  3.3*   < > 3.8  --   --    PROTTOTAL  --   --   --   --  5.9*  --   --   --   --   --  6.1*   < > 6.0*  --   --    BILITOTAL  --   --   --   --  0.5  --   --   --   --   --  0.5   < > 0.4  --   --    ALKPHOS  --   --   --   --  86  --   --   --   --   --  80   < > 72  --   --    ALT  --   --   --   --  50  --   --   --   --   --  35   < > 25  --   --    AST  --   --   --   --  58*  --   --   --   --   --  55*   < > 43  --   --     < > = values in this interval not displayed.      GLUCOSE:   Recent Labs   Lab 07/31/24  0548 07/30/24  2122 07/30/24  1153 07/30/24  0807 07/30/24  0510 07/30/24  0201   * 113* 116* 122* 163* 118*

## 2024-08-01 ENCOUNTER — TELEPHONE (OUTPATIENT)
Dept: CARDIAC REHAB | Facility: OTHER | Age: 69
End: 2024-08-01
Payer: COMMERCIAL

## 2024-08-02 ENCOUNTER — TELEPHONE (OUTPATIENT)
Dept: VASCULAR SURGERY | Facility: CLINIC | Age: 69
End: 2024-08-02
Payer: COMMERCIAL

## 2024-08-02 NOTE — TELEPHONE ENCOUNTER
[facility] Call Center    Phone Message    May a detailed message be left on voicemail: yes     Reason for Call: Other: Pts daughter is returning a call from Nery to get him scheduled, please call back thanks!     Action Taken: Message routed to:  [facility] ([facility]): [facility]    Travel Screening: Not Applicable     Date of Service:

## 2024-08-02 NOTE — TELEPHONE ENCOUNTER
Returned call. SYD w/ JANI.    JOSEFA StephensN, RN  RN Care Coordinator  [facility] Vascular Surgery - [facility] phone: [phone]  Fax: [phone]

## 2024-08-02 NOTE — TELEPHONE ENCOUNTER
[facility] Call Center    Phone Message    May a detailed message be left on voicemail: yes     Reason for Call: Other: Please call daughter to discuss upcoming appointments. Consent to communicate on file. Okay to leave a VM     Action Taken: Message routed to:  [facility] ([facility]): [facility]    Travel Screening: Not Applicable

## 2024-08-02 NOTE — TELEPHONE ENCOUNTER
Returned Titi call and answered her questions.    JOSEFA StephensN, RN  RN Care Coordinator  [facility] Vascular Surgery - [facility] phone: [phone]  Fax: [phone]

## 2024-08-03 ENCOUNTER — APPOINTMENT (OUTPATIENT)
Dept: ULTRASOUND IMAGING | Facility: CLINIC | Age: 69
End: 2024-08-03
Attending: EMERGENCY MEDICINE
Payer: MEDICARE

## 2024-08-03 ENCOUNTER — HOSPITAL ENCOUNTER (EMERGENCY)
Facility: CLINIC | Age: 69
Discharge: HOME | End: 2024-08-03
Attending: EMERGENCY MEDICINE | Admitting: EMERGENCY MEDICINE
Payer: MEDICARE

## 2024-08-03 VITALS
DIASTOLIC BLOOD PRESSURE: 73 MMHG | RESPIRATION RATE: 18 BRPM | HEIGHT: 70 IN | WEIGHT: 172.4 LBS | SYSTOLIC BLOOD PRESSURE: 119 MMHG | TEMPERATURE: 98.5 F | BODY MASS INDEX: 24.68 KG/M2 | HEART RATE: 102 BPM | OXYGEN SATURATION: 96 %

## 2024-08-03 DIAGNOSIS — Z95.1 S/P CABG (CORONARY ARTERY BYPASS GRAFT): ICD-10-CM

## 2024-08-03 DIAGNOSIS — L03.115 CELLULITIS OF RIGHT LOWER EXTREMITY: ICD-10-CM

## 2024-08-03 DIAGNOSIS — Z48.89 ENCOUNTER FOR POST SURGICAL WOUND CHECK: ICD-10-CM

## 2024-08-03 LAB
ALBUMIN SERPL BCG-MCNC: 3.6 G/DL (ref 3.5–5.2)
ALP SERPL-CCNC: 153 U/L (ref 40–150)
ALT SERPL W P-5'-P-CCNC: 88 U/L (ref 0–70)
ANION GAP SERPL CALCULATED.3IONS-SCNC: 10 MMOL/L (ref 7–15)
AST SERPL W P-5'-P-CCNC: 51 U/L (ref 0–45)
BASOPHILS # BLD AUTO: 0.1 10E3/UL (ref 0–0.2)
BASOPHILS NFR BLD AUTO: 1 %
BILIRUB SERPL-MCNC: 0.5 MG/DL
BUN SERPL-MCNC: 9.7 MG/DL (ref 8–23)
CALCIUM SERPL-MCNC: 8.9 MG/DL (ref 8.8–10.4)
CHLORIDE SERPL-SCNC: 102 MMOL/L (ref 98–107)
CREAT SERPL-MCNC: 0.76 MG/DL (ref 0.67–1.17)
CRP SERPL-MCNC: 36.69 MG/L
EGFRCR SERPLBLD CKD-EPI 2021: >90 ML/MIN/1.73M2
EOSINOPHIL # BLD AUTO: 0.2 10E3/UL (ref 0–0.7)
EOSINOPHIL NFR BLD AUTO: 2 %
ERYTHROCYTE [DISTWIDTH] IN BLOOD BY AUTOMATED COUNT: 14.5 % (ref 10–15)
ERYTHROCYTE [SEDIMENTATION RATE] IN BLOOD BY WESTERGREN METHOD: 48 MM/HR (ref 0–20)
GLUCOSE SERPL-MCNC: 103 MG/DL (ref 70–99)
HCO3 SERPL-SCNC: 25 MMOL/L (ref 22–29)
HCT VFR BLD AUTO: 34.4 % (ref 40–53)
HGB BLD-MCNC: 11.9 G/DL (ref 13.3–17.7)
IMM GRANULOCYTES # BLD: 0.2 10E3/UL
IMM GRANULOCYTES NFR BLD: 2 %
LYMPHOCYTES # BLD AUTO: 1.6 10E3/UL (ref 0.8–5.3)
LYMPHOCYTES NFR BLD AUTO: 17 %
MCH RBC QN AUTO: 32 PG (ref 26.5–33)
MCHC RBC AUTO-ENTMCNC: 34.6 G/DL (ref 31.5–36.5)
MCV RBC AUTO: 93 FL (ref 78–100)
MONOCYTES # BLD AUTO: 0.8 10E3/UL (ref 0–1.3)
MONOCYTES NFR BLD AUTO: 9 %
NEUTROPHILS # BLD AUTO: 6.5 10E3/UL (ref 1.6–8.3)
NEUTROPHILS NFR BLD AUTO: 70 %
NRBC # BLD AUTO: 0 10E3/UL
NRBC BLD AUTO-RTO: 0 /100
PLATELET # BLD AUTO: 359 10E3/UL (ref 150–450)
POTASSIUM SERPL-SCNC: 3.9 MMOL/L (ref 3.4–5.3)
PROCALCITONIN SERPL IA-MCNC: 0.05 NG/ML
PROT SERPL-MCNC: 6.9 G/DL (ref 6.4–8.3)
RBC # BLD AUTO: 3.72 10E6/UL (ref 4.4–5.9)
SODIUM SERPL-SCNC: 137 MMOL/L (ref 135–145)
WBC # BLD AUTO: 9.3 10E3/UL (ref 4–11)

## 2024-08-03 PROCEDURE — 86140 C-REACTIVE PROTEIN: CPT | Performed by: EMERGENCY MEDICINE

## 2024-08-03 PROCEDURE — 99285 EMERGENCY DEPT VISIT HI MDM: CPT | Mod: 25 | Performed by: EMERGENCY MEDICINE

## 2024-08-03 PROCEDURE — 84145 PROCALCITONIN (PCT): CPT | Performed by: EMERGENCY MEDICINE

## 2024-08-03 PROCEDURE — 250N000011 HC RX IP 250 OP 636: Performed by: EMERGENCY MEDICINE

## 2024-08-03 PROCEDURE — 96365 THER/PROPH/DIAG IV INF INIT: CPT | Performed by: EMERGENCY MEDICINE

## 2024-08-03 PROCEDURE — 85025 COMPLETE CBC W/AUTO DIFF WBC: CPT | Performed by: EMERGENCY MEDICINE

## 2024-08-03 PROCEDURE — 85652 RBC SED RATE AUTOMATED: CPT | Performed by: EMERGENCY MEDICINE

## 2024-08-03 PROCEDURE — 80053 COMPREHEN METABOLIC PANEL: CPT | Performed by: EMERGENCY MEDICINE

## 2024-08-03 PROCEDURE — 87040 BLOOD CULTURE FOR BACTERIA: CPT | Performed by: EMERGENCY MEDICINE

## 2024-08-03 PROCEDURE — 93971 EXTREMITY STUDY: CPT | Mod: RT

## 2024-08-03 PROCEDURE — 99284 EMERGENCY DEPT VISIT MOD MDM: CPT | Performed by: EMERGENCY MEDICINE

## 2024-08-03 PROCEDURE — 36415 COLL VENOUS BLD VENIPUNCTURE: CPT | Performed by: EMERGENCY MEDICINE

## 2024-08-03 RX ORDER — CEFTRIAXONE 1 G/1
1 INJECTION, POWDER, FOR SOLUTION INTRAMUSCULAR; INTRAVENOUS ONCE
Status: COMPLETED | OUTPATIENT
Start: 2024-08-03 | End: 2024-08-03

## 2024-08-03 RX ORDER — FENTANYL CITRATE 50 UG/ML
75 INJECTION, SOLUTION INTRAMUSCULAR; INTRAVENOUS ONCE
Status: DISCONTINUED | OUTPATIENT
Start: 2024-08-03 | End: 2024-08-03 | Stop reason: HOSPADM

## 2024-08-03 RX ORDER — CEPHALEXIN 500 MG/1
500 CAPSULE ORAL 4 TIMES DAILY
Qty: 40 CAPSULE | Refills: 0 | Status: SHIPPED | OUTPATIENT
Start: 2024-08-03 | End: 2024-08-13

## 2024-08-03 RX ADMIN — CEFTRIAXONE SODIUM 1 G: 1 INJECTION, POWDER, FOR SOLUTION INTRAMUSCULAR; INTRAVENOUS at 14:10

## 2024-08-03 ASSESSMENT — COLUMBIA-SUICIDE SEVERITY RATING SCALE - C-SSRS
6. HAVE YOU EVER DONE ANYTHING, STARTED TO DO ANYTHING, OR PREPARED TO DO ANYTHING TO END YOUR LIFE?: NO
2. HAVE YOU ACTUALLY HAD ANY THOUGHTS OF KILLING YOURSELF IN THE PAST MONTH?: NO
1. IN THE PAST MONTH, HAVE YOU WISHED YOU WERE DEAD OR WISHED YOU COULD GO TO SLEEP AND NOT WAKE UP?: NO

## 2024-08-03 ASSESSMENT — ACTIVITIES OF DAILY LIVING (ADL)
ADLS_ACUITY_SCORE: 38

## 2024-08-03 NOTE — ED TRIAGE NOTES
Pt presents with concerns of redness and warmth to the right lower leg.  Pt had quadruple bypass surgery last Thursday and the right leg is where veins had been harvested from.   The redness started today.   Increased fatigue today.      Triage Assessment (Adult)       Row Name 08/03/24 1054          Triage Assessment    Airway WDL WDL        Respiratory WDL    Respiratory WDL WDL        Skin Circulation/Temperature WDL    Skin Circulation/Temperature WDL WDL        Cardiac WDL    Cardiac WDL WDL        Peripheral/Neurovascular WDL    Peripheral Neurovascular WDL WDL        Cognitive/Neuro/Behavioral WDL    Cognitive/Neuro/Behavioral WDL WDL

## 2024-08-03 NOTE — DISCHARGE INSTRUCTIONS
Your blood work did show some elevated inflammatory markers, but did not see signs concerning for sepsis    Your ultrasound ruled out any blood clot in your leg    Ultrasound did show some fluid collection underneath your surgical incisions on both legs, which I think are consistent with seromas.  This is fluid that collects after surgery, and should be reabsorbed by the body over time    The redness on your leg I suspect is due to a cellulitis.  You were given a dose of IV antibiotics in the emergency room today, and will be started on oral antibiotics on an outpatient basis.  You may take the next dose of antibiotics this evening.  Take 4 times daily for total of 10 days    If you develop any new or worsening symptoms, especially if the redness extends more than 1 cm outside the purple pen bertha, if you are running a fever, if pus is draining from any of your wounds, or you have new concerns, return promptly to the ER for evaluation    Otherwise keep follow-up appointments with your cardiologist and other providers as scheduled

## 2024-08-03 NOTE — ED PROVIDER NOTES
History     Chief Complaint   Patient presents with    Post-op Problem     HPI  Modesto Lehman is a 68 year old male who presents with concern of leg redness and pain.  He recently underwent four-vessel CABG and has developed redness today.  Redness is on the right anterior shin.  It hurts to touch and feels very warm.  Family is concerned for possible blood clot or infection.  Otherwise notes the incisions he was not discharged with any antibiotics.  Has not been running a fever.  He says he had been doing well since his hospitalization.  Knows that they tried to get grafts from both legs, and does have quite a bit of bruising.  Has not seen any redness around the incisions on his legs.    Allergies:  No Known Allergies    Problem List:    Patient Active Problem List    Diagnosis Date Noted    Left carotid stenosis 06/25/2024     Priority: Medium    Hx of Lyme disease on 8/3/2020 04/11/2024     Priority: Medium    PAD (peripheral artery disease) (H24) 04/11/2024     Priority: Medium    Coronary artery disease of native artery of native heart with stable angina pectoris (H24) 04/11/2024     Priority: Medium    Positive cardiac stress test on 3/5/2024 04/11/2024     Priority: Medium    Elevated coronary artery calcium score 04/11/2024     Priority: Medium     At 2015.1 on 3/5/2024      Spondylosis of thoracic region without myelopathy or radiculopathy 02/16/2024     Priority: Medium    Lyme disease 07/31/2020     Priority: Medium    Family history of diabetes mellitus 01/30/2018     Priority: Medium    Neural foraminal stenosis of cervical spine 05/24/2016     Priority: Medium    Plantar wart of left foot 04/02/2014     Priority: Medium    Other synovitis and tenosynovitis 08/02/2012     Priority: Medium    Denis's cyst 02/29/2012     Priority: Medium    Backache 11/07/2011     Priority: Medium    Spinal stenosis, lumbar 12/21/2010     Priority: Medium    DJD (degenerative joint disease) 12/08/2010      Priority: Medium    Benign neoplasm of colon 09/06/2010     Priority: Medium    Tobacco user 09/28/2009     Priority: Medium    Obesity 11/02/2005     Priority: Medium    Degeneration of lumbar or lumbosacral intervertebral disc 12/16/1997     Priority: Medium        Past Medical History:    Past Medical History:   Diagnosis Date    Allergy status to analgesic agent     Anesthesia of skin     CAD (coronary artery disease)     Elevated coronary artery calcium score     History of recurrent pneumonia     Hyperlipidemia     Low back pain     Personal history of nicotine dependence     Personal history of other medical treatment (CODE)     PVD (peripheral vascular disease) (H24)        Past Surgical History:    Past Surgical History:   Procedure Laterality Date    ANGIOPLASTY      Right leg stenting and bypass, Left also    BYPASS GRAFT ARTERY CORONARY N/A 7/25/2024    Procedure: Median Sternotomy, On Cardiopulmonary Bypass Graft, CORONARY ARTERY BYPASS GRAFT X_3_, Left AND RIGHTGreater Saphenous Vein [place], Left Internal Mammary Artery [place], Intra-operative Transesophageal Echocardiogram per Anesthesia.;  Surgeon: Kit Mason MD;  Location: [facility] OR    COLONOSCOPY  08/23/2010    Q 10yrs.    COLONOSCOPY N/A 02/24/2022    6 tubular adenomas, 5 year follow up, 2/24/2027    CV CORONARY ANGIOGRAM N/A 4/15/2024    Procedure: Coronary Angiogram;  Surgeon: Toni Sellers MD;  Location:  [facility]    ENDARTERECTOMY CAROTID Left 6/25/2024    Procedure: Left Carotid Endarterectomy with bovine pericardial patch angioplasty. Intraoperative Ultrasound. Intraoperative EEG monitoring;  Surgeon: Rasheed Sam MD;  Location: [facility] OR    EXTRACTION(S) DENTAL      recently removed    OTHER SURGICAL HISTORY      [id number],PREMALIG/BENIGN SKIN LESION EXCISION, removed from chest    OTHER SURGICAL HISTORY      [id number],ANESTHESIA ALERT,No  problems with anaesthesia       Family History:    Family History    Problem Relation Age of Onset    Diabetes Mother         Diabetes    Heart Disease Mother         Heart Disease    Other - See Comments Father         COPD, CD    Heart Disease Brother         Heart Disease    Other - See Comments Brother         killed by drunk  at age 19.    Substance Abuse Other         Alcohol/Drug, No hx of chemical dependency.    Diabetes Other         Diabetes    Heart Disease Other         Heart Disease    Anesthesia Reaction No family hx of     Clotting Disorder No family hx of     Bleeding Disorder No family hx of        Social History:  Marital Status:   [2]  Social History     Tobacco Use    Smoking status: Former     Current packs/day: 0.25     Average packs/day: 0.3 packs/day for 14.7 years (3.7 ttl pk-yrs)     Types: Cigars, Cigarettes     Start date: 11/4/2009    Smokeless tobacco: Never    Tobacco comments:     Quit smoking: quit 6/20/17 (cigarettes); smokes cigars 2 puffs cigar week   Vaping Use    Vaping status: Never Used   Substance Use Topics    Alcohol use: Yes     Comment: 2 vodka 3-4x week    Drug use: Yes     Types: Marijuana     Comment: Not daily 2x week        Medications:    cephALEXin (KEFLEX) 500 MG capsule  acetaminophen (TYLENOL) 325 MG tablet  aspirin (ASA) 81 MG chewable tablet  atorvastatin (LIPITOR) 80 MG tablet  colchicine (COLCRYS) 0.6 MG tablet  cyclobenzaprine (FLEXERIL) 10 MG tablet  EPINEPHrine (ANY BX GENERIC EQUIV) 0.3 MG/0.3ML injection 2-pack  gabapentin (NEURONTIN) 100 MG capsule  hydrOXYzine HCl (ATARAX) 25 MG tablet  lisinopril (ZESTRIL) 5 MG tablet  metoprolol succinate ER (TOPROL XL) 50 MG 24 hr tablet  nitroGLYcerin (NITROSTAT) 0.4 MG sublingual tablet  oxyCODONE (ROXICODONE) 5 MG tablet  pantoprazole (PROTONIX) 40 MG EC tablet  polyethylene glycol (MIRALAX) 17 GM/Dose powder  senna-docusate (SENOKOT-S/PERICOLACE) 8.6-50 MG tablet  simethicone (MYLICON) 80 MG chewable tablet          Review of Systems   All other systems reviewed  "and are negative.      Physical Exam   BP: 107/72  Pulse: 106  Temp: 98.1  F (36.7  C)  Resp: 18  Height: 176.5 cm (5' 9.5\")  Weight: 78.2 kg (172 lb 6.4 oz)  SpO2: 99 %      Physical Exam  Vitals and nursing note reviewed.   Constitutional:       Appearance: He is not toxic-appearing.   HENT:      Head: Normocephalic.   Cardiovascular:      Rate and Rhythm: Normal rate.      Pulses:           Dorsalis pedis pulses are 2+ on the right side and 2+ on the left side.   Pulmonary:      Effort: Pulmonary effort is normal.   Musculoskeletal:      Right lower leg: No edema.      Left lower leg: No edema.   Skin:     General: Skin is warm.      Capillary Refill: Capillary refill takes less than 2 seconds.      Findings: Bruising and erythema present.      Comments: See photo below.  Right anterior shin with right red skin changes, warmth, and tenderness.  No underlying crepitus.  Bruising of both lower extremities in various stages of healing.  Strong DP pulses bilaterally   Neurological:      Mental Status: He is alert.         ED Course        Procedures              Critical Care time:  none               Results for orders placed or performed during the hospital encounter of 08/03/24 (from the past 24 hour(s))   CBC with platelets differential    Narrative    The following orders were created for panel order CBC with platelets differential.  Procedure                               Abnormality         Status                     ---------                               -----------         ------                     CBC with platelets and d...[[id number]]  Abnormal            Final result                 Please view results for these tests on the individual orders.   Comprehensive metabolic panel   Result Value Ref Range    Sodium 137 135 - 145 mmol/L    Potassium 3.9 3.4 - 5.3 mmol/L    Carbon Dioxide (CO2) 25 22 - 29 mmol/L    Anion Gap 10 7 - 15 mmol/L    Urea Nitrogen 9.7 8.0 - 23.0 mg/dL    Creatinine 0.76 0.67 - 1.17 " mg/dL    GFR Estimate >90 >60 mL/min/1.73m2    Calcium 8.9 8.8 - 10.4 mg/dL    Chloride 102 98 - 107 mmol/L    Glucose 103 (H) 70 - 99 mg/dL    Alkaline Phosphatase 153 (H) 40 - 150 U/L    AST 51 (H) 0 - 45 U/L    ALT 88 (H) 0 - 70 U/L    Protein Total 6.9 6.4 - 8.3 g/dL    Albumin 3.6 3.5 - 5.2 g/dL    Bilirubin Total 0.5 <=1.2 mg/dL   CRP inflammation   Result Value Ref Range    CRP Inflammation 36.69 (H) <5.00 mg/L   Erythrocyte sedimentation rate auto   Result Value Ref Range    Erythrocyte Sedimentation Rate 48 (H) 0 - 20 mm/hr   Procalcitonin   Result Value Ref Range    Procalcitonin 0.05 <0.50 ng/mL   CBC with platelets and differential   Result Value Ref Range    WBC Count 9.3 4.0 - 11.0 10e3/uL    RBC Count 3.72 (L) 4.40 - 5.90 10e6/uL    Hemoglobin 11.9 (L) 13.3 - 17.7 g/dL    Hematocrit 34.4 (L) 40.0 - 53.0 %    MCV 93 78 - 100 fL    MCH 32.0 26.5 - 33.0 pg    MCHC 34.6 31.5 - 36.5 g/dL    RDW 14.5 10.0 - 15.0 %    Platelet Count 359 150 - 450 10e3/uL    % Neutrophils 70 %    % Lymphocytes 17 %    % Monocytes 9 %    % Eosinophils 2 %    % Basophils 1 %    % Immature Granulocytes 2 %    NRBCs per 100 WBC 0 <1 /100    Absolute Neutrophils 6.5 1.6 - 8.3 10e3/uL    Absolute Lymphocytes 1.6 0.8 - 5.3 10e3/uL    Absolute Monocytes 0.8 0.0 - 1.3 10e3/uL    Absolute Eosinophils 0.2 0.0 - 0.7 10e3/uL    Absolute Basophils 0.1 0.0 - 0.2 10e3/uL    Absolute Immature Granulocytes 0.2 <=0.4 10e3/uL    Absolute NRBCs 0.0 10e3/uL   US Lower Extremity Venous Duplex Right    Narrative    EXAM: US LOWER EXTREMITY VENOUS DUPLEX RIGHT  LOCATION: [facility]  DATE: 8/3/2024    INDICATION: Redness, pain, swelling  COMPARISON: None.  TECHNIQUE: Venous Duplex ultrasound of the right lower extremity with and without compression, augmentation and duplex. Color flow and spectral Doppler with waveform analysis performed.    FINDINGS: Exam includes the common femoral, femoral, popliteal, and  contralateral common femoral veins as well as segmentally visualized deep calf veins and greater saphenous vein.     RIGHT: No deep vein thrombosis. No superficial thrombophlebitis. No popliteal cyst.    Complex fluid collection within the soft tissues of the anterior right lower leg, reportedly deep to the patient's incision site. Complex fluid collection within the soft tissues of the anterior left lower leg, reportedly deep to the patient's incision   site.      Impression    IMPRESSION:  1.  No deep venous thrombosis in the right lower extremity.  2.  Complex fluid collections within the soft tissues of the bilateral anterior lower legs which are reportedly deep to the patient's surgical incision sites.       Medications   sodium chloride 0.9% BOLUS 1,000 mL (has no administration in time range)   fentaNYL (PF) (SUBLIMAZE) injection 75 mcg (0 mcg Intravenous Hold 8/3/24 1330)   cefTRIAXone (ROCEPHIN) 1 g vial to attach to  mL bag for ADULTS or NS 50 mL bag for PEDS (0 g Intravenous Stopped 8/3/24 1443)       Assessments & Plan (with Medical Decision Making)  Modesto is a 68-year-old male presenting with concern of redness on his right lower leg.  See history and physical exam as above  68-year-old male in no acute distress, is vitally stable and afebrile.  See photo above for area of concern.  He does have some bruising on bilateral lower extremities in various stages of healing.  His son who accompanies him has photos of restless legs from the last few days, did no significant redness noted on the right anterior shin until today.  It is quite warm to touch and is very tender.  No fluctuance or induration is appreciated.  Incisions are intact, there is no drainage or surrounding erythema of the incisions on the lower extremities.  They were well-approximated with skin glue.  Will order ultrasound to rule out DVT, but more concerned for cellulitis or other postoperative infection.  Will plan to check some  blood work, including a blood culture, and may need to give antibiotics.  Offered the patient something for pain, and he initially declined but then when discussing the planned intervention with ultrasound, he will take something for pain before imaging  Labs and imaging as above.  He does have elevated CRP and sed rate, but white blood cell count and lactic acid level are within normal limits.  Ultrasound results ruled out DVT, but was mention of complex fluid collection just deep to incision sites on bilateral lower extremities.  Favor these to represent seromas due to recent instrumentation and saphenous vein grafting rather than abscesses.  Patient was given IV Rocephin after blood culture was drawn.  Will plan to continue the patient on Keflex 4 times daily to treat cellulitis of his right lower extremity.  He needs to keep close follow-up with his primary provider, vascular surgeon, thoracic surgeon, and other specialists.  If he develops any significant new or worsening symptoms should return promptly to the ER for evaluation.  The area of concern was outlined with purple skin pen, and instructed that if the redness extends past these borders that he is to return to the ED.  He expresses understanding.  All questions for patient and son were answered.  Discharged in stable condition.     I have reviewed the nursing notes.    I have reviewed the findings, diagnosis, plan and need for follow up with the patient.           Medical Decision Making  The patient's presentation was of low complexity (an acute and uncomplicated illness or injury).    The patient's evaluation involved:  ordering and/or review of 3+ test(s) in this encounter (see separate area of note for details)    The patient's management necessitated moderate risk (prescription drug management including medications given in the ED).        Discharge Medication List as of 8/3/2024  3:24 PM        START taking these medications    Details    cephALEXin (KEFLEX) 500 MG capsule Take 1 capsule (500 mg) by mouth 4 times daily for 10 days, Disp-40 capsule, R-0, E-Prescribe             Final diagnoses:   Cellulitis of right lower extremity   Encounter for post surgical wound check   S/P CABG (coronary artery bypass graft)       8/3/2024   [facility] EMERGENCY DEPT       Jammie Farah DO  08/03/24 [id number]

## 2024-08-06 ENCOUNTER — TELEPHONE (OUTPATIENT)
Dept: CARDIOLOGY | Facility: CLINIC | Age: 69
End: 2024-08-06
Payer: COMMERCIAL

## 2024-08-07 NOTE — PROGRESS NOTES
CARDIOTHORACIC SURGERY FOLLOW-UP VISIT     Modesto Lehman   1955   [id number]      Date: 7/25/2024.  Surgeon: Dr. Kit Mason  1.  Coronary artery bypass grafting x 3               - reversed saphenous vein graft to the obtuse marginal branch of the left circumflex coronary artery              - reversed saphenous vein graft to the diagonal branch of the left anterior descending coronary artery              - pedicled left internal mammary artery to left anterior descending coronary artery  2.  Endoscopic vein harvest of the greater saphenous vein from the left lower extremity.  3. Transesophageal echocardiogram    Reason for visit: Post-op clinic visit    ASSESSMENT/PLAN:  Modesto Lehman is a 68 year old male who is status post CABG on 7/25/2024 with Dr. Mason    Patient Active Problem List   Diagnosis    DJD (degenerative joint disease)    Backache    Baker's cyst    Benign neoplasm of colon    Degeneration of lumbar or lumbosacral intervertebral disc    Family history of diabetes mellitus    Neural foraminal stenosis of cervical spine    Obesity    Plantar wart of left foot    Spinal stenosis, lumbar    Other synovitis and tenosynovitis    Tobacco user    Lyme disease    Spondylosis of thoracic region without myelopathy or radiculopathy    Hx of Lyme disease on 8/3/2020    PAD (peripheral artery disease) (H24)    Coronary artery disease of native artery of native heart with stable angina pectoris (H24)    Positive cardiac stress test on 3/5/2024    Elevated coronary artery calcium score    Left carotid stenosis        Surgically doing well. Pain is overall controlled. Midline sternal incision healing well without signs of infection. Increasing activity and strength.  Sounds to be in a  regular rhythm with HR <100 bpm.  Appears euvolemic.    Hemodynamics are stable. Medications reviewed and no changes were needed today.   Slight irritation noted to inferior sternal incision likely secondary to  friction from LifeVest strap. No evidence of infection. Patient instructed to keep dry gauze between his LifeVest and sternal incision as a barrier to prevent irritation. He was instructed that the gauze should be changed at least once daily, or more frequently if it becomes moist, damaged, or dirty.   Was recently seen in the ED on 8/3/2024 for suspected /acute Right anterior shin cellulitis after saphenous vein harvest, treating with Keflex through 8/13 or 8/14.  His leg is significantly improving - the erythema has reduced from skin markings and pain resolved. He was instructed to continue his entire course of Keflex as prescribed.  Also recently seen in the ED on 8/8/24 for upper lip edema, suspected ACE-I induced angioedema and lisinopril was stopped. This has resolved since stopping lisinopril. Lisinopril was removed from his medication list and confirmed ACE-I has been added to his allergies.  ICM, HFrEF: post-op LV EF was 30-35%. Continue LifeVest until your next ECHO to be scheduled by cardiology. GDMT for Heart failure: he is on Metoprolol succinate, lisinopril recently discontinued due to concern for angioedema. Referral sent to Cardiology [facility] clinic - patient should have close follow up within the next 2-3 weeks for ongoing monitoring/up-titration of GDMT.  Pericarditis: continue colchicine until seen by your Cardiologist.  Follow up:  Follow up with your PCP as scheduled on 8/13  Follow up with Cardiology within the next 2-3 weeks. Placed referral for heart failure (CORE) Cardiology follow up. Hopefully patient can follow close to his home in [place].   Follow up with vascular surgery as scheduled on 8/12  Continue Outpatient Cardiac Rehab until completed.   Continue sternal precautions for 12 weeks from surgery date.   No driving for 4 weeks from surgery date.     Postoperative restrictions have been reviewed and all of the patient's questions were answered. Our contact information was given to  the patient if he should have any further questions/concerns. No further follow up is needed with us.  The total time spent with the patient was 25 minutes, > 50% of which was spent in counseling and coordination of care.    Jackelyn Chun PA-C  3:17 PM  2024  Cardiovascular Surgery  __________________________________________________________________________________    HPI:   Modesto Lehman is a 68 year old male with a PMH of CAD, CHFrEF (30-35%), HLD, PAD (s/p bilateral popliteal bypass), Carotid disease (s/p  and chronic occlusion of DARIUSZ), chronic back pain who is now s/p planned CABG x3 on 2024 with Dr. Mason. Hospital course was remarkable for post-op ileus and significant gastric distension requiring NG tube placement. His symptoms improved, he had return of normal bowel function and was tolerating regular diet at the time of discharge. Vascular surgery saw patient a day prior to discharge given his recent carotid endarterectomy and outpatient follow up was arranged. His baseline post-operative echo was unremarkable other than LVEF of 30-35% which is stable from his pre-op echo. Lifevest was arranged at discharge. He was also treated with colchicine for presumed pericarditis.   He was discharged home on 24.    On 8/3 he presented to the ED with leg pain and redness and was treated for cellulitis of his right lower extremity with Keflex. US without DVT, showed fluid collection felt to be secondary to seroma from recent vein harvest.   Returned to ED on 24 with complaints of upper lip edema, suspected ACE-I induced angioedema and lisinopril was stopped.   Modesto has otherwise been doing well since discharge and now returns to clinic for postop visit.  Not having much pain, taking Tylenol and Robaxin once in a while for incisional pain and chronic back pain. Has not been needing oxycodone.    Patient reports incision is healing well.  Denies sternal popping or clicking or  incisional drainage/erythema. He had been putting dry gauze between his LifeVest and his sternal incision to protect the incision but states he stopped doing this because he was told to leave his incision open to air. He is now concerned that his sternal incision is getting irritated.   Patient denies any fever, chills, chest pain, palpitations, edema, SOB, lightheadedness, nausea, and vomiting.    Patient is having normal bowel movements and voiding without problems.  Has not yet started cardiac rehab but plans to close to his home in [place]. He doesn't think he has been called to arrange this yet.  Weight has been stable overall, not fluctuating by more than 2 lb.    Blood pressures have been <120 SBP at home. HR <100 at home.   He reports the swelling in his lower lip has completely resolved since his ED visit yesterday. Reports the redness and swelling in his leg has significantly improved with taking Keflex.    ROS:  Review of symptoms otherwise negative unless commented about in HPI.     LABS:    CBC RESULTS:   Recent Labs   Lab Test 08/08/24  1228 08/03/24  1249 07/31/24  0548   WBC 9.5 9.3 9.5   HGB 11.8* 11.9* 10.6*   HCT 34.7* 34.4* 30.7*   * 359 204       CMP RESULTS:  Recent Labs   Lab Test 08/08/24  1228 08/03/24  1249 07/31/24  1310 07/31/24  1227 07/31/24  0548 07/30/24  0807 07/30/24  0510    137  --   --  135  --  136   POTASSIUM 4.3 3.9  --  3.9 3.4  --  3.6  3.6   CHLORIDE 102 102  --   --  102  --  100   CO2 27 25  --   --  22  --  27   ANIONGAP 10 10  --   --  11  --  9   * 103* 102*  --  107*   < > 163*   BUN 9.9 9.7  --   --  10.8  --  9.3   CR 0.75 0.76  --   --  0.60*  --  0.64*   GFRESTIMATED >90 >90  --   --  >90  --  >90   DANE 9.0 8.9  --   --  7.9*  --  7.9*   BILITOTAL 0.4 0.5  --   --   --   --  0.5   ALKPHOS 149 153*  --   --   --   --  86   ALT 44 88*  --   --   --   --  50   AST 29 51*  --   --   --   --  58*    < > = values in this interval not displayed.      Recent Labs   Lab Test 07/25/24  1837 07/25/24  1749 07/08/24  1302 05/01/24  0859   INR 1.24* 1.43* 1.02 1.06       IMAGING:  None    PHYSICAL EXAM:   /72 (BP Location: Right arm, Patient Position: Chair, Cuff Size: Adult Regular)   Pulse 94   Wt 77.6 kg (171 lb 1.6 oz)   SpO2 96%   BMI 24.90 kg/m      General: alert and oriented x 3, pleasant, no acute distress, normal mood and affect  Neuro: no focal deficits   CV: S1 S2, no murmurs, rubs or gallops, regular rate and rhythm, no peripheral edema  Pulm: bilateral breath sounds, clear to auscultation, easy work of breathing  Incision: sternal and vein harvest incisions clean dry and intact without erythema, swelling or drainage. Erythema to right shin significantly improved (compared to picture patient showed me). Minimal reactive erythema to lower part of sternal incision where LifeVest rubs against incision; no induration, fluctuance or drainage.     PROCEDURES:  None       CURRENT MEDICATIONS:   Current Outpatient Medications   Medication Sig Dispense Refill    acetaminophen (TYLENOL) 325 MG tablet Take 2 tablets (650 mg) by mouth every 4 hours (while awake) 50 tablet 0    aspirin (ASA) 81 MG chewable tablet Take 2 tablets (162 mg) by mouth or NG Tube daily 60 tablet 0    atorvastatin (LIPITOR) 80 MG tablet Take 1 tablet (80 mg) by mouth daily (Patient taking differently: Take 80 mg by mouth at bedtime) 90 tablet 3    cephALEXin (KEFLEX) 500 MG capsule Take 1 capsule (500 mg) by mouth 4 times daily for 10 days 40 capsule 0    colchicine (COLCRYS) 0.6 MG tablet Take 1 tablet (0.6 mg) by mouth or Feeding Tube 2 times daily 60 tablet 0    cyclobenzaprine (FLEXERIL) 10 MG tablet Take 1 tablet (10 mg) by mouth 3 times daily as needed for muscle spasms 50 tablet 0    EPINEPHrine (ANY BX GENERIC EQUIV) 0.3 MG/0.3ML injection 2-pack Inject 0.3 mLs (0.3 mg) into the muscle once as needed for anaphylaxis 1 each 0    gabapentin (NEURONTIN) 100 MG capsule Take  100 mg by mouth 3 times daily as needed (Pain)      hydrOXYzine HCl (ATARAX) 25 MG tablet Take 1 tablet (25 mg) by mouth every 6 hours as needed for other or anxiety (adjuvant pain) 30 tablet 0    metoprolol succinate ER (TOPROL XL) 50 MG 24 hr tablet Take 1 tablet (50 mg) by mouth daily 30 tablet 0    nitroGLYcerin (NITROSTAT) 0.4 MG sublingual tablet For chest pain place 1 tablet under the tongue every 5 minutes for 3 doses. If symptoms persist 5 minutes after 1st dose call 911. 25 tablet 3    oxyCODONE (ROXICODONE) 5 MG tablet Take 1 tablet (5 mg) by mouth every 6 hours as needed for moderate pain 10 tablet 0    pantoprazole (PROTONIX) 40 MG EC tablet Take 1 tablet (40 mg) by mouth daily For 30 days, then discontinue 30 tablet 0    polyethylene glycol (MIRALAX) 17 GM/Dose powder Take 17 g by mouth daily 510 g 0    senna-docusate (SENOKOT-S/PERICOLACE) 8.6-50 MG tablet Take 2 tablets by mouth 2 times daily as needed for constipation 50 tablet 0    simethicone (MYLICON) 80 MG chewable tablet Take 1 tablet (80 mg) by mouth every 6 hours as needed for cramping 30 tablet 0     No current facility-administered medications for this visit.       CC  Physician No Ref-Primary

## 2024-08-08 ENCOUNTER — HOSPITAL ENCOUNTER (EMERGENCY)
Facility: CLINIC | Age: 69
Discharge: HOME | End: 2024-08-08
Attending: STUDENT IN AN ORGANIZED HEALTH CARE EDUCATION/TRAINING PROGRAM | Admitting: STUDENT IN AN ORGANIZED HEALTH CARE EDUCATION/TRAINING PROGRAM
Payer: MEDICARE

## 2024-08-08 ENCOUNTER — TELEPHONE (OUTPATIENT)
Dept: CARDIOLOGY | Facility: CLINIC | Age: 69
End: 2024-08-08
Payer: COMMERCIAL

## 2024-08-08 VITALS
DIASTOLIC BLOOD PRESSURE: 78 MMHG | SYSTOLIC BLOOD PRESSURE: 123 MMHG | RESPIRATION RATE: 18 BRPM | WEIGHT: 172 LBS | HEART RATE: 99 BPM | BODY MASS INDEX: 25.04 KG/M2 | TEMPERATURE: 97.8 F | OXYGEN SATURATION: 96 %

## 2024-08-08 DIAGNOSIS — T46.4X5A ANGIOTENSIN CONVERTING ENZYME INHIBITOR-AGGRAVATED ANGIOEDEMA, INITIAL ENCOUNTER: ICD-10-CM

## 2024-08-08 DIAGNOSIS — T78.3XXA ANGIOTENSIN CONVERTING ENZYME INHIBITOR-AGGRAVATED ANGIOEDEMA, INITIAL ENCOUNTER: ICD-10-CM

## 2024-08-08 DIAGNOSIS — R22.0 LIP SWELLING: ICD-10-CM

## 2024-08-08 LAB
ALBUMIN SERPL BCG-MCNC: 3.8 G/DL (ref 3.5–5.2)
ALP SERPL-CCNC: 149 U/L (ref 40–150)
ALT SERPL W P-5'-P-CCNC: 44 U/L (ref 0–70)
ANION GAP SERPL CALCULATED.3IONS-SCNC: 10 MMOL/L (ref 7–15)
AST SERPL W P-5'-P-CCNC: 29 U/L (ref 0–45)
BACTERIA BLD CULT: NO GROWTH
BASOPHILS # BLD AUTO: 0 10E3/UL (ref 0–0.2)
BASOPHILS NFR BLD AUTO: 0 %
BILIRUB SERPL-MCNC: 0.4 MG/DL
BUN SERPL-MCNC: 9.9 MG/DL (ref 8–23)
CALCIUM SERPL-MCNC: 9 MG/DL (ref 8.8–10.4)
CHLORIDE SERPL-SCNC: 102 MMOL/L (ref 98–107)
CREAT SERPL-MCNC: 0.75 MG/DL (ref 0.67–1.17)
CRP SERPL-MCNC: 11.29 MG/L
EGFRCR SERPLBLD CKD-EPI 2021: >90 ML/MIN/1.73M2
EOSINOPHIL # BLD AUTO: 0.1 10E3/UL (ref 0–0.7)
EOSINOPHIL NFR BLD AUTO: 1 %
ERYTHROCYTE [DISTWIDTH] IN BLOOD BY AUTOMATED COUNT: 14.4 % (ref 10–15)
ERYTHROCYTE [SEDIMENTATION RATE] IN BLOOD BY WESTERGREN METHOD: 41 MM/HR (ref 0–20)
GLUCOSE SERPL-MCNC: 119 MG/DL (ref 70–99)
HCO3 SERPL-SCNC: 27 MMOL/L (ref 22–29)
HCT VFR BLD AUTO: 34.7 % (ref 40–53)
HGB BLD-MCNC: 11.8 G/DL (ref 13.3–17.7)
IMM GRANULOCYTES # BLD: 0 10E3/UL
IMM GRANULOCYTES NFR BLD: 0 %
LYMPHOCYTES # BLD AUTO: 1.5 10E3/UL (ref 0.8–5.3)
LYMPHOCYTES NFR BLD AUTO: 16 %
MCH RBC QN AUTO: 31.6 PG (ref 26.5–33)
MCHC RBC AUTO-ENTMCNC: 34 G/DL (ref 31.5–36.5)
MCV RBC AUTO: 93 FL (ref 78–100)
MONOCYTES # BLD AUTO: 0.6 10E3/UL (ref 0–1.3)
MONOCYTES NFR BLD AUTO: 6 %
NEUTROPHILS # BLD AUTO: 7.2 10E3/UL (ref 1.6–8.3)
NEUTROPHILS NFR BLD AUTO: 76 %
NRBC # BLD AUTO: 0 10E3/UL
NRBC BLD AUTO-RTO: 0 /100
PLATELET # BLD AUTO: 455 10E3/UL (ref 150–450)
POTASSIUM SERPL-SCNC: 4.3 MMOL/L (ref 3.4–5.3)
PROT SERPL-MCNC: 6.8 G/DL (ref 6.4–8.3)
RBC # BLD AUTO: 3.73 10E6/UL (ref 4.4–5.9)
SODIUM SERPL-SCNC: 139 MMOL/L (ref 135–145)
WBC # BLD AUTO: 9.5 10E3/UL (ref 4–11)

## 2024-08-08 PROCEDURE — 85025 COMPLETE CBC W/AUTO DIFF WBC: CPT | Performed by: STUDENT IN AN ORGANIZED HEALTH CARE EDUCATION/TRAINING PROGRAM

## 2024-08-08 PROCEDURE — 96375 TX/PRO/DX INJ NEW DRUG ADDON: CPT

## 2024-08-08 PROCEDURE — 99284 EMERGENCY DEPT VISIT MOD MDM: CPT | Performed by: STUDENT IN AN ORGANIZED HEALTH CARE EDUCATION/TRAINING PROGRAM

## 2024-08-08 PROCEDURE — 99284 EMERGENCY DEPT VISIT MOD MDM: CPT | Mod: 25

## 2024-08-08 PROCEDURE — 96361 HYDRATE IV INFUSION ADD-ON: CPT

## 2024-08-08 PROCEDURE — 86140 C-REACTIVE PROTEIN: CPT | Performed by: STUDENT IN AN ORGANIZED HEALTH CARE EDUCATION/TRAINING PROGRAM

## 2024-08-08 PROCEDURE — 96374 THER/PROPH/DIAG INJ IV PUSH: CPT

## 2024-08-08 PROCEDURE — 250N000011 HC RX IP 250 OP 636: Performed by: STUDENT IN AN ORGANIZED HEALTH CARE EDUCATION/TRAINING PROGRAM

## 2024-08-08 PROCEDURE — 85652 RBC SED RATE AUTOMATED: CPT | Performed by: STUDENT IN AN ORGANIZED HEALTH CARE EDUCATION/TRAINING PROGRAM

## 2024-08-08 PROCEDURE — 36415 COLL VENOUS BLD VENIPUNCTURE: CPT | Performed by: STUDENT IN AN ORGANIZED HEALTH CARE EDUCATION/TRAINING PROGRAM

## 2024-08-08 PROCEDURE — 258N000003 HC RX IP 258 OP 636: Performed by: STUDENT IN AN ORGANIZED HEALTH CARE EDUCATION/TRAINING PROGRAM

## 2024-08-08 PROCEDURE — 80053 COMPREHEN METABOLIC PANEL: CPT | Performed by: STUDENT IN AN ORGANIZED HEALTH CARE EDUCATION/TRAINING PROGRAM

## 2024-08-08 RX ORDER — DIPHENHYDRAMINE HYDROCHLORIDE 50 MG/ML
25 INJECTION INTRAMUSCULAR; INTRAVENOUS ONCE
Status: COMPLETED | OUTPATIENT
Start: 2024-08-08 | End: 2024-08-08

## 2024-08-08 RX ORDER — METHYLPREDNISOLONE SODIUM SUCCINATE 125 MG/2ML
125 INJECTION, POWDER, LYOPHILIZED, FOR SOLUTION INTRAMUSCULAR; INTRAVENOUS ONCE
Status: COMPLETED | OUTPATIENT
Start: 2024-08-08 | End: 2024-08-08

## 2024-08-08 RX ADMIN — METHYLPREDNISOLONE SODIUM SUCCINATE 125 MG: 125 INJECTION, POWDER, FOR SOLUTION INTRAMUSCULAR; INTRAVENOUS at 12:30

## 2024-08-08 RX ADMIN — DIPHENHYDRAMINE HYDROCHLORIDE 25 MG: 50 INJECTION, SOLUTION INTRAMUSCULAR; INTRAVENOUS at 12:30

## 2024-08-08 RX ADMIN — SODIUM CHLORIDE 1000 ML: 9 INJECTION, SOLUTION INTRAVENOUS at 12:29

## 2024-08-08 ASSESSMENT — ACTIVITIES OF DAILY LIVING (ADL)
ADLS_ACUITY_SCORE: 38

## 2024-08-08 ASSESSMENT — COLUMBIA-SUICIDE SEVERITY RATING SCALE - C-SSRS
2. HAVE YOU ACTUALLY HAD ANY THOUGHTS OF KILLING YOURSELF IN THE PAST MONTH?: NO
1. IN THE PAST MONTH, HAVE YOU WISHED YOU WERE DEAD OR WISHED YOU COULD GO TO SLEEP AND NOT WAKE UP?: NO
6. HAVE YOU EVER DONE ANYTHING, STARTED TO DO ANYTHING, OR PREPARED TO DO ANYTHING TO END YOUR LIFE?: NO

## 2024-08-08 NOTE — ED PROVIDER NOTES
History     Chief Complaint   Patient presents with    Oral Swelling     HPI  Modesto Lehman is a 68 year old male with complex medical history including peripheral artery disease, coronary disease with recent bypass, Lyme disease who presents for evaluation of lower lip swelling.  Patient has had a complex recent medical history, including hospitalization at the end of July for four-vessel CABG at the [facility].  He was started on several new medications including a beta-blocker and lisinopril.  Then he was also seen in the emergency department on 8/3 for leg swelling and possible infection.  Workup at that time showed some bruising and erythema, labs showed moderately elevated inflammatory markers but normal white count and negative procalcitonin.  DVT study was negative but did show some complex fluid collections under the surgical incision sites.  Patient was ultimately discharged home on Keflex and has been taking this medication ever since.  Then this morning he noticed a small amount of swelling to his right lower lip that has progressively grown in size despite taking a dose of Zyrtec.  Symptoms started around 9 AM.  He describes feeling mildly short of breath but otherwise denies wheezing, lightheadedness, fever, rash, other complaints today.    Allergies:  No Known Allergies    Problem List:    Patient Active Problem List    Diagnosis Date Noted    Left carotid stenosis 06/25/2024     Priority: Medium    Hx of Lyme disease on 8/3/2020 04/11/2024     Priority: Medium    PAD (peripheral artery disease) (H24) 04/11/2024     Priority: Medium    Coronary artery disease of native artery of native heart with stable angina pectoris (H24) 04/11/2024     Priority: Medium    Positive cardiac stress test on 3/5/2024 04/11/2024     Priority: Medium    Elevated coronary artery calcium score 04/11/2024     Priority: Medium     At 2015.1 on 3/5/2024      Spondylosis of thoracic region without  myelopathy or radiculopathy 02/16/2024     Priority: Medium    Lyme disease 07/31/2020     Priority: Medium    Family history of diabetes mellitus 01/30/2018     Priority: Medium    Neural foraminal stenosis of cervical spine 05/24/2016     Priority: Medium    Plantar wart of left foot 04/02/2014     Priority: Medium    Other synovitis and tenosynovitis 08/02/2012     Priority: Medium    Denis's cyst 02/29/2012     Priority: Medium    Backache 11/07/2011     Priority: Medium    Spinal stenosis, lumbar 12/21/2010     Priority: Medium    DJD (degenerative joint disease) 12/08/2010     Priority: Medium    Benign neoplasm of colon 09/06/2010     Priority: Medium    Tobacco user 09/28/2009     Priority: Medium    Obesity 11/02/2005     Priority: Medium    Degeneration of lumbar or lumbosacral intervertebral disc 12/16/1997     Priority: Medium      Past Medical History:    Past Medical History:   Diagnosis Date    Allergy status to analgesic agent     Anesthesia of skin     CAD (coronary artery disease)     Elevated coronary artery calcium score     History of recurrent pneumonia     Hyperlipidemia     Low back pain     Personal history of nicotine dependence     Personal history of other medical treatment (CODE)     PVD (peripheral vascular disease) (H24)      Past Surgical History:    Past Surgical History:   Procedure Laterality Date    ANGIOPLASTY      Right leg stenting and bypass, Left also    BYPASS GRAFT ARTERY CORONARY N/A 7/25/2024    Procedure: Median Sternotomy, On Cardiopulmonary Bypass Graft, CORONARY ARTERY BYPASS GRAFT X_3_, Left AND RIGHTGreater Saphenous Vein [place], Left Internal Mammary Artery [place], Intra-operative Transesophageal Echocardiogram per Anesthesia.;  Surgeon: Kit Mason MD;  Location: [facility] OR    COLONOSCOPY  08/23/2010    Q 10yrs.    COLONOSCOPY N/A 02/24/2022    6 tubular adenomas, 5 year follow up, 2/24/2027    CV CORONARY ANGIOGRAM N/A 4/15/2024    Procedure: Coronary  Angiogram;  Surgeon: Toin Sellers MD;  Location:  [facility]    ENDARTERECTOMY CAROTID Left 6/25/2024    Procedure: Left Carotid Endarterectomy with bovine pericardial patch angioplasty. Intraoperative Ultrasound. Intraoperative EEG monitoring;  Surgeon: Rasheed Sam MD;  Location:  OR    EXTRACTION(S) DENTAL      recently removed    OTHER SURGICAL HISTORY      [id number],PREMALIG/BENIGN SKIN LESION EXCISION, removed from chest    OTHER SURGICAL HISTORY      [id number],ANESTHESIA ALERT,No  problems with anaesthesia     Family History:    Family History   Problem Relation Age of Onset    Diabetes Mother         Diabetes    Heart Disease Mother         Heart Disease    Other - See Comments Father         COPD, CD    Heart Disease Brother         Heart Disease    Other - See Comments Brother         killed by drunk  at age 19.    Substance Abuse Other         Alcohol/Drug, No hx of chemical dependency.    Diabetes Other         Diabetes    Heart Disease Other         Heart Disease    Anesthesia Reaction No family hx of     Clotting Disorder No family hx of     Bleeding Disorder No family hx of      Social History:  Marital Status:   [2]  Social History     Tobacco Use    Smoking status: Former     Current packs/day: 0.25     Average packs/day: 0.3 packs/day for 14.8 years (3.7 ttl pk-yrs)     Types: Cigars, Cigarettes     Start date: 11/4/2009    Smokeless tobacco: Never    Tobacco comments:     Quit smoking: quit 6/20/17 (cigarettes); smokes cigars 2 puffs cigar week   Vaping Use    Vaping status: Never Used   Substance Use Topics    Alcohol use: Yes     Comment: 2 vodka 3-4x week    Drug use: Yes     Types: Marijuana     Comment: Not daily 2x week      Medications:    acetaminophen (TYLENOL) 325 MG tablet  aspirin (ASA) 81 MG chewable tablet  atorvastatin (LIPITOR) 80 MG tablet  cephALEXin (KEFLEX) 500 MG capsule  colchicine (COLCRYS) 0.6 MG tablet  cyclobenzaprine (FLEXERIL)  10 MG tablet  EPINEPHrine (ANY BX GENERIC EQUIV) 0.3 MG/0.3ML injection 2-pack  gabapentin (NEURONTIN) 100 MG capsule  hydrOXYzine HCl (ATARAX) 25 MG tablet  lisinopril (ZESTRIL) 5 MG tablet  metoprolol succinate ER (TOPROL XL) 50 MG 24 hr tablet  nitroGLYcerin (NITROSTAT) 0.4 MG sublingual tablet  oxyCODONE (ROXICODONE) 5 MG tablet  pantoprazole (PROTONIX) 40 MG EC tablet  polyethylene glycol (MIRALAX) 17 GM/Dose powder  senna-docusate (SENOKOT-S/PERICOLACE) 8.6-50 MG tablet  simethicone (MYLICON) 80 MG chewable tablet      Review of Systems   All other systems reviewed and are negative.  See HPI.    Physical Exam   BP: 116/73  Pulse: 98  Temp: 97.8  F (36.6  C)  Resp: 18  Weight: 78 kg (172 lb)  SpO2: 98 %    Physical Exam  Vitals and nursing note reviewed.   Constitutional:       General: He is not in acute distress.     Appearance: Normal appearance. He is not toxic-appearing.      Comments: Sitting comfortably on exam bed, speaking in full sentences.  No distress.   HENT:      Head: Normocephalic and atraumatic.      Nose: Nose normal.      Mouth/Throat:      Mouth: Mucous membranes are moist.      Pharynx: Oropharynx is clear.      Comments: Moderate swelling to the right lower lip.  No tongue involvement.  Remainder of oropharynx is unremarkable.  No stridor.  Eyes:      General: No scleral icterus.     Extraocular Movements: Extraocular movements intact.      Conjunctiva/sclera: Conjunctivae normal.      Pupils: Pupils are equal, round, and reactive to light.   Cardiovascular:      Rate and Rhythm: Normal rate and regular rhythm.      Pulses: Normal pulses.      Heart sounds: Normal heart sounds. No murmur heard.  Pulmonary:      Effort: Pulmonary effort is normal. No respiratory distress.      Breath sounds: Normal breath sounds. No wheezing.      Comments: Speaking in full sentences.  Clear to auscultation, no wheezing.  Abdominal:      Palpations: Abdomen is soft.      Tenderness: There is no abdominal  tenderness.   Musculoskeletal:         General: No tenderness or deformity. Normal range of motion.      Cervical back: Normal range of motion and neck supple. No tenderness.   Skin:     General: Skin is warm.      Capillary Refill: Capillary refill takes less than 2 seconds.      Coloration: Skin is not pale.      Findings: No erythema or rash.   Neurological:      General: No focal deficit present.      Mental Status: He is alert and oriented to person, place, and time.   Psychiatric:         Mood and Affect: Mood normal.       ED Course        Procedures            Results for orders placed or performed during the hospital encounter of 08/08/24 (from the past 24 hour(s))   CBC with platelets differential    Narrative    The following orders were created for panel order CBC with platelets differential.  Procedure                               Abnormality         Status                     ---------                               -----------         ------                     CBC with platelets and d...[[id number]]  Abnormal            Final result                 Please view results for these tests on the individual orders.   Comprehensive metabolic panel   Result Value Ref Range    Sodium 139 135 - 145 mmol/L    Potassium 4.3 3.4 - 5.3 mmol/L    Carbon Dioxide (CO2) 27 22 - 29 mmol/L    Anion Gap 10 7 - 15 mmol/L    Urea Nitrogen 9.9 8.0 - 23.0 mg/dL    Creatinine 0.75 0.67 - 1.17 mg/dL    GFR Estimate >90 >60 mL/min/1.73m2    Calcium 9.0 8.8 - 10.4 mg/dL    Chloride 102 98 - 107 mmol/L    Glucose 119 (H) 70 - 99 mg/dL    Alkaline Phosphatase 149 40 - 150 U/L    AST 29 0 - 45 U/L    ALT 44 0 - 70 U/L    Protein Total 6.8 6.4 - 8.3 g/dL    Albumin 3.8 3.5 - 5.2 g/dL    Bilirubin Total 0.4 <=1.2 mg/dL   CRP inflammation   Result Value Ref Range    CRP Inflammation 11.29 (H) <5.00 mg/L   Erythrocyte sedimentation rate auto   Result Value Ref Range    Erythrocyte Sedimentation Rate 41 (H) 0 - 20 mm/hr   CBC with  platelets and differential   Result Value Ref Range    WBC Count 9.5 4.0 - 11.0 10e3/uL    RBC Count 3.73 (L) 4.40 - 5.90 10e6/uL    Hemoglobin 11.8 (L) 13.3 - 17.7 g/dL    Hematocrit 34.7 (L) 40.0 - 53.0 %    MCV 93 78 - 100 fL    MCH 31.6 26.5 - 33.0 pg    MCHC 34.0 31.5 - 36.5 g/dL    RDW 14.4 10.0 - 15.0 %    Platelet Count 455 (H) 150 - 450 10e3/uL    % Neutrophils 76 %    % Lymphocytes 16 %    % Monocytes 6 %    % Eosinophils 1 %    % Basophils 0 %    % Immature Granulocytes 0 %    NRBCs per 100 WBC 0 <1 /100    Absolute Neutrophils 7.2 1.6 - 8.3 10e3/uL    Absolute Lymphocytes 1.5 0.8 - 5.3 10e3/uL    Absolute Monocytes 0.6 0.0 - 1.3 10e3/uL    Absolute Eosinophils 0.1 0.0 - 0.7 10e3/uL    Absolute Basophils 0.0 0.0 - 0.2 10e3/uL    Absolute Immature Granulocytes 0.0 <=0.4 10e3/uL    Absolute NRBCs 0.0 10e3/uL       Medications   sodium chloride 0.9% BOLUS 1,000 mL (0 mLs Intravenous Stopped 8/8/24 1420)   methylPREDNISolone sodium succinate (solu-MEDROL) injection 125 mg (125 mg Intravenous $Given 8/8/24 1230)   diphenhydrAMINE (BENADRYL) injection 25 mg (25 mg Intravenous $Given 8/8/24 1230)       Assessments & Plan (with Medical Decision Making)     I have reviewed the nursing notes.    I have reviewed the findings, diagnosis, plan and need for follow up with the patient.  Medical Decision Making  Modesto Lehman is a 68 year old male with complex medical history including peripheral artery disease, coronary disease with recent bypass, Lyme disease who presents for evaluation of lower lip swelling.  Normal vitals on arrival including O2 saturation of 98% on room air.  Significant for moderate localized swelling to the right lower lip with no other tongue/mouth involvement.  Lungs are clear to auscultation and he has no rash.  Although patient is currently taking Keflex, he has been doing so for several days with no symptoms.  I think the more likely cause of his swelling is recently starting lisinopril  at the beginning of the month.  No current signs of anaphylaxis outside of lip swelling and with medication changes just described, very likely representative of ACE inhibitor angioedema.  Will still treat with steroids and Benadryl, then monitor in the ED.    Labs are reassuring.  Mild anemia, no leukocytosis.  He does still have mildly elevated inflammatory markers but these are improved compared to recent visit.  The swelling of his lip seemed to improve a bit with Benadryl/steroids, and certainly did not progress while he was here in the emergency department.  He was observed for over 3 hours without complication.  I probably would have monitored him for a bit longer in the setting of new angioedema, but he was adamant about wanting to go home.  I still think this is very likely due to recently starting lisinopril and not due to his Keflex.  Patient and son have already reached out to his PCP/cardiologist and they instructed him to discontinue this immediately.  They will follow-up as scheduled in clinic over the next several days and agree to return immediately for any new or worsening symptoms.    Discharge Medication List as of 8/8/2024  2:23 PM        Final diagnoses:   Angiotensin converting enzyme inhibitor-aggravated angioedema, initial encounter   Lip swelling     8/8/2024   [facility] EMERGENCY DEPT       Efrain Stanley MD  08/08/24 1476

## 2024-08-08 NOTE — TELEPHONE ENCOUNTER
[facility] received a message about patient having new lip swelling after starting an antibiotic. [facility] recommended patient got to ER for potential allergic reaction. Patient verbalized understanding and will present to the nearest ER.

## 2024-08-08 NOTE — DISCHARGE INSTRUCTIONS
I think the swelling in your lip is due to a side effect of one of your medications (lisinopril).  I do not think the swelling is related to your antibiotic at all.    Discontinue the lisinopril immediately.  I think you are safe to continue the antibiotic/Keflex, but monitor all of your symptoms very closely.    You can use Benadryl to help with swelling.  Follow-up with your primary team as soon as possible.  Return to the emergency department immediately for any new or worsening symptoms, including worsened lip swelling, rash, any difficulty breathing.

## 2024-08-08 NOTE — ED TRIAGE NOTES
Pt presents with concern of swollen lip. Pt recently seen in ER and on antibiotics. Concern for reaction.

## 2024-08-09 ENCOUNTER — OFFICE VISIT (OUTPATIENT)
Dept: CARDIOLOGY | Facility: CLINIC | Age: 69
End: 2024-08-09
Attending: THORACIC SURGERY (CARDIOTHORACIC VASCULAR SURGERY)
Payer: MEDICARE

## 2024-08-09 VITALS
HEART RATE: 94 BPM | DIASTOLIC BLOOD PRESSURE: 72 MMHG | WEIGHT: 171.1 LBS | BODY MASS INDEX: 24.9 KG/M2 | OXYGEN SATURATION: 96 % | SYSTOLIC BLOOD PRESSURE: 110 MMHG

## 2024-08-09 DIAGNOSIS — Z95.1 S/P CABG (CORONARY ARTERY BYPASS GRAFT): Primary | ICD-10-CM

## 2024-08-09 PROCEDURE — 99024 POSTOP FOLLOW-UP VISIT: CPT | Performed by: PHYSICIAN ASSISTANT

## 2024-08-09 PROCEDURE — G0463 HOSPITAL OUTPT CLINIC VISIT: HCPCS

## 2024-08-09 ASSESSMENT — PAIN SCALES - GENERAL: PAINLEVEL: NO PAIN (0)

## 2024-08-09 NOTE — LETTER
8/9/2024      RE: Modesto Lehman  [address]  [facility] [address]       Dear Colleague,    Thank you for the opportunity to participate in the care of your patient, Modesto Lehman, at the [facility]. Please see a copy of my visit note below.    CARDIOTHORACIC SURGERY FOLLOW-UP VISIT     Modesto Lehman   1955   [id number]      Date: 7/25/2024.  Surgeon: Dr. Kit Mason  1.  Coronary artery bypass grafting x 3               - reversed saphenous vein graft to the obtuse marginal branch of the left circumflex coronary artery              - reversed saphenous vein graft to the diagonal branch of the left anterior descending coronary artery              - pedicled left internal mammary artery to left anterior descending coronary artery  2.  Endoscopic vein harvest of the greater saphenous vein from the left lower extremity.  3. Transesophageal echocardiogram    Reason for visit: Post-op clinic visit    ASSESSMENT/PLAN:  Modesto Lehman is a 68 year old male who is status post CABG on 7/25/2024 with Dr. Mason    Patient Active Problem List   Diagnosis     DJD (degenerative joint disease)     Backache     Baker's cyst     Benign neoplasm of colon     Degeneration of lumbar or lumbosacral intervertebral disc     Family history of diabetes mellitus     Neural foraminal stenosis of cervical spine     Obesity     Plantar wart of left foot     Spinal stenosis, lumbar     Other synovitis and tenosynovitis     Tobacco user     Lyme disease     Spondylosis of thoracic region without myelopathy or radiculopathy     Hx of Lyme disease on 8/3/2020     PAD (peripheral artery disease) (H24)     Coronary artery disease of native artery of native heart with stable angina pectoris (H24)     Positive cardiac stress test on 3/5/2024     Elevated coronary artery calcium score     Left carotid stenosis        Surgically doing  well. Pain is overall controlled. Midline sternal incision healing well without signs of infection. Increasing activity and strength.  Sounds to be in a  regular rhythm with HR <100 bpm.  Appears euvolemic.    Hemodynamics are stable. Medications reviewed and no changes were needed today.   Slight irritation noted to inferior sternal incision likely secondary to friction from LifeVest strap. No evidence of infection. Patient instructed to keep dry gauze between his LifeVest and sternal incision as a barrier to prevent irritation. He was instructed that the gauze should be changed at least once daily, or more frequently if it becomes moist, damaged, or dirty.   Was recently seen in the ED on 8/3/2024 for suspected /acute Right anterior shin cellulitis after saphenous vein harvest, treating with Keflex through 8/13 or 8/14.  His leg is significantly improving - the erythema has reduced from skin markings and pain resolved. He was instructed to continue his entire course of Keflex as prescribed.  Also recently seen in the ED on 8/8/24 for upper lip edema, suspected ACE-I induced angioedema and lisinopril was stopped. This has resolved since stopping lisinopril. Lisinopril was removed from his medication list and confirmed ACE-I has been added to his allergies.  ICM, HFrEF: post-op LV EF was 30-35%. Continue LifeVest until your next ECHO to be scheduled by cardiology. GDMT for Heart failure: he is on Metoprolol succinate, lisinopril recently discontinued due to concern for angioedema. Referral sent to Cardiology C[facility] clinic - patient should have close follow up within the next 2-3 weeks for ongoing monitoring/up-titration of GDMT.  Pericarditis: continue colchicine until seen by your Cardiologist.  Follow up:  Follow up with your PCP as scheduled on 8/13  Follow up with Cardiology within the next 2-3 weeks. Placed referral for heart failure (CORE) Cardiology follow up. Hopefully patient can follow close to his home in  [place].   Follow up with vascular surgery as scheduled on   Continue Outpatient Cardiac Rehab until completed.   Continue sternal precautions for 12 weeks from surgery date.   No driving for 4 weeks from surgery date.     Postoperative restrictions have been reviewed and all of the patient's questions were answered. Our contact information was given to the patient if he should have any further questions/concerns. No further follow up is needed with us.  The total time spent with the patient was 25 minutes, > 50% of which was spent in counseling and coordination of care.    Jackelyn Chun PA-C  3:17 PM  2024  Cardiovascular Surgery  __________________________________________________________________________________    HPI:   Modesto Lehman is a 68 year old male with a PMH of CAD, CHFrEF (30-35%), HLD, PAD (s/p bilateral popliteal bypass), Carotid disease (s/p  and chronic occlusion of DARIUSZ), chronic back pain who is now s/p planned CABG x3 on 2024 with Dr. Mason. Hospital course was remarkable for post-op ileus and significant gastric distension requiring NG tube placement. His symptoms improved, he had return of normal bowel function and was tolerating regular diet at the time of discharge. Vascular surgery saw patient a day prior to discharge given his recent carotid endarterectomy and outpatient follow up was arranged. His baseline post-operative echo was unremarkable other than LVEF of 30-35% which is stable from his pre-op echo. Lifevest was arranged at discharge. He was also treated with colchicine for presumed pericarditis.   He was discharged home on 24.    On 8/3 he presented to the ED with leg pain and redness and was treated for cellulitis of his right lower extremity with Keflex. US without DVT, showed fluid collection felt to be secondary to seroma from recent vein harvest.   Returned to ED on 24 with complaints of upper lip edema, suspected ACE-I induced  angioedema and lisinopril was stopped.   Modesto has otherwise been doing well since discharge and now returns to clinic for postop visit.  Not having much pain, taking Tylenol and Robaxin once in a while for incisional pain and chronic back pain. Has not been needing oxycodone.    Patient reports incision is healing well.  Denies sternal popping or clicking or incisional drainage/erythema. He had been putting dry gauze between his LifeVest and his sternal incision to protect the incision but states he stopped doing this because he was told to leave his incision open to air. He is now concerned that his sternal incision is getting irritated.   Patient denies any fever, chills, chest pain, palpitations, edema, SOB, lightheadedness, nausea, and vomiting.    Patient is having normal bowel movements and voiding without problems.  Has not yet started cardiac rehab but plans to close to his home in [place]. He doesn't think he has been called to arrange this yet.  Weight has been stable overall, not fluctuating by more than 2 lb.    Blood pressures have been <120 SBP at home. HR <100 at home.   He reports the swelling in his lower lip has completely resolved since his ED visit yesterday. Reports the redness and swelling in his leg has significantly improved with taking Keflex.    ROS:  Review of symptoms otherwise negative unless commented about in HPI.     LABS:    CBC RESULTS:   Recent Labs   Lab Test 08/08/24 1228 08/03/24  1249 07/31/24  0548   WBC 9.5 9.3 9.5   HGB 11.8* 11.9* 10.6*   HCT 34.7* 34.4* 30.7*   * 359 204       CMP RESULTS:  Recent Labs   Lab Test 08/08/24  1228 08/03/24  1249 07/31/24  1310 07/31/24  1227 07/31/24  0548 07/30/24  0807 07/30/24  0510    137  --   --  135  --  136   POTASSIUM 4.3 3.9  --  3.9 3.4  --  3.6  3.6   CHLORIDE 102 102  --   --  102  --  100   CO2 27 25  --   --  22  --  27   ANIONGAP 10 10  --   --  11  --  9   * 103* 102*  --  107*   < > 163*   BUN  9.9 9.7  --   --  10.8  --  9.3   CR 0.75 0.76  --   --  0.60*  --  0.64*   GFRESTIMATED >90 >90  --   --  >90  --  >90   DANE 9.0 8.9  --   --  7.9*  --  7.9*   BILITOTAL 0.4 0.5  --   --   --   --  0.5   ALKPHOS 149 153*  --   --   --   --  86   ALT 44 88*  --   --   --   --  50   AST 29 51*  --   --   --   --  58*    < > = values in this interval not displayed.     Recent Labs   Lab Test 07/25/24  1837 07/25/24  1749 07/08/24  1302 05/01/24  0859   INR 1.24* 1.43* 1.02 1.06       IMAGING:  None    PHYSICAL EXAM:   /72 (BP Location: Right arm, Patient Position: Chair, Cuff Size: Adult Regular)   Pulse 94   Wt 77.6 kg (171 lb 1.6 oz)   SpO2 96%   BMI 24.90 kg/m      General: alert and oriented x 3, pleasant, no acute distress, normal mood and affect  Neuro: no focal deficits   CV: S1 S2, no murmurs, rubs or gallops, regular rate and rhythm, no peripheral edema  Pulm: bilateral breath sounds, clear to auscultation, easy work of breathing  Incision: sternal and vein harvest incisions clean dry and intact without erythema, swelling or drainage. Erythema to right shin significantly improved (compared to picture patient showed me). Minimal reactive erythema to lower part of sternal incision where LifeVest rubs against incision; no induration, fluctuance or drainage.     PROCEDURES:  None       CURRENT MEDICATIONS:   Current Outpatient Medications   Medication Sig Dispense Refill     acetaminophen (TYLENOL) 325 MG tablet Take 2 tablets (650 mg) by mouth every 4 hours (while awake) 50 tablet 0     aspirin (ASA) 81 MG chewable tablet Take 2 tablets (162 mg) by mouth or NG Tube daily 60 tablet 0     atorvastatin (LIPITOR) 80 MG tablet Take 1 tablet (80 mg) by mouth daily (Patient taking differently: Take 80 mg by mouth at bedtime) 90 tablet 3     cephALEXin (KEFLEX) 500 MG capsule Take 1 capsule (500 mg) by mouth 4 times daily for 10 days 40 capsule 0     colchicine (COLCRYS) 0.6 MG tablet Take 1 tablet (0.6 mg) by  mouth or Feeding Tube 2 times daily 60 tablet 0     cyclobenzaprine (FLEXERIL) 10 MG tablet Take 1 tablet (10 mg) by mouth 3 times daily as needed for muscle spasms 50 tablet 0     EPINEPHrine (ANY BX GENERIC EQUIV) 0.3 MG/0.3ML injection 2-pack Inject 0.3 mLs (0.3 mg) into the muscle once as needed for anaphylaxis 1 each 0     gabapentin (NEURONTIN) 100 MG capsule Take 100 mg by mouth 3 times daily as needed (Pain)       hydrOXYzine HCl (ATARAX) 25 MG tablet Take 1 tablet (25 mg) by mouth every 6 hours as needed for other or anxiety (adjuvant pain) 30 tablet 0     metoprolol succinate ER (TOPROL XL) 50 MG 24 hr tablet Take 1 tablet (50 mg) by mouth daily 30 tablet 0     nitroGLYcerin (NITROSTAT) 0.4 MG sublingual tablet For chest pain place 1 tablet under the tongue every 5 minutes for 3 doses. If symptoms persist 5 minutes after 1st dose call 911. 25 tablet 3     oxyCODONE (ROXICODONE) 5 MG tablet Take 1 tablet (5 mg) by mouth every 6 hours as needed for moderate pain 10 tablet 0     pantoprazole (PROTONIX) 40 MG EC tablet Take 1 tablet (40 mg) by mouth daily For 30 days, then discontinue 30 tablet 0     polyethylene glycol (MIRALAX) 17 GM/Dose powder Take 17 g by mouth daily 510 g 0     senna-docusate (SENOKOT-S/PERICOLACE) 8.6-50 MG tablet Take 2 tablets by mouth 2 times daily as needed for constipation 50 tablet 0     simethicone (MYLICON) 80 MG chewable tablet Take 1 tablet (80 mg) by mouth every 6 hours as needed for cramping 30 tablet 0     No current facility-administered medications for this visit.       CC  Physician No Ref-Primary       Please do not hesitate to contact me if you have any questions/concerns.     Sincerely,     Cardiovascular Thoracic Surgery

## 2024-08-09 NOTE — NURSING NOTE
Chief Complaint   Patient presents with    Follow Up     POST-OP   Vitals were taken and medications were reconciled.    Collin Hernandez, Visit Facilitator Clinic  2:17 PM

## 2024-08-09 NOTE — PATIENT INSTRUCTIONS
Your surgery was on 7/25/2024    Ok to start driving four weeks after the date of surgery as long as you are no longer taking narcotic pain medications.    From the date of surgery: no lifting greater than 10 pounds for 8 weeks, then no lifting greater than 20 pounds for an additional 4 weeks.    Do not soak your incisions until fully healed over with no scabbing; this includes hot tubs, baths, pools etc.    Follow up:   Primary care doctor follow up scheduled for 8/13  Placed referral for Cardiology follow up. Should be seen in 2-3 weeks from today. They will call you to schedule an appointment   Follow up with vascular surgery as scheduled on 8/12  Referral placed for cardiac rehab. They will call you to schedule your first appointment. Continue cardiac rehab until completed.    If you have questions or concerns, please call us:    Maeve Lam  [phone]    Cresencio Welch RN Care Coordinator - [phone]   Debra Osman, RN Care Coordinator - [phone]  Miriam Luong, RN Care Coordinator - [phone]

## 2024-08-11 ENCOUNTER — HOSPITAL ENCOUNTER (EMERGENCY)
Facility: CLINIC | Age: 69
Discharge: HOME | End: 2024-08-11
Attending: PHYSICIAN ASSISTANT | Admitting: PHYSICIAN ASSISTANT
Payer: MEDICARE

## 2024-08-11 ENCOUNTER — APPOINTMENT (OUTPATIENT)
Dept: GENERAL RADIOLOGY | Facility: CLINIC | Age: 69
End: 2024-08-11
Attending: PHYSICIAN ASSISTANT
Payer: MEDICARE

## 2024-08-11 VITALS
BODY MASS INDEX: 24 KG/M2 | OXYGEN SATURATION: 96 % | DIASTOLIC BLOOD PRESSURE: 77 MMHG | WEIGHT: 164.9 LBS | SYSTOLIC BLOOD PRESSURE: 138 MMHG | TEMPERATURE: 98.7 F | RESPIRATION RATE: 15 BRPM | HEART RATE: 105 BPM

## 2024-08-11 DIAGNOSIS — R53.83 FATIGUE: ICD-10-CM

## 2024-08-11 DIAGNOSIS — Z13.29 SCREENING FOR THYROID DISORDER: ICD-10-CM

## 2024-08-11 DIAGNOSIS — R22.0 LIP SWELLING: ICD-10-CM

## 2024-08-11 DIAGNOSIS — R07.89 CHEST HEAVINESS: ICD-10-CM

## 2024-08-11 LAB
ALBUMIN SERPL BCG-MCNC: 4 G/DL (ref 3.5–5.2)
ALP SERPL-CCNC: 156 U/L (ref 40–150)
ALT SERPL W P-5'-P-CCNC: 40 U/L (ref 0–70)
ANION GAP SERPL CALCULATED.3IONS-SCNC: 16 MMOL/L (ref 7–15)
AST SERPL W P-5'-P-CCNC: 25 U/L (ref 0–45)
BASE EXCESS BLDV CALC-SCNC: 1.5 MMOL/L (ref -3–3)
BASOPHILS # BLD AUTO: 0 10E3/UL (ref 0–0.2)
BASOPHILS NFR BLD AUTO: 0 %
BILIRUB SERPL-MCNC: 0.8 MG/DL
BUN SERPL-MCNC: 12.4 MG/DL (ref 8–23)
CALCIUM SERPL-MCNC: 9.4 MG/DL (ref 8.8–10.4)
CHLORIDE SERPL-SCNC: 98 MMOL/L (ref 98–107)
CREAT SERPL-MCNC: 0.74 MG/DL (ref 0.67–1.17)
CRP SERPL-MCNC: 7.63 MG/L
EGFRCR SERPLBLD CKD-EPI 2021: >90 ML/MIN/1.73M2
EOSINOPHIL # BLD AUTO: 0.1 10E3/UL (ref 0–0.7)
EOSINOPHIL NFR BLD AUTO: 1 %
ERYTHROCYTE [DISTWIDTH] IN BLOOD BY AUTOMATED COUNT: 14.1 % (ref 10–15)
GLUCOSE SERPL-MCNC: 136 MG/DL (ref 70–99)
HCO3 BLDV-SCNC: 26 MMOL/L (ref 21–28)
HCO3 SERPL-SCNC: 21 MMOL/L (ref 22–29)
HCT VFR BLD AUTO: 38 % (ref 40–53)
HGB BLD-MCNC: 13.2 G/DL (ref 13.3–17.7)
HOLD SPECIMEN: NORMAL
IMM GRANULOCYTES # BLD: 0.1 10E3/UL
IMM GRANULOCYTES NFR BLD: 0 %
LACTATE SERPL-SCNC: 1.1 MMOL/L (ref 0.7–2)
LYMPHOCYTES # BLD AUTO: 1.8 10E3/UL (ref 0.8–5.3)
LYMPHOCYTES NFR BLD AUTO: 14 %
MCH RBC QN AUTO: 31.5 PG (ref 26.5–33)
MCHC RBC AUTO-ENTMCNC: 34.7 G/DL (ref 31.5–36.5)
MCV RBC AUTO: 91 FL (ref 78–100)
MONOCYTES # BLD AUTO: 0.5 10E3/UL (ref 0–1.3)
MONOCYTES NFR BLD AUTO: 4 %
NEUTROPHILS # BLD AUTO: 10.2 10E3/UL (ref 1.6–8.3)
NEUTROPHILS NFR BLD AUTO: 81 %
NRBC # BLD AUTO: 0 10E3/UL
NRBC BLD AUTO-RTO: 0 /100
NT-PROBNP SERPL-MCNC: 2676 PG/ML (ref 0–900)
O2/TOTAL GAS SETTING VFR VENT: 21 %
OXYHGB MFR BLDV: 73 % (ref 70–75)
PCO2 BLDV: 38 MM HG (ref 40–50)
PH BLDV: 7.43 [PH] (ref 7.32–7.43)
PLATELET # BLD AUTO: 434 10E3/UL (ref 150–450)
PO2 BLDV: 39 MM HG (ref 25–47)
POTASSIUM SERPL-SCNC: 4 MMOL/L (ref 3.4–5.3)
PROCALCITONIN SERPL IA-MCNC: 0.04 NG/ML
PROT SERPL-MCNC: 7.5 G/DL (ref 6.4–8.3)
RBC # BLD AUTO: 4.19 10E6/UL (ref 4.4–5.9)
SAO2 % BLDV: 74.4 % (ref 70–75)
SODIUM SERPL-SCNC: 135 MMOL/L (ref 135–145)
TROPONIN T SERPL HS-MCNC: 28 NG/L
TROPONIN T SERPL HS-MCNC: 29 NG/L
WBC # BLD AUTO: 12.7 10E3/UL (ref 4–11)

## 2024-08-11 PROCEDURE — 83880 ASSAY OF NATRIURETIC PEPTIDE: CPT | Performed by: PHYSICIAN ASSISTANT

## 2024-08-11 PROCEDURE — 86140 C-REACTIVE PROTEIN: CPT | Performed by: PHYSICIAN ASSISTANT

## 2024-08-11 PROCEDURE — 82805 BLOOD GASES W/O2 SATURATION: CPT | Performed by: PHYSICIAN ASSISTANT

## 2024-08-11 PROCEDURE — 96375 TX/PRO/DX INJ NEW DRUG ADDON: CPT

## 2024-08-11 PROCEDURE — 99284 EMERGENCY DEPT VISIT MOD MDM: CPT | Performed by: PHYSICIAN ASSISTANT

## 2024-08-11 PROCEDURE — 99285 EMERGENCY DEPT VISIT HI MDM: CPT | Mod: 25

## 2024-08-11 PROCEDURE — 36415 COLL VENOUS BLD VENIPUNCTURE: CPT | Performed by: PHYSICIAN ASSISTANT

## 2024-08-11 PROCEDURE — 83605 ASSAY OF LACTIC ACID: CPT | Performed by: PHYSICIAN ASSISTANT

## 2024-08-11 PROCEDURE — 93005 ELECTROCARDIOGRAM TRACING: CPT

## 2024-08-11 PROCEDURE — 96374 THER/PROPH/DIAG INJ IV PUSH: CPT

## 2024-08-11 PROCEDURE — 86618 LYME DISEASE ANTIBODY: CPT | Performed by: PHYSICIAN ASSISTANT

## 2024-08-11 PROCEDURE — 84443 ASSAY THYROID STIM HORMONE: CPT

## 2024-08-11 PROCEDURE — 258N000003 HC RX IP 258 OP 636: Performed by: PHYSICIAN ASSISTANT

## 2024-08-11 PROCEDURE — 96361 HYDRATE IV INFUSION ADD-ON: CPT

## 2024-08-11 PROCEDURE — 80053 COMPREHEN METABOLIC PANEL: CPT | Performed by: PHYSICIAN ASSISTANT

## 2024-08-11 PROCEDURE — 86617 LYME DISEASE ANTIBODY: CPT | Performed by: PHYSICIAN ASSISTANT

## 2024-08-11 PROCEDURE — 71045 X-RAY EXAM CHEST 1 VIEW: CPT

## 2024-08-11 PROCEDURE — 93010 ELECTROCARDIOGRAM REPORT: CPT | Performed by: PHYSICIAN ASSISTANT

## 2024-08-11 PROCEDURE — 85025 COMPLETE CBC W/AUTO DIFF WBC: CPT | Performed by: PHYSICIAN ASSISTANT

## 2024-08-11 PROCEDURE — 250N000011 HC RX IP 250 OP 636: Performed by: PHYSICIAN ASSISTANT

## 2024-08-11 PROCEDURE — 84484 ASSAY OF TROPONIN QUANT: CPT | Mod: 91 | Performed by: PHYSICIAN ASSISTANT

## 2024-08-11 PROCEDURE — 84145 PROCALCITONIN (PCT): CPT | Performed by: PHYSICIAN ASSISTANT

## 2024-08-11 RX ORDER — METHYLPREDNISOLONE SODIUM SUCCINATE 125 MG/2ML
125 INJECTION, POWDER, LYOPHILIZED, FOR SOLUTION INTRAMUSCULAR; INTRAVENOUS ONCE
Status: COMPLETED | OUTPATIENT
Start: 2024-08-11 | End: 2024-08-11

## 2024-08-11 RX ORDER — SODIUM CHLORIDE 9 MG/ML
INJECTION, SOLUTION INTRAVENOUS CONTINUOUS
Status: DISCONTINUED | OUTPATIENT
Start: 2024-08-11 | End: 2024-08-12 | Stop reason: HOSPADM

## 2024-08-11 RX ORDER — DIPHENHYDRAMINE HYDROCHLORIDE 50 MG/ML
25 INJECTION INTRAMUSCULAR; INTRAVENOUS ONCE
Status: COMPLETED | OUTPATIENT
Start: 2024-08-11 | End: 2024-08-11

## 2024-08-11 RX ADMIN — SODIUM CHLORIDE: 9 INJECTION, SOLUTION INTRAVENOUS at 20:23

## 2024-08-11 RX ADMIN — DIPHENHYDRAMINE HYDROCHLORIDE 25 MG: 50 INJECTION, SOLUTION INTRAMUSCULAR; INTRAVENOUS at 21:35

## 2024-08-11 RX ADMIN — METHYLPREDNISOLONE SODIUM SUCCINATE 125 MG: 125 INJECTION, POWDER, FOR SOLUTION INTRAMUSCULAR; INTRAVENOUS at 21:33

## 2024-08-11 ASSESSMENT — COLUMBIA-SUICIDE SEVERITY RATING SCALE - C-SSRS
2. HAVE YOU ACTUALLY HAD ANY THOUGHTS OF KILLING YOURSELF IN THE PAST MONTH?: NO
6. HAVE YOU EVER DONE ANYTHING, STARTED TO DO ANYTHING, OR PREPARED TO DO ANYTHING TO END YOUR LIFE?: NO
1. IN THE PAST MONTH, HAVE YOU WISHED YOU WERE DEAD OR WISHED YOU COULD GO TO SLEEP AND NOT WAKE UP?: NO

## 2024-08-11 ASSESSMENT — ACTIVITIES OF DAILY LIVING (ADL)
ADLS_ACUITY_SCORE: 38
ADLS_ACUITY_SCORE: 36
ADLS_ACUITY_SCORE: 38
ADLS_ACUITY_SCORE: 38

## 2024-08-11 NOTE — PROGRESS NOTES
Vascular & Endovascular Surgery Clinic Consultation   Vascular Surgeon: Rasheed Sam MD, RPVI       Location:  [facility]    Modesto Lehman  Medical Record #:  [id number]  YOB: 1955  Age:  68 year old     Date of Service: 5/22/2024    Referring Provider: Rasheed Sam.  Primary Care Provider: No Ref-Primary, Physician    Chief Complaint/Reason for Visit: Carotid disease in the setting of coronary artery disease    HPI:  Mr. Lehman is a pleasant 68 year old male seen today with his family for carotid disease at the request of Dr. Mason.  He is asymptomatic with no stroke/TIA, focal neurologic deficit, upper or lower extremity weakness or sensory dysfunction, amaurosis fugax, aphasia, dysarthria, or facial droop.  He has no lower extremity symptoms at this time after multiple procedures.       Review of Systems:    A 12 point ROS was reviewed and is negative except for symptoms noted in HPI.     Medical/Surgical History:    Past Medical History:   Diagnosis Date    Allergy status to analgesic agent     No reported medication allergies    Anesthesia of skin     No  problems with anaesthesia    CAD (coronary artery disease)     Elevated coronary artery calcium score     History of recurrent pneumonia     No Comments Provided    Hyperlipidemia     No Comments Provided    Low back pain     pain with bilateral leg and disc injuries.    Personal history of nicotine dependence     Personal history of other medical treatment (CODE)     No blood transfusions    PVD (peripheral vascular disease) (H24)          Past Surgical History:   Procedure Laterality Date    ANGIOPLASTY      Right leg stenting and bypass, Left also    BYPASS GRAFT ARTERY CORONARY N/A 7/25/2024    Procedure: Median Sternotomy, On Cardiopulmonary Bypass Graft, CORONARY ARTERY BYPASS GRAFT X_3_, Left AND RIGHTGreater Saphenous Vein Vining, Left Internal Mammary Artery Vining,  Intra-operative Transesophageal Echocardiogram per Anesthesia.;  Surgeon: Kit Mason MD;  Location: [facility] OR    COLONOSCOPY  08/23/2010    Q 10yrs.    COLONOSCOPY N/A 02/24/2022    6 tubular adenomas, 5 year follow up, 2/24/2027    CV CORONARY ANGIOGRAM N/A 4/15/2024    Procedure: Coronary Angiogram;  Surgeon: Toni Sellers MD;  Location:  [facility]    ENDARTERECTOMY CAROTID Left 6/25/2024    Procedure: Left Carotid Endarterectomy with bovine pericardial patch angioplasty. Intraoperative Ultrasound. Intraoperative EEG monitoring;  Surgeon: Rasheed Sam MD;  Location: [facility] OR    EXTRACTION(S) DENTAL      recently removed    OTHER SURGICAL HISTORY      SLU232,PREMALIG/BENIGN SKIN LESION EXCISION, removed from chest    OTHER SURGICAL HISTORY      208489,ANESTHESIA ALERT,No  problems with anaesthesia        Allergies:     Allergies   Allergen Reactions    Ace Inhibitors Angioedema     ED visit 8/8/2024: suspected upper lip edema was secondary to recent lisinopril start after heart surgery. Improved on steroid/benadryl        Medications:    Current Outpatient Medications   Medication Sig Dispense Refill    atorvastatin (LIPITOR) 80 MG tablet Take 1 tablet (80 mg) by mouth daily (Patient taking differently: Take 80 mg by mouth at bedtime) 90 tablet 3    EPINEPHrine (ANY BX GENERIC EQUIV) 0.3 MG/0.3ML injection 2-pack Inject 0.3 mLs (0.3 mg) into the muscle once as needed for anaphylaxis 1 each 0    nitroGLYcerin (NITROSTAT) 0.4 MG sublingual tablet For chest pain place 1 tablet under the tongue every 5 minutes for 3 doses. If symptoms persist 5 minutes after 1st dose call 911. 25 tablet 3    acetaminophen (TYLENOL) 325 MG tablet Take 2 tablets (650 mg) by mouth every 4 hours (while awake) 50 tablet 0    aspirin (ASA) 81 MG chewable tablet Take 2 tablets (162 mg) by mouth or NG Tube daily 60 tablet 0    cephALEXin (KEFLEX) 500 MG capsule Take 1 capsule (500 mg) by mouth 4 times  daily for 10 days 40 capsule 0    colchicine (COLCRYS) 0.6 MG tablet Take 1 tablet (0.6 mg) by mouth or Feeding Tube 2 times daily 60 tablet 0    cyclobenzaprine (FLEXERIL) 10 MG tablet Take 1 tablet (10 mg) by mouth 3 times daily as needed for muscle spasms 50 tablet 0    gabapentin (NEURONTIN) 100 MG capsule Take 100 mg by mouth 3 times daily as needed (Pain)      hydrOXYzine HCl (ATARAX) 25 MG tablet Take 1 tablet (25 mg) by mouth every 6 hours as needed for other or anxiety (adjuvant pain) 30 tablet 0    metoprolol succinate ER (TOPROL XL) 50 MG 24 hr tablet Take 1 tablet (50 mg) by mouth daily 30 tablet 0    oxyCODONE (ROXICODONE) 5 MG tablet Take 1 tablet (5 mg) by mouth every 6 hours as needed for moderate pain 10 tablet 0    pantoprazole (PROTONIX) 40 MG EC tablet Take 1 tablet (40 mg) by mouth daily For 30 days, then discontinue 30 tablet 0    polyethylene glycol (MIRALAX) 17 GM/Dose powder Take 17 g by mouth daily 510 g 0    senna-docusate (SENOKOT-S/PERICOLACE) 8.6-50 MG tablet Take 2 tablets by mouth 2 times daily as needed for constipation 50 tablet 0    simethicone (MYLICON) 80 MG chewable tablet Take 1 tablet (80 mg) by mouth every 6 hours as needed for cramping 30 tablet 0     No current facility-administered medications for this visit.        Social Habits:    Tobacco Use      Smoking status: Former        Packs/day: 0.25        Years: 0.3 packs/day for 14.8 years (3.7 ttl pk-yrs)        Types: Cigars, Cigarettes        Start date: 11/4/2009      Smokeless tobacco: Never      Tobacco comments: Quit smoking: quit 6/20/17 (cigarettes); smoked cigars 2 puffs cigar week then stopped before surgery       Alcohol Use: Not on file       History   Drug Use Unknown     Comment: Not daily 2x week        Family History:    Family History   Problem Relation Age of Onset    Diabetes Mother         Diabetes    Heart Disease Mother         Heart Disease    Other - See Comments Father         COPD, CD    Heart  Disease Brother         Heart Disease    Other - See Comments Brother         killed by drunk  at age 19.    Substance Abuse Other         Alcohol/Drug, No hx of chemical dependency.    Diabetes Other         Diabetes    Heart Disease Other         Heart Disease    Anesthesia Reaction No family hx of     Clotting Disorder No family hx of     Bleeding Disorder No family hx of        Physical Examination:  /71 (BP Location: Left arm, Patient Position: Sitting, Cuff Size: Adult Regular)   Pulse 81   SpO2 95%   Wt Readings from Last 1 Encounters:   08/09/24 77.6 kg (171 lb 1.6 oz)     There is no height or weight on file to calculate BMI.    Exam:  General: No apparent distress. Answers questions appropriately   Neurologic: Focally intact, Alert and oriented x 3.   Eyes: Grossly normal.   Cardiac:  RRR  Pulmonary:  Non labored breathing with normal respiratory effort  Abdominal: Soft, non distended, non tender to palpation.  No pulsatile abdominal mass.   Musculoskeletal/Extremity: 5/5 gross upper and lower extremity strength.   No open wounds or abrasions.     Laboratory Findings:  Hemoglobin   Date Value Ref Range Status   08/08/2024 11.8 (L) 13.3 - 17.7 g/dL Final   08/03/2024 11.9 (L) 13.3 - 17.7 g/dL Final   07/31/2020 14.2 13.3 - 17.7 g/dL Final   07/13/2020 15.4 13.3 - 17.7 g/dL Final     WBC   Date Value Ref Range Status   07/31/2020 10.6 4.0 - 11.0 10e9/L Final   07/13/2020 14.7 (H) 4.0 - 11.0 10e9/L Final     WBC Count   Date Value Ref Range Status   08/08/2024 9.5 4.0 - 11.0 10e3/uL Final   08/03/2024 9.3 4.0 - 11.0 10e3/uL Final     Platelet Count   Date Value Ref Range Status   08/08/2024 455 (H) 150 - 450 10e3/uL Final   08/03/2024 359 150 - 450 10e3/uL Final   07/31/2020 250 150 - 450 10e9/L Final   07/13/2020 239 150 - 450 10e9/L Final     Creatinine   Date Value Ref Range Status   08/08/2024 0.75 0.67 - 1.17 mg/dL Final   07/31/2020 0.80 0.70 - 1.30 mg/dL Final     Sodium   Date Value Ref  Range Status   08/08/2024 139 135 - 145 mmol/L Final   07/31/2020 140 134 - 144 mmol/L Final     Glucose   Date Value Ref Range Status   08/08/2024 119 (H) 70 - 99 mg/dL Final   08/03/2024 103 (H) 70 - 99 mg/dL Final   12/01/2021 87 70 - 105 mg/dL Final   07/31/2020 165 (H) 70 - 105 mg/dL Final   07/13/2020 106 (H) 70 - 105 mg/dL Final     GLUCOSE BY METER POCT   Date Value Ref Range Status   07/31/2024 102 (H) 70 - 99 mg/dL Final   07/30/2024 113 (H) 70 - 99 mg/dL Final     Hemoglobin A1C   Date Value Ref Range Status   05/01/2024 5.9 (H) <5.7 % Final     Comment:     Normal <5.7%   Prediabetes 5.7-6.4%    Diabetes 6.5% or higher     Note: Adopted from ADA consensus guidelines.     INR   Date Value Ref Range Status   07/25/2024 1.24 (H) 0.85 - 1.15 Final       Imaging:    I personally reviewed the carotid duplex from 4/30/24 and CTA head and neck from 5/7/2024 which shows chronic right carotid occlusion with high grade stenosis of the left ICA with dense calcific disease. I also reviewed his ABIs and lower extremity duplex which show patent revascularizations. Aortic duplex also reviewed which shows ectatic abdominal aorta.     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 Mason  #4- Hyperlipidemia  #5- Peripheral arterial disease status post bilateral bypasses, elsewhere  #6- Heart Failure with Reduced EF, 30-35%  #7- Former nicotine dependence  Mr. Lehman is a pleasant 68 year old male evaluated for carotid disease in setting of coronary artery disease requiring CABG with Dr. Mason.  We had a  long discussion about the natural history, risk factors, and etiology of carotid stenosis.  The results of randomized trial data including NASCET, ACAS, ECST, ACST, and CREST.  We discussed stroke risk reduction in asymptomatic disease  by which the data suggests 5-year stroke risk of 11% with best medical therapy which can reduce to 5% with carotid revascularization.  We discussed the current SVS guidelines for concurrent coronary and carotid disease which advocate for carotid before CABG only for symptomatic disease and consideration for those with >70% stenosis bilaterally or with contralateral occlusion. he does have indication. We discussed both staged and concomitant CEA/CABG.  Dr. Mason and I will meet to discuss now that I have met with Mr. Lehman to determine final surgical plan.     Risks, benefits, and alternatives left-sided carotid endarterectomy including but not limited to death, stroke, cranial nerve injury, hyperperfusion syndrome, bleeding, MI or other cardiopulmonary complications and, infection. We reviewed the benefits and alternatives including medical management and stenting either by the transfermoral route or transcervical carotid artery revascularization (TCAR). Mr. Lehman was involved in all aspects of decision making and appears to understand. The patient and his family understand the risks and would like to proceed with left carotid endarterectomy whether this is staged or concomitant to CABG.    Discussed warning signs including, but not limited to, stroke/TIA, focal neurologic deficit, upper or lower extremity weakness or sensory dysfunction, amaurosis fugax, aphasia, dysarthria, or facial droop.  If he experiences any of these, he has been advised to seek treatment and contact my team.    Mr. Lehman and his family are in agreement with this plan as outlined.    Recommendations/Plan:  I will discuss operative plans with Dr. Mason  Continue optimal medical therapy with   Aspirin  High does statin therapy  Continue smoking avoidence  Antihypertensives     Rasheed Sam MD  Vascular & Endovascular Surgery      45 minutes spent on the day of encounter doing chart review from our system and care  everywhere, history and exam, documentation, coordinating care, and further activities as noted with over half spent counseling.

## 2024-08-12 ENCOUNTER — TELEPHONE (OUTPATIENT)
Dept: NURSING | Facility: CLINIC | Age: 69
End: 2024-08-12

## 2024-08-12 ENCOUNTER — ANCILLARY PROCEDURE (OUTPATIENT)
Dept: ULTRASOUND IMAGING | Facility: CLINIC | Age: 69
End: 2024-08-12
Attending: SURGERY
Payer: COMMERCIAL

## 2024-08-12 ENCOUNTER — OFFICE VISIT (OUTPATIENT)
Dept: VASCULAR SURGERY | Facility: CLINIC | Age: 69
End: 2024-08-12
Attending: SURGERY
Payer: COMMERCIAL

## 2024-08-12 VITALS — HEART RATE: 108 BPM | DIASTOLIC BLOOD PRESSURE: 75 MMHG | OXYGEN SATURATION: 96 % | SYSTOLIC BLOOD PRESSURE: 131 MMHG

## 2024-08-12 DIAGNOSIS — A69.20 LYME DISEASE: Primary | ICD-10-CM

## 2024-08-12 DIAGNOSIS — I65.22 ASYMPTOMATIC STENOSIS OF LEFT CAROTID ARTERY WITHOUT INFARCTION: ICD-10-CM

## 2024-08-12 DIAGNOSIS — Z98.890 POSTOPERATIVE STATE: ICD-10-CM

## 2024-08-12 DIAGNOSIS — I65.22 LEFT CAROTID STENOSIS: ICD-10-CM

## 2024-08-12 DIAGNOSIS — I65.21 RIGHT-SIDED CAROTID ARTERY OCCLUSION WITHOUT CEREBRAL INFARCTION: ICD-10-CM

## 2024-08-12 DIAGNOSIS — I25.118 CORONARY ARTERY DISEASE OF NATIVE ARTERY OF NATIVE HEART WITH STABLE ANGINA PECTORIS (H): Primary | ICD-10-CM

## 2024-08-12 DIAGNOSIS — Z98.890 HISTORY OF LEFT-SIDED CAROTID ENDARTERECTOMY: ICD-10-CM

## 2024-08-12 LAB
B BURGDOR IGG SERPL QL IA: 2.83 INDEX
B BURGDOR IGG SERPL QL IA: REACTIVE
B BURGDOR IGG+IGM SER QL: 1.19
B BURGDOR IGM SERPL QL IA: 1.21 INDEX
B BURGDOR IGM SERPL QL IA: REACTIVE

## 2024-08-12 PROCEDURE — 99024 POSTOP FOLLOW-UP VISIT: CPT | Mod: GC | Performed by: SURGERY

## 2024-08-12 PROCEDURE — 93880 EXTRACRANIAL BILAT STUDY: CPT | Performed by: RADIOLOGY

## 2024-08-12 RX ORDER — DOXYCYCLINE 100 MG/1
100 CAPSULE ORAL 2 TIMES DAILY
Qty: 20 CAPSULE | Refills: 0 | Status: SHIPPED | OUTPATIENT
Start: 2024-08-12 | End: 2024-08-22

## 2024-08-12 NOTE — DISCHARGE INSTRUCTIONS
Your workup today was overall reassuring.  Please follow-up at your scheduled appointment with your clinic provider.  For your lip swelling you can continue to try a daily Zyrtec or Allegra as well as Benadryl as needed.  If you develop any new or worsening symptoms please return to the emergency department.    Thank you for choosing [facility] Emergency [facility]. It was a pleasure taking care of you today. If you have any questions, please call [phone].    Ning Barba, PITER

## 2024-08-12 NOTE — ED PROVIDER NOTES
History     Chief Complaint   Patient presents with    Fatigue       HPI  Modesto Lehman is a 68 year old male with a history of coronary artery disease s/p CABG on July 25, 2024, who presents to the emergency department complaining of fatigue.  The patient reports he was here on August 8 for lower lip swelling.  He received Benadryl and steroids at that time and symptoms improved.  It was suspected be related to his lisinopril so that was discontinued.  He had been feeling okay until today around lunchtime his lip swelled up again and he developed significant fatigue, chest heaviness, and a little shortness of breath.  He tried oral Benadryl at home without relief.  He did go walking a bit more than usual yesterday with his son and did well.  He notes a little bit of a cough sometimes but no worsening/persistent cough and denies any fevers or bodyaches today.  He states he feels a little bit of indigestion which is new.  He has not had any abdominal pain or vomiting.  No leg swelling.  He is worried that this could be his Lyme disease.  Back in 2020 when he was initially diagnosed with Lyme disease he was presenting with left lower lip swelling that they initially attributed to antibiotics but eventually someone told him that it was likely the Lyme disease causing it.  He is currently on Keflex for right leg cellulitis that was diagnosed on 8/3 here in the ED.  Patient denies any throat tightness or swelling in the mouth other than the left lower lip.        Allergies:  Allergies   Allergen Reactions    Ace Inhibitors Angioedema     ED visit 8/8/2024: suspected upper lip edema was secondary to recent lisinopril start after heart surgery. Improved on steroid/benadryl       Problem List:    Patient Active Problem List    Diagnosis Date Noted    Left carotid stenosis 06/25/2024     Priority: Medium    Hx of Lyme disease on 8/3/2020 04/11/2024     Priority: Medium    PAD (peripheral artery disease) (H24)  04/11/2024     Priority: Medium    Coronary artery disease of native artery of native heart with stable angina pectoris (H24) 04/11/2024     Priority: Medium    Positive cardiac stress test on 3/5/2024 04/11/2024     Priority: Medium    Elevated coronary artery calcium score 04/11/2024     Priority: Medium     At 2015.1 on 3/5/2024      Spondylosis of thoracic region without myelopathy or radiculopathy 02/16/2024     Priority: Medium    Lyme disease 07/31/2020     Priority: Medium    Family history of diabetes mellitus 01/30/2018     Priority: Medium    Neural foraminal stenosis of cervical spine 05/24/2016     Priority: Medium    Plantar wart of left foot 04/02/2014     Priority: Medium    Other synovitis and tenosynovitis 08/02/2012     Priority: Medium    Denis's cyst 02/29/2012     Priority: Medium    Backache 11/07/2011     Priority: Medium    Spinal stenosis, lumbar 12/21/2010     Priority: Medium    DJD (degenerative joint disease) 12/08/2010     Priority: Medium    Benign neoplasm of colon 09/06/2010     Priority: Medium    Tobacco user 09/28/2009     Priority: Medium    Obesity 11/02/2005     Priority: Medium    Degeneration of lumbar or lumbosacral intervertebral disc 12/16/1997     Priority: Medium        Past Medical History:    Past Medical History:   Diagnosis Date    Allergy status to analgesic agent     Anesthesia of skin     CAD (coronary artery disease)     Elevated coronary artery calcium score     History of recurrent pneumonia     Hyperlipidemia     Low back pain     Personal history of nicotine dependence     Personal history of other medical treatment (CODE)     PVD (peripheral vascular disease) (H24)        Past Surgical History:    Past Surgical History:   Procedure Laterality Date    ANGIOPLASTY      Right leg stenting and bypass, Left also    BYPASS GRAFT ARTERY CORONARY N/A 7/25/2024    Procedure: Median Sternotomy, On Cardiopulmonary Bypass Graft, CORONARY ARTERY BYPASS GRAFT X_3_, Left  AND RIGHTGreater Saphenous Vein [place], Left Internal Mammary Artery [place], Intra-operative Transesophageal Echocardiogram per Anesthesia.;  Surgeon: Kit Mason MD;  Location: [facility] OR    COLONOSCOPY  08/23/2010    Q 10yrs.    COLONOSCOPY N/A 02/24/2022    6 tubular adenomas, 5 year follow up, 2/24/2027    CV CORONARY ANGIOGRAM N/A 4/15/2024    Procedure: Coronary Angiogram;  Surgeon: Toni Sellers MD;  Location:  [facility]    ENDARTERECTOMY CAROTID Left 6/25/2024    Procedure: Left Carotid Endarterectomy with bovine pericardial patch angioplasty. Intraoperative Ultrasound. Intraoperative EEG monitoring;  Surgeon: Rasheed Sam MD;  Location: [facility] OR    EXTRACTION(S) DENTAL      recently removed    OTHER SURGICAL HISTORY      XAC469,PREMALIG/BENIGN SKIN LESION EXCISION, removed from chest    OTHER SURGICAL HISTORY      [id number],ANESTHESIA ALERT,No  problems with anaesthesia       Family History:    Family History   Problem Relation Age of Onset    Diabetes Mother         Diabetes    Heart Disease Mother         Heart Disease    Other - See Comments Father         COPD, CD    Heart Disease Brother         Heart Disease    Other - See Comments Brother         killed by drunk  at age 19.    Substance Abuse Other         Alcohol/Drug, No hx of chemical dependency.    Diabetes Other         Diabetes    Heart Disease Other         Heart Disease    Anesthesia Reaction No family hx of     Clotting Disorder No family hx of     Bleeding Disorder No family hx of        Social History:  Marital Status:   [2]  Social History     Tobacco Use    Smoking status: Former     Current packs/day: 0.25     Average packs/day: 0.3 packs/day for 14.8 years (3.7 ttl pk-yrs)     Types: Cigars, Cigarettes     Start date: 11/4/2009    Smokeless tobacco: Never    Tobacco comments:     Quit smoking: quit 6/20/17 (cigarettes); smoked cigars 2 puffs cigar week then stopped before surgery    Vaping Use    Vaping status: Never Used   Substance Use Topics    Alcohol use: Not Currently     Comment: 2 vodka 3-4x week    Drug use: Not Currently     Types: Marijuana     Comment: Not daily 2x week        Medications:    acetaminophen (TYLENOL) 325 MG tablet  aspirin (ASA) 81 MG chewable tablet  atorvastatin (LIPITOR) 80 MG tablet  cephALEXin (KEFLEX) 500 MG capsule  colchicine (COLCRYS) 0.6 MG tablet  cyclobenzaprine (FLEXERIL) 10 MG tablet  EPINEPHrine (ANY BX GENERIC EQUIV) 0.3 MG/0.3ML injection 2-pack  gabapentin (NEURONTIN) 100 MG capsule  hydrOXYzine HCl (ATARAX) 25 MG tablet  metoprolol succinate ER (TOPROL XL) 50 MG 24 hr tablet  nitroGLYcerin (NITROSTAT) 0.4 MG sublingual tablet  oxyCODONE (ROXICODONE) 5 MG tablet  pantoprazole (PROTONIX) 40 MG EC tablet  polyethylene glycol (MIRALAX) 17 GM/Dose powder  senna-docusate (SENOKOT-S/PERICOLACE) 8.6-50 MG tablet  simethicone (MYLICON) 80 MG chewable tablet          Review of Systems   All other systems reviewed and are negative.          Physical Exam   BP: 104/78  Pulse: 114  Temp: 98.7  F (37.1  C)  Resp: 20  Weight: 74.8 kg (164 lb 14.4 oz)  SpO2: 97 %      Physical Exam  Vitals and nursing note reviewed.   Constitutional:       General: He is not in acute distress.     Appearance: He is well-developed. He is not diaphoretic.      Comments: Patient appears fatigued and older than stated age.   HENT:      Head: Normocephalic and atraumatic.      Nose: Nose normal.      Mouth/Throat:      Mouth: Mucous membranes are moist.      Pharynx: Oropharynx is clear.      Comments: Left lower lip is edematous.  There is no oropharyngeal edema or tongue edema.  Eyes:      Extraocular Movements: Extraocular movements intact.      Conjunctiva/sclera: Conjunctivae normal.      Pupils: Pupils are equal, round, and reactive to light.   Cardiovascular:      Rate and Rhythm: Regular rhythm. Tachycardia present.      Heart sounds: Normal heart sounds.   Pulmonary:       Effort: Pulmonary effort is normal. No respiratory distress.      Comments: Mildly diminished air movement.  Occasional crackles heard scattered throughout.  Sternotomy incision appears to be healing well.  Intact, no surrounding erythema, no drainage.  Abdominal:      General: Bowel sounds are normal. There is no distension.      Palpations: Abdomen is soft.      Tenderness: There is no abdominal tenderness.   Musculoskeletal:         General: No deformity.      Cervical back: Neck supple.   Skin:     General: Skin is warm and dry.   Neurological:      General: No focal deficit present.      Mental Status: He is alert and oriented to person, place, and time. Mental status is at baseline.      Coordination: Coordination normal.   Psychiatric:         Mood and Affect: Mood normal.             ED Course        Procedures              EKG Interpretation:      Interpreted by Brisa Salazar PA-C  Time reviewed: 2015  Symptoms at time of EKG: fatigue, chest heaviness   Rhythm: sinus tachycardia  Rate: 100-110  Axis: Left Axis Deviation  Ectopy: none  Conduction: normal  ST Segments/ T Waves: No acute ischemic changes  Q Waves: none  Comparison to prior: No significant changes    Clinical Impression: sinus tachycardia          Results for orders placed or performed during the hospital encounter of 08/11/24 (from the past 24 hour(s))   CBC with platelets differential    Narrative    The following orders were created for panel order CBC with platelets differential.  Procedure                               Abnormality         Status                     ---------                               -----------         ------                     CBC with platelets and d...[[id number]]  Abnormal            Final result                 Please view results for these tests on the individual orders.   Comprehensive metabolic panel   Result Value Ref Range    Sodium 135 135 - 145 mmol/L    Potassium 4.0 3.4 - 5.3 mmol/L    Carbon  Dioxide (CO2) 21 (L) 22 - 29 mmol/L    Anion Gap 16 (H) 7 - 15 mmol/L    Urea Nitrogen 12.4 8.0 - 23.0 mg/dL    Creatinine 0.74 0.67 - 1.17 mg/dL    GFR Estimate >90 >60 mL/min/1.73m2    Calcium 9.4 8.8 - 10.4 mg/dL    Chloride 98 98 - 107 mmol/L    Glucose 136 (H) 70 - 99 mg/dL    Alkaline Phosphatase 156 (H) 40 - 150 U/L    AST 25 0 - 45 U/L    ALT 40 0 - 70 U/L    Protein Total 7.5 6.4 - 8.3 g/dL    Albumin 4.0 3.5 - 5.2 g/dL    Bilirubin Total 0.8 <=1.2 mg/dL   Lactic acid whole blood with 1x repeat in 2 hr when >2   Result Value Ref Range    Lactic Acid, Initial 1.1 0.7 - 2.0 mmol/L   Procalcitonin   Result Value Ref Range    Procalcitonin 0.04 <0.50 ng/mL   Troponin T, High Sensitivity   Result Value Ref Range    Troponin T, High Sensitivity 29 (H) <=22 ng/L   Blood gas venous   Result Value Ref Range    pH Venous 7.43 7.32 - 7.43    pCO2 Venous 38 (L) 40 - 50 mm Hg    pO2 Venous 39 25 - 47 mm Hg    Bicarbonate Venous 26 21 - 28 mmol/L    Base Excess/Deficit Venous 1.5 -3.0 - 3.0 mmol/L    FIO2 21     Oxyhemoglobin Venous 73 70 - 75 %    O2 Sat, Venous 74.4 70.0 - 75.0 %    Narrative    In healthy individuals, oxyhemoglobin (O2Hb) and oxygen saturation (SO2) are approximately equal. In the presence of dyshemoglobins, oxyhemoglobin can be considerably lower than oxygen saturation.   CRP inflammation   Result Value Ref Range    CRP Inflammation 7.63 (H) <5.00 mg/L   CBC with platelets and differential   Result Value Ref Range    WBC Count 12.7 (H) 4.0 - 11.0 10e3/uL    RBC Count 4.19 (L) 4.40 - 5.90 10e6/uL    Hemoglobin 13.2 (L) 13.3 - 17.7 g/dL    Hematocrit 38.0 (L) 40.0 - 53.0 %    MCV 91 78 - 100 fL    MCH 31.5 26.5 - 33.0 pg    MCHC 34.7 31.5 - 36.5 g/dL    RDW 14.1 10.0 - 15.0 %    Platelet Count 434 150 - 450 10e3/uL    % Neutrophils 81 %    % Lymphocytes 14 %    % Monocytes 4 %    % Eosinophils 1 %    % Basophils 0 %    % Immature Granulocytes 0 %    NRBCs per 100 WBC 0 <1 /100    Absolute Neutrophils  10.2 (H) 1.6 - 8.3 10e3/uL    Absolute Lymphocytes 1.8 0.8 - 5.3 10e3/uL    Absolute Monocytes 0.5 0.0 - 1.3 10e3/uL    Absolute Eosinophils 0.1 0.0 - 0.7 10e3/uL    Absolute Basophils 0.0 0.0 - 0.2 10e3/uL    Absolute Immature Granulocytes 0.1 <=0.4 10e3/uL    Absolute NRBCs 0.0 10e3/uL   Nt probnp inpatient (BNP)   Result Value Ref Range    N terminal Pro BNP Inpatient 2,676 (H) 0 - 900 pg/mL   Extra Tube    Narrative    The following orders were created for panel order Extra Tube.  Procedure                               Abnormality         Status                     ---------                               -----------         ------                     Extra Blue Top Tube[[id number]]                              Final result                 Please view results for these tests on the individual orders.   Extra Blue Top Tube   Result Value Ref Range    Hold Specimen JIC    XR Chest Port 1 View    Narrative    EXAM: XR CHEST PORT 1 VIEW  LOCATION: [facility]  DATE: 8/11/2024    INDICATION: dyspnea  COMPARISON: Chest radiograph 7/31/2024      Impression    IMPRESSION: Stable size of cardiomediastinal silhouette status post median sternotomy and CABG. Persistent elevation of the left hemidiaphragm with linear left basilar atelectasis. No definite consolidation, pleural effusion or pneumothorax. Bones are   unchanged.   Troponin T, High Sensitivity   Result Value Ref Range    Troponin T, High Sensitivity 28 (H) <=22 ng/L       Medications   sodium chloride 0.9 % infusion (0 mLs Intravenous Stopped 8/11/24 2258)   diphenhydrAMINE (BENADRYL) injection 25 mg (25 mg Intravenous $Given 8/11/24 2135)   methylPREDNISolone sodium succinate (solu-MEDROL) injection 125 mg (125 mg Intravenous $Given 8/11/24 2133)         Assessments & Plan (with Medical Decision Making)  Modesto Lehman is a 68 year old male who presented to the ED complaining of lower lip swelling, fatigue, chest heaviness, and  shortness of breath.  Symptoms started around noon today.  He has a history of Lyme disease and is worried about recurrence.  He did get seen a few days ago for lip swelling and was presumed to be caused by lisinopril.  Symptoms resolved with Benadryl and Solu-Medrol then randomly came back today.  On arrival to the ED, patient was tachycardic but otherwise had normal vital signs.  He did not appear acutely ill or toxic.  Did seem fatigued.  Left lower lip was edematous but oropharynx without edema.  He had occasionally faint crackles heard scattered in the lungs and mildly diminished air movement but no significant respiratory distress.  Sternotomy incision appears to be healing well.  IV was established and labs drawn for further evaluation.  Patient's EKG today showed a sinus tachycardia but no dysrhythmias or significant abnormalities to suggest ischemia.  Troponin today was 29 initially.  His BNP was 2676.  Chest x-ray however showed no significant fluid overload to suggest significant heart failure as cause of dyspnea and chest heaviness.  Also with no signs of pneumonia.  He has a known EF of 35% after his surgery a few weeks ago.  His venous gas was generally unremarkable.  His white blood cell count was mildly elevated at 12.7 and hemoglobin 13.2.  Lactic acid and procalcitonin normal.  I repeated his troponin and it was stable at 28.  I went over all these results with the patient and his son.  During course of ED stay he was given IV Benadryl and Solu-Medrol which did start to improve the lip swelling seen here today.  I did screen him for Lyme disease and this is pending.  Symptoms are certainly atypical although when I reviewed his record from 4 years ago he did present with recurrent lower lip swelling when diagnosed with Lyme disease.  I do not want to start him on any new antibiotics until this results.  Patient is comfortable holding on further treatment for now and actually has an appointment in 2  days with his primary where they can discuss results and further management options.  He overall is feeling stable here and comfortable with plan to discharge home given his reassuring workup today.  For lip swelling he can take Benadryl at home or a daily Zyrtec.  He was advised if he develops any new or worsening symptoms to return to the emergency department.  All questions answered and patient discharged home in stable condition.     I have reviewed the nursing notes.    I have reviewed the findings, diagnosis, plan and need for follow up with the patient.      New Prescriptions    No medications on file       Final diagnoses:   Fatigue   Chest heaviness   Lip swelling     Note: Chart documentation done in part with Dragon Voice Recognition software. Although reviewed after completion, some word and grammatical errors may remain.     8/11/2024   [facility] EMERGENCY DEPT       Brisa Salazar PA-C  08/11/24 1463

## 2024-08-12 NOTE — TELEPHONE ENCOUNTER
[facility]    Reason for call: Lab Result Notification     Lab Result (including Rx patient on, if applicable).  If culture, copy of lab report at bottom.  Lab Result:   Component      Latest Ref Rng 8/11/2024  8:22 PM   Lyme Confirm IgG by CLIA Instrument Value      <0.90 Index 2.83 (H)    Lyme Confirm IgG by CLIA Blood      Nonreactive  Reactive !    Lyme Confirm IgM by CLIA Instrument Value      <0.90 Index 1.21 (H)    Lyme Confirm IgM by CLIA Blood      Nonreactive  Reactive !    Lyme Disease Antibodies Serum      <0.90  1.19 (H)       Legend:  (H) High  ! Abnormal    Creatinine Level (mg/dl)   Creatinine   Date Value Ref Range Status   08/11/2024 0.74 0.67 - 1.17 mg/dL Final   07/31/2020 0.80 0.70 - 1.30 mg/dL Final    Creatinine clearance (ml/min), if applicable    Serum creatinine: 0.74 mg/dL 08/11/24 2022  Estimated creatinine clearance: 103.9 mL/min     ED Symptoms: Lip swelling, fatigue.  History of Lymes disease.      Current Symptoms: Patient states that his lip swelling has decreased.  Denies any new or worsening symptoms. Patient has a follow up with his PCP tomorrow.     RN Recommendations/Instructions per [place] ED lab result protocol:   [facility] ED lab result protocol utilized: Tick borne illness  Instruct to start antibiotic, sent to preferred pharmacy.     Allergic to ATBs: No  Breastfeeding: N/A  Pregnant: N/A  On Coumadin/Warfarin: No  Had any urinary procedures or have urinary   retention, neurogenic bladder, or use a catheter: N/A      Patient/care giver notified to contact your PCP clinic or return to the Emergency department if your:  Symptoms do not improve after 3 days on antibiotic.  Symptoms do not resolve after completing antibiotic.  Symptoms worsen or other concerning symptoms.       JUDI BRIDGES RN

## 2024-08-12 NOTE — PATIENT INSTRUCTIONS
Thank you so much for choosing us for your care. It was a pleasure to see you at the vascular clinic today.     Follow-up recommendations: We will see you back in 2 months. Please do not hesitate to reach out to us in the meantime with any concerns. Our schedulers will get in touch with you to set up your follow-up visit.    Additional testing/imaging ordered today: Repeat carotid US in 2 months.      Our scheduling team will get in touch with you to set up any follow-up testing/imaging and/or appointments. Please be aware that any testing/imaging recommended today will need to completed prior to your next visit with the provider. If testing/imaging is not completed prior to your next visit, your visit may be rescheduled.     If you have any questions, please contact our clinic directly at [phone] and ask for the nurse. We also encourage the use of [employer] to communicate with your healthcare provider.    If you have an urgent need after business hours (8:00 am to 4:30 pm) please call [phone], option 4, and ask for the vascular attending on call. For non-urgent after hours needs, please call the vascular clinic at [phone]. For scheduling needs, please call our clinic directly at [phone].

## 2024-08-12 NOTE — Clinical Note
8/12/2024       RE: Modesto Lehman  [address]  [facility] [address]     Dear Colleague,    Thank you for referring your patient, Modesto Lehman, to the [facility]ULAR [facility] [place] [facility]. Please see a copy of my visit note below.    No notes on file    Again, thank you for allowing me to participate in the care of your patient.      Sincerely,    Rasheed Sam MD

## 2024-08-12 NOTE — NURSING NOTE
"Chief Complaint   Patient presents with    Follow Up     Return vascular; post-op carotid with US prior       Initial /75 (BP Location: Left arm, Patient Position: Sitting, Cuff Size: Adult Regular)   Pulse 108   SpO2 96%  Estimated body mass index is 24 kg/m  as calculated from the following:    Height as of 8/3/24: 1.765 m (5' 9.5\").    Weight as of 8/11/24: 74.8 kg (164 lb 14.4 oz).  BP completed using cuff size: regular    Medication refills needed?: n/a      Brittany Pinedo, EMT  "

## 2024-08-12 NOTE — PROGRESS NOTES
Vascular & Endovascular Surgery Clinic Return Visit   Vascular Surgeon: Rasheed Sam MD, RPVI      Location:  [facility]    Modesto Lehman  Medical Record #:  [id number]  YOB: 1955  Age:  68 year old     Date of Service: 8/12/2024    Primary Care Provider: No Ref-Primary, Physician    Chief Complaint/Reason for Visit: Post-op follow-up s/p left carotid endarterectomy    Interval History:  Mr. Lehman is a pleasant 68 year old male who returns today for post-op follow-up s/p left carotid endarterectomy on 6/25/24. Was seen in NP post-op clinic on 7/8/24, doing well at that time and instructed to continue ASA/statin and smoking cessation. Is also s/p 3v CABG on 7/25/24, was seen in the ER on 8/3/24 due to concern for cellulitis; US at that time showed some subcutaneous fluid in the RLE consistent with seroma, and the patient was discharged with PO abx for presumed cellulitis. Today, he states he feels well after surgery. Denies dizziness/lightheadedness, syncope, vision changes, arm pain/numbness/tingling/weakness.     Review of Systems:    A 12 point ROS was reviewed and is negative except for symptoms noted in HPI.     Medical/Surgical History:    Past Medical History:   Diagnosis Date    Allergy status to analgesic agent     No reported medication allergies    Anesthesia of skin     No  problems with anaesthesia    CAD (coronary artery disease)     Elevated coronary artery calcium score     History of recurrent pneumonia     No Comments Provided    Hyperlipidemia     No Comments Provided    Low back pain     pain with bilateral leg and disc injuries.    Personal history of nicotine dependence     Personal history of other medical treatment (CODE)     No blood transfusions    PVD (peripheral vascular disease) (H24)          Past Surgical History:   Procedure Laterality Date    ANGIOPLASTY      Right leg stenting and bypass, Left also    BYPASS GRAFT  ARTERY CORONARY N/A 7/25/2024    Procedure: Median Sternotomy, On Cardiopulmonary Bypass Graft, CORONARY ARTERY BYPASS GRAFT X_3_, Left AND RIGHTGreater Saphenous Vein [place], Left Internal Mammary Artery [place], Intra-operative Transesophageal Echocardiogram per Anesthesia.;  Surgeon: Kit Mason MD;  Location: [facility] OR    COLONOSCOPY  08/23/2010    Q 10yrs.    COLONOSCOPY N/A 02/24/2022    6 tubular adenomas, 5 year follow up, 2/24/2027    CV CORONARY ANGIOGRAM N/A 4/15/2024    Procedure: Coronary Angiogram;  Surgeon: Toni Sellers MD;  Location:  [facility] CARDI[facility] CATH LAB    ENDARTERECTOMY CAROTID Left 6/25/2024    Procedure: Left Carotid Endarterectomy with bovine pericardial patch angioplasty. Intraoperative Ultrasound. Intraoperative EEG monitoring;  Surgeon: Rasheed Sam MD;  Location: [facility] OR    EXTRACTION(S) DENTAL      recently removed    OTHER SURGICAL HISTORY      [id number],PREMALIG/BENIGN SKIN LESION EXCISION, removed from chest    OTHER SURGICAL HISTORY      [id number],ANESTHESIA ALERT,No  problems with anaesthesia        Allergies:     Allergies   Allergen Reactions    Ace Inhibitors Angioedema     ED visit 8/8/2024: suspected upper lip edema was secondary to recent lisinopril start after heart surgery. Improved on steroid/benadryl        Medications:    Current Outpatient Medications   Medication Sig Dispense Refill    acetaminophen (TYLENOL) 325 MG tablet Take 2 tablets (650 mg) by mouth every 4 hours (while awake) 50 tablet 0    aspirin (ASA) 81 MG chewable tablet Take 2 tablets (162 mg) by mouth or NG Tube daily 60 tablet 0    atorvastatin (LIPITOR) 80 MG tablet Take 1 tablet (80 mg) by mouth daily (Patient taking differently: Take 80 mg by mouth at bedtime) 90 tablet 3    cephALEXin (KEFLEX) 500 MG capsule Take 1 capsule (500 mg) by mouth 4 times daily for 10 days 40 capsule 0    colchicine (COLCRYS) 0.6 MG tablet Take 1 tablet (0.6 mg) by mouth or Feeding Tube 2 times daily  60 tablet 0    cyclobenzaprine (FLEXERIL) 10 MG tablet Take 1 tablet (10 mg) by mouth 3 times daily as needed for muscle spasms 50 tablet 0    doxycycline hyclate (VIBRAMYCIN) 100 MG capsule Take 1 capsule (100 mg) by mouth 2 times daily for 10 days 20 capsule 0    EPINEPHrine (ANY BX GENERIC EQUIV) 0.3 MG/0.3ML injection 2-pack Inject 0.3 mLs (0.3 mg) into the muscle once as needed for anaphylaxis 1 each 0    gabapentin (NEURONTIN) 100 MG capsule Take 100 mg by mouth 3 times daily as needed (Pain)      hydrOXYzine HCl (ATARAX) 25 MG tablet Take 1 tablet (25 mg) by mouth every 6 hours as needed for other or anxiety (adjuvant pain) 30 tablet 0    metoprolol succinate ER (TOPROL XL) 50 MG 24 hr tablet Take 1 tablet (50 mg) by mouth daily 30 tablet 0    nitroGLYcerin (NITROSTAT) 0.4 MG sublingual tablet For chest pain place 1 tablet under the tongue every 5 minutes for 3 doses. If symptoms persist 5 minutes after 1st dose call 911. 25 tablet 3    oxyCODONE (ROXICODONE) 5 MG tablet Take 1 tablet (5 mg) by mouth every 6 hours as needed for moderate pain 10 tablet 0    pantoprazole (PROTONIX) 40 MG EC tablet Take 1 tablet (40 mg) by mouth daily For 30 days, then discontinue 30 tablet 0    polyethylene glycol (MIRALAX) 17 GM/Dose powder Take 17 g by mouth daily 510 g 0    senna-docusate (SENOKOT-S/PERICOLACE) 8.6-50 MG tablet Take 2 tablets by mouth 2 times daily as needed for constipation 50 tablet 0    simethicone (MYLICON) 80 MG chewable tablet Take 1 tablet (80 mg) by mouth every 6 hours as needed for cramping 30 tablet 0     No current facility-administered medications for this visit.        Social Habits:    Tobacco Use      Smoking status: Former        Packs/day: 0.25        Years: 0.3 packs/day for 14.8 years (3.7 ttl pk-yrs)        Types: Cigars, Cigarettes        Start date: 11/4/2009      Smokeless tobacco: Never      Tobacco comments: Quit smoking: quit 6/20/17 (cigarettes); smoked cigars 2 puffs cigar week  then stopped before surgery       Alcohol Use: Not on file       History   Drug Use Unknown     Comment: Not daily 2x week        Family History:    Family History   Problem Relation Age of Onset    Diabetes Mother         Diabetes    Heart Disease Mother         Heart Disease    Other - See Comments Father         COPD, CD    Heart Disease Brother         Heart Disease    Other - See Comments Brother         killed by drunk  at age 19.    Substance Abuse Other         Alcohol/Drug, No hx of chemical dependency.    Diabetes Other         Diabetes    Heart Disease Other         Heart Disease    Anesthesia Reaction No family hx of     Clotting Disorder No family hx of     Bleeding Disorder No family hx of        Physical Examination:  There were no vitals taken for this visit.  Wt Readings from Last 1 Encounters:   08/11/24 74.8 kg (164 lb 14.4 oz)     There is no height or weight on file to calculate BMI.    Exam:  General: No apparent distress. Answers questions appropriately   Neurologic: Focally intact, Alert and oriented x 3.   Eyes: Grossly normal.   Cardiac:  RRR. Sternotomy incision healing well, no sternal dehiscence appreciated on exam, thoracic brace in place.   Pulmonary:  Non labored breathing with normal respiratory effort.   Musculoskeletal/Extremity: 5/5 gross upper extremity strength. No edema.  No open wounds or abrasions.   Skin: Left neck incision healing well, CDI, no SOI.     Vascular Exam:   Carotid: No Bruit    Radial: Left: 2+   Right: 2+    Ulnar: Left: 2+   Right: 2+    Laboratory Findings:  Hemoglobin   Date Value Ref Range Status   08/11/2024 13.2 (L) 13.3 - 17.7 g/dL Final   08/08/2024 11.8 (L) 13.3 - 17.7 g/dL Final   07/31/2020 14.2 13.3 - 17.7 g/dL Final   07/13/2020 15.4 13.3 - 17.7 g/dL Final     WBC   Date Value Ref Range Status   07/31/2020 10.6 4.0 - 11.0 10e9/L Final   07/13/2020 14.7 (H) 4.0 - 11.0 10e9/L Final     WBC Count   Date Value Ref Range Status   08/11/2024 12.7  (H) 4.0 - 11.0 10e3/uL Final   08/08/2024 9.5 4.0 - 11.0 10e3/uL Final     Platelet Count   Date Value Ref Range Status   08/11/2024 434 150 - 450 10e3/uL Final   08/08/2024 455 (H) 150 - 450 10e3/uL Final   07/31/2020 250 150 - 450 10e9/L Final   07/13/2020 239 150 - 450 10e9/L Final     Creatinine   Date Value Ref Range Status   08/11/2024 0.74 0.67 - 1.17 mg/dL Final   07/31/2020 0.80 0.70 - 1.30 mg/dL Final     Sodium   Date Value Ref Range Status   08/11/2024 135 135 - 145 mmol/L Final   07/31/2020 140 134 - 144 mmol/L Final     Glucose   Date Value Ref Range Status   08/11/2024 136 (H) 70 - 99 mg/dL Final   08/08/2024 119 (H) 70 - 99 mg/dL Final   12/01/2021 87 70 - 105 mg/dL Final   07/31/2020 165 (H) 70 - 105 mg/dL Final   07/13/2020 106 (H) 70 - 105 mg/dL Final     GLUCOSE BY METER POCT   Date Value Ref Range Status   07/31/2024 102 (H) 70 - 99 mg/dL Final   07/30/2024 113 (H) 70 - 99 mg/dL Final     Hemoglobin A1C   Date Value Ref Range Status   05/01/2024 5.9 (H) <5.7 % Final     Comment:     Normal <5.7%   Prediabetes 5.7-6.4%    Diabetes 6.5% or higher     Note: Adopted from ADA consensus guidelines.     INR   Date Value Ref Range Status   07/25/2024 1.24 (H) 0.85 - 1.15 Final     Imaging:  I personally reviewed the carotid US from today which shows excellent flow through a patent left ICA following repair. No elevated velocities, ICA/CCA ratios <2.0. A small hematoma is identified, does not appear to be clinically significant - may actually be FloSeal.     US carotid bilateral, 08/12/24:  1. RIGHT ICA: Occluded, unchanged.     2. LEFT ICA: Post endarterectomy.  Less than 50% diameter narrowing by  grayscale imaging and sonographic velocity criteria.     3. 6.6 x 8.2 x 23.6 mm structure anterior to the left mid carotid  artery, probable hematoma. If clinically indicated, further evaluation  with neck CT could be performed.    Impression/Shared Medical Decision Making:    #1- Asymptomatic high grade left  carotid stenosis  #2- Asymptomatic chronic occlusion of the right internal carotid artery  #3- Coronary artery disease, pending coronary bypass with Dr. Mason  #4- Hyperlipidemia  #5- Peripheral arterial disease status post bilateral bypasses, elsewhere  #6- Heart Failure with Reduced EF, 30-35%  #7- Former nicotine dependence    Mr. Lehman is a pleasant 68 year old male seen for left carotid artery stenosis, now s/p carotid endarterectomy on 7/9/24 with CABG during same hospitalization. Doing well post-op. Post-op US today shows good patency of the repair.     Mr. Lehman is in agreement with this plan as outlined.    Recommendations/Plan:  OMT: ASA 81, Lipitor, metoprolol  RTC 2 months virtually with repeat carotid US      Chantal Duarte MD  PGY-6  Vascular & Endovascular Surgery  Pager: #[phone]

## 2024-08-13 ENCOUNTER — OFFICE VISIT (OUTPATIENT)
Dept: FAMILY MEDICINE | Facility: OTHER | Age: 69
End: 2024-08-13
Attending: FAMILY MEDICINE
Payer: COMMERCIAL

## 2024-08-13 ENCOUNTER — TELEPHONE (OUTPATIENT)
Dept: CARDIAC REHAB | Facility: OTHER | Age: 69
End: 2024-08-13
Payer: COMMERCIAL

## 2024-08-13 VITALS
RESPIRATION RATE: 16 BRPM | TEMPERATURE: 98.3 F | WEIGHT: 168.3 LBS | OXYGEN SATURATION: 96 % | HEART RATE: 94 BPM | DIASTOLIC BLOOD PRESSURE: 60 MMHG | SYSTOLIC BLOOD PRESSURE: 116 MMHG | BODY MASS INDEX: 24.5 KG/M2

## 2024-08-13 DIAGNOSIS — Z95.1 S/P CABG (CORONARY ARTERY BYPASS GRAFT): Primary | ICD-10-CM

## 2024-08-13 DIAGNOSIS — A69.20 LYME DISEASE: ICD-10-CM

## 2024-08-13 DIAGNOSIS — I42.0 DILATED CARDIOMYOPATHY (H): ICD-10-CM

## 2024-08-13 DIAGNOSIS — I31.9 PERICARDITIS, UNSPECIFIED CHRONICITY, UNSPECIFIED TYPE: ICD-10-CM

## 2024-08-13 DIAGNOSIS — Z23 NEED FOR SHINGLES VACCINE: ICD-10-CM

## 2024-08-13 DIAGNOSIS — I25.10 CORONARY ARTERY DISEASE INVOLVING NATIVE HEART WITHOUT ANGINA PECTORIS, UNSPECIFIED VESSEL OR LESION TYPE: ICD-10-CM

## 2024-08-13 DIAGNOSIS — I77.9 BILATERAL CAROTID ARTERY DISEASE, UNSPECIFIED TYPE (H): ICD-10-CM

## 2024-08-13 DIAGNOSIS — I50.9 CHRONIC CONGESTIVE HEART FAILURE, UNSPECIFIED HEART FAILURE TYPE (H): ICD-10-CM

## 2024-08-13 DIAGNOSIS — Z29.11 NEED FOR VACCINATION AGAINST RESPIRATORY SYNCYTIAL VIRUS: ICD-10-CM

## 2024-08-13 DIAGNOSIS — I73.9 PERIPHERAL VASCULAR DISEASE (H): ICD-10-CM

## 2024-08-13 DIAGNOSIS — Z13.29 SCREENING FOR THYROID DISORDER: ICD-10-CM

## 2024-08-13 LAB — TSH SERPL DL<=0.005 MIU/L-ACNC: 1.33 UIU/ML (ref 0.3–4.2)

## 2024-08-13 PROCEDURE — G2211 COMPLEX E/M VISIT ADD ON: HCPCS | Performed by: FAMILY MEDICINE

## 2024-08-13 PROCEDURE — 99215 OFFICE O/P EST HI 40 MIN: CPT | Performed by: FAMILY MEDICINE

## 2024-08-13 PROCEDURE — G0463 HOSPITAL OUTPT CLINIC VISIT: HCPCS

## 2024-08-13 RX ORDER — ATORVASTATIN CALCIUM 80 MG/1
80 TABLET, FILM COATED ORAL AT BEDTIME
COMMUNITY
Start: 2024-08-13 | End: 2024-09-17

## 2024-08-13 ASSESSMENT — PAIN SCALES - GENERAL: PAINLEVEL: MODERATE PAIN (4)

## 2024-08-13 NOTE — LETTER
My Heart Failure Action Plan  Name: Modesto Lehman   YOB: 1955  Date: 8/13/2024   My doctor:   No Ref-Primary, Physician   [facility]   [address]  [place] [address] [phone] My Diagnosis: HF-rEF (EF < 40%)  My Ejection Fraction:   Lab Results   Component Value Date    LVEF 30-35% (moderately reduced) 07/30/2024     30% - 34%  My Exercise Goal: Start exercise slowly - to begin, do a few minutes of exercise, several times a day. Increase your time and speed [id number] to build tolerance, with a goal of 30 minutes of exercise daily. Steady, slow, and consistent exercise is both safe and healthy. Stop and rest when you feel tired or become short of breath. Do not push yourself on days when you don t feel well.       My Weight Plan:   Wt Readings from Last 2 Encounters:   08/13/24 76.3 kg (168 lb 4.8 oz)   08/11/24 74.8 kg (164 lb 14.4 oz)     Weigh yourself daily using the same scale. If you gain more than 2 pounds in 24 hours or 5 pounds in 7 days. call the clinic    My Diet Goal: No added salt    Emergency Room Visits:    Our goal is to improve your quality of life and help you avoid a visit to the emergency room or hospital.  If we work together, we can achieve this goal. But, if you feel you need to call 911 or go to the emergency room, please do so.  If you go to the emergency room, please bring your list of medicines and your daily weight chart with you.    Each day ask yourself:  Is my weight up?  Do I have swelling?  Do I have trouble breathing?  How did I sleep?  Other problems?       GREEN ZONE     Weight gained is no more than 2 pounds a day or 5 pounds a in 7 days.  No swelling in feet, ankles, legs or stomach.  No more swelling than usual.  No more trouble breathing than usual.  No change in my sleep.  No other problems. What should I do?  I am doing fine. I will take my medicine, follow my diet, see my doctor, exercise, and watch for  symptoms.           YELLOW ZONE         Weight gain of more than 2 pounds in one day or 5 pounds in 7 days.  New swelling in ankle, leg, knee or thigh.  Bloating in belly, pants feel tighter.  Swelling in hands or face.  Coughing or trouble breathing while walking or talking.  Harder to breathe last night.  Have trouble sleeping, wake up short of breath.  Unusually tired.  Not eating.  Nausea, vomiting, or diarrhea  Pain in my chest or bad  leg cramps.  Feel weak or dizzy. What should I do?  I need to take action and call my doctor or nurse today.                 RED ZONE         Weight gain of 5 pounds overnight.  Chest pain or pressure that does not go away.  Feel less alert.  Wheezing or have trouble breathing when at rest.  Cannot sleep lying down.  Cannot take my medicines.  Pass out or faint. What should I do?  I need to call my doctor or nurse now!  Call 911 if I have chest pain or cannot breathe.

## 2024-08-13 NOTE — NURSING NOTE
Chief Complaint   Patient presents with    Hospital F/U         Medication Reconciliation: complete    Brisa Chang, LPN

## 2024-08-13 NOTE — PROGRESS NOTES
Nursing Notes:   Brisa hCang LPN  8/13/2024  3:21 PM  Signed  Chief Complaint   Patient presents with    Hospital F/U         Medication Reconciliation: complete    Brisa Chang LPN      Lola Salvador is a 68 year old, presenting for the following health issues:  Hospital F/U        8/13/2024     2:54 PM   Additional Questions   Roomed by Brisa Chang     CRISTAL Valerio is here today for follow up.  He had been hospitalized at the [facility] between 7/25-7/31/24.  He underwent a 3 vessel CABG at that time of left circumflex, left anterior descending diagonal branch and left anterior descending arteries..  He also had ischemic cardiomyopathy, dilated cardiomyopathy and pericarditis.  He also has a history of PAD s/p bilateral fem-pop bypasses, carotid artery disease, s/p left carotid endarterectomy (6/25/24).  His post operative echocardiogram showed an LVEF of 30-35%, which was stable from his preop echocardiogram.  He was discharged with a a lifevest.  He developed a postoperative ileus.  He required an NG tube and was started on D5 NS.  He was able to have his diet advanced.  He subsequently was seen in the Emergency Department on 8/3/24 due to redness of his left leg.  Ultrasound was negative for DVT.  It was felt he had cellulitis of his leg and was treated with keflex.  He presented to the Emergency Department on 8/8/24 due to oral swelling.  It was felt that this was most likely related to his ACE-I, but patient felt that it was very similar to when he had Lyme disease last year.  He was treated with steroids and benadryl in the Emergency Department.  It did not progress and he was sent home.   He had a visit with Cardiology on 8/9/24.  It was recommended that he stay off of the lisinopril.  It was recommended that he continue using the lifevest until he has his next echocardiogram.  He is on colchicine for pericarditis.  He was to follow up with Cardiology again in 2-3 weeks.   He  presented to the Emergency Department again on 8/11/24 due to fatigue.  He has a past history of lyme and repeat lyme test was completed at that time.  This did return positive.  He was given a 10 day course of doxycycline, which he started yesterday.  He had a follow up visit with [facility] surgery regarding his carotid endarterectomy yesterday.  It was recommended that he stay on aspirin 81 mg daily as well as Atorvastatin and metoprolol.  He is to follow up with them again in 2 months with repeat carotid ultrasound at that time.  He has not had any fever.  He just started doxycycline yesterday.  He had lyme last year also.  At that time, his lips were swelling up too.  He is feeling better at this time.  Starting cardiac rehab 8/22/24.      Hospital Follow-up Visit:    Hospital/Nursing Home/IP Rehab Facility:  [facility]   Date of Admission: 7/25  Date of Discharge: 7/31  Reason(s) for Admission: CABG x3   Was the patient in the ICU or did the patient experience delirium during hospitalization?  No  Do you have any other stressors you would like to discuss with your provider? OTHER: His wife Imelda has a history of a prior kidney transplant and is going to be needing another new kidney.  She is not yet needing dialysis.      Problems taking medications regularly:  None  Medication changes since discharge: None  Problems adhering to non-medication therapy:  None    Summary of hospitalization:  [facility] hospital discharge summary reviewed  Diagnostic Tests/Treatments reviewed.  Follow up needed: none  Other Healthcare Providers Involved in Patient s Care:          Cardiology, vascular surgery, CV surgery.  Update since discharge: improved.         Plan of care communicated with patient                   Review of Systems  Constitutional, HEENT, cardiovascular, pulmonary, GI, , musculoskeletal, neuro, skin, endocrine and psych systems are negative, except as otherwise noted.      Objective    /60   Pulse 94    Temp 98.3  F (36.8  C) (Tympanic)   Resp 16   Wt 76.3 kg (168 lb 4.8 oz)   SpO2 96%   BMI 24.50 kg/m    Body mass index is 24.5 kg/m .  Physical Exam  Constitutional:       Appearance: Normal appearance.   HENT:      Head: Normocephalic.   Eyes:      Extraocular Movements: Extraocular movements intact.      Pupils: Pupils are equal, round, and reactive to light.   Cardiovascular:      Rate and Rhythm: Normal rate and regular rhythm.      Heart sounds: Normal heart sounds. No murmur heard.     Comments: Surgical wound and drain sites appear to be healing well.    Wearing external Lifevest defibrillator.  Pulmonary:      Effort: Pulmonary effort is normal.      Breath sounds: Normal breath sounds. No wheezing, rhonchi or rales.   Musculoskeletal:      Cervical back: Normal range of motion and neck supple.   Lymphadenopathy:      Cervical: No cervical adenopathy.   Skin:     Comments: No redness of legs.  His vein harvest wound sites on legs appear to be healing well.   Neurological:      Mental Status: He is alert.   Psychiatric:         Mood and Affect: Mood normal.         Behavior: Behavior normal.        TSH   Date Value Ref Range Status   08/11/2024 1.33 0.30 - 4.20 uIU/mL Final           ICD-10-CM    1. S/P CABG (coronary artery bypass graft)  Z95.1       2. Pericarditis, unspecified chronicity, unspecified type  I31.9       3. Dilated cardiomyopathy (H)  I42.0       4. Coronary artery disease involving native heart without angina pectoris, unspecified vessel or lesion type  I25.10       5. Chronic congestive heart failure, unspecified heart failure type (H)  I50.9       6. Bilateral carotid artery disease, unspecified type (H24)  I77.9       7. Lyme disease  A69.20       8. Peripheral vascular disease (H24)  I73.9 atorvastatin (LIPITOR) 80 MG tablet      9. Need for shingles vaccine  Z23 zoster vaccine recombinant adjuvanted (SHINGRIX) injection      10. Need for vaccination against respiratory syncytial  virus  Z29.11 RSV vaccine, bivalent, ABRYSVO, injection      11. Screening for thyroid disorder  Z13.29 TSH Reflex GH           Stable.  Following with CV surgery and Cardiology.    See #1.  See #1.  See #1.  See #1.   Doing well s/p left carotid endarterectomy.  He has 100% occlusion of his right carotid.  On doxycycline.  Stable.  On Atorvastatin.    Order for Shingrix sent to pharmacy.    Order for RSV vaccine sent to pharmacy.  TSH added to labs from 8/11/24 and was normal as above.    No follow-ups on file.     47 minutes spent in review of chart, interviewing patient, examining patient, discussing plan, reviewing results and completing note on the date of encounter.    The longitudinal plan of care for the diagnosis(es)/condition(s) as documented were addressed during this visit. Due to the added complexity in care, I will continue to support Modesto in the subsequent management and with ongoing continuity of care.      Laurie Campos MD

## 2024-08-13 NOTE — TELEPHONE ENCOUNTER
Patient verified their .  Called patient re: referral to cardiac rehab received from Andrea  Instructed on program and goals of cardiac rehab.  Appointment set for 24.  Patient verbalized understanding.

## 2024-08-19 ENCOUNTER — TELEPHONE (OUTPATIENT)
Dept: CARDIOLOGY | Facility: CLINIC | Age: 69
End: 2024-08-19
Payer: COMMERCIAL

## 2024-08-19 NOTE — TELEPHONE ENCOUNTER
8/19 Patient confirmed scheduled appointment:  Date: 10/4/2024  Time: 10:20 am  Visit type: enrollment core  Provider: Horacio  [place] [facility] [place]imaging: labs prior  Additional notes: N/A

## 2024-08-22 ENCOUNTER — HOSPITAL ENCOUNTER (OUTPATIENT)
Dept: CARDIAC REHAB | Facility: OTHER | Age: 69
Discharge: HOME | End: 2024-08-22
Attending: FAMILY MEDICINE
Payer: MEDICARE

## 2024-08-22 PROCEDURE — 93798 PHYS/QHP OP CAR RHAB W/ECG: CPT

## 2024-08-26 ENCOUNTER — HOSPITAL ENCOUNTER (OUTPATIENT)
Dept: CARDIAC REHAB | Facility: OTHER | Age: 69
Discharge: HOME | End: 2024-08-26
Attending: SURGERY
Payer: MEDICARE

## 2024-08-26 PROCEDURE — 93798 PHYS/QHP OP CAR RHAB W/ECG: CPT

## 2024-08-28 ENCOUNTER — HOSPITAL ENCOUNTER (OUTPATIENT)
Dept: CARDIAC REHAB | Facility: OTHER | Age: 69
Discharge: HOME | End: 2024-08-28
Attending: SURGERY
Payer: MEDICARE

## 2024-08-28 PROCEDURE — 93798 PHYS/QHP OP CAR RHAB W/ECG: CPT

## 2024-08-30 ENCOUNTER — HOSPITAL ENCOUNTER (OUTPATIENT)
Dept: CARDIAC REHAB | Facility: OTHER | Age: 69
Discharge: HOME | End: 2024-08-30
Attending: SURGERY
Payer: MEDICARE

## 2024-08-30 PROCEDURE — 93798 PHYS/QHP OP CAR RHAB W/ECG: CPT

## 2024-09-04 ENCOUNTER — HOSPITAL ENCOUNTER (OUTPATIENT)
Dept: CARDIAC REHAB | Facility: OTHER | Age: 69
Discharge: HOME | End: 2024-09-04
Attending: FAMILY MEDICINE
Payer: MEDICARE

## 2024-09-04 PROCEDURE — 93798 PHYS/QHP OP CAR RHAB W/ECG: CPT

## 2024-09-06 ENCOUNTER — HOSPITAL ENCOUNTER (OUTPATIENT)
Dept: CARDIAC REHAB | Facility: OTHER | Age: 69
Discharge: HOME | End: 2024-09-06
Attending: SURGERY
Payer: MEDICARE

## 2024-09-06 PROCEDURE — 93798 PHYS/QHP OP CAR RHAB W/ECG: CPT

## 2024-09-09 ENCOUNTER — HOSPITAL ENCOUNTER (OUTPATIENT)
Dept: CARDIAC REHAB | Facility: OTHER | Age: 69
Discharge: HOME | End: 2024-09-09
Attending: SURGERY
Payer: MEDICARE

## 2024-09-09 PROCEDURE — 93798 PHYS/QHP OP CAR RHAB W/ECG: CPT

## 2024-09-13 ENCOUNTER — HOSPITAL ENCOUNTER (OUTPATIENT)
Dept: CARDIAC REHAB | Facility: OTHER | Age: 69
Discharge: HOME | End: 2024-09-13
Attending: SURGERY
Payer: MEDICARE

## 2024-09-13 PROCEDURE — 93798 PHYS/QHP OP CAR RHAB W/ECG: CPT

## 2024-09-16 ENCOUNTER — TELEPHONE (OUTPATIENT)
Dept: CARDIAC REHAB | Facility: OTHER | Age: 69
End: 2024-09-16
Payer: COMMERCIAL

## 2024-09-16 DIAGNOSIS — I25.118 CORONARY ARTERY DISEASE OF NATIVE ARTERY OF NATIVE HEART WITH STABLE ANGINA PECTORIS (H): ICD-10-CM

## 2024-09-16 RX ORDER — METOPROLOL SUCCINATE 50 MG/1
50 TABLET, EXTENDED RELEASE ORAL DAILY
Qty: 90 TABLET | Refills: 3 | Status: SHIPPED | OUTPATIENT
Start: 2024-09-16 | End: 2024-10-04

## 2024-09-16 NOTE — TELEPHONE ENCOUNTER
Patient informed to continue it and of refill sent     Brisa Lovett CMA on 9/16/2024 at 10:45 AM

## 2024-09-16 NOTE — TELEPHONE ENCOUNTER
Pt states he is cancelling appointment for cardiac rehab today as he reports swelling in his lower extremities.   Which he states is better today, worse yesterday.  RN instructed him to weigh daily if weight up 2 to 3 lbs in one days or 5 lbs in week to see medical provider.    He states he is out of metoprolol, RN will send message to Dr. Matt Prieto for refill if he needs to continue this.

## 2024-09-16 NOTE — TELEPHONE ENCOUNTER
Pt verified his .  His RX for metoprolol has run out which was ordered from [facility] after his CABG procedure.  Bottle had 30 pills which he is now out of.    Question does this need to be refilled?   He reports he does use [facility] pharmacy.  Please call patient and/or reorder if needed.

## 2024-09-17 DIAGNOSIS — I73.9 PERIPHERAL VASCULAR DISEASE (H): ICD-10-CM

## 2024-09-17 RX ORDER — ATORVASTATIN CALCIUM 80 MG/1
80 TABLET, FILM COATED ORAL AT BEDTIME
Qty: 90 TABLET | Refills: 3 | Status: SHIPPED | OUTPATIENT
Start: 2024-09-17

## 2024-09-17 NOTE — TELEPHONE ENCOUNTER
Requested Prescriptions   Pending Prescriptions Disp Refills    atorvastatin (LIPITOR) 80 MG tablet 90 tablet      Sig: Take 1 tablet (80 mg) by mouth at bedtime.       Antihyperlipidemic agents Passed - 9/17/2024 11:37 AM        Passed - LDL on file in the past 12 months        Passed - Medication is active on med list        Passed - Recent (12 mo) or future (90 days) visit within the authorizing provider's specialty     The patient must have completed an in-person or virtual visit within the past 12 months or has a future visit scheduled within the next 90 days with the authorizing provider s specialty.  Urgent care and e-visits do not quality as an office visit for this protocol.        Passed - Patient is age 18 years or older     Last Written Prescription Date:  8/13/24  Last Fill Quantity: -,   # refills: -  Last Office Visit: 8/9/24  Future Office visit:    Next 5 appointments (look out 90 days)      Oct 08, 2024 9:15 AM  (Arrive by 9:00 AM)  Return Visit with Omega Chandler,   [facility] ([facility] [address]  [place] [address]  [phone]     Routing refill request to provider for review/approval because:  Medication is reported/historical    Unable to complete prescription refill per RN Medication Refill Policy.   Carlie Lehman RN ....................  9/17/2024   2:12 PM

## 2024-09-20 ENCOUNTER — HOSPITAL ENCOUNTER (OUTPATIENT)
Dept: CARDIAC REHAB | Facility: OTHER | Age: 69
Discharge: HOME | End: 2024-09-20
Attending: SURGERY
Payer: MEDICARE

## 2024-09-20 ENCOUNTER — TELEPHONE (OUTPATIENT)
Dept: CARDIOLOGY | Facility: OTHER | Age: 69
End: 2024-09-20
Payer: COMMERCIAL

## 2024-09-20 PROCEDURE — 93798 PHYS/QHP OP CAR RHAB W/ECG: CPT

## 2024-09-20 NOTE — TELEPHONE ENCOUNTER
Patient Request:     Patient has been out of atorvastatin 80 mg and would like it refilled. Is unsure why his prescription at [facility] for it on 9/17/2024, was cancelled. He has an appointment scheduled with Dr. Chandler 10/8/2024.     Please advise,    Thank you,     Tammy Ghosh RN on 9/20/2024 at 8:30 AM

## 2024-09-20 NOTE — TELEPHONE ENCOUNTER
"Spoke with pharmacy call center, they indicate the atorvastatin is in \"ready for  status.\"    Called patient and informed him of this.  States he will check again.    Gin Antony RN on 9/20/2024 at 9:38 AM    "

## 2024-09-23 ENCOUNTER — HOSPITAL ENCOUNTER (OUTPATIENT)
Dept: CARDIAC REHAB | Facility: OTHER | Age: 69
Discharge: HOME | End: 2024-09-23
Attending: SURGERY
Payer: MEDICARE

## 2024-09-23 PROCEDURE — 93798 PHYS/QHP OP CAR RHAB W/ECG: CPT

## 2024-09-25 ENCOUNTER — HOSPITAL ENCOUNTER (OUTPATIENT)
Dept: CARDIAC REHAB | Facility: OTHER | Age: 69
Discharge: HOME | End: 2024-09-25
Attending: SURGERY
Payer: MEDICARE

## 2024-09-25 PROCEDURE — 93798 PHYS/QHP OP CAR RHAB W/ECG: CPT

## 2024-09-27 ENCOUNTER — HOSPITAL ENCOUNTER (OUTPATIENT)
Dept: CARDIAC REHAB | Facility: OTHER | Age: 69
Discharge: HOME | End: 2024-09-27
Attending: SURGERY
Payer: MEDICARE

## 2024-09-27 PROCEDURE — 93798 PHYS/QHP OP CAR RHAB W/ECG: CPT

## 2024-09-30 ENCOUNTER — HOSPITAL ENCOUNTER (OUTPATIENT)
Dept: CARDIAC REHAB | Facility: OTHER | Age: 69
Discharge: HOME | End: 2024-09-30
Attending: SURGERY
Payer: MEDICARE

## 2024-09-30 ENCOUNTER — PATIENT OUTREACH (OUTPATIENT)
Dept: CARE COORDINATION | Facility: CLINIC | Age: 69
End: 2024-09-30
Payer: COMMERCIAL

## 2024-09-30 PROCEDURE — 93798 PHYS/QHP OP CAR RHAB W/ECG: CPT

## 2024-09-30 NOTE — PROGRESS NOTES
"Clinic Care Coordination Contact    Referral received by Cardiac Rehab to offer Care Coordination to patient to discuss his home situation, resources for caregiving for him and his wife.    Initiated phone call to patient.  Patient unable to talk directly on the phone with writer as he was driving the car.  His wife, Imelda, answered the call on their mobile cell phone. Wife/Imelda reported that they are currently taking a road trip to [facility] at this time to \"get away\" for the afternoon.  She proceeded to share how she was doing as she, too, has medical issues.     Wife/Imelda agreed to give patient message that writer would reach out again tomorrow to discuss any needs.    Will address Care Coordination option tomorrow with patient directly.     ADRIANE Dominguez on 9/30/2024 at 4:54 PM       "

## 2024-10-02 ENCOUNTER — DOCUMENTATION ONLY (OUTPATIENT)
Dept: CARDIOLOGY | Facility: CLINIC | Age: 69
End: 2024-10-02
Payer: COMMERCIAL

## 2024-10-02 DIAGNOSIS — I25.118 CORONARY ARTERY DISEASE OF NATIVE ARTERY OF NATIVE HEART WITH STABLE ANGINA PECTORIS (H): Primary | ICD-10-CM

## 2024-10-02 NOTE — PROGRESS NOTES
Hello,   In order to offer our patients the best possible experience, the lab schedule is reviewed to ensure patients have lab orders in Epic prior to arriving at the lab.    Please have one of your team members place a lab order in Epic for this patient prior to the lab appointment date.     Thank you for your attention,   [facility] [phone]

## 2024-10-03 NOTE — PROGRESS NOTES
Clinic Care Coordination Contact    Initiated a call back to patient.  Discussed patient's current status and the medical status of his wife, Avelina.      Patient stated that he is doing ok at this time---no specific concerns or resource needs expressed.     Patient did state that he is uncertain re: his appointment tomorrow.  Patient agreed to call sooner in the future with any questions that he may have before an appointment.    Patient agreed to call Care Coordination with any future concerns he may have re: personal resources, navigation health care decisions and appointments.     ADRIANE Dominguez on 10/3/2024 at 5:48 PM

## 2024-10-04 ENCOUNTER — LAB (OUTPATIENT)
Dept: LAB | Facility: CLINIC | Age: 69
End: 2024-10-04
Attending: NURSE PRACTITIONER
Payer: COMMERCIAL

## 2024-10-04 ENCOUNTER — HOSPITAL ENCOUNTER (OUTPATIENT)
Dept: CARDIOLOGY | Facility: CLINIC | Age: 69
Discharge: HOME | End: 2024-10-04
Attending: NURSE PRACTITIONER
Payer: MEDICARE

## 2024-10-04 ENCOUNTER — TELEPHONE (OUTPATIENT)
Dept: CARDIOLOGY | Facility: CLINIC | Age: 69
End: 2024-10-04

## 2024-10-04 ENCOUNTER — OFFICE VISIT (OUTPATIENT)
Dept: CARDIOLOGY | Facility: CLINIC | Age: 69
End: 2024-10-04
Attending: NURSE PRACTITIONER
Payer: MEDICARE

## 2024-10-04 VITALS
SYSTOLIC BLOOD PRESSURE: 92 MMHG | WEIGHT: 177.3 LBS | BODY MASS INDEX: 25.81 KG/M2 | HEART RATE: 79 BPM | OXYGEN SATURATION: 97 % | DIASTOLIC BLOOD PRESSURE: 61 MMHG

## 2024-10-04 DIAGNOSIS — I25.118 CORONARY ARTERY DISEASE OF NATIVE ARTERY OF NATIVE HEART WITH STABLE ANGINA PECTORIS (H): ICD-10-CM

## 2024-10-04 DIAGNOSIS — I50.22 CHRONIC SYSTOLIC HEART FAILURE (H): ICD-10-CM

## 2024-10-04 DIAGNOSIS — Z95.1 S/P CABG (CORONARY ARTERY BYPASS GRAFT): ICD-10-CM

## 2024-10-04 DIAGNOSIS — I50.22 CHRONIC SYSTOLIC HEART FAILURE (H): Primary | ICD-10-CM

## 2024-10-04 LAB
ANION GAP SERPL CALCULATED.3IONS-SCNC: 9 MMOL/L (ref 7–15)
BUN SERPL-MCNC: 14.2 MG/DL (ref 8–23)
CALCIUM SERPL-MCNC: 8.9 MG/DL (ref 8.8–10.4)
CHLORIDE SERPL-SCNC: 100 MMOL/L (ref 98–107)
CREAT SERPL-MCNC: 0.82 MG/DL (ref 0.67–1.17)
EGFRCR SERPLBLD CKD-EPI 2021: >90 ML/MIN/1.73M2
GLUCOSE SERPL-MCNC: 139 MG/DL (ref 70–99)
HCO3 SERPL-SCNC: 27 MMOL/L (ref 22–29)
LVEF ECHO: NORMAL
POTASSIUM SERPL-SCNC: 4.4 MMOL/L (ref 3.4–5.3)
SODIUM SERPL-SCNC: 136 MMOL/L (ref 135–145)

## 2024-10-04 PROCEDURE — G0463 HOSPITAL OUTPT CLINIC VISIT: HCPCS | Performed by: NURSE PRACTITIONER

## 2024-10-04 PROCEDURE — 93306 TTE W/DOPPLER COMPLETE: CPT | Mod: 26 | Performed by: INTERNAL MEDICINE

## 2024-10-04 PROCEDURE — 36415 COLL VENOUS BLD VENIPUNCTURE: CPT | Performed by: PATHOLOGY

## 2024-10-04 PROCEDURE — 93306 TTE W/DOPPLER COMPLETE: CPT

## 2024-10-04 PROCEDURE — 99204 OFFICE O/P NEW MOD 45 MIN: CPT | Mod: 24 | Performed by: NURSE PRACTITIONER

## 2024-10-04 PROCEDURE — 80048 BASIC METABOLIC PNL TOTAL CA: CPT | Performed by: PATHOLOGY

## 2024-10-04 RX ORDER — METOPROLOL SUCCINATE 50 MG/1
25 TABLET, EXTENDED RELEASE ORAL DAILY
Qty: 90 TABLET | Refills: 3 | Status: SHIPPED | OUTPATIENT
Start: 2024-10-04

## 2024-10-04 RX ORDER — LOSARTAN POTASSIUM 25 MG/1
12.5 TABLET ORAL DAILY
Qty: 30 TABLET | Refills: 3 | Status: SHIPPED | OUTPATIENT
Start: 2024-10-04 | End: 2024-10-08 | Stop reason: ALTCHOICE

## 2024-10-04 RX ORDER — ASPIRIN 81 MG/1
162 TABLET, CHEWABLE ORAL DAILY
Qty: 180 TABLET | Refills: 3 | Status: SHIPPED | OUTPATIENT
Start: 2024-10-04 | End: 2024-10-08

## 2024-10-04 RX ORDER — FUROSEMIDE 20 MG/1
20 TABLET ORAL DAILY PRN
Qty: 30 TABLET | Refills: 0 | Status: SHIPPED | OUTPATIENT
Start: 2024-10-04

## 2024-10-04 ASSESSMENT — PAIN SCALES - GENERAL: PAINLEVEL: MILD PAIN (3)

## 2024-10-04 NOTE — TELEPHONE ENCOUNTER
30 days supply test claims requested for the drugs below:  Entresto 24-26 BID   Jardiance 10 mg   Farxiga 10 mg     Entresto   -$10.00 copay        Jardiance  -$10.00 copay        Farxiga  -$10.00 copay

## 2024-10-04 NOTE — LETTER
"10/4/2024      RE: Modesto Lehman  [address]  [facility] [address]       Dear Colleague,    Thank you for the opportunity to participate in the care of your patient, Modesto Lehman, at the [facility] [place] [facility]. Please see a copy of my visit note below.      Health system Cardiology   [facility]      HPI:   Mr. Lehman is a 68 year old male with medical history pertinent for CAD, HFrEF (30-35%), ischemic cardiomyopathy, HLD, PAD (s/p bilateral popliteal bypass), carotid disease (s/p  and chronic occlusion of DARIUSZ), and chronic back pain. He was referred to Dr. Mason by his cardiologist after outpatient angiogram demonstrated severe 3-vessel coronary artery disease and underwent CABG x 3v on 24. Post-op EKG with diffuse ST elevation c/w pericarditis, troponins down-trending, continue colchicine 0.6 mg BID. Mr. Lehman presents to clinic for CORE enrollment.     Today, Mr. Lehman presents to clinic with his wife. They live in [place], [place]. Reports feeling fair. Since surgery he notes that he still has reduced stamina and does not have his same \"get up and go\" energy. He is however doing all the grocery shopping, cooking, and laundry. Prior to surgery he would chop his own wood, but has hired someone to do that this winter. He is attending cardiac rehab 3x weekly. Walking on treadmill and biking. Feels fine with the level of exertion. FORD is in proportion to effort. No acute SOB. Does not a generalized chest tightness. Thinks it might be from wearing the Lifevest. Denies acute chest pain or palpitations. No Lifevest shocks. No lightheadedness or dizziness. He has had one episode of leg and foot swelling. Last week he notes his weight was up 5 lbs and shoes were fitting tighter. Resolved independently. He is mindful of following a low salt diet and fluid restriction.   He weighs himself daily, 177 lb with Lifevest on. "     Of note, he presented to the ED on 8/8 with lower lip swelling, but with no other tongue/mouth involvement. He was on Keflex at that time, but had been on several days prior to symptoms. ED MD suspected that the more likely cause was swelling due to lisinopril, which was started on 7/31/24. Treat with steroids and Benadryl. Lisinopril discontinued.     He was dx with Lymes Disease ~ 5 years ago.     Cardiac Medications  - ASA 81 mg daily   - Atorvastatin 80 mg daily   - Metoprolol succinate 50 mg daily     PAST MEDICAL HISTORY:  Past Medical History:   Diagnosis Date     Allergy status to analgesic agent     No reported medication allergies     Anesthesia of skin     No  problems with anaesthesia     CAD (coronary artery disease)      Elevated coronary artery calcium score      History of recurrent pneumonia     No Comments Provided     Hyperlipidemia     No Comments Provided     Low back pain     pain with bilateral leg and disc injuries.     Personal history of nicotine dependence      Personal history of other medical treatment (CODE)     No blood transfusions     PVD (peripheral vascular disease) (H)        FAMILY HISTORY:  Family History   Problem Relation Age of Onset     Diabetes Mother         Diabetes     Heart Disease Mother         Heart Disease     Other - See Comments Father         COPD, CD     Heart Disease Brother         Heart Disease     Other - See Comments Brother         killed by drunk  at age 19.     Substance Abuse Other         Alcohol/Drug, No hx of chemical dependency.     Diabetes Other         Diabetes     Heart Disease Other         Heart Disease     Anesthesia Reaction No family hx of      Clotting Disorder No family hx of      Bleeding Disorder No family hx of        SOCIAL HISTORY:  Social History     Socioeconomic History     Marital status:    Tobacco Use     Smoking status: Former     Current packs/day: 0.25     Average packs/day: 0.3 packs/day for 14.9 years (3.7  ttl pk-yrs)     Types: Cigars, Cigarettes     Start date: 11/4/2009     Smokeless tobacco: Never     Tobacco comments:     Quit smoking: quit 6/20/17 (cigarettes); smoked cigars 2 puffs cigar week then stopped before surgery   Vaping Use     Vaping status: Never Used   Substance and Sexual Activity     Alcohol use: Not Currently     Comment: 2 vodka 3-4x week     Drug use: Not Currently     Types: Marijuana     Comment: Not daily 2x week     Sexual activity: Not Currently   Social History Narrative    Self employed as .   with 3 adult children.    No hx of chemical dependency.  Patient is a former smoker.   Alcohol Use - yes occ    Wife - Imelda Lehman     Social Determinants of Health     Financial Resource Strain: Low Risk  (2/16/2024)    Financial Resource Strain      Within the past 12 months, have you or your family members you live with been unable to get utilities (heat, electricity) when it was really needed?: No   Food Insecurity: Low Risk  (2/16/2024)    Food Insecurity      Within the past 12 months, did you worry that your food would run out before you got money to buy more?: No      Within the past 12 months, did the food you bought just not last and you didn t have money to get more?: No   Transportation Needs: Low Risk  (2/16/2024)    Transportation Needs      Within the past 12 months, has lack of transportation kept you from medical appointments, getting your medicines, non-medical meetings or appointments, work, or from getting things that you need?: No   Physical Activity: Sufficiently Active (2/16/2024)    Exercise Vital Sign      Days of Exercise per Week: 7 days      Minutes of Exercise per Session: 60 min   Stress: Stress Concern Present (2/16/2024)    [employer] - Occupational Stress Questionnaire      Feeling of Stress : Very much   Interpersonal Safety: Low Risk  (8/13/2024)    Interpersonal Safety      Do you feel physically and emotionally safe  where you currently live?: Yes      Within the past 12 months, have you been hit, slapped, kicked or otherwise physically hurt by someone?: No      Within the past 12 months, have you been humiliated or emotionally abused in other ways by your partner or ex-partner?: No   Housing Stability: Low Risk  (2/16/2024)    Housing Stability      Do you have housing? : Yes      Are you worried about losing your housing?: No       CURRENT MEDICATIONS:  Current Outpatient Medications   Medication Sig Dispense Refill     acetaminophen (TYLENOL) 325 MG tablet Take 2 tablets (650 mg) by mouth every 4 hours (while awake) 50 tablet 0     aspirin (ASA) 81 MG chewable tablet Take 2 tablets (162 mg) by mouth or NG Tube daily 60 tablet 0     atorvastatin (LIPITOR) 80 MG tablet Take 1 tablet (80 mg) by mouth at bedtime. 90 tablet 3     colchicine (COLCRYS) 0.6 MG tablet Take 1 tablet (0.6 mg) by mouth or Feeding Tube 2 times daily 60 tablet 0     cyclobenzaprine (FLEXERIL) 10 MG tablet Take 1 tablet (10 mg) by mouth 3 times daily as needed for muscle spasms 50 tablet 0     EPINEPHrine (ANY BX GENERIC EQUIV) 0.3 MG/0.3ML injection 2-pack Inject 0.3 mLs (0.3 mg) into the muscle once as needed for anaphylaxis 1 each 0     gabapentin (NEURONTIN) 100 MG capsule Take 100 mg by mouth 3 times daily as needed (Pain)       hydrOXYzine HCl (ATARAX) 25 MG tablet Take 1 tablet (25 mg) by mouth every 6 hours as needed for other or anxiety (adjuvant pain) 30 tablet 0     metoprolol succinate ER (TOPROL XL) 50 MG 24 hr tablet Take 1 tablet (50 mg) by mouth daily. 90 tablet 3     nitroGLYcerin (NITROSTAT) 0.4 MG sublingual tablet For chest pain place 1 tablet under the tongue every 5 minutes for 3 doses. If symptoms persist 5 minutes after 1st dose call 911. 25 tablet 3     oxyCODONE (ROXICODONE) 5 MG tablet Take 1 tablet (5 mg) by mouth every 6 hours as needed for moderate pain 10 tablet 0     pantoprazole (PROTONIX) 40 MG EC tablet Take 1 tablet (40  mg) by mouth daily For 30 days, then discontinue 30 tablet 0     polyethylene glycol (MIRALAX) 17 GM/Dose powder Take 17 g by mouth daily 510 g 0     senna-docusate (SENOKOT-S/PERICOLACE) 8.6-50 MG tablet Take 2 tablets by mouth 2 times daily as needed for constipation 50 tablet 0     simethicone (MYLICON) 80 MG chewable tablet Take 1 tablet (80 mg) by mouth every 6 hours as needed for cramping 30 tablet 0     No current facility-administered medications for this visit.       ROS:   Refer to HPI    EXAM:  BP 92/61 (BP Location: Right arm, Patient Position: Chair, Cuff Size: Adult Regular)   Pulse 79   Wt 80.4 kg (177 lb 4.8 oz)   SpO2 97%   BMI 25.81 kg/m       GENERAL: Appears comfortable, in no acute distress.   HEENT: Eye symmetrical, no discharge or icterus bilaterally. Mucous membranes moist and without lesions.  CV: RRR, +S1S2, no murmur, rub, or gallop. JVP < 6 cm at 90 degrees.   RESPIRATORY: Respirations regular, even, and unlabored. Lungs CTA throughout.  GI: Soft and non distended with normoactive bowel sounds present in all quadrants. No tenderness, rebound, guarding. No hepatomegaly.   EXTREMITIES: no peripheral edema. 2+ bilateral pedal pulses.   NEUROLOGIC: Alert and oriented x 3. No focal deficits.   MUSCULOSKELETAL: No joint swelling or tenderness.   SKIN: No jaundice. No rashes or lesions.     Labs, reviewed with patient in clinic today:  CBC RESULTS:  Lab Results   Component Value Date    WBC 12.7 (H) 08/11/2024    WBC 10.6 07/31/2020    RBC 4.19 (L) 08/11/2024    RBC 4.61 07/31/2020    HGB 13.2 (L) 08/11/2024    HGB 14.2 07/31/2020    HCT 38.0 (L) 08/11/2024    HCT 41.4 07/31/2020    MCV 91 08/11/2024    MCV 90 07/31/2020    MCH 31.5 08/11/2024    MCH 30.8 07/31/2020    MCHC 34.7 08/11/2024    MCHC 34.3 07/31/2020    RDW 14.1 08/11/2024    RDW 14.5 07/31/2020     08/11/2024     07/31/2020       CMP RESULTS:  Lab Results   Component Value Date     08/11/2024      "07/31/2020    POTASSIUM 4.0 08/11/2024    POTASSIUM 3.7 07/25/2024    POTASSIUM 4.1 12/01/2021    POTASSIUM 3.4 (L) 07/31/2020    CHLORIDE 98 08/11/2024    CHLORIDE 100 12/01/2021    CHLORIDE 109 (H) 07/31/2020    CO2 21 (L) 08/11/2024    CO2 31 12/01/2021    CO2 23 07/31/2020    ANIONGAP 16 (H) 08/11/2024    ANIONGAP 6 12/01/2021    ANIONGAP 8 07/31/2020     (H) 08/11/2024     (H) 07/31/2024    GLC 87 12/01/2021     (H) 07/31/2020    BUN 12.4 08/11/2024    BUN 14 12/01/2021    BUN 9 07/31/2020    CR 0.74 08/11/2024    CR 0.80 07/31/2020    GFRESTIMATED >90 08/11/2024    GFRESTIMATED >90 07/31/2020    GFRESTBLACK >90 07/31/2020    DANE 9.4 08/11/2024    DANE 8.2 (L) 07/31/2020    BILITOTAL 0.8 08/11/2024    BILITOTAL 0.4 07/03/2020    ALBUMIN 4.0 08/11/2024    ALBUMIN 4.4 12/01/2021    ALBUMIN 4.1 07/03/2020    ALKPHOS 156 (H) 08/11/2024    ALKPHOS 96 07/03/2020    ALT 40 08/11/2024    ALT 19 07/03/2020    AST 25 08/11/2024    AST 17 07/03/2020        INR RESULTS:  Lab Results   Component Value Date    INR 1.24 (H) 07/25/2024       Lab Results   Component Value Date    MAG 2.1 07/31/2024     Lab Results   Component Value Date    NTBNPI 2,676 (H) 08/11/2024     No results found for: \"NTBNP\"    Cardiac Diagnostics:    7/30/2024 Echo  Interpretation Summary  Left ventricular function is decreased. The ejection fraction is 30-35%  (moderately reduced).  Right ventricular function, chamber size, wall motion, and thickness are  normal.  No significant valvular abnormalities present.  IVC diameter and respiratory changes fall into an intermediate range  suggesting an RA pressure of 8 mmHg.  No pericardial effusion is present.     This study was compared with the study from 4/23/2024. No significant changes  noted.    4/23/2024 Echo  Interpretation Summary  Left ventricular function is decreased. The ejection fraction is 30-35%  (moderately reduced).  There is moderate diffuse hypokinesis with akinesis of " the basal-mid  inferolateral and basal inferior segments indicative of multivessel CAD.  Global right ventricular function is normal.  No significant valvular abnormalities present.  Estimated mean right atrial pressure is normal.  No pericardial effusion is present.    4/15/2024 Coronary Angiogram  .                3/5/2024 CCS  Total calcium score of 2015.1. The observed calcium score is at the  96th percentile for subjects of the same age, gender, and  race/ethnicity who are free of clinical cardiovascular disease and  treated diabetes.      Assessment and Plan:   Mr. Lehman is a 68 year old male with medical history pertinent for CAD, HFrEF (30-35%), ischemic cardiomyopathy, HLD, PAD (s/p bilateral popliteal bypass), carotid disease (s/p  and chronic occlusion of DARIUSZ), and chronic back pain. He was referred to Dr. Mason by his cardiologist after outpatient angiogram demonstrated severe 3-vessel coronary artery disease and underwent CABG x 3v on 24. Mr. Lehman presents to clinic for CORE enrollment.     Presently warm and compensated, functional class III symptoms. Grossly euvolemic by exam. Review of labs demonstrate normal lytes and renal function. We discussed the etiology of his HFrEF, as well as to the goal of CORE clinic and of GDMT. He is presently on a beta blocker. He was on lisinopril, however this was stopped secondary to concern for ACE-I induced angioedema. Today's BP is low stable. For now we will attempt to start low dose losartan 12.5 mg daily. Instructed to take benadryl should he develop any lip/tongue swelling and to notify us immediately. He has been wearing a LifeVest since surgery. We will update an echo today to determine ongoing need. Will also refer to [facility] for ongoing assistance with GDMT.     Mr. Lehman will be seeing Dr. Chandler next week. For future CORE appointments, it would be reasonable for him to follow with [facility], however if he prefers he can continue to  follow with me in CORE.       # Chronic systolic heart failure/HFrEF secondary to ICM, EF 30-35%    Stage C. NYHA Class III.    Fluid status: euvolemic, lasix 20 mg daily PRN   ACEi/ARB/ARNi/afterload reduction: start losartan 12.5 mg daily, allergy to ACE-I  BB: metoprolol succinate 25 mg daily  Aldosterone antagonist: deferred while other medical therapy is prioritized  SGLT2i: deferred while other medical therapy is prioritized  SCD prophylaxis: decision deferred during medication uptitration  NSAID use: contraindicated  Sleep apnea evaluation: did not discuss at today's visit, will discuss at next visit.   Remote monitoring: defer at this time    # CAD s/p CABG x 3 on 7/25/24   # Hyperlipidemia  # PAD s/p bilateral popliteal bypass  # Carotid artery disease s/p left carotid endarterectomy 6/2024. Chronic occlusion of DARIUSZ.  - continue ASA/statin and smoking cessation   - follows with vascular surgery 10/16/24 with carotid U/S prior     # Former Tobacco dependence   Smoked Norah Sweets on average 3-5 a day. He has been smoking since age 16. He did quit for a while starting up once again 10-15 years ago. Quit prior to CABG.       Follow up:  - Echo today  - Dr. Chandler 10/8/24  - CORE in 2 months      A total of 60 minutes was spent on the day of the visit, which includes preparation for the visit (reviewing previous medical records, laboratories and investigations), in conjunction with the actual clinic visit with the patient, which includes obtaining a history and physical exam, creating and reviewing the care plan, patient education (and family if present), counseling, documenting clinical information in the electronic health record and care coordination.       Brittany Leonard DNP, NP-C  Advance Heart Failure  10/04/24      Please do not hesitate to contact me if you have any questions/concerns.     Sincerely,     CHIN Preciado CNP

## 2024-10-04 NOTE — PATIENT INSTRUCTIONS
CORE: Cardiomyopathy, Optimization, Rehabilitation, Education      Take your medicines every day, as directed    Changes/Recommendations made today:  REDUCE metoprolol to 1/2 tablet (25 mg) daily  START losartan 1/2 tablet (12.5 mg) daily  Referral for Medication Therapy Management (a specialty cardiology pharmacist will call you to help manage your new cardiac medications)  Please monitor your blood pressure daily. Best time to monitor your blood pressure in ~ 90 minutes after your morning medications. Keep a log of your blood pressure readings.   Please monitor your weight daily  Take lasix 1 tablet (20 mg) as needed for weight gain or shortness of breath, or leg swelling   Take 2 tablets (1000 mg) of tylenol with 1 tablet (10 mg) of flexeril with melatonin before bedtime. Okay to take tylenol 1000 mg (2 tablets) 3 or 4 times daily.    Monitor Your Weight and Symptoms    Contact us if you:    Gain 2 pounds in one day or 5 pounds in one week  Feel more short of breath  Notice more leg swelling  Feel lightheadeded   Change your lifestyle    Limit Salt or Sodium:  2000 mg  Limit Fluids:  2000 mL or approximately 64 ounces  Eat a Heart Healthy Diet  Low in saturated fats  Stay Active:  Aim to move at least 150 minutes every  week         To Contact us    During Business Hours:  [phone], option # 1      After hours, weekends or holidays:   [phone], Option #4  Ask to speak to the On-Call Cardiologist. Inform them you are a CORE/heart failure patient at the [facility].     Use [employer] allows you to communicate directly with your heart team through secure messaging.  [facility] can be accessed any time on your phone, computer, or tablet.  If you need assistance, we'd be happy to help!         Keep your Heart Appointments:    ECHO (heart ultrasound) at soonest availability     Dr. Chandler 10/8/2024 with labs the same day    CORE in 2 months. We will continue to work on optimizing your heart medications.  We will do this in collaboration with Dr. Chandler, monitoring your labs and by phone

## 2024-10-04 NOTE — PROGRESS NOTES
"  [facility]   CORE Clinic      HPI:   Mr. Lehman is a 68 year old male with medical history pertinent for CAD, HFrEF (30-35%), ischemic cardiomyopathy, HLD, PAD (s/p bilateral popliteal bypass), carotid disease (s/p  and chronic occlusion of DARIUSZ), and chronic back pain. He was referred to Dr. Mason by his cardiologist after outpatient angiogram demonstrated severe 3-vessel coronary artery disease and underwent CABG x 3v on 24. Post-op EKG with diffuse ST elevation c/w pericarditis, troponins down-trending, continue colchicine 0.6 mg BID. Mr. Lehman presents to clinic for CORE enrollment.     Today, Mr. Lehman presents to clinic with his wife. They live in [place], [place]. Reports feeling fair. Since surgery he notes that he still has reduced stamina and does not have his same \"get up and go\" energy. He is however doing all the grocery shopping, cooking, and laundry. Prior to surgery he would chop his own wood, but has hired someone to do that this winter. He is attending cardiac rehab 3x weekly. Walking on treadmill and biking. Feels fine with the level of exertion. FORD is in proportion to effort. No acute SOB. Does not a generalized chest tightness. Thinks it might be from wearing the Lifevest. Denies acute chest pain or palpitations. No Lifevest shocks. No lightheadedness or dizziness. He has had one episode of leg and foot swelling. Last week he notes his weight was up 5 lbs and shoes were fitting tighter. Resolved independently. He is mindful of following a low salt diet and fluid restriction.   He weighs himself daily, 177 lb with Lifevest on.     Of note, he presented to the ED on  with lower lip swelling, but with no other tongue/mouth involvement. He was on Keflex at that time, but had been on several days prior to symptoms. ED MD suspected that the more likely cause was swelling due to lisinopril, which was started on 24. Treat with steroids and Benadryl. Lisinopril " discontinued.     He was dx with Lymes Disease ~ 5 years ago.     Cardiac Medications  - ASA 81 mg daily   - Atorvastatin 80 mg daily   - Metoprolol succinate 50 mg daily     PAST MEDICAL HISTORY:  Past Medical History:   Diagnosis Date    Allergy status to analgesic agent     No reported medication allergies    Anesthesia of skin     No  problems with anaesthesia    CAD (coronary artery disease)     Elevated coronary artery calcium score     History of recurrent pneumonia     No Comments Provided    Hyperlipidemia     No Comments Provided    Low back pain     pain with bilateral leg and disc injuries.    Personal history of nicotine dependence     Personal history of other medical treatment (CODE)     No blood transfusions    PVD (peripheral vascular disease) (H)        FAMILY HISTORY:  Family History   Problem Relation Age of Onset    Diabetes Mother         Diabetes    Heart Disease Mother         Heart Disease    Other - See Comments Father         COPD, CD    Heart Disease Brother         Heart Disease    Other - See Comments Brother         killed by drunk  at age 19.    Substance Abuse Other         Alcohol/Drug, No hx of chemical dependency.    Diabetes Other         Diabetes    Heart Disease Other         Heart Disease    Anesthesia Reaction No family hx of     Clotting Disorder No family hx of     Bleeding Disorder No family hx of        SOCIAL HISTORY:  Social History     Socioeconomic History    Marital status:    Tobacco Use    Smoking status: Former     Current packs/day: 0.25     Average packs/day: 0.3 packs/day for 14.9 years (3.7 ttl pk-yrs)     Types: Cigars, Cigarettes     Start date: 11/4/2009    Smokeless tobacco: Never    Tobacco comments:     Quit smoking: quit 6/20/17 (cigarettes); smoked cigars 2 puffs cigar week then stopped before surgery   Vaping Use    Vaping status: Never Used   Substance and Sexual Activity    Alcohol use: Not Currently     Comment: 2 vodka 3-4x week     Drug use: Not Currently     Types: Marijuana     Comment: Not daily 2x week    Sexual activity: Not Currently   Social History Narrative    Self employed as .   with 3 adult children.    No hx of chemical dependency.  Patient is a former smoker.   Alcohol Use - yes occ    Wife - Imelda Lehman     Social Determinants of Health     Financial Resource Strain: Low Risk  (2/16/2024)    Financial Resource Strain     Within the past 12 months, have you or your family members you live with been unable to get utilities (heat, electricity) when it was really needed?: No   Food Insecurity: Low Risk  (2/16/2024)    Food Insecurity     Within the past 12 months, did you worry that your food would run out before you got money to buy more?: No     Within the past 12 months, did the food you bought just not last and you didn t have money to get more?: No   Transportation Needs: Low Risk  (2/16/2024)    Transportation Needs     Within the past 12 months, has lack of transportation kept you from medical appointments, getting your medicines, non-medical meetings or appointments, work, or from getting things that you need?: No   Physical Activity: Sufficiently Active (2/16/2024)    Exercise Vital Sign     Days of Exercise per Week: 7 days     Minutes of Exercise per Session: 60 min   Stress: Stress Concern Present (2/16/2024)    [employer] [place] [employer] - Occupational Stress Questionnaire     Feeling of Stress : Very much   Interpersonal Safety: Low Risk  (8/13/2024)    Interpersonal Safety     Do you feel physically and emotionally safe where you currently live?: Yes     Within the past 12 months, have you been hit, slapped, kicked or otherwise physically hurt by someone?: No     Within the past 12 months, have you been humiliated or emotionally abused in other ways by your partner or ex-partner?: No   Housing Stability: Low Risk  (2/16/2024)    Housing Stability     Do you have housing? : Yes     Are you  worried about losing your housing?: No       CURRENT MEDICATIONS:  Current Outpatient Medications   Medication Sig Dispense Refill    acetaminophen (TYLENOL) 325 MG tablet Take 2 tablets (650 mg) by mouth every 4 hours (while awake) 50 tablet 0    aspirin (ASA) 81 MG chewable tablet Take 2 tablets (162 mg) by mouth or NG Tube daily 60 tablet 0    atorvastatin (LIPITOR) 80 MG tablet Take 1 tablet (80 mg) by mouth at bedtime. 90 tablet 3    colchicine (COLCRYS) 0.6 MG tablet Take 1 tablet (0.6 mg) by mouth or Feeding Tube 2 times daily 60 tablet 0    cyclobenzaprine (FLEXERIL) 10 MG tablet Take 1 tablet (10 mg) by mouth 3 times daily as needed for muscle spasms 50 tablet 0    EPINEPHrine (ANY BX GENERIC EQUIV) 0.3 MG/0.3ML injection 2-pack Inject 0.3 mLs (0.3 mg) into the muscle once as needed for anaphylaxis 1 each 0    gabapentin (NEURONTIN) 100 MG capsule Take 100 mg by mouth 3 times daily as needed (Pain)      hydrOXYzine HCl (ATARAX) 25 MG tablet Take 1 tablet (25 mg) by mouth every 6 hours as needed for other or anxiety (adjuvant pain) 30 tablet 0    metoprolol succinate ER (TOPROL XL) 50 MG 24 hr tablet Take 1 tablet (50 mg) by mouth daily. 90 tablet 3    nitroGLYcerin (NITROSTAT) 0.4 MG sublingual tablet For chest pain place 1 tablet under the tongue every 5 minutes for 3 doses. If symptoms persist 5 minutes after 1st dose call 911. 25 tablet 3    oxyCODONE (ROXICODONE) 5 MG tablet Take 1 tablet (5 mg) by mouth every 6 hours as needed for moderate pain 10 tablet 0    pantoprazole (PROTONIX) 40 MG EC tablet Take 1 tablet (40 mg) by mouth daily For 30 days, then discontinue 30 tablet 0    polyethylene glycol (MIRALAX) 17 GM/Dose powder Take 17 g by mouth daily 510 g 0    senna-docusate (SENOKOT-S/PERICOLACE) 8.6-50 MG tablet Take 2 tablets by mouth 2 times daily as needed for constipation 50 tablet 0    simethicone (MYLICON) 80 MG chewable tablet Take 1 tablet (80 mg) by mouth every 6 hours as needed for  cramping 30 tablet 0     No current facility-administered medications for this visit.       ROS:   Refer to HPI    EXAM:  BP 92/61 (BP Location: Right arm, Patient Position: Chair, Cuff Size: Adult Regular)   Pulse 79   Wt 80.4 kg (177 lb 4.8 oz)   SpO2 97%   BMI 25.81 kg/m       GENERAL: Appears comfortable, in no acute distress.   HEENT: Eye symmetrical, no discharge or icterus bilaterally. Mucous membranes moist and without lesions.  CV: RRR, +S1S2, no murmur, rub, or gallop. JVP < 6 cm at 90 degrees.   RESPIRATORY: Respirations regular, even, and unlabored. Lungs CTA throughout.  GI: Soft and non distended with normoactive bowel sounds present in all quadrants. No tenderness, rebound, guarding. No hepatomegaly.   EXTREMITIES: no peripheral edema. 2+ bilateral pedal pulses.   NEUROLOGIC: Alert and oriented x 3. No focal deficits.   MUSCULOSKELETAL: No joint swelling or tenderness.   SKIN: No jaundice. No rashes or lesions.     Labs, reviewed with patient in clinic today:  CBC RESULTS:  Lab Results   Component Value Date    WBC 12.7 (H) 08/11/2024    WBC 10.6 07/31/2020    RBC 4.19 (L) 08/11/2024    RBC 4.61 07/31/2020    HGB 13.2 (L) 08/11/2024    HGB 14.2 07/31/2020    HCT 38.0 (L) 08/11/2024    HCT 41.4 07/31/2020    MCV 91 08/11/2024    MCV 90 07/31/2020    MCH 31.5 08/11/2024    MCH 30.8 07/31/2020    MCHC 34.7 08/11/2024    MCHC 34.3 07/31/2020    RDW 14.1 08/11/2024    RDW 14.5 07/31/2020     08/11/2024     07/31/2020       CMP RESULTS:  Lab Results   Component Value Date     08/11/2024     07/31/2020    POTASSIUM 4.0 08/11/2024    POTASSIUM 3.7 07/25/2024    POTASSIUM 4.1 12/01/2021    POTASSIUM 3.4 (L) 07/31/2020    CHLORIDE 98 08/11/2024    CHLORIDE 100 12/01/2021    CHLORIDE 109 (H) 07/31/2020    CO2 21 (L) 08/11/2024    CO2 31 12/01/2021    CO2 23 07/31/2020    ANIONGAP 16 (H) 08/11/2024    ANIONGAP 6 12/01/2021    ANIONGAP 8 07/31/2020     (H) 08/11/2024      "(H) 07/31/2024    GLC 87 12/01/2021     (H) 07/31/2020    BUN 12.4 08/11/2024    BUN 14 12/01/2021    BUN 9 07/31/2020    CR 0.74 08/11/2024    CR 0.80 07/31/2020    GFRESTIMATED >90 08/11/2024    GFRESTIMATED >90 07/31/2020    GFRESTBLACK >90 07/31/2020    DANE 9.4 08/11/2024    DANE 8.2 (L) 07/31/2020    BILITOTAL 0.8 08/11/2024    BILITOTAL 0.4 07/03/2020    ALBUMIN 4.0 08/11/2024    ALBUMIN 4.4 12/01/2021    ALBUMIN 4.1 07/03/2020    ALKPHOS 156 (H) 08/11/2024    ALKPHOS 96 07/03/2020    ALT 40 08/11/2024    ALT 19 07/03/2020    AST 25 08/11/2024    AST 17 07/03/2020        INR RESULTS:  Lab Results   Component Value Date    INR 1.24 (H) 07/25/2024       Lab Results   Component Value Date    MAG 2.1 07/31/2024     Lab Results   Component Value Date    NTBNPI 2,676 (H) 08/11/2024     No results found for: \"NTBNP\"    Cardiac Diagnostics:    7/30/2024 Echo  Interpretation Summary  Left ventricular function is decreased. The ejection fraction is 30-35%  (moderately reduced).  Right ventricular function, chamber size, wall motion, and thickness are  normal.  No significant valvular abnormalities present.  IVC diameter and respiratory changes fall into an intermediate range  suggesting an RA pressure of 8 mmHg.  No pericardial effusion is present.     This study was compared with the study from 4/23/2024. No significant changes  noted.    4/23/2024 Echo  Interpretation Summary  Left ventricular function is decreased. The ejection fraction is 30-35%  (moderately reduced).  There is moderate diffuse hypokinesis with akinesis of the basal-mid  inferolateral and basal inferior segments indicative of multivessel CAD.  Global right ventricular function is normal.  No significant valvular abnormalities present.  Estimated mean right atrial pressure is normal.  No pericardial effusion is present.    4/15/2024 Coronary Angiogram  .                3/5/2024 CCS  Total calcium score of 2015.1. The observed calcium score is " at the  96th percentile for subjects of the same age, gender, and  race/ethnicity who are free of clinical cardiovascular disease and  treated diabetes.      Assessment and Plan:   Mr. Lehman is a 68 year old male with medical history pertinent for CAD, HFrEF (30-35%), ischemic cardiomyopathy, HLD, PAD (s/p bilateral popliteal bypass), carotid disease (s/p  and chronic occlusion of DARIUSZ), and chronic back pain. He was referred to Dr. Mason by his cardiologist after outpatient angiogram demonstrated severe 3-vessel coronary artery disease and underwent CABG x 3v on 24. Mr. Lehman presents to clinic for CORE enrollment.     Presently warm and compensated, functional class III symptoms. Grossly euvolemic by exam. Review of labs demonstrate normal lytes and renal function. We discussed the etiology of his HFrEF, as well as to the goal of CORE clinic and of GDMT. He is presently on a beta blocker. He was on lisinopril, however this was stopped secondary to concern for ACE-I induced angioedema. Today's BP is low stable. For now we will attempt to start low dose losartan 12.5 mg daily. Instructed to take benadryl should he develop any lip/tongue swelling and to notify us immediately. He has been wearing a LifeVest since surgery. We will update an echo today to determine ongoing need. Will also refer to [facility] for ongoing assistance with GDMT.     Mr. Lehman will be seeing Dr. Chandler next week. For future CORE appointments, it would be reasonable for him to follow with [facility], however if he prefers he can continue to follow with me in CORE.       # Chronic systolic heart failure/HFrEF secondary to ICM, EF 30-35%    Stage C. NYHA Class III.    Fluid status: euvolemic, lasix 20 mg daily PRN   ACEi/ARB/ARNi/afterload reduction: start losartan 12.5 mg daily, allergy to ACE-I  BB: metoprolol succinate 25 mg daily  Aldosterone antagonist: deferred while other medical therapy is prioritized  SGLT2i: deferred  while other medical therapy is prioritized  SCD prophylaxis: decision deferred during medication uptitration  NSAID use: contraindicated  Sleep apnea evaluation: did not discuss at today's visit, will discuss at next visit.   Remote monitoring: defer at this time    # CAD s/p CABG x 3 on 7/25/24   # Hyperlipidemia  # PAD s/p bilateral popliteal bypass  # Carotid artery disease s/p left carotid endarterectomy 6/2024. Chronic occlusion of DARIUSZ.  - continue ASA/statin and smoking cessation   - follows with vascular surgery 10/16/24 with carotid U/S prior     # Former Tobacco dependence   Smoked Norah Sweets on average 3-5 a day. He has been smoking since age 16. He did quit for a while starting up once again 10-15 years ago. Quit prior to CABG.       Follow up:  - Echo today  - Dr. Chandler 10/8/24  - CORE in 2 months      A total of 60 minutes was spent on the day of the visit, which includes preparation for the visit (reviewing previous medical records, laboratories and investigations), in conjunction with the actual clinic visit with the patient, which includes obtaining a history and physical exam, creating and reviewing the care plan, patient education (and family if present), counseling, documenting clinical information in the electronic health record and care coordination.       Brittany Leonard DNP, NP-C  Advance Heart Failure  10/04/24

## 2024-10-04 NOTE — NURSING NOTE
Diet: Patient instructed regarding a heart failure healthy diet, including discussion of reduced fat and 2000 mg daily sodium restriction, daily weights, medication purpose and compliance, fluid restrictions and resources for patient and family to access for assistance with heart failure management.       Labs: Patient was given results of the laboratory testing obtained today and patient was instructed about when to return for the next laboratory testing.     Med Reconcile: Reviewed and verified all current medications with the patient. The updated medication list was printed and given to the patient.     Med changes: decrease metoprolol to 25 mg daily, start Losartan 12.5 mg daily     Return Appointment: Patient given instructions regarding scheduling next clinic visit: Dr Chandler as scheduled next week.  Will send message to [facility] for follow up with Rupali Porter and Dr Chandler.  Echo scheduled for today to update his EF    Patient stated he understood all health information given and agreed to call with further questions or concerns.     Offered HF education, they declined but took book.      Rhea Rinaldi RN        Can we let Mr. Lehman know that his echo shows slight improvement in EF to 35-40%, previously 30-35%. He can stop wearing LifeVest. Tell him to contact [facility] and they will tell him how to send it back.   We will still continue to optimize heart failure meds as discussed in clinic.     Called Mr Lehman and updated him, he was pleased to hear this and will reach out to [facility]

## 2024-10-04 NOTE — NURSING NOTE
Chief Complaint   Patient presents with    New Patient     CORE enrollment, HFrEF, hx of CAB. Labs prior  PHI form signed          Vitals were taken, medications reconciled.    Raya Peralta, Clinic Assistant   10:04 AM

## 2024-10-07 ENCOUNTER — HOSPITAL ENCOUNTER (OUTPATIENT)
Dept: CARDIAC REHAB | Facility: OTHER | Age: 69
Discharge: HOME | End: 2024-10-07
Attending: SURGERY
Payer: MEDICARE

## 2024-10-07 PROCEDURE — 93798 PHYS/QHP OP CAR RHAB W/ECG: CPT

## 2024-10-07 NOTE — PROGRESS NOTES
[facility] HEART CARE   CARDIOLOGY PROGRESS NOTE     Chief Complaint   Patient presents with    Follow Up     Follow up after surgeries          Diagnosis:  1.  CAD.   -CABG x 3 on 7/25/2024, [facility].    -LIMA to LAD, R SVG to diagonal and OM.   -Cardiac cath on 4/15/2024, multivessel disease-[facility].   -LM 70%, LAD 60%, first diagonal 60%, LCx 99%, mid LCx 100%, % with collaterals.  2.  Elevated coronary calcium score   -2015.1 on 3/5/2024.  3.  CHF-systolic.   -35-40% in 10/4/24.   -30-35% in 7/25/2024.   -30 to 35% on 4/20/2024.  4.  WMA.   -9/23/2024.  5.  PAD.   -Bilateral popliteal bypass.  6.  Tobacco abuse.   -3-5 Burke sweets a day.    7.  Carotid artery stenosis.   -100% right and 50% left, left carotid endarterectomy on 8/12/2024   -L carotid endarterectomy on 8/2/2024 4, [facility].        Assessment/Plan:    1.  CHF: History of severely reduced EF of 30-35% in April 2024 likely ischemic related.  Now revascularized with CABG in 7/25/2024.  EF has improved slightly to 30-35% on 10/4/2024.  Discussed management of heart failure.  Currently on losartan 12.5 mg once daily and metoprolol 25 mg XL daily.  Stop losartan 12.5 mg daily and start Entresto 24/26 mg twice a day and Farxiga 10 mg daily.  Discussed the importance of salt restriction, fluid restriction, and weighing himself on a daily basis.  She was having some issues he was given Lasix 20 mg as needed for medication changes which she has not needed to use.  Patient was able to give up ZOLL device and forego defibrillator with an increase EF.  2.  CAD: As mentioned had a severely elevated coronary calcium score of 2015.1 on 3/5/2024.  Patient underwent cardiac catheterization on 4/15/2024 and was found to have multivessel disease.  He underwent triple bypass at [facility] on 7/25/2024.  He is here in follow-up.  He has been doing cardiac rehab.  He is currently on aspirin 162 mg daily.  Will decrease it to 81 mg daily.  Continue  atorvastatin 80 mg daily and SL nitro as needed for chest pain.  3.  Tobacco abuse: Smokes Norah sweets.  Encouraged to quit.   4.  Carotid artery stenosis: Status post left carotid [facility]  5.  Follow-up in 2 to 3 months.  Today, losartan 12.5 mg discontinue and he was started on Entresto 24/26 mg twice a day and Farxiga 10 mg daily.  Aspirin reduced to 81 mg once daily.        Interval history:  See above.      HPI:    Mr. Lehman is being seen by cardiology for chest discomfort and a severely elevated coronary calcium score.  He has been referred to cardiology for concerns of coronary artery disease.     [place] describes multiple back injuries dating back to third grade.  In the third grade, he was working on a roof.  I believe he fell twice landing on his back.  Once to the ground.  The other was hitting a metal pole.  About a year ago, he rolled his 4 gómez but was not injured by the machine.  He jumped out of the way.  But when trying to operate the machine, he states it was very heavy.  When he went to pick it up, this exacerbated his back discomfort.  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  He has been concerned that his intrascapular discomfort is related to his back.       He describes substernal, right precordial, neck, and intrascapular discomfort which has been exacerbated with activity.  Even with emotional stress, it will exacerbate his symptoms.  He describes sharp but also heavy discomfort to his chest.  With that, he is diaphoretic.  It limits his activities.  He smokes Norah sweets on average 3-5 a day.  He has been smoking since age 16.  He did quit for a while starting up once again 10-15 years ago.  His wife has had a lot of medical issues and ultimately had a renal transplant.  He states that the stress of her health has caused him to continue to  smoke.  He does have a history of PAD with bilateral femoropopliteal.  He has not been taking aspirin regularly.  He does take his statin but has never been given SL nitro.  Patient has been convinced that his substernal and intrascapular discomfort has been related to arthritis.     He had an MRI of his thoracic spine on 3/14/2023 showing mild progression of multivessel thoracic spondylosis with no high-grade spinal canal or foraminal narrowing.     Patient has been hesitant to have stress testing.  Underwent CT coronary for calcium on 3/5/2024.  Was to be a CTA of coronaries but changed to CT coronary for calcium because of a calcium score of 2015.1.  Calcium score was 96 percentile for same age, gender, race, and ethnicity      Relevant testing:  Echocardiogram on 10/4/24:  Left ventricular wall thickness is normal. Left ventricular size is normal.  The visual ejection fraction is 35-40%. Mild to moderate diffuse hypokinesis  is present.  Right ventricular function, chamber size, wall motion, and thickness are  normal.  Moderate aortic valve calcification is present.  The inferior vena cava was normal in size with preserved respiratory  variability. No pericardial effusion is present.  No significant changes noted. LV function grossly similar.    US carotids on 8/12/2024:  1. RIGHT ICA: Occluded, unchanged.  2. LEFT ICA: Post endarterectomy.  Less than 50% diameter narrowing by  grayscale imaging and sonographic velocity criteria.  3. 6.6 x 8.2 x 23.6 mm structure anterior to the left mid carotid  artery, probable hematoma. If clinically indicated, further evaluation  with neck CT could be performed.    Echo on 7/25/2024:  Left ventricular function is decreased. The ejection fraction is 30-35%  (moderately reduced).  Right ventricular function, chamber size, wall motion, and thickness are  normal.  No significant valvular abnormalities present.  IVC diameter and respiratory changes fall into an intermediate  range  suggesting an RA pressure of 8 mmHg.  No pericardial effusion is present.  This study was compared with the study from 2024. No significant changes  noted.    Echo on 2024:  Left ventricular function is decreased.   The ejection fraction is 30-35% (moderately reduced).  There is moderate diffuse hypokinesis with akinesis of the basal-mid  inferolateral and basal inferior segments indicative of multivessel CAD.  Global right ventricular function is normal.  No significant valvular abnormalities present.  Estimated mean right atrial pressure is normal.  No pericardial effusion is present.    Echo on 2024:  Left ventricular function is decreased. The ejection fraction is 30-35% (moderately reduced).  There is moderate diffuse hypokinesis with akinesis of the basal-mid  inferolateral and basal inferior segments indicative of multivessel CAD.  Global right ventricular function is normal.  No significant valvular abnormalities present.  Estimated mean right atrial pressure is normal.  No pericardial effusion is present.    Cardiac cath on 4/15/2024:    Mid LM lesion is 70% stenosed.    Prox LAD to Mid LAD lesion is 60% stenosed.    1st Diag lesion is 60% stenosed.    Mid Cx lesion is 100% stenosed.    Ost Cx to Prox Cx lesion is 99% stenosed.    Prox RCA lesion is 100% stenosed.     Significant ostial left main disease.  Chronic total occlusion of circumflex with left to left collaterals from diagonal.  Chronic total occlusion right coronary artery with left to left (from septals) and right to right (from marginal) collaterals.  Recommend consideration for bypass surgery.    US abdominal aorta for AAA on 3/8/2024:  No abdominal aortic aneurysm.    US abdominal aorta on 3/5/2024:  No abdominal aortic aneurysm.    CT scan for calcium on 3/5/2024:  Left main: 88.5  Left anterior descendin.4  Left circumflex: 685.3  Right coronary artery: 256.9  Posterior descending artery: 0.0  TOTAL: 2015.      The estimated probability of a non-zero calcium score for a white Male  of age 66 years is 78%. The observed calcium score is at the 96th  percentile for subjects of the same age, gender, and race/ethnicity  who are free of clinical cardiovascular disease and treated diabetes.  The heart, thoracic aorta and main pulmonary artery are within normal limits in size.     CT angio abdomen on 9/20/2023 [facility]:  1. Patent right common iliac to common femoral artery stent graft.   2. Patent bilateral femoral to popliteal artery bypass grafts without evidence of graft stenosis or aneurysmal dilatation.   3. 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   4. Three-vessel runoff to the left foot with diffuse calcified atherosclerosis and small vessel lumens in the 3 lower leg arteries.      Stress echo on 4/9/2013:  1. Normal echocardiographic portion of the stress echocardiogram with ejection fraction increasing from 60% pre-exercise to 80% immediately post-exercise with no new regional wall motion abnormalities.  2. Screening color and spectral Doppler echocardiogram was not performed.  3. For details of the stress EKG portion of the examination, please see  Dr. Acosta's report.        No diagnosis found.      Past Medical History:   Diagnosis Date    Allergy status to analgesic agent     No reported medication allergies    Anesthesia of skin     No  problems with anaesthesia    CAD (coronary artery disease)     Elevated coronary artery calcium score     History of recurrent pneumonia     No Comments Provided    Hyperlipidemia     No Comments Provided    Low back pain     pain with bilateral leg and disc injuries.    Personal history of nicotine dependence     Personal history of other medical treatment (CODE)     No blood transfusions    PVD (peripheral vascular disease) (H)        Past Surgical History:   Procedure Laterality Date    ANGIOPLASTY      Right leg stenting and  bypass, Left also    BYPASS GRAFT ARTERY CORONARY N/A 7/25/2024    Procedure: Median Sternotomy, On Cardiopulmonary Bypass Graft, CORONARY ARTERY BYPASS GRAFT X_3_, Left AND RIGHTGreater Saphenous Vein [place], Left Internal Mammary Artery [place], Intra-operative Transesophageal Echocardiogram per Anesthesia.;  Surgeon: Kit Mason MD;  Location: [facility] OR    COLONOSCOPY  08/23/2010    Q 10yrs.    COLONOSCOPY N/A 02/24/2022    6 tubular adenomas, 5 year follow up, 2/24/2027    CV CORONARY ANGIOGRAM N/A 4/15/2024    Procedure: Coronary Angiogram;  Surgeon: Toni Sellers MD;  Location:  HEART CARDIAC CATH LAB    ENDARTERECTOMY CAROTID Left 6/25/2024    Procedure: Left Carotid Endarterectomy with bovine pericardial patch angioplasty. Intraoperative Ultrasound. Intraoperative EEG monitoring;  Surgeon: Rasheed Sam MD;  Location: [facility] OR    EXTRACTION(S) DENTAL      recently removed    OTHER SURGICAL HISTORY      [id number],PREMALIG/BENIGN SKIN LESION EXCISION, removed from chest    OTHER SURGICAL HISTORY      [id number],ANESTHESIA ALERT,No  problems with anaesthesia       Allergies   Allergen Reactions    Ace Inhibitors Angioedema     ED visit 8/8/2024: suspected upper lip edema was secondary to recent lisinopril start after heart surgery. Improved on steroid/benadryl       Current Outpatient Medications   Medication Sig Dispense Refill    acetaminophen (TYLENOL) 325 MG tablet Take 2 tablets (650 mg) by mouth every 4 hours (while awake) 50 tablet 0    aspirin (ASA) 81 MG chewable tablet Take 2 tablets (162 mg) by mouth or NG Tube daily. 180 tablet 3    atorvastatin (LIPITOR) 80 MG tablet Take 1 tablet (80 mg) by mouth at bedtime. 90 tablet 3    colchicine (COLCRYS) 0.6 MG tablet Take 1 tablet (0.6 mg) by mouth or Feeding Tube 2 times daily (Patient not taking: Reported on 10/4/2024) 60 tablet 0    cyclobenzaprine (FLEXERIL) 10 MG tablet Take 1 tablet (10 mg) by mouth 3 times daily as needed for  muscle spasms 50 tablet 0    EPINEPHrine (ANY BX GENERIC EQUIV) 0.3 MG/0.3ML injection 2-pack Inject 0.3 mLs (0.3 mg) into the muscle once as needed for anaphylaxis 1 each 0    furosemide (LASIX) 20 MG tablet Take 1 tablet (20 mg) by mouth daily as needed (take for weight gain more than 3 lbs in 1 day or more than 5 lbs in 7 days OR for swelling in your legs or acute shortness of breath). 30 tablet 0    gabapentin (NEURONTIN) 100 MG capsule Take 100 mg by mouth 3 times daily as needed (Pain)      hydrOXYzine HCl (ATARAX) 25 MG tablet Take 1 tablet (25 mg) by mouth every 6 hours as needed for other or anxiety (adjuvant pain) 30 tablet 0    losartan (COZAAR) 25 MG tablet Take 0.5 tablets (12.5 mg) by mouth daily. 30 tablet 3    metoprolol succinate ER (TOPROL XL) 50 MG 24 hr tablet Take 0.5 tablets (25 mg) by mouth daily. 90 tablet 3    nitroGLYcerin (NITROSTAT) 0.4 MG sublingual tablet For chest pain place 1 tablet under the tongue every 5 minutes for 3 doses. If symptoms persist 5 minutes after 1st dose call 911. 25 tablet 3    oxyCODONE (ROXICODONE) 5 MG tablet Take 1 tablet (5 mg) by mouth every 6 hours as needed for moderate pain 10 tablet 0    pantoprazole (PROTONIX) 40 MG EC tablet Take 1 tablet (40 mg) by mouth daily For 30 days, then discontinue (Patient not taking: Reported on 10/4/2024) 30 tablet 0    polyethylene glycol (MIRALAX) 17 GM/Dose powder Take 17 g by mouth daily 510 g 0    senna-docusate (SENOKOT-S/PERICOLACE) 8.6-50 MG tablet Take 2 tablets by mouth 2 times daily as needed for constipation (Patient not taking: Reported on 10/4/2024) 50 tablet 0    simethicone (MYLICON) 80 MG chewable tablet Take 1 tablet (80 mg) by mouth every 6 hours as needed for cramping 30 tablet 0       Social History     Socioeconomic History    Marital status:      Spouse name: Not on file    Number of children: Not on file    Years of education: Not on file    Highest education level: Not on file   Occupational  History    Not on file   Tobacco Use    Smoking status: Former     Current packs/day: 0.25     Average packs/day: 0.3 packs/day for 14.9 years (3.7 ttl pk-yrs)     Types: Cigars, Cigarettes     Start date: 11/4/2009    Smokeless tobacco: Never    Tobacco comments:     Quit smoking: quit 6/20/17 (cigarettes); smoked cigars 2 puffs cigar week then stopped before surgery   Vaping Use    Vaping status: Never Used   Substance and Sexual Activity    Alcohol use: Not Currently     Comment: 2-3 times a week    Drug use: Not Currently     Types: Marijuana     Comment: Not daily 2x week    Sexual activity: Not Currently   Other Topics Concern    Not on file   Social History Narrative    Self employed as .   with 3 adult children.    No hx of chemical dependency.  Patient is a former smoker.   Alcohol Use - yes occ    Wife - Imelda Lehman     Social Determinants of Health     Financial Resource Strain: Low Risk  (2/16/2024)    Financial Resource Strain     Within the past 12 months, have you or your family members you live with been unable to get utilities (heat, electricity) when it was really needed?: No   Food Insecurity: Low Risk  (2/16/2024)    Food Insecurity     Within the past 12 months, did you worry that your food would run out before you got money to buy more?: No     Within the past 12 months, did the food you bought just not last and you didn t have money to get more?: No   Transportation Needs: Low Risk  (2/16/2024)    Transportation Needs     Within the past 12 months, has lack of transportation kept you from medical appointments, getting your medicines, non-medical meetings or appointments, work, or from getting things that you need?: No   Physical Activity: Sufficiently Active (2/16/2024)    Exercise Vital Sign     Days of Exercise per Week: 7 days     Minutes of Exercise per Session: 60 min   Stress: Stress Concern Present (2/16/2024)    [employer] [place] [employer] - Occupational  "Stress Questionnaire     Feeling of Stress : Very much   Social Connections: Not on file   Interpersonal Safety: Low Risk  (10/8/2024)    Interpersonal Safety     Do you feel physically and emotionally safe where you currently live?: Yes     Within the past 12 months, have you been hit, slapped, kicked or otherwise physically hurt by someone?: No     Within the past 12 months, have you been humiliated or emotionally abused in other ways by your partner or ex-partner?: No   Housing Stability: Low Risk  (2/16/2024)    Housing Stability     Do you have housing? : Yes     Are you worried about losing your housing?: No       LAB RESULTS:   Office Visit on 04/11/2024   Component Date Value Ref Range Status    Ventricular Rate 04/11/2024 69  BPM Final    Atrial Rate 04/11/2024 69  BPM Final    VT Interval 04/11/2024 172  ms Final    QRS Duration 04/11/2024 114  ms Final    QT 04/11/2024 412  ms Final    QTc 04/11/2024 441  ms Final    P Axis 04/11/2024 49  degrees Final    R AXIS 04/11/2024 -40  degrees Final    T Axis 04/11/2024 83  degrees Final    Interpretation ECG 04/11/2024    Final                    Value:Sinus rhythm  Left axis deviation  Nonspecific ST and T wave abnormality  Left axis deviation  When compared with ECG of 27-FEB-2023 11:43,  ST no longer elevated in Anterior leads  Confirmed by MD VEGA, NOÉ ([id number]) on 4/11/2024 12:40:19 PM          Review of systems: Negative except that which was noted in the HPI.    Physical examination:  /80 (BP Location: Right arm, Patient Position: Sitting, Cuff Size: Adult Large)   Pulse 88   Temp 97.4  F (36.3  C) (Temporal)   Resp 16   Ht 1.753 m (5' 9\")   Wt 78.9 kg (174 lb)   SpO2 97%   BMI 25.70 kg/m      GENERAL APPEARANCE: healthy, alert and no distress  CHEST: lungs clear to auscultation - no rales, rhonchi or wheezes, no use of accessory muscles, no retractions, respirations are unlabored, normal respiratory rate  CARDIOVASCULAR: regular rhythm, " normal S1 with physiologic split S2, no S3 or S4 and no murmur, click or rub  EXTREMITIES: no clubbing, cyanosis or edema.    Total time spent on day of visit, including review of tests, obtaining/reviewing separately obtained history, ordering medications/tests/procedures, communicating with PCP/consultants, and documenting in electronic medical record: 30 minutes.              Thank you for allowing me to participate in the care of your patient. Please do not hesitate to contact me if you have any questions.     Omega Chandler, DO

## 2024-10-08 ENCOUNTER — OFFICE VISIT (OUTPATIENT)
Dept: CARDIOLOGY | Facility: OTHER | Age: 69
End: 2024-10-08
Attending: INTERNAL MEDICINE
Payer: COMMERCIAL

## 2024-10-08 VITALS
DIASTOLIC BLOOD PRESSURE: 80 MMHG | OXYGEN SATURATION: 97 % | HEART RATE: 88 BPM | HEIGHT: 69 IN | TEMPERATURE: 97.4 F | BODY MASS INDEX: 25.77 KG/M2 | SYSTOLIC BLOOD PRESSURE: 124 MMHG | WEIGHT: 174 LBS | RESPIRATION RATE: 16 BRPM

## 2024-10-08 DIAGNOSIS — I25.118 CORONARY ARTERY DISEASE OF NATIVE ARTERY OF NATIVE HEART WITH STABLE ANGINA PECTORIS (H): ICD-10-CM

## 2024-10-08 DIAGNOSIS — Z95.1 HISTORY OF CORONARY ARTERY BYPASS GRAFT X 3: ICD-10-CM

## 2024-10-08 DIAGNOSIS — I50.9 CHRONIC CONGESTIVE HEART FAILURE, UNSPECIFIED HEART FAILURE TYPE (H): Primary | ICD-10-CM

## 2024-10-08 DIAGNOSIS — I25.810 CORONARY ARTERY DISEASE INVOLVING CORONARY BYPASS GRAFT OF NATIVE HEART WITHOUT ANGINA PECTORIS: ICD-10-CM

## 2024-10-08 PROCEDURE — G0463 HOSPITAL OUTPT CLINIC VISIT: HCPCS

## 2024-10-08 PROCEDURE — 99214 OFFICE O/P EST MOD 30 MIN: CPT | Performed by: INTERNAL MEDICINE

## 2024-10-08 RX ORDER — ASPIRIN 81 MG/1
81 TABLET, CHEWABLE ORAL DAILY
Qty: 90 TABLET | Refills: 3 | Status: SHIPPED | OUTPATIENT
Start: 2024-10-08

## 2024-10-08 RX ORDER — DAPAGLIFLOZIN 10 MG/1
10 TABLET, FILM COATED ORAL DAILY
Qty: 90 TABLET | Refills: 3 | Status: SHIPPED | OUTPATIENT
Start: 2024-10-08

## 2024-10-08 RX ORDER — SACUBITRIL AND VALSARTAN 24; 26 MG/1; MG/1
1 TABLET, FILM COATED ORAL 2 TIMES DAILY
Qty: 180 TABLET | Refills: 3 | Status: SHIPPED | OUTPATIENT
Start: 2024-10-08

## 2024-10-08 ASSESSMENT — PAIN SCALES - GENERAL: PAINLEVEL: MODERATE PAIN (4)

## 2024-10-08 NOTE — NURSING NOTE
"Patient comes I for follow up after surgeries.    Chief Complaint   Patient presents with    Follow Up     Follow up after surgeries       Initial /80 (BP Location: Right arm, Patient Position: Sitting, Cuff Size: Adult Large)   Pulse 88   Temp 97.4  F (36.3  C) (Temporal)   Resp 16   Ht 1.753 m (5' 9\")   Wt 78.9 kg (174 lb)   SpO2 97%   BMI 25.70 kg/m   Estimated body mass index is 25.7 kg/m  as calculated from the following:    Height as of this encounter: 1.753 m (5' 9\").    Weight as of this encounter: 78.9 kg (174 lb).  Meds Reconciled: complete  Pt is on Aspirin  Pt is on a Statin  PHQ and/or ELSA reviewed. Pt referred to PCP/MH Provider as appropriate.    Fanny Olguin LPN      "

## 2024-10-11 ENCOUNTER — HOSPITAL ENCOUNTER (OUTPATIENT)
Dept: CARDIAC REHAB | Facility: OTHER | Age: 69
Discharge: HOME | End: 2024-10-11
Attending: SURGERY
Payer: MEDICARE

## 2024-10-11 ENCOUNTER — TELEPHONE (OUTPATIENT)
Dept: CARDIAC REHAB | Facility: OTHER | Age: 69
End: 2024-10-11
Payer: COMMERCIAL

## 2024-10-11 DIAGNOSIS — I44.1 MOBITZ TYPE 2 SECOND DEGREE HEART BLOCK: Primary | ICD-10-CM

## 2024-10-11 PROCEDURE — 93798 PHYS/QHP OP CAR RHAB W/ECG: CPT

## 2024-10-11 NOTE — TELEPHONE ENCOUNTER
Pt here for cardiac rehab today 10/11/24.  Noted on monitor for second degree block type 2 several beats then resolved.  See cardiac rehab scanned session.  Patient had no symptoms.  Monitor strip shown to Dr. Galaviz.  He  ok'd  for patient to go home if no sx and no return of block type rhythm.    Instructed patient to go to ER if any sx of dizziness or chest pain.  Pt verbalized understanding.   Will cancel next cardiac rehab session and await advise from Dr. Chandler..

## 2024-10-14 ENCOUNTER — HOSPITAL ENCOUNTER (OUTPATIENT)
Dept: ULTRASOUND IMAGING | Facility: OTHER | Age: 69
Discharge: HOME | End: 2024-10-14
Attending: SURGERY | Admitting: SURGERY
Payer: MEDICARE

## 2024-10-14 ENCOUNTER — HOSPITAL ENCOUNTER (OUTPATIENT)
Dept: CARDIAC REHAB | Facility: OTHER | Age: 69
Discharge: HOME | End: 2024-10-14
Attending: SURGERY
Payer: MEDICARE

## 2024-10-14 DIAGNOSIS — I65.21 RIGHT-SIDED CAROTID ARTERY OCCLUSION WITHOUT CEREBRAL INFARCTION: ICD-10-CM

## 2024-10-14 PROCEDURE — 93880 EXTRACRANIAL BILAT STUDY: CPT

## 2024-10-14 PROCEDURE — 93798 PHYS/QHP OP CAR RHAB W/ECG: CPT

## 2024-10-16 ENCOUNTER — HOSPITAL ENCOUNTER (OUTPATIENT)
Dept: CARDIAC REHAB | Facility: OTHER | Age: 69
Discharge: HOME | End: 2024-10-16
Attending: SURGERY
Payer: MEDICARE

## 2024-10-16 ENCOUNTER — ALLIED HEALTH/NURSE VISIT (OUTPATIENT)
Dept: FAMILY MEDICINE | Facility: OTHER | Age: 69
End: 2024-10-16
Attending: INTERNAL MEDICINE
Payer: MEDICARE

## 2024-10-16 ENCOUNTER — VIRTUAL VISIT (OUTPATIENT)
Dept: VASCULAR SURGERY | Facility: CLINIC | Age: 69
End: 2024-10-16
Payer: COMMERCIAL

## 2024-10-16 VITALS — BODY MASS INDEX: 24.77 KG/M2 | HEIGHT: 70 IN | WEIGHT: 173 LBS

## 2024-10-16 DIAGNOSIS — Z98.890 HISTORY OF LEFT-SIDED CAROTID ENDARTERECTOMY: Primary | ICD-10-CM

## 2024-10-16 DIAGNOSIS — I73.9 PAD (PERIPHERAL ARTERY DISEASE) (H): ICD-10-CM

## 2024-10-16 DIAGNOSIS — Z87.891 FORMER CIGARETTE SMOKER: ICD-10-CM

## 2024-10-16 DIAGNOSIS — I65.22 ASYMPTOMATIC STENOSIS OF LEFT CAROTID ARTERY WITHOUT INFARCTION: ICD-10-CM

## 2024-10-16 DIAGNOSIS — I65.21 RIGHT-SIDED CAROTID ARTERY OCCLUSION WITHOUT CEREBRAL INFARCTION: ICD-10-CM

## 2024-10-16 DIAGNOSIS — I65.23 BILATERAL CAROTID ARTERY STENOSIS: ICD-10-CM

## 2024-10-16 DIAGNOSIS — Z98.890 POSTOPERATIVE STATE: ICD-10-CM

## 2024-10-16 DIAGNOSIS — I25.118 CORONARY ARTERY DISEASE OF NATIVE ARTERY OF NATIVE HEART WITH STABLE ANGINA PECTORIS (H): Primary | ICD-10-CM

## 2024-10-16 DIAGNOSIS — I44.1 MOBITZ TYPE 2 SECOND DEGREE HEART BLOCK: ICD-10-CM

## 2024-10-16 PROCEDURE — 999N000157 HC STATISTIC RCP TIME EA 10 MIN

## 2024-10-16 PROCEDURE — 93798 PHYS/QHP OP CAR RHAB W/ECG: CPT

## 2024-10-16 PROCEDURE — 93246 EXT ECG>7D<15D RECORDING: CPT

## 2024-10-16 PROCEDURE — 99213 OFFICE O/P EST LOW 20 MIN: CPT | Mod: GT | Performed by: SURGERY

## 2024-10-16 NOTE — PROGRESS NOTES
Zio patch for rhythm.     Wfie may need new kidney     Carotid duplex at 6 mo bertha   Median Sternotomy, On Cardiopulmonary Bypass Graft, CORONARY ARTERY BYPASS GRAFT X_3_, Left AND RIGHTGreater Saphenous Vein Sunflower, Left Internal Mammary Artery Sunflower, Intra-operative Transesophageal Echocardiogram per Anesthesia.;  Surgeon: Kit Mason MD;  Location: [facility] OR    COLONOSCOPY  08/23/2010    Q 10yrs.    COLONOSCOPY N/A 02/24/2022    6 tubular adenomas, 5 year follow up, 2/24/2027    CV CORONARY ANGIOGRAM N/A 4/15/2024    Procedure: Coronary Angiogram;  Surgeon: Toni Sellers MD;  Location:  [facility]    ENDARTERECTOMY CAROTID Left 6/25/2024    Procedure: Left Carotid Endarterectomy with bovine pericardial patch angioplasty. Intraoperative Ultrasound. Intraoperative EEG monitoring;  Surgeon: Rasheed Sam MD;  Location:  OR    EXTRACTION(S) DENTAL      recently removed    OTHER SURGICAL HISTORY      [id number],PREMALIG/BENIGN SKIN LESION EXCISION, removed from chest    OTHER SURGICAL HISTORY      [id number],ANESTHESIA ALERT,No  problems with anaesthesia        Allergies:     Allergies   Allergen Reactions    Ace Inhibitors Angioedema     ED visit 8/8/2024: suspected upper lip edema was secondary to recent lisinopril start after heart surgery. Improved on steroid/benadryl        Medications:    Current Outpatient Medications   Medication Sig Dispense Refill    acetaminophen (TYLENOL) 325 MG tablet Take 2 tablets (650 mg) by mouth every 4 hours (while awake) 50 tablet 0    aspirin (ASA) 81 MG chewable tablet Take 1 tablet (81 mg) by mouth daily. 90 tablet 3    atorvastatin (LIPITOR) 80 MG tablet Take 1 tablet (80 mg) by mouth at bedtime. 90 tablet 3    cyclobenzaprine (FLEXERIL) 10 MG tablet Take 1 tablet (10 mg) by mouth 3 times daily as needed for muscle spasms 50 tablet 0    dapagliflozin (FARXIGA) 10 MG TABS tablet Take 1 tablet (10 mg) by mouth daily. 90 tablet 3    EPINEPHrine (ANY BX GENERIC EQUIV) 0.3 MG/0.3ML injection 2-pack Inject 0.3 mLs (0.3 mg) into  the muscle once as needed for anaphylaxis 1 each 0    furosemide (LASIX) 20 MG tablet Take 1 tablet (20 mg) by mouth daily as needed (take for weight gain more than 3 lbs in 1 day or more than 5 lbs in 7 days OR for swelling in your legs or acute shortness of breath). 30 tablet 0    gabapentin (NEURONTIN) 100 MG capsule Take 100 mg by mouth 3 times daily as needed (Pain).      hydrOXYzine HCl (ATARAX) 25 MG tablet Take 1 tablet (25 mg) by mouth every 6 hours as needed for other or anxiety (adjuvant pain) 30 tablet 0    metoprolol succinate ER (TOPROL XL) 50 MG 24 hr tablet Take 0.5 tablets (25 mg) by mouth daily. (Patient taking differently: Take 50 mg by mouth daily.) 90 tablet 3    melatonin 5 MG tablet Take 5 mg by mouth nightly as needed for sleep.      nitroGLYcerin (NITROSTAT) 0.4 MG sublingual tablet For chest pain place 1 tablet under the tongue every 5 minutes for 3 doses. If symptoms persist 5 minutes after 1st dose call 911. 25 tablet 3    sacubitril-valsartan (ENTRESTO) 49-51 MG per tablet Take 1 tablet by mouth 2 times daily. 60 tablet 1     No current facility-administered medications for this visit.        Social Habits:    Tobacco Use      Smoking status: Former        Packs/day: 0.25        Years: 0.3 packs/day for 15.1 years (3.8 ttl pk-yrs)        Types: Cigars, Cigarettes        Start date: 11/4/2009        Passive exposure: Current (per pt)      Smokeless tobacco: Never      Tobacco comments: Quit smoking: quit 6/20/17 (cigarettes); smoked cigars 2 puffs cigar week then stopped before surgery       Alcohol Use: Not on file       History   Drug Use    Types: Marijuana     Comment: Not daily 2x week        Family History:    Family History   Problem Relation Age of Onset    Diabetes Mother         Diabetes    Heart Disease Mother         Heart Disease    Other - See Comments Father         COPD, CD    Heart Disease Brother         Heart Disease    Other - See Comments Brother         killed by  "drunk  at age 19.    Substance Abuse Other         Alcohol/Drug, No hx of chemical dependency.    Diabetes Other         Diabetes    Heart Disease Other         Heart Disease    Anesthesia Reaction No family hx of     Clotting Disorder No family hx of     Bleeding Disorder No family hx of        Physical Examination:  Ht 1.778 m (5' 10\")   Wt 78.5 kg (173 lb)   BMI 24.82 kg/m    Wt Readings from Last 1 Encounters:   12/10/24 78.9 kg (174 lb)     Body mass index is 24.82 kg/m .    Exam:  No full examination as this was a video visit however,   General: No apparent distress. Answers questions appropriately with no evidence of neurologic deficit on limited video.  Neurologic: move all extremities equally  Eyes: no obvious scleral abnormality  Skin: No exposed skin lesions on face or hands     Laboratory Findings:  Hemoglobin   Date Value Ref Range Status   08/11/2024 13.2 (L) 13.3 - 17.7 g/dL Final   08/08/2024 11.8 (L) 13.3 - 17.7 g/dL Final   07/31/2020 14.2 13.3 - 17.7 g/dL Final   07/13/2020 15.4 13.3 - 17.7 g/dL Final     WBC   Date Value Ref Range Status   07/31/2020 10.6 4.0 - 11.0 10e9/L Final   07/13/2020 14.7 (H) 4.0 - 11.0 10e9/L Final     WBC Count   Date Value Ref Range Status   08/11/2024 12.7 (H) 4.0 - 11.0 10e3/uL Final   08/08/2024 9.5 4.0 - 11.0 10e3/uL Final     Platelet Count   Date Value Ref Range Status   08/11/2024 434 150 - 450 10e3/uL Final   08/08/2024 455 (H) 150 - 450 10e3/uL Final   07/31/2020 250 150 - 450 10e9/L Final   07/13/2020 239 150 - 450 10e9/L Final     Creatinine   Date Value Ref Range Status   12/02/2024 0.94 0.67 - 1.17 mg/dL Final   07/31/2020 0.80 0.70 - 1.30 mg/dL Final     Sodium   Date Value Ref Range Status   12/02/2024 134 (L) 135 - 145 mmol/L Final   07/31/2020 140 134 - 144 mmol/L Final     Glucose   Date Value Ref Range Status   12/02/2024 120 (H) 70 - 99 mg/dL Final   10/18/2024 123 (H) 70 - 99 mg/dL Final   12/01/2021 87 70 - 105 mg/dL Final   07/31/2020 165 " (H) 70 - 105 mg/dL Final   07/13/2020 106 (H) 70 - 105 mg/dL Final     GLUCOSE BY METER POCT   Date Value Ref Range Status   07/31/2024 102 (H) 70 - 99 mg/dL Final   07/30/2024 113 (H) 70 - 99 mg/dL Final     Hemoglobin A1C   Date Value Ref Range Status   05/01/2024 5.9 (H) <5.7 % Final     Comment:     Normal <5.7%   Prediabetes 5.7-6.4%    Diabetes 6.5% or higher     Note: Adopted from ADA consensus guidelines.     INR   Date Value Ref Range Status   07/25/2024 1.24 (H) 0.85 - 1.15 Final       Imaging:    I personally reviewed the carotid duplex from 10/14/2024 which shows widely patent left carotid endarterectomy site with no recurrent stenosis.  Chronic right carotid occlusion.     Impression/Shared Medical Decision Making:    #1- Asymptomatic high grade left carotid stenosis status post left carotid endarterectomy, 6/24  #2- Asymptomatic chronic occlusion of the right internal carotid artery  #3- Coronary artery disease status post coronary bypass x3, Dr. Mason 7/24  #4- Hyperlipidemia  #5- Peripheral arterial disease status post bilateral bypasses, elsewhere  #6- Heart Failure with Reduced EF, 30-35%  #7- Former nicotine dependence  Mr. Lehman is a pleasant 69 year old male seen for 3 follow up of carotid endarterectomy.  He is doing well from the neurologic standpoint.  His reconstruction is widely patent.  We will see him back at the 6 month anniversary with repeat duplex.       Discussed warning signs including, but not limited to, stroke/TIA, focal neurologic deficit, upper or lower extremity weakness or sensory dysfunction, amaurosis fugax, aphasia, dysarthria, or facial droop.  If he experiences any of these, he has been advised to seek treatment and contact my team.    Mr. Lehman is in agreement with this plan as outlined.    Recommendations/Plan:  Return at 6 month anniversary of operation with repeat carotid duplex  Continue optimal medical therapy    Rasheed Sam MD  Vascular &  Endovascular Surgery      10 minutes spent on the day of encounter doing chart review from our system and care everywhere, history and exam, documentation, coordinating care, and further activities as noted with over half spent counseling.

## 2024-10-16 NOTE — Clinical Note
10/16/2024       RE: Modesto Lehman  [address]  [facility] [address]     Dear Colleague,    Thank you for referring your patient, Modesto Lehman, to the [facility] [place] at [facility]. Please see a copy of my visit note below.    No notes on file    Again, thank you for allowing me to participate in the care of your patient.      Sincerely,    Rasheed Sam MD

## 2024-10-16 NOTE — NURSING NOTE
Pt declined PHQ QNRS per pt    Current patient location:    [facility] [address]    Is the patient currently in the state of [place]? YES    Visit mode:VIDEO    If the visit is dropped, the patient can be reconnected by: VIDEO VISIT: Text to cell phone:   Telephone Information:   Mobile [phone]       Will anyone else be joining the visit? NO  (If patient encounters technical issues they should call [phone])    Are changes needed to the allergy or medication list? Pt stated no med changes    Are refills needed on medications prescribed by this physician? NO    Rooming Documentation:  Not applicable    Reason for visit: RECHECK    NO other vitals to report per pt    Lorie BENDERF

## 2024-10-16 NOTE — PATIENT INSTRUCTIONS
Thank you so much for choosing us for your care. It was a pleasure to see you at the vascular clinic today.     Follow-up recommendations: We will see you back in 6 months virtually. Please do not hesitate to reach out to us in the meantime with any concerns. Our schedulers will get in touch with you to set up your follow-up visit.    Additional testing/imaging ordered today: US carotid in 6 months      Our scheduling team will get in touch with you to set up any follow-up testing/imaging and/or appointments. Please be aware that any testing/imaging recommended today will need to completed prior to your next visit with the provider. If testing/imaging is not completed prior to your next visit, your visit may be rescheduled.     If you have any questions, please contact our clinic directly at [phone] and ask for the nurse. We also encourage the use of SIVI to communicate with your healthcare provider.    If you have an urgent need after business hours (8:00 am to 4:30 pm) please call [phone], option 4, and ask for the vascular attending on call. For non-urgent after hours needs, please call the vascular clinic at [phone]. For scheduling needs, please call our clinic directly at [phone].

## 2024-10-16 NOTE — PROGRESS NOTES
Patient arrived (date)10/16/2024 (time)0900 for a 14 day Zio monitor per (provider name) Dr. Chandler for Mobitz Type II Second Degree Heart Block (Dx).  Patient's skin was prepped per protocol.  Zio monitor was placed.  Patient verbalized understanding of instructions and plan of care.  Patient was given Zio diary and prepaid .  Respiratory Therapist reviewed Zio Patch placement process and asked patient if they are comfortable proceeding with placement. Patient (is or is not) is comfortable proceeding. Patient (would or would not) would not like an employee of same gender to be (present or to place) present or to place the Zio Patch.  Documentation of Zio Patch site (Bleeding or Not Bleeding) Not Bleeding  If there is bleeding Documentation of why. N/A  Documentation of accomodation made for patient. No    Danika Tsang, RT

## 2024-10-17 ENCOUNTER — VIRTUAL VISIT (OUTPATIENT)
Facility: CLINIC | Age: 69
End: 2024-10-17
Attending: NURSE PRACTITIONER
Payer: COMMERCIAL

## 2024-10-17 DIAGNOSIS — R52 PAIN: ICD-10-CM

## 2024-10-17 DIAGNOSIS — I50.9 CHRONIC CONGESTIVE HEART FAILURE, UNSPECIFIED HEART FAILURE TYPE (H): ICD-10-CM

## 2024-10-17 DIAGNOSIS — G47.9 SLEEP DISORDER: ICD-10-CM

## 2024-10-17 DIAGNOSIS — I25.118 CORONARY ARTERY DISEASE OF NATIVE ARTERY OF NATIVE HEART WITH STABLE ANGINA PECTORIS (H): Primary | ICD-10-CM

## 2024-10-17 NOTE — Clinical Note
Medications changed to Entresto and Farxiga by cardiologist on 10/8. Doing well on these. I will have him get a Basic Metabolic Panel in a week to make sure kidney function/potassium are stable. If stable I will add spironolactone.  Please let me know what questions you have for me,  Trevor Wood, Pharm. D., [facility] Medication Therapy Management Pharmacist

## 2024-10-17 NOTE — PROGRESS NOTES
"Medication Therapy Management (MTM) Encounter    ASSESSMENT:                            Medication Adherence/Access: No issues identified.    Hyperlipidemia /CAD  Stable.        HFrEF (</=40%):  Pt would benefit from starting an MRA to be on full GDMT per heartfailure guidelines. Would also benefit from increasing doses to targets below. Need Basic Metabolic Panel after recent Farxiga and Entresto start before initiating spironolactone.      GDMT dosing goals if possible:  Entresto - 97 mg sacubitril and 103 mg valsartan twice daily, Spironolactone 25-50 mg once daily, Metoprolol  mg once daily, and Farxiga 10 mg         Sleep:  Stable.     Pain:   Stable.     PLAN:                            Call your closest [place] laboratory to get your Basic Metabolic Panel checked.   If labs are normal, will start spironolactone    Follow-up: Return in about 4 weeks (around 11/14/2024) for Follow up, with me, using a phone visit.    SUBJECTIVE/OBJECTIVE:                          Modesto Lehman is a 68 year old male seen for an initial visit. He was referred to me from Brittany NEELY.      Reason for visit: GDMT titrations.    Allergies/ADRs: Reviewed in chart  Past Medical History: Reviewed in chart  Tobacco: He reports that he has quit smoking. His smoking use included cigars and cigarettes. He started smoking about 14 years ago. He has a 3.7 pack-year smoking history. He has been exposed to tobacco smoke. He has never used smokeless tobacco.  Alcohol: 1-3 beverages / week  Other Substance Use: THC  Medication Adherence/Access: no issues reported.    Hyperlipidemia /CAD  Aspirin 81 mg daily - no bleeding issues  atorvastatin 80mg daily - reports that his legs bother him reports his legs get \"nervous\"  NTG SL as needed  - hasn't needed  Patient reports no significant myalgias or other side effects.  The ASCVD Risk score (Halley DK, et al., 2019) failed to calculate for the following reasons:    The valid total " cholesterol range is 130 to 320 mg/dL    Recent Labs   Lab Test 02/16/24  0943 12/01/21  1138   CHOL 120 116   HDL 59 43   LDL 42 46   TRIG 94 134        Heart Failure   HFrEF (</=40%)   Beta Blocker: metoprolol succinate 50 mg daily  ACEi/ARB/ARNI: Entresto 24-26 mg twice a day recently switched from losartan - allergy to ACE-I (has an epipen as needed anaphylaxis)  he thinks this was due to lyme disease  SGLT2i: Farxiga 10 mg daily   MRA: deferred due to starting other therapies  Diuretic:   Patient reports no current medication side effects.     Patient reports these HF symptoms: none.    Patient is measuring daily weights  and reports stable.   Patient self-monitors blood pressure.  Home BP monitoring 125/75 mmHg. Pulse of 96 bpm.   Patient is following a low sodium diet and is not avoiding alcohol.    BP Readings from Last 3 Encounters:   10/08/24 124/80   10/04/24 92/61   08/13/24 116/60       Pulse Readings from Last 3 Encounters:   10/08/24 88   10/04/24 79   08/13/24 94            Sleep:  Melatonin 5 mg at night  Not sure if it is helpful. Seems to be getting better sleep last few days.     Pain:   Cycloenzaprine 10 mg three times a day as needed  - takes one at night  Acetaminophen 650 mg as needed - takes one at nght  Gabapentin 100 mg three times a day as needed - has not been using  Hydroxyzine 25 mg as needed - not currently    No concerns or questions at this time.     Today's Vitals: There were no vitals taken for this visit.  ----------------      I spent 17 minutes with this patient today. All changes were made via collaborative practice agreement with Brittany Leonard A copy of the visit note was provided to the patient's provider(s).    A summary of these recommendations was sent via [employer].    Trevor Wood, Pharm. D., BCACP  Medication Therapy Management Pharmacist      Telemedicine Visit Details  The patient's medications can be safely assessed via a telemedicine encounter.  Type of service:   Telephone visit  Originating Location (pt. Location): Home    Distant Location (provider location):  Off-site  Start Time:  8:02 am  End Time:  8:19 am     Medication Therapy Recommendations  CHF (congestive heart failure) (H)    Rationale: Synergistic therapy - Needs additional medication therapy - Indication   Recommendation: Start Medication - SPIRONOLACTONE   Status: Contact Provider - Awaiting Response

## 2024-10-17 NOTE — PATIENT INSTRUCTIONS
"Recommendations from today's MTM visit:                                                    MTM (medication therapy management) is a service provided by a clinical pharmacist designed to help you get the most of out of your medicines.   Today we reviewed what your medicines are for, how to know if they are working, that your medicines are safe and how to make your medicine regimen as easy as possible.    Call your closest [place] laboratory to get your Basic Metabolic Panel checked.   If labs are normal, will start spironolactone    Follow-up: Return in about 4 weeks (around 11/14/2024) for Follow up, with me, using a phone visit.    It was great speaking with you today.  I value your experience and would be very thankful for your time in providing feedback in our clinic survey. In the next few days, you may receive an email or text message from [employer] with a link to a survey related to your  clinical pharmacist.\"     To schedule another MTM appointment, please call the clinic directly or you may call the MTM scheduling line at [phone] or toll-free at [phone].     My Clinical Pharmacist's contact information:                                                      Please feel free to contact me with any questions or concerns you have.      Trevor Wood, Pharm. D., [facility]  Medication Therapy Management Pharmacist    "

## 2024-10-18 ENCOUNTER — LAB (OUTPATIENT)
Dept: LAB | Facility: OTHER | Age: 69
End: 2024-10-18
Attending: FAMILY MEDICINE
Payer: MEDICARE

## 2024-10-18 ENCOUNTER — HOSPITAL ENCOUNTER (OUTPATIENT)
Dept: CARDIAC REHAB | Facility: OTHER | Age: 69
Discharge: HOME | End: 2024-10-18
Attending: SURGERY
Payer: MEDICARE

## 2024-10-18 DIAGNOSIS — I50.22 CHRONIC SYSTOLIC HEART FAILURE (H): ICD-10-CM

## 2024-10-18 LAB
ANION GAP SERPL CALCULATED.3IONS-SCNC: 10 MMOL/L (ref 7–15)
BUN SERPL-MCNC: 17 MG/DL (ref 8–23)
CALCIUM SERPL-MCNC: 9.1 MG/DL (ref 8.8–10.4)
CHLORIDE SERPL-SCNC: 100 MMOL/L (ref 98–107)
CREAT SERPL-MCNC: 1.04 MG/DL (ref 0.67–1.17)
EGFRCR SERPLBLD CKD-EPI 2021: 79 ML/MIN/1.73M2
GLUCOSE SERPL-MCNC: 123 MG/DL (ref 70–99)
HCO3 SERPL-SCNC: 24 MMOL/L (ref 22–29)
POTASSIUM SERPL-SCNC: 4.3 MMOL/L (ref 3.4–5.3)
SODIUM SERPL-SCNC: 134 MMOL/L (ref 135–145)

## 2024-10-18 PROCEDURE — 80048 BASIC METABOLIC PNL TOTAL CA: CPT | Mod: ZL

## 2024-10-18 PROCEDURE — 36415 COLL VENOUS BLD VENIPUNCTURE: CPT | Mod: ZL

## 2024-10-18 PROCEDURE — 93798 PHYS/QHP OP CAR RHAB W/ECG: CPT

## 2024-10-21 ENCOUNTER — HOSPITAL ENCOUNTER (OUTPATIENT)
Dept: CARDIAC REHAB | Facility: OTHER | Age: 69
Discharge: HOME | End: 2024-10-21
Attending: SURGERY
Payer: MEDICARE

## 2024-10-21 PROCEDURE — 93798 PHYS/QHP OP CAR RHAB W/ECG: CPT

## 2024-10-23 ENCOUNTER — HOSPITAL ENCOUNTER (OUTPATIENT)
Dept: CARDIAC REHAB | Facility: OTHER | Age: 69
Discharge: HOME | End: 2024-10-23
Attending: SURGERY
Payer: MEDICARE

## 2024-10-23 PROCEDURE — 93798 PHYS/QHP OP CAR RHAB W/ECG: CPT

## 2024-10-25 ENCOUNTER — HOSPITAL ENCOUNTER (OUTPATIENT)
Dept: CARDIAC REHAB | Facility: OTHER | Age: 69
Discharge: HOME | End: 2024-10-25
Attending: SURGERY
Payer: MEDICARE

## 2024-10-25 PROCEDURE — 93798 PHYS/QHP OP CAR RHAB W/ECG: CPT

## 2024-10-28 ENCOUNTER — HOSPITAL ENCOUNTER (OUTPATIENT)
Dept: CARDIAC REHAB | Facility: OTHER | Age: 69
Discharge: HOME | End: 2024-10-28
Payer: MEDICARE

## 2024-10-28 PROCEDURE — 93798 PHYS/QHP OP CAR RHAB W/ECG: CPT

## 2024-10-30 ENCOUNTER — HOSPITAL ENCOUNTER (OUTPATIENT)
Dept: CARDIAC REHAB | Facility: OTHER | Age: 69
Discharge: HOME | End: 2024-10-30
Attending: FAMILY MEDICINE
Payer: MEDICARE

## 2024-10-30 PROCEDURE — 93798 PHYS/QHP OP CAR RHAB W/ECG: CPT

## 2024-11-01 ENCOUNTER — HOSPITAL ENCOUNTER (OUTPATIENT)
Dept: CARDIAC REHAB | Facility: OTHER | Age: 69
Discharge: HOME | End: 2024-11-01
Attending: FAMILY MEDICINE
Payer: MEDICARE

## 2024-11-04 ENCOUNTER — HOSPITAL ENCOUNTER (OUTPATIENT)
Dept: CARDIAC REHAB | Facility: OTHER | Age: 69
Discharge: HOME | End: 2024-11-04
Attending: FAMILY MEDICINE
Payer: MEDICARE

## 2024-11-08 ENCOUNTER — HOSPITAL ENCOUNTER (OUTPATIENT)
Dept: CARDIAC REHAB | Facility: OTHER | Age: 69
Discharge: HOME | End: 2024-11-08
Payer: COMMERCIAL

## 2024-11-11 ENCOUNTER — HOSPITAL ENCOUNTER (OUTPATIENT)
Dept: CARDIAC REHAB | Facility: OTHER | Age: 69
Discharge: HOME | End: 2024-11-11
Payer: MEDICARE

## 2024-11-14 ENCOUNTER — VIRTUAL VISIT (OUTPATIENT)
Facility: CLINIC | Age: 69
End: 2024-11-14
Attending: NURSE PRACTITIONER
Payer: MEDICARE

## 2024-11-14 DIAGNOSIS — I50.9 CHRONIC CONGESTIVE HEART FAILURE, UNSPECIFIED HEART FAILURE TYPE (H): Primary | ICD-10-CM

## 2024-11-14 RX ORDER — SACUBITRIL AND VALSARTAN 49; 51 MG/1; MG/1
1 TABLET, FILM COATED ORAL 2 TIMES DAILY
Status: CANCELLED | OUTPATIENT
Start: 2024-11-14

## 2024-11-14 NOTE — Clinical Note
Slowly working up his Entresto dose. Following closely to monitor for hypotension.  Trevor Wood, Pharm. D., [facility] Medication Therapy Management Pharmacist

## 2024-11-14 NOTE — PROGRESS NOTES
Medication Therapy Management (MTM) Encounter    ASSESSMENT:                            Medication Adherence/Access: No issues identified.    HFrEF (</=40%):  Patient is doing well on current medications. Will slowly up titrate him on Entresto due to history of low blood pressure. Will follow with him closely on this. Will avoid spironolactone for now given lower sodium.     PLAN:                            Increase Entresto to 1 tablet in the morning and 2 tablets at night.     Follow-up: Return in about 13 days (around 11/27/2024) for Follow up, with me, using a phone visit.    SUBJECTIVE/OBJECTIVE:                          Modesto Lehman is a 69 year old male seen for a follow-up visit.       Reason for visit: HF follow-up .    Allergies/ADRs: Reviewed in chart  Past Medical History: Reviewed in chart  Tobacco: He reports that he has quit smoking. His smoking use included cigars and cigarettes. He started smoking about 15 years ago. He has a 3.8 pack-year smoking history. He has been exposed to tobacco smoke. He has never used smokeless tobacco.  Alcohol: 1-3 beverages / week  THC/CBD  Medication Adherence/Access: no issues reported.    Heart Failure   HFrEF (</=40%)   Beta Blocker: metoprolol succinate 50 mg daily  ACEi/ARB/ARNI: Entresto 24-26 mg twice a day recently switched from losartan - allergy to ACE-I (has an epipen as needed anaphylaxis)  he thinks this was due to lyme disease  SGLT2i: Farxiga 10 mg daily   MRA: holding off on this as sodium is low  Patient reports no current medication side effects.     Patient reports these HF symptoms: none.    Patient is measuring daily weights  and reports stable.   Patient self-monitors blood pressure.  Does not check at home but gets checked at cardiac rehab. Reports it was 120/80 mmHg.   Patient is following a low sodium diet and is not avoiding alcohol.  BP Readings from Last 3 Encounters:   10/08/24 124/80   10/04/24 92/61   08/13/24 116/60       Pulse  Readings from Last 3 Encounters:   10/08/24 88   10/04/24 79   08/13/24 94                        Today's Vitals: There were no vitals taken for this visit.  ----------------      I spent 8 minutes with this patient today. All changes were made via collaborative practice agreement with Brittany NEELY. A copy of the visit note was provided to the patient's provider(s).    A summary of these recommendations was sent via [employer].    Trevor Wood, Pharm. D., BCACP  Medication Therapy Management Pharmacist      Telemedicine Visit Details  The patient's medications can be safely assessed via a telemedicine encounter.  Type of service:  Telephone visit  Originating Location (pt. Location): Home    Distant Location (provider location):  Off-site  Start Time:  8:30 am  End Time:  8:38am     Medication Therapy Recommendations  CHF (congestive heart failure) (H)   1 Current Medication: sacubitril-valsartan (ENTRESTO) 24-26 MG per tablet   Current Medication Sig: Take 1 tablet by mouth 2 times daily.   Rationale: Dose too low - Dosage too low - Effectiveness   Recommendation: Increase Dose   Status: Accepted per CPA   Identified Date: 11/14/2024 Completed Date: 11/15/2024

## 2024-11-15 ENCOUNTER — HOSPITAL ENCOUNTER (OUTPATIENT)
Dept: CARDIAC REHAB | Facility: OTHER | Age: 69
Discharge: HOME | End: 2024-11-15
Payer: COMMERCIAL

## 2024-11-15 NOTE — PATIENT INSTRUCTIONS
"Recommendations from today's MTM visit:                                                    MTM (medication therapy management) is a service provided by a clinical pharmacist designed to help you get the most of out of your medicines.   Today we reviewed what your medicines are for, how to know if they are working, that your medicines are safe and how to make your medicine regimen as easy as possible.    Increase Entresto to 1 tablet in the morning and 2 tablets at night.     Follow-up: Return in about 13 days (around 11/27/2024) for Follow up, with me, using a phone visit.    It was great speaking with you today.  I value your experience and would be very thankful for your time in providing feedback in our clinic survey. In the next few days, you may receive an email or text message from [employer] with a link to a survey related to your  clinical pharmacist.\"     To schedule another MTM appointment, please call the clinic directly or you may call the MTM scheduling line at [phone] or toll-free at [phone].     My Clinical Pharmacist's contact information:                                                      Please feel free to contact me with any questions or concerns you have.      Trevor Wood, Pharm. D., BCACP  Medication Therapy Management Pharmacist    "

## 2024-11-18 ENCOUNTER — HOSPITAL ENCOUNTER (OUTPATIENT)
Dept: CARDIAC REHAB | Facility: OTHER | Age: 69
Discharge: HOME | End: 2024-11-18
Payer: MEDICARE

## 2024-11-22 ENCOUNTER — HOSPITAL ENCOUNTER (OUTPATIENT)
Dept: CARDIAC REHAB | Facility: OTHER | Age: 69
Discharge: HOME | End: 2024-11-22
Payer: MEDICARE

## 2024-11-22 PROCEDURE — 93798 PHYS/QHP OP CAR RHAB W/ECG: CPT

## 2024-11-25 ENCOUNTER — HOSPITAL ENCOUNTER (OUTPATIENT)
Dept: CARDIAC REHAB | Facility: OTHER | Age: 69
Discharge: HOME | End: 2024-11-25
Payer: MEDICARE

## 2024-11-27 ENCOUNTER — VIRTUAL VISIT (OUTPATIENT)
Facility: CLINIC | Age: 69
End: 2024-11-27
Attending: NURSE PRACTITIONER
Payer: COMMERCIAL

## 2024-11-27 DIAGNOSIS — I50.9 CHRONIC CONGESTIVE HEART FAILURE, UNSPECIFIED HEART FAILURE TYPE (H): Primary | ICD-10-CM

## 2024-11-27 RX ORDER — SACUBITRIL AND VALSARTAN 49; 51 MG/1; MG/1
1 TABLET, FILM COATED ORAL 2 TIMES DAILY
Qty: 60 TABLET | Refills: 1 | Status: SHIPPED | OUTPATIENT
Start: 2024-11-27

## 2024-11-27 NOTE — PATIENT INSTRUCTIONS
"Recommendations from today's MTM visit:                                                    MTM (medication therapy management) is a service provided by a clinical pharmacist designed to help you get the most of out of your medicines.   Today we reviewed what your medicines are for, how to know if they are working, that your medicines are safe and how to make your medicine regimen as easy as possible.    Schedule a lab appointment  in 1-2 weeks to get Basic Metabolic Panel drawn  Increase Entresto to 49-51 mg twice a day     Follow-up: Return in about 5 weeks (around 12/31/2024) for Follow up, with me, using a phone visit.    It was great speaking with you today.  I value your experience and would be very thankful for your time in providing feedback in our clinic survey. In the next few days, you may receive an email or text message from [employer] with a link to a survey related to your  clinical pharmacist.\"     To schedule another MTM appointment, please call the clinic directly or you may call the MTM scheduling line at [phone] or toll-free at [phone].     My Clinical Pharmacist's contact information:                                                      Please feel free to contact me with any questions or concerns you have.      Trevor Wood, Pharm. D., [facility]  Medication Therapy Management Pharmacist    "

## 2024-12-02 ENCOUNTER — LAB (OUTPATIENT)
Dept: LAB | Facility: OTHER | Age: 69
End: 2024-12-02
Payer: MEDICARE

## 2024-12-02 ENCOUNTER — HOSPITAL ENCOUNTER (OUTPATIENT)
Dept: CARDIAC REHAB | Facility: OTHER | Age: 69
Discharge: HOME | End: 2024-12-02
Payer: MEDICARE

## 2024-12-02 LAB
ANION GAP SERPL CALCULATED.3IONS-SCNC: 8 MMOL/L (ref 7–15)
BUN SERPL-MCNC: 15.1 MG/DL (ref 8–23)
CALCIUM SERPL-MCNC: 9.2 MG/DL (ref 8.8–10.4)
CHLORIDE SERPL-SCNC: 100 MMOL/L (ref 98–107)
CREAT SERPL-MCNC: 0.94 MG/DL (ref 0.67–1.17)
EGFRCR SERPLBLD CKD-EPI 2021: 89 ML/MIN/1.73M2
GLUCOSE SERPL-MCNC: 120 MG/DL (ref 70–99)
HCO3 SERPL-SCNC: 26 MMOL/L (ref 22–29)
HOLD SPECIMEN: NORMAL
POTASSIUM SERPL-SCNC: 4.7 MMOL/L (ref 3.4–5.3)
SODIUM SERPL-SCNC: 134 MMOL/L (ref 135–145)

## 2024-12-02 PROCEDURE — 36415 COLL VENOUS BLD VENIPUNCTURE: CPT | Mod: ZL

## 2024-12-02 PROCEDURE — 80048 BASIC METABOLIC PNL TOTAL CA: CPT | Mod: ZL

## 2024-12-06 ENCOUNTER — HOSPITAL ENCOUNTER (OUTPATIENT)
Dept: CARDIAC REHAB | Facility: OTHER | Age: 69
Discharge: HOME | End: 2024-12-06
Payer: COMMERCIAL

## 2024-12-09 ENCOUNTER — HOSPITAL ENCOUNTER (OUTPATIENT)
Dept: CARDIAC REHAB | Facility: OTHER | Age: 69
Discharge: HOME | End: 2024-12-09
Payer: MEDICARE

## 2024-12-10 ENCOUNTER — OFFICE VISIT (OUTPATIENT)
Dept: CARDIOLOGY | Facility: OTHER | Age: 69
End: 2024-12-10
Attending: INTERNAL MEDICINE
Payer: MEDICARE

## 2024-12-10 VITALS
OXYGEN SATURATION: 95 % | TEMPERATURE: 98 F | BODY MASS INDEX: 25.77 KG/M2 | WEIGHT: 174 LBS | HEIGHT: 69 IN | SYSTOLIC BLOOD PRESSURE: 100 MMHG | RESPIRATION RATE: 16 BRPM | DIASTOLIC BLOOD PRESSURE: 60 MMHG | HEART RATE: 82 BPM

## 2024-12-10 DIAGNOSIS — R94.39 POSITIVE CARDIAC STRESS TEST: ICD-10-CM

## 2024-12-10 DIAGNOSIS — I65.22 LEFT CAROTID STENOSIS: ICD-10-CM

## 2024-12-10 DIAGNOSIS — I25.118 CORONARY ARTERY DISEASE OF NATIVE ARTERY OF NATIVE HEART WITH STABLE ANGINA PECTORIS (H): ICD-10-CM

## 2024-12-10 DIAGNOSIS — I50.9 CHRONIC CONGESTIVE HEART FAILURE, UNSPECIFIED HEART FAILURE TYPE (H): Primary | ICD-10-CM

## 2024-12-10 DIAGNOSIS — I73.9 PAD (PERIPHERAL ARTERY DISEASE) (H): ICD-10-CM

## 2024-12-10 DIAGNOSIS — R93.1 ELEVATED CORONARY ARTERY CALCIUM SCORE: ICD-10-CM

## 2024-12-10 PROCEDURE — G0463 HOSPITAL OUTPT CLINIC VISIT: HCPCS

## 2024-12-10 ASSESSMENT — PAIN SCALES - GENERAL: PAINLEVEL_OUTOF10: MODERATE PAIN (4)

## 2024-12-10 NOTE — PROGRESS NOTES
[facility] HEART CARE   CARDIOLOGY PROGRESS NOTE     Chief Complaint   Patient presents with    Follow Up     medications          Diagnosis:  1.  CAD.   -CABG x3 on 7/25/2024, [facility].    -LIMA to LAD, R SVG to diagonal and OM.   -Cardiac cath on 4/15/2024, multivessel disease-[facility].   -LM 70%, LAD 60%, first diagonal 60%, LCx 99%, mid LCx 100%, % with collaterals.  2.  Elevated coronary calcium score   -2015.1 on 3/5/2024.  3.  CHF-systolic.   -35-40% in 10/4/24.   -30-35% in 7/25/2024.   -30 to 35% on 4/20/2024.  4.  WMA.   -9/23/2024.  5.  PAD.   -Bilateral popliteal bypass.  6.  Tobacco abuse.   -Occasional [place] [employer].    -3-5 Norah sweets a day.    7.  Carotid artery stenosis.   -100% left with prior left endarterectomy, 0%.   -100% right and 50% left, left carotid endarterectomy on 8/12/2024   -L carotid endarterectomy on 8/2/2024 4, [facility].        Assessment/Plan:   1.  Carotid artery stenosis.  Ultrasound carotids on 10/14/2024.  Occluded right internal carotid artery with carotid endarterectomy on the left, no stenosis.  Findings discussed.  Encouraged him to quit smoking.  Continue aspirin 81 mg daily and atorvastatin 80 mg at bedtime.  2.  CHF: History of severely reduced EF of 30-35% in April 2024 likely ischemic related.  Now revascularized with CABG in 7/25/2024.  EF has improved slightly to 30-35% on 10/4/2024.  Discussed management of heart failure.  Currently on Entresto 49/51 mg twice a day, Farxiga 10 mg daily, metoprolol 50 mg XL daily, and Lasix 20 mg as needed which he states he has never taken.  Discussed the importance of salt restriction, fluid restriction, and weighing himself on a daily basis.   3.  CAD: As mentioned had a severely elevated coronary calcium score of 2015.1 on 3/5/2024.  Patient underwent cardiac catheterization on 4/15/2024 and was found to have multivessel disease.  He underwent triple bypass at [facility] on 7/25/2024.  He continues to have mild  structural chest discomfort with lifting heavy objects but otherwise no other issues.  The wound has healed well.  Discussed proper diet and activity.  Continue medical management with aspirin 81 mg daily, Lipitor 80 mg daily, SL nitro as needed for chest pain.  4.  Tobacco abuse: Smokes [place] sweets occasional.  Encouraged to quit.   5.  Echo in 1 year.  6.  Follow-up in 1 year after completion of echocardiogram.        Interval history:  See above.      HPI:    Mr. Lehman is being seen by cardiology for chest discomfort and a severely elevated coronary calcium score.  He has been referred to cardiology for concerns of coronary artery disease.     [place] describes multiple back injuries dating back to third grade.  In the third grade, he was working on a roof.  I believe he fell twice landing on his back.  Once to the ground.  The other was hitting a metal pole.  About a year ago, he rolled his 4 gómez but was not injured by the machine.  He jumped out of the way.  But when trying to operate the machine, he states it was very heavy.  When he went to pick it up, this exacerbated his back discomfort.  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  He has been concerned that his intrascapular discomfort is related to his back.       He describes substernal, right precordial, neck, and intrascapular discomfort which has been exacerbated with activity.  Even with emotional stress, it will exacerbate his symptoms.  He describes sharp but also heavy discomfort to his chest.  With that, he is diaphoretic.  It limits his activities.  He smokes [place] sweets on average 3-5 a day.  He has been smoking since age 16.  He did quit for a while starting up once again 10-15 years ago.  His wife has had a lot of medical issues and ultimately had a renal transplant.  He states that the stress of her health has caused him to  continue to smoke.  He does have a history of PAD with bilateral femoropopliteal.  He has not been taking aspirin regularly.  He does take his statin but has never been given SL nitro.  Patient has been convinced that his substernal and intrascapular discomfort has been related to arthritis.     He had an MRI of his thoracic spine on 3/14/2023 showing mild progression of multivessel thoracic spondylosis with no high-grade spinal canal or foraminal narrowing.     Patient has been hesitant to have stress testing.  Underwent CT coronary for calcium on 3/5/2024.  Was to be a CTA of coronaries but changed to CT coronary for calcium because of a calcium score of 2015.1.  Calcium score was 96 percentile for same age, gender, race, and ethnicity      Relevant testing:  Echocardiogram on 10/4/24:  Left ventricular wall thickness is normal. Left ventricular size is normal.  The visual ejection fraction is 35-40%. Mild to moderate diffuse hypokinesis  is present.  Right ventricular function, chamber size, wall motion, and thickness are  normal.  Moderate aortic valve calcification is present.  The inferior vena cava was normal in size with preserved respiratory  variability. No pericardial effusion is present.  No significant changes noted. LV function grossly similar.    US carotids on 8/12/2024:  1. RIGHT ICA: Occluded, unchanged.  2. LEFT ICA: Post endarterectomy.  Less than 50% diameter narrowing by  grayscale imaging and sonographic velocity criteria.  3. 6.6 x 8.2 x 23.6 mm structure anterior to the left mid carotid  artery, probable hematoma. If clinically indicated, further evaluation  with neck CT could be performed.    Echo on 7/25/2024:  Left ventricular function is decreased. The ejection fraction is 30-35%  (moderately reduced).  Right ventricular function, chamber size, wall motion, and thickness are  normal.  No significant valvular abnormalities present.  IVC diameter and respiratory changes fall into an  intermediate range  suggesting an RA pressure of 8 mmHg.  No pericardial effusion is present.  This study was compared with the study from 2024. No significant changes  noted.    Echo on 2024:  Left ventricular function is decreased.   The ejection fraction is 30-35% (moderately reduced).  There is moderate diffuse hypokinesis with akinesis of the basal-mid  inferolateral and basal inferior segments indicative of multivessel CAD.  Global right ventricular function is normal.  No significant valvular abnormalities present.  Estimated mean right atrial pressure is normal.  No pericardial effusion is present.    Echo on 2024:  Left ventricular function is decreased. The ejection fraction is 30-35% (moderately reduced).  There is moderate diffuse hypokinesis with akinesis of the basal-mid  inferolateral and basal inferior segments indicative of multivessel CAD.  Global right ventricular function is normal.  No significant valvular abnormalities present.  Estimated mean right atrial pressure is normal.  No pericardial effusion is present.    Cardiac cath on 4/15/2024:    Mid LM lesion is 70% stenosed.    Prox LAD to Mid LAD lesion is 60% stenosed.    1st Diag lesion is 60% stenosed.    Mid Cx lesion is 100% stenosed.    Ost Cx to Prox Cx lesion is 99% stenosed.    Prox RCA lesion is 100% stenosed.     Significant ostial left main disease.  Chronic total occlusion of circumflex with left to left collaterals from diagonal.  Chronic total occlusion right coronary artery with left to left (from septals) and right to right (from marginal) collaterals.  Recommend consideration for bypass surgery.    US abdominal aorta for AAA on 3/8/2024:  No abdominal aortic aneurysm.    US abdominal aorta on 3/5/2024:  No abdominal aortic aneurysm.    CT scan for calcium on 3/5/2024:  Left main: 88.5  Left anterior descendin.4  Left circumflex: 685.3  Right coronary artery: 256.9  Posterior descending artery: 0.0  TOTAL:  2015.1     The estimated probability of a non-zero calcium score for a white Male  of age 66 years is 78%. The observed calcium score is at the 96th  percentile for subjects of the same age, gender, and race/ethnicity  who are free of clinical cardiovascular disease and treated diabetes.  The heart, thoracic aorta and main pulmonary artery are within normal limits in size.     CT angio abdomen on 9/20/2023 [facility]:  1. Patent right common iliac to common femoral artery stent graft.   2. Patent bilateral femoral to popliteal artery bypass grafts without evidence of graft stenosis or aneurysmal dilatation.   3. 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   4. Three-vessel runoff to the left foot with diffuse calcified atherosclerosis and small vessel lumens in the 3 lower leg arteries.      Stress echo on 4/9/2013:  1. Normal echocardiographic portion of the stress echocardiogram with ejection fraction increasing from 60% pre-exercise to 80% immediately post-exercise with no new regional wall motion abnormalities.  2. Screening color and spectral Doppler echocardiogram was not performed.  3. For details of the stress EKG portion of the examination, please see  Dr. Acosta's report.          ICD-10-CM    1. Chronic congestive heart failure, unspecified heart failure type (H)  I50.9 Echocardiogram Complete      2. Coronary artery disease of native artery of native heart with stable angina pectoris (H)  I25.118       3. Positive cardiac stress test on 3/5/2024  R94.39       4. Elevated coronary artery calcium score  R93.1       5. Left carotid stenosis  I65.22       6. PAD (peripheral artery disease) (H)  I73.9             Past Medical History:   Diagnosis Date    Allergy status to analgesic agent     No reported medication allergies    Anesthesia of skin     No  problems with anaesthesia    CAD (coronary artery disease)     Elevated coronary artery calcium score     History  of recurrent pneumonia     No Comments Provided    Hyperlipidemia     No Comments Provided    Low back pain     pain with bilateral leg and disc injuries.    Personal history of nicotine dependence     Personal history of other medical treatment (CODE)     No blood transfusions    PVD (peripheral vascular disease) (H)        Past Surgical History:   Procedure Laterality Date    ANGIOPLASTY      Right leg stenting and bypass, Left also    BYPASS GRAFT ARTERY CORONARY N/A 7/25/2024    Procedure: Median Sternotomy, On Cardiopulmonary Bypass Graft, CORONARY ARTERY BYPASS GRAFT X_3_, Left AND RIGHTGreater Saphenous Vein [place], Left Internal Mammary Artery [place], Intra-operative Transesophageal Echocardiogram per Anesthesia.;  Surgeon: Kit Mason MD;  Location: [facility] OR    COLONOSCOPY  08/23/2010    Q 10yrs.    COLONOSCOPY N/A 02/24/2022    6 tubular adenomas, 5 year follow up, 2/24/2027    CV CORONARY ANGIOGRAM N/A 4/15/2024    Procedure: Coronary Angiogram;  Surgeon: Toni Sellers MD;  Location:  HEART CARDIAC CATH LAB    ENDARTERECTOMY CAROTID Left 6/25/2024    Procedure: Left Carotid Endarterectomy with bovine pericardial patch angioplasty. Intraoperative Ultrasound. Intraoperative EEG monitoring;  Surgeon: Rasheed Sam MD;  Location: [facility] OR    EXTRACTION(S) DENTAL      recently removed    OTHER SURGICAL HISTORY      [id number],PREMALIG/BENIGN SKIN LESION EXCISION, removed from chest    OTHER SURGICAL HISTORY      [id number],ANESTHESIA ALERT,No  problems with anaesthesia       Allergies   Allergen Reactions    Ace Inhibitors Angioedema     ED visit 8/8/2024: suspected upper lip edema was secondary to recent lisinopril start after heart surgery. Improved on steroid/benadryl       Current Outpatient Medications   Medication Sig Dispense Refill    acetaminophen (TYLENOL) 325 MG tablet Take 2 tablets (650 mg) by mouth every 4 hours (while awake) 50 tablet 0    aspirin (ASA) 81 MG chewable tablet  Take 1 tablet (81 mg) by mouth daily. 90 tablet 3    atorvastatin (LIPITOR) 80 MG tablet Take 1 tablet (80 mg) by mouth at bedtime. 90 tablet 3    cyclobenzaprine (FLEXERIL) 10 MG tablet Take 1 tablet (10 mg) by mouth 3 times daily as needed for muscle spasms 50 tablet 0    dapagliflozin (FARXIGA) 10 MG TABS tablet Take 1 tablet (10 mg) by mouth daily. 90 tablet 3    EPINEPHrine (ANY BX GENERIC EQUIV) 0.3 MG/0.3ML injection 2-pack Inject 0.3 mLs (0.3 mg) into the muscle once as needed for anaphylaxis 1 each 0    furosemide (LASIX) 20 MG tablet Take 1 tablet (20 mg) by mouth daily as needed (take for weight gain more than 3 lbs in 1 day or more than 5 lbs in 7 days OR for swelling in your legs or acute shortness of breath). 30 tablet 0    gabapentin (NEURONTIN) 100 MG capsule Take 100 mg by mouth 3 times daily as needed (Pain).      hydrOXYzine HCl (ATARAX) 25 MG tablet Take 1 tablet (25 mg) by mouth every 6 hours as needed for other or anxiety (adjuvant pain) 30 tablet 0    melatonin 5 MG tablet Take 5 mg by mouth nightly as needed for sleep.      metoprolol succinate ER (TOPROL XL) 50 MG 24 hr tablet Take 0.5 tablets (25 mg) by mouth daily. (Patient taking differently: Take 50 mg by mouth daily.) 90 tablet 3    nitroGLYcerin (NITROSTAT) 0.4 MG sublingual tablet For chest pain place 1 tablet under the tongue every 5 minutes for 3 doses. If symptoms persist 5 minutes after 1st dose call 911. 25 tablet 3    sacubitril-valsartan (ENTRESTO) 49-51 MG per tablet Take 1 tablet by mouth 2 times daily. 60 tablet 1       Social History     Socioeconomic History    Marital status:      Spouse name: Not on file    Number of children: Not on file    Years of education: Not on file    Highest education level: Not on file   Occupational History    Not on file   Tobacco Use    Smoking status: Former     Current packs/day: 0.25     Average packs/day: 0.3 packs/day for 15.1 years (3.8 ttl pk-yrs)     Types: Cigars, Cigarettes      Start date: 11/4/2009     Passive exposure: Current (per pt)    Smokeless tobacco: Never    Tobacco comments:     Quit smoking: quit 6/20/17 (cigarettes); smoked cigars 2 puffs cigar week then stopped before surgery   Vaping Use    Vaping status: Never Used   Substance and Sexual Activity    Alcohol use: Not Currently     Comment: 2-3 times a week    Drug use: Yes     Types: Marijuana     Comment: Not daily 2x week    Sexual activity: Not Currently   Other Topics Concern    Not on file   Social History Narrative    Self employed as .   with 3 adult children.    No hx of chemical dependency.  Patient is a former smoker.   Alcohol Use - yes occ    Wife - Imelda Lehman     Social Drivers of Health     Financial Resource Strain: Low Risk  (2/16/2024)    Financial Resource Strain     Within the past 12 months, have you or your family members you live with been unable to get utilities (heat, electricity) when it was really needed?: No   Food Insecurity: Low Risk  (2/16/2024)    Food Insecurity     Within the past 12 months, did you worry that your food would run out before you got money to buy more?: No     Within the past 12 months, did the food you bought just not last and you didn t have money to get more?: No   Transportation Needs: Low Risk  (2/16/2024)    Transportation Needs     Within the past 12 months, has lack of transportation kept you from medical appointments, getting your medicines, non-medical meetings or appointments, work, or from getting things that you need?: No   Physical Activity: Sufficiently Active (2/16/2024)    Exercise Vital Sign     Days of Exercise per Week: 7 days     Minutes of Exercise per Session: 60 min   Stress: Stress Concern Present (2/16/2024)    [place] [place] [employer] - Occupational Stress Questionnaire     Feeling of Stress : Very much   Social Connections: Not on file   Interpersonal Safety: Low Risk  (12/10/2024)    Interpersonal Safety     Do  "you feel physically and emotionally safe where you currently live?: Yes     Within the past 12 months, have you been hit, slapped, kicked or otherwise physically hurt by someone?: No     Within the past 12 months, have you been humiliated or emotionally abused in other ways by your partner or ex-partner?: No   Housing Stability: Low Risk  (2/16/2024)    Housing Stability     Do you have housing? : Yes     Are you worried about losing your housing?: No       LAB RESULTS:   Office Visit on 04/11/2024   Component Date Value Ref Range Status    Ventricular Rate 04/11/2024 69  BPM Final    Atrial Rate 04/11/2024 69  BPM Final    [place] Interval 04/11/2024 172  ms Final    QRS Duration 04/11/2024 114  ms Final    QT 04/11/2024 412  ms Final    QTc 04/11/2024 441  ms Final    P Axis 04/11/2024 49  degrees Final    R AXIS 04/11/2024 -40  degrees Final    T Axis 04/11/2024 83  degrees Final    Interpretation ECG 04/11/2024    Final                    Value:Sinus rhythm  Left axis deviation  Nonspecific ST and T wave abnormality  Left axis deviation  When compared with ECG of 27-FEB-2023 11:43,  ST no longer elevated in Anterior leads  Confirmed by MD FERRARI DANIEL ([id number]) on 4/11/2024 12:40:19 PM          Review of systems: Negative except that which was noted in the HPI.    Physical examination:  /60 (BP Location: Left arm, Patient Position: Sitting, Cuff Size: Adult Regular)   Pulse 82   Temp 98  F (36.7  C) (Temporal)   Resp 16   Ht 1.75 m (5' 8.9\")   Wt 78.9 kg (174 lb)   SpO2 95%   BMI 25.77 kg/m      GENERAL APPEARANCE: healthy, alert and no distress  CHEST: lungs clear to auscultation.  CARDIOVASCULAR: regular rhythm, normal S1 with physiologic split S2, no S3 or S4 and no murmur, click or rub  EXTREMITIES: no clubbing, cyanosis or edema.    Total time spent on day of visit, including review of tests, obtaining/reviewing separately obtained history, ordering medications/tests/procedures, communicating with " PCP/consultants, and documenting in electronic medical record: 20 minutes.              Thank you for allowing me to participate in the care of your patient. Please do not hesitate to contact me if you have any questions.     Omega Chandler, DO

## 2024-12-10 NOTE — NURSING NOTE
"Chief Complaint   Patient presents with    Follow Up     medications       Initial /60 (BP Location: Left arm, Patient Position: Sitting, Cuff Size: Adult Regular)   Pulse 82   Temp 98  F (36.7  C) (Temporal)   Resp 16   Ht 1.75 m (5' 8.9\")   Wt 78.9 kg (174 lb)   SpO2 95%   BMI 25.77 kg/m   Estimated body mass index is 25.77 kg/m  as calculated from the following:    Height as of this encounter: 1.75 m (5' 8.9\").    Weight as of this encounter: 78.9 kg (174 lb).  Meds Reconciled: complete  Pt is on Aspirin  Pt is on a Statin  PHQ and/or ELSA reviewed. Pt referred to PCP/MH Provider as appropriate.    Fanny Olguin LPN      "

## 2024-12-13 ENCOUNTER — HOSPITAL ENCOUNTER (OUTPATIENT)
Dept: CARDIAC REHAB | Facility: OTHER | Age: 69
Discharge: HOME | End: 2024-12-13
Payer: MEDICARE

## 2024-12-24 ENCOUNTER — TELEPHONE (OUTPATIENT)
Dept: CARDIOLOGY | Facility: CLINIC | Age: 69
End: 2024-12-24
Payer: COMMERCIAL

## 2024-12-24 NOTE — TELEPHONE ENCOUNTER
Patient Contacted for the patient to call back and schedule the following:    Appointment type: Return CORE  Provider: Brittany Leonard  Return date: 06/23/2025  Specialty phone number: [phone] opt 1  Additional appointment(s) needed: N/A  Additonal Notes: N/A

## 2024-12-31 ENCOUNTER — VIRTUAL VISIT (OUTPATIENT)
Facility: CLINIC | Age: 69
End: 2024-12-31
Attending: NURSE PRACTITIONER
Payer: MEDICARE

## 2024-12-31 DIAGNOSIS — I25.118 CORONARY ARTERY DISEASE OF NATIVE ARTERY OF NATIVE HEART WITH STABLE ANGINA PECTORIS (H): ICD-10-CM

## 2024-12-31 DIAGNOSIS — I50.9 CHRONIC CONGESTIVE HEART FAILURE, UNSPECIFIED HEART FAILURE TYPE (H): ICD-10-CM

## 2024-12-31 RX ORDER — METOPROLOL SUCCINATE 50 MG/1
50 TABLET, EXTENDED RELEASE ORAL DAILY
Qty: 90 TABLET | Refills: 3 | Status: SHIPPED | OUTPATIENT
Start: 2024-12-31

## 2024-12-31 RX ORDER — SACUBITRIL AND VALSARTAN 97; 103 MG/1; MG/1
1 TABLET, FILM COATED ORAL 2 TIMES DAILY
Qty: 180 TABLET | Refills: 1 | Status: SHIPPED | OUTPATIENT
Start: 2024-12-31

## 2024-12-31 NOTE — PATIENT INSTRUCTIONS
"Recommendations from today's MTM visit:                                                    MTM (medication therapy management) is a service provided by a clinical pharmacist designed to help you get the most of out of your medicines.   Today we reviewed what your medicines are for, how to know if they are working, that your medicines are safe and how to make your medicine regimen as easy as possible.    Increase Entresto to 1 tablet in the morning and 2 tablets in the evening for a week and then increase to 2 tablets twice a day if tolerating. Sent in prescription for  mg dose once you run out of your current supply of the lower dose.   Schedule a lab appointment  in 1-2 weeks to get Basic Metabolic Panel drawn  Sent in refill for metoprolol with updated instructions.    Follow-up: Return in about 6 weeks (around 2/11/2025) for Follow up, with me, using a phone visit.    It was great speaking with you today.  I value your experience and would be very thankful for your time in providing feedback in our clinic survey. In the next few days, you may receive an email or text message from [employer] with a link to a survey related to your  clinical pharmacist.\"     To schedule another MTM appointment, please call the clinic directly or you may call the MTM scheduling line at [phone] or toll-free at [phone].     My Clinical Pharmacist's contact information:                                                      Please feel free to contact me with any questions or concerns you have.      Trevor Wood, Pharm. D., BCACP  Medication Therapy Management Pharmacist    "

## 2024-12-31 NOTE — PROGRESS NOTES
Medication Therapy Management (MTM) Encounter    ASSESSMENT:                            Medication Adherence/Access: No issues identified.    HFrEF (</=40%):  Patient would benefit from further titration of GDMT as blood pressure allows. Discussed with patient to test 2 tablets of current dose at night before full doubling of dose to make sure his blood pressure will tolerate the increase. Patient will message me with any side effect concerns of low blood pressure. Will also send in next dose of Entresto if double dose is tolerated and for when current supply runs out. Also will correct metoprolol instructions on medication list to reflect what he is taking at home.       GDMT dosing goals if possible:  Entresto - 97 mg sacubitril and 103 mg valsartan twice daily, Metoprolol  mg once daily, and Farxiga 10 mg      PLAN:                            Increase Entresto to 1 tablet in the morning and 2 tablets in the evening for a week and then increase to 2 tablets twice a day if tolerating. Sent in prescription for  mg dose once you run out of your current supply of the lower dose.   Schedule a lab appointment  in 1-2 weeks to get Basic Metabolic Panel drawn  Sent in refill for metoprolol with updated instructions.    Follow-up: Return in about 6 weeks (around 2/11/2025) for Follow up, with me, using a phone visit.     SUBJECTIVE/OBJECTIVE:                          Modesto Lehman is a 69 year old male seen for a follow-up visit.       Reason for visit: HF follow-up.    Allergies/ADRs: Reviewed in chart  Past Medical History: Reviewed in chart  Tobacco: He reports that he has quit smoking. His smoking use included cigars and cigarettes. He started smoking about 15 years ago. He has a 3.8 pack-year smoking history. He has been exposed to tobacco smoke. He has never used smokeless tobacco.  Alcohol: 1-3 beverages / week  THC/CBD  Medication Adherence/Access: no issues reported.    Heart Failure   HFrEF  "(</=40%)   Beta Blocker: metoprolol succinate 50 mg daily - instructions say to take a half tablet but patient reports he has been taking 50 mg  ACEi/ARB/ARNI: Entresto 49-51 mg twice a day   SGLT2i: Farxiga 10 mg daily   MRA: holding off on this as sodium is low  Patient reports no current medication side effects.     Reports that weights have been stable  Patient reports these HF symptoms: \"a little bit\" shortness of breath  Patient self-monitors blood pressure.  Reports it was 111/68 mmHg. Not much lower than this. Hasn't been checking pulse. Today 146/86 mmHg and a pulse of 86 bpm. Had 4 cups of coffee already.   Patient is following a low sodium diet and is not avoiding alcohol.           Today's Vitals: There were no vitals taken for this visit.  ----------------      I spent 8 minutes with this patient today. All changes were made via collaborative practice agreement with Brittany NEELY.     A summary of these recommendations was sent via [employer].    Trevor Wood Pharm. D., BCACP  Medication Therapy Management Pharmacist      Telemedicine Visit Details  The patient's medications can be safely assessed via a telemedicine encounter.  Type of service:  Telephone visit  Originating Location (pt. Location): Home    Distant Location (provider location):  Off-site  Start Time:  8:31 am  End Time:  8:39 am     Medication Therapy Recommendations  CHF (congestive heart failure) (H)   1 Current Medication: sacubitril-valsartan (ENTRESTO) 49-51 MG per tablet (Discontinued)   Current Medication Sig: Take 1 tablet by mouth 2 times daily.   Rationale: Dose too low - Dosage too low - Effectiveness   Recommendation: Increase Dose   Status: Accepted per CPA   Identified Date: 12/31/2024 Completed Date: 12/31/2024            "

## 2025-01-05 ENCOUNTER — HEALTH MAINTENANCE LETTER (OUTPATIENT)
Age: 70
End: 2025-01-05

## 2025-02-11 ENCOUNTER — VIRTUAL VISIT (OUTPATIENT)
Facility: CLINIC | Age: 70
End: 2025-02-11
Attending: NURSE PRACTITIONER
Payer: MEDICARE

## 2025-02-11 DIAGNOSIS — I50.9 CHRONIC CONGESTIVE HEART FAILURE, UNSPECIFIED HEART FAILURE TYPE (H): ICD-10-CM

## 2025-02-11 RX ORDER — SACUBITRIL AND VALSARTAN 49; 51 MG/1; MG/1
1 TABLET, FILM COATED ORAL 2 TIMES DAILY
Qty: 180 TABLET | Refills: 3 | Status: SHIPPED | OUTPATIENT
Start: 2025-02-11

## 2025-02-11 NOTE — PATIENT INSTRUCTIONS
"Recommendations from today's MTM visit:                                                    MTM (medication therapy management) is a service provided by a clinical pharmacist designed to help you get the most of out of your medicines.   Today we reviewed what your medicines are for, how to know if they are working, that your medicines are safe and how to make your medicine regimen as easy as possible.    Continue medications as prescribed  Take blood pressure 3-4 times per week and keep a log for next visit.     Follow-up: Return in about 13 weeks (around 5/13/2025) for Follow up, with me, using a phone visit.    It was great speaking with you today.  I value your experience and would be very thankful for your time in providing feedback in our clinic survey. In the next few days, you may receive an email or text message from [employer] with a link to a survey related to your  clinical pharmacist.\"     To schedule another MTM appointment, please call the clinic directly or you may call the MTM scheduling line at [phone] or toll-free at [phone].     My Clinical Pharmacist's contact information:                                                      Please feel free to contact me with any questions or concerns you have.      Trevor Wood, Pharm. D., BCACP  Medication Therapy Management Pharmacist    "

## 2025-02-11 NOTE — PROGRESS NOTES
Medication Therapy Management (MTM) Encounter    ASSESSMENT:                            Medication Adherence/Access: No issues identified.    HF:   The patient was unable to tolerate higher doses of Entresto due to hypotension, so we will plan to avoid increasing Entresto in the future.  Blood pressure is still slightly elevated on current dose of Entresto so could consider increasing metoprolol.  Patient did not want to make any changes due to recent issues with Entresto increased and wanted to wait on next visit..    PLAN:                            Continue medications as prescribed  Take blood pressure 3-4 times per week and keep a log for next visit.     Follow-up: Return in about 13 weeks (around 5/13/2025) for Follow up, with me, using a phone visit.    SUBJECTIVE/OBJECTIVE:                          Modesto Lehman is a 69 year old male seen for a follow-up visit.       Reason for visit: HF follow-up.    Allergies/ADRs: Reviewed in chart  Past Medical History: Reviewed in chart  Tobacco: He reports that he has quit smoking. His smoking use included cigars and cigarettes. He started smoking about 15 years ago. He has a 3.8 pack-year smoking history. He has been exposed to tobacco smoke. He has never used smokeless tobacco.  Alcohol: 1-3 beverages / week    Medication Adherence/Access: no issues reported.    Heart Failure   HFrEF (</=40%)   Beta Blocker: metoprolol succinate 50 mg daily - discussed increasing this and he wanted to wait a couple months before any new changes  ACEi/ARB/ARNI: Entresto 49-51 mg twice a day - was down to 70/40's when he was taking the  mg dose  SGLT2i: Farxiga 10 mg daily   MRA: holding off on this as sodium is low  Patient reports no current medication side effects.     Reports that weights have been stable  Patient reports these HF symptoms: none  Patient self-monitors blood pressure.  Reports it was 138/74 mmHg this morning. His pulse was 76 bpm today.  Patient is  following a low sodium diet and is not avoiding alcohol.       Today's Vitals: There were no vitals taken for this visit.  ----------------      I spent 9 minutes with this patient today. All changes were made via collaborative practice agreement with Brittany OCYNE .     A summary of these recommendations was sent via [employer].    Trevor Wood, Pharm. D., BCACP  Medication Therapy Management Pharmacist      Telemedicine Visit Details  The patient's medications can be safely assessed via a telemedicine encounter.  Type of service:  Telephone visit  Originating Location (pt. Location): Home    Distant Location (provider location):  Off-site  Start Time:  830 am  End Time:  839 am     Medication Therapy Recommendations  CHF (congestive heart failure) (H)   1 Current Medication: metoprolol succinate ER (TOPROL XL) 50 MG 24 hr tablet   Current Medication Sig: Take 1 tablet (50 mg) by mouth daily.   Rationale: Dose too low - Dosage too low - Effectiveness   Recommendation: Increase Dose   Status: Declined per Patient   Identified Date: 2/11/2025 Completed Date: 2/11/2025             Plan: SPF stressed Detail Level: Zone

## 2025-03-30 ENCOUNTER — HEALTH MAINTENANCE LETTER (OUTPATIENT)
Age: 70
End: 2025-03-30

## 2025-03-31 ENCOUNTER — TELEPHONE (OUTPATIENT)
Dept: CARDIOLOGY | Facility: CLINIC | Age: 70
End: 2025-03-31
Payer: COMMERCIAL

## 2025-03-31 NOTE — TELEPHONE ENCOUNTER
Patient Contacted for the patient to call back and schedule the following:    Appointment type: Return CORE  Provider: Annel  Return date: 07/18  Specialty phone number: [phone] opt 1  Additional appointment(s) needed: Labs  Additonal Notes: N/A

## 2025-04-28 ENCOUNTER — OFFICE VISIT (OUTPATIENT)
Dept: VASCULAR SURGERY | Facility: CLINIC | Age: 70
End: 2025-04-28
Payer: COMMERCIAL

## 2025-04-28 ENCOUNTER — ANCILLARY PROCEDURE (OUTPATIENT)
Dept: ULTRASOUND IMAGING | Facility: CLINIC | Age: 70
End: 2025-04-28
Attending: SURGERY
Payer: COMMERCIAL

## 2025-04-28 VITALS
HEART RATE: 74 BPM | WEIGHT: 172.3 LBS | SYSTOLIC BLOOD PRESSURE: 100 MMHG | DIASTOLIC BLOOD PRESSURE: 62 MMHG | OXYGEN SATURATION: 93 % | BODY MASS INDEX: 25.52 KG/M2

## 2025-04-28 DIAGNOSIS — Z98.890 POSTOPERATIVE STATE: ICD-10-CM

## 2025-04-28 DIAGNOSIS — I65.21 RIGHT-SIDED CAROTID ARTERY OCCLUSION WITHOUT CEREBRAL INFARCTION: ICD-10-CM

## 2025-04-28 DIAGNOSIS — I73.9 PAD (PERIPHERAL ARTERY DISEASE): ICD-10-CM

## 2025-04-28 DIAGNOSIS — Z98.890 HISTORY OF LEFT-SIDED CAROTID ENDARTERECTOMY: Primary | ICD-10-CM

## 2025-04-28 DIAGNOSIS — I65.22 LEFT CAROTID STENOSIS: ICD-10-CM

## 2025-04-28 PROCEDURE — 99214 OFFICE O/P EST MOD 30 MIN: CPT | Mod: GC | Performed by: SURGERY

## 2025-04-28 PROCEDURE — 1126F AMNT PAIN NOTED NONE PRSNT: CPT | Performed by: SURGERY

## 2025-04-28 PROCEDURE — 3078F DIAST BP <80 MM HG: CPT | Performed by: SURGERY

## 2025-04-28 PROCEDURE — 93880 EXTRACRANIAL BILAT STUDY: CPT | Performed by: RADIOLOGY

## 2025-04-28 PROCEDURE — 3074F SYST BP LT 130 MM HG: CPT | Performed by: SURGERY

## 2025-04-28 ASSESSMENT — PAIN SCALES - GENERAL: PAINLEVEL_OUTOF10: NO PAIN (0)

## 2025-04-28 NOTE — PATIENT INSTRUCTIONS
Thank you so much for choosing us for your care. It was a pleasure to see you at the vascular clinic today.     Follow-up recommendations: We will see you back in 1 year virtually. Please do not hesitate to reach out to us in the meantime with any concerns. Our schedulers will get in touch with you to set up your follow-up visit.    Additional testing/imaging ordered today: Carotid ultrasound, lower extremity ABIs and arterial ultrasound in 1 year.      Our scheduling team will get in touch with you to set up any follow-up testing/imaging and/or appointments. Please be aware that any testing/imaging recommended today will need to completed prior to your next visit with the provider. If testing/imaging is not completed prior to your next visit, your visit may be rescheduled.     If you have any questions, please contact our clinic directly at [phone] and ask for the nurse. We also encourage the use of [employer] to communicate with your healthcare provider.    If you have an urgent need after business hours (8:00 am to 4:30 pm) please call [phone], option 4, and ask for the vascular attending on call. For non-urgent after hours needs, please call the vascular clinic at [phone]. For scheduling needs, please call our clinic directly at [phone].    For additional patient education resources, please visit the Society of Vascular Surgery patient resources website at: [email or web address]

## 2025-04-28 NOTE — PROGRESS NOTES
Vascular & Endovascular Surgery Clinic Return Visit   Vascular Surgeon: Rasheed Sam MD, RPVI      Location:  [facility]    Modesto Lehman  Medical Record #:  [id number]  YOB: 1955  Age:  69 year old     Date of Service: 4/28/2025    Primary Care Provider: Laurie Campos    Chief Complaint/Reason for Visit: 6 month follow-up s/p L CEA    Interval History: Mr. Lehman is a pleasant 69 year old male who returns today with his wife for follow-up of left carotid artery stenosis s/p CEA.  His previous visit on October 16, 2024 the patient was reporting that he was doing well and had no new symptoms of stroke or TIA symptoms.  He reports no new focal neurologic deficits, upper or lower extremity weakness, or sensory dysfunction. The patient had no symptoms of amaurosis fugax, aphasia, dysarthria or facial droop, however when asked about any vision changes, he did report having central vision loss in the bilateral eyes that occurs only at night and resolves in the day time; states it does not occur in dim light, but his wife mentioned that he notices it often when he is driving at night. He states he has never mentioned this to anyone, but he had cataract surgery 4-5 years ago and has noticed it since then. Has not seen his ophthalmologist in a while. He also reports some claudication in the BLE, but is able to ambulate several blocks before onset and is also able to ambulate through it. Denies any ischemic rest pain or non-healing wounds.     Review of Systems:    A 12 point ROS was reviewed and is negative except for symptoms noted in HPI.     Medical/Surgical History:    Past Medical History:   Diagnosis Date    Allergy status to analgesic agent     No reported medication allergies    Anesthesia of skin     No  problems with anaesthesia    CAD (coronary artery disease)     Elevated coronary artery calcium score     History of recurrent pneumonia      No Comments Provided    Hyperlipidemia     No Comments Provided    Low back pain     pain with bilateral leg and disc injuries.    Personal history of nicotine dependence     Personal history of other medical treatment (CODE)     No blood transfusions    PVD (peripheral vascular disease)          Past Surgical History:   Procedure Laterality Date    ANGIOPLASTY      Right leg stenting and bypass, Left also    BYPASS GRAFT ARTERY CORONARY N/A 7/25/2024    Procedure: Median Sternotomy, On Cardiopulmonary Bypass Graft, CORONARY ARTERY BYPASS GRAFT X_3_, Left AND RIGHTGreater Saphenous Vein [place], Left Internal Mammary Artery [place], Intra-operative Transesophageal Echocardiogram per Anesthesia.;  Surgeon: Kit Mason MD;  Location: [facility] OR    COLONOSCOPY  08/23/2010    Q 10yrs.    COLONOSCOPY N/A 02/24/2022    6 tubular adenomas, 5 year follow up, 2/24/2027    CV CORONARY ANGIOGRAM N/A 4/15/2024    Procedure: Coronary Angiogram;  Surgeon: Toni Sellers MD;  Location:  [facility]    ENDARTERECTOMY CAROTID Left 6/25/2024    Procedure: Left Carotid Endarterectomy with bovine pericardial patch angioplasty. Intraoperative Ultrasound. Intraoperative EEG monitoring;  Surgeon: Rasheed Sam MD;  Location: [facility] OR    EXTRACTION(S) DENTAL      recently removed    OTHER SURGICAL HISTORY      PIN718,PREMALIG/BENIGN SKIN LESION EXCISION, removed from chest    OTHER SURGICAL HISTORY      208489,ANESTHESIA ALERT,No  problems with anaesthesia        Allergies:     Allergies   Allergen Reactions    Ace Inhibitors Angioedema     ED visit 8/8/2024: suspected upper lip edema was secondary to recent lisinopril start after heart surgery. Improved on steroid/benadryl        Medications:    Current Outpatient Medications   Medication Sig Dispense Refill    acetaminophen (TYLENOL) 325 MG tablet Take 2 tablets (650 mg) by mouth every 4 hours (while awake) 50 tablet 0    aspirin (ASA) 81 MG chewable  tablet Take 1 tablet (81 mg) by mouth daily. 90 tablet 3    atorvastatin (LIPITOR) 80 MG tablet Take 1 tablet (80 mg) by mouth at bedtime. 90 tablet 3    cyclobenzaprine (FLEXERIL) 10 MG tablet Take 1 tablet (10 mg) by mouth 3 times daily as needed for muscle spasms 50 tablet 0    dapagliflozin (FARXIGA) 10 MG TABS tablet Take 1 tablet (10 mg) by mouth daily. 90 tablet 3    EPINEPHrine (ANY BX GENERIC EQUIV) 0.3 MG/0.3ML injection 2-pack Inject 0.3 mLs (0.3 mg) into the muscle once as needed for anaphylaxis 1 each 0    furosemide (LASIX) 20 MG tablet Take 1 tablet (20 mg) by mouth daily as needed (take for weight gain more than 3 lbs in 1 day or more than 5 lbs in 7 days OR for swelling in your legs or acute shortness of breath). 30 tablet 0    gabapentin (NEURONTIN) 100 MG capsule Take 100 mg by mouth 3 times daily as needed (Pain).      hydrOXYzine HCl (ATARAX) 25 MG tablet Take 1 tablet (25 mg) by mouth every 6 hours as needed for other or anxiety (adjuvant pain) 30 tablet 0    melatonin 5 MG tablet Take 5 mg by mouth nightly as needed for sleep.      metoprolol succinate ER (TOPROL XL) 50 MG 24 hr tablet Take 1 tablet (50 mg) by mouth daily. 90 tablet 3    nitroGLYcerin (NITROSTAT) 0.4 MG sublingual tablet For chest pain place 1 tablet under the tongue every 5 minutes for 3 doses. If symptoms persist 5 minutes after 1st dose call 911. 25 tablet 3    sacubitril-valsartan (ENTRESTO) 49-51 MG per tablet Take 1 tablet by mouth 2 times daily. 180 tablet 3     No current facility-administered medications for this visit.        Social Habits:    Tobacco Use      Smoking status: Former        Packs/day: 0.25        Years: 0.3 packs/day for 15.5 years (3.9 ttl pk-yrs)        Types: Cigars, Cigarettes        Start date: 11/4/2009        Passive exposure: Current (per pt)      Smokeless tobacco: Never      Tobacco comments: Quit smoking: quit 6/20/17 (cigarettes); smoked cigars 2 puffs cigar week then stopped before  surgery       Alcohol Use: Not on file       History   Drug Use    Types: Marijuana     Comment: Not daily 2x week        Family History:    Family History   Problem Relation Age of Onset    Diabetes Mother         Diabetes    Heart Disease Mother         Heart Disease    Other - See Comments Father         COPD, CD    Heart Disease Brother         Heart Disease    Other - See Comments Brother         killed by drunk  at age 19.    Substance Abuse Other         Alcohol/Drug, No hx of chemical dependency.    Diabetes Other         Diabetes    Heart Disease Other         Heart Disease    Anesthesia Reaction No family hx of     Clotting Disorder No family hx of     Bleeding Disorder No family hx of      Physical Examination:  There were no vitals taken for this visit.  Wt Readings from Last 1 Encounters:   12/10/24 78.9 kg (174 lb)     Body mass index is 25.52 kg/m .    Exam:  General: No apparent distress. Answers questions appropriately.  Neurologic: Focally intact, Alert and oriented x 3. 5/5 upper/lower extremity strength. SILT in all extremities. Facial motor function intact and symmetric, no lateral tongue deviation.   Eyes: Grossly normal.  HEENT: Left neck incision well-healed.  Cardiac:  RRR.  Pulmonary:  Non labored breathing with normal respiratory effort    Vascular Exam:   Carotid: No Bruit    Radial: Left: 2+   Right: 2+    Ulnar: Left: 2+   Right: 2+    Laboratory Findings:  Hemoglobin   Date Value Ref Range Status   08/11/2024 13.2 (L) 13.3 - 17.7 g/dL Final   08/08/2024 11.8 (L) 13.3 - 17.7 g/dL Final   07/31/2020 14.2 13.3 - 17.7 g/dL Final   07/13/2020 15.4 13.3 - 17.7 g/dL Final     WBC   Date Value Ref Range Status   07/31/2020 10.6 4.0 - 11.0 10e9/L Final   07/13/2020 14.7 (H) 4.0 - 11.0 10e9/L Final     WBC Count   Date Value Ref Range Status   08/11/2024 12.7 (H) 4.0 - 11.0 10e3/uL Final   08/08/2024 9.5 4.0 - 11.0 10e3/uL Final     Platelet Count   Date Value Ref Range Status   08/11/2024  434 150 - 450 10e3/uL Final   08/08/2024 455 (H) 150 - 450 10e3/uL Final   07/31/2020 250 150 - 450 10e9/L Final   07/13/2020 239 150 - 450 10e9/L Final     Creatinine   Date Value Ref Range Status   12/02/2024 0.94 0.67 - 1.17 mg/dL Final   07/31/2020 0.80 0.70 - 1.30 mg/dL Final     Sodium   Date Value Ref Range Status   12/02/2024 134 (L) 135 - 145 mmol/L Final   07/31/2020 140 134 - 144 mmol/L Final     Glucose   Date Value Ref Range Status   12/02/2024 120 (H) 70 - 99 mg/dL Final   10/18/2024 123 (H) 70 - 99 mg/dL Final   12/01/2021 87 70 - 105 mg/dL Final   07/31/2020 165 (H) 70 - 105 mg/dL Final   07/13/2020 106 (H) 70 - 105 mg/dL Final     GLUCOSE BY METER POCT   Date Value Ref Range Status   07/31/2024 102 (H) 70 - 99 mg/dL Final   07/30/2024 113 (H) 70 - 99 mg/dL Final     Hemoglobin A1C   Date Value Ref Range Status   05/01/2024 5.9 (H) <5.7 % Final     Comment:     Normal <5.7%   Prediabetes 5.7-6.4%    Diabetes 6.5% or higher     Note: Adopted from ADA consensus guidelines.     INR   Date Value Ref Range Status   07/25/2024 1.24 (H) 0.85 - 1.15 Final     Imaging:  I personally reviewed the bilateral carotid arteries on duplex from 04/28/2025 and compared to that from 10/14/2024. CUS today shows stable right ICA occlusion. Left ICA velocities <180 cm/s and overall similar to 6 months ago. Good ICA waveforms with minimal turbulence. L CEA repair patent.      Impression/Shared Medical Decision Making:    #1- Asymptomatic high grade left carotid stenosis, status post left carotid endarterectomy 6/25/2024  #2- Asymptomatic chronic occlusion of the right internal carotid artery  #3- Coronary artery disease status post coronary bypass x3, Dr. Mason 7/2024  #4- Hyperlipidemia  #5- Peripheral arterial disease status post bilateral bypasses at OSH  #6- Heart Failure with reduced EF, 30-35%  #7- Former nicotine dependence  #8- Cataracts, s/p bilateral cataract surgery but with new bilateral nocturnal vision  changes - suspect recurrent cataracts.     6 follow up of carotid endarterectomy.  He is doing well from the neurologic standpoint.  His reconstruction is widely patent.  We will see him back at the 12 month anniversary with repeat duplex. He also has PAD with history of bilateral lower extremity bypasses and does not appear to be following with anyone for bypass surveillance, so we will manage that as well. He has some mild bilateral lower extremity claudication after extensive activity, but is able to keep ambulating through it. I encouraged him to continue to stay active and ambulate daily.     Mr. Lehman and his wife are in agreement with this plan as outlined.    Recommendations/Plan:  Recommended patient follow-up with his ophthalmologist about his visual abnormalities.  OMT: continue ASA, statin  RTC for 1 year virtual visit with repeat carotid US as well as BLE LISA US/duplex US in May 2026 - will be able to do appt on same day as his wife, who is also a patient of Dr Zelayas.       Chantal Duarte MD  PGY-6  Vascular Surgery  Pager: [phone]    Please page me directly with any questions/concerns during regular weekday hours before 5PM. If there is no response, if it is a weekend, or if it is during evening hours, then please use the [employer] system to page the first-call resident on call for vascular surgery.

## 2025-04-28 NOTE — NURSING NOTE
Chief Complaint   Patient presents with    Follow Up     4/28/2025 visit with Rasheed Sam MD for RETURN VASCULAR PATIENT - 6 Month follow up CEA; US prior       Vitals were taken and medications reconciled.    Perry Salamanca, Visit Facilitator  8:46 AM

## 2025-04-28 NOTE — LETTER
4/28/2025       RE: Modesto Lehman  [address]  [facility] [address]     Dear Colleague,    Thank you for referring your patient, Modesto Lehman, to the [facility] [place] at [facility]. Please see a copy of my visit note below.    Vascular & Endovascular Surgery Clinic Return Visit   Vascular Surgeon: Rasheed Sam MD, RPVI      Location:  [facility]    Modesto Lehman  Medical Record #:  [id number]  YOB: 1955  Age:  69 year old     Date of Service: 4/28/2025    Primary Care Provider: Laurie Campos    Chief Complaint/Reason for Visit: 6 month follow-up s/p L CEA    Interval History: Mr. Lehman is a pleasant 69 year old male who returns today with his wife for follow-up of left carotid artery stenosis s/p CEA.  His previous visit on October 16, 2024 the patient was reporting that he was doing well and had no new symptoms of stroke or TIA symptoms.  He reports no new focal neurologic deficits, upper or lower extremity weakness, or sensory dysfunction. The patient had no symptoms of amaurosis fugax, aphasia, dysarthria or facial droop, however when asked about any vision changes, he did report having central vision loss in the bilateral eyes that occurs only at night and resolves in the day time; states it does not occur in dim light, but his wife mentioned that he notices it often when he is driving at night. He states he has never mentioned this to anyone, but he had cataract surgery 4-5 years ago and has noticed it since then. Has not seen his ophthalmologist in a while. He also reports some claudication in the BLE, but is able to ambulate several blocks before onset and is also able to ambulate through it. Denies any ischemic rest pain or non-healing wounds.     Review of Systems:    A 12 point ROS was reviewed and is negative except for symptoms noted in HPI.      Medical/Surgical History:    Past Medical History:   Diagnosis Date     Allergy status to analgesic agent     No reported medication allergies     Anesthesia of skin     No  problems with anaesthesia     CAD (coronary artery disease)      Elevated coronary artery calcium score      History of recurrent pneumonia     No Comments Provided     Hyperlipidemia     No Comments Provided     Low back pain     pain with bilateral leg and disc injuries.     Personal history of nicotine dependence      Personal history of other medical treatment (CODE)     No blood transfusions     PVD (peripheral vascular disease)          Past Surgical History:   Procedure Laterality Date     ANGIOPLASTY      Right leg stenting and bypass, Left also     BYPASS GRAFT ARTERY CORONARY N/A 7/25/2024    Procedure: Median Sternotomy, On Cardiopulmonary Bypass Graft, CORONARY ARTERY BYPASS GRAFT X_3_, Left AND RIGHTGreater Saphenous Vein [place], Left Internal Mammary Artery [place], Intra-operative Transesophageal Echocardiogram per Anesthesia.;  Surgeon: Kit Mason MD;  Location: [facility]     COLONOSCOPY  08/23/2010    Q 10yrs.     COLONOSCOPY N/A 02/24/2022    6 tubular adenomas, 5 year follow up, 2/24/2027     CV CORONARY ANGIOGRAM N/A 4/15/2024    Procedure: Coronary Angiogram;  Surgeon: Toni Sellers MD;  Location:  HEART CARDIAC CATH LAB     ENDARTERECTOMY CAROTID Left 6/25/2024    Procedure: Left Carotid Endarterectomy with bovine pericardial patch angioplasty. Intraoperative Ultrasound. Intraoperative EEG monitoring;  Surgeon: Rasheed Sam MD;  Location:  OR     EXTRACTION(S) DENTAL      recently removed     OTHER SURGICAL HISTORY      [id number],PREMALIG/BENIGN SKIN LESION EXCISION, removed from chest     OTHER SURGICAL HISTORY      208489,ANESTHESIA ALERT,No  problems with anaesthesia        Allergies:     Allergies   Allergen Reactions     Ace Inhibitors Angioedema     ED visit 8/8/2024: suspected upper  lip edema was secondary to recent lisinopril start after heart surgery. Improved on steroid/benadryl        Medications:    Current Outpatient Medications   Medication Sig Dispense Refill     acetaminophen (TYLENOL) 325 MG tablet Take 2 tablets (650 mg) by mouth every 4 hours (while awake) 50 tablet 0     aspirin (ASA) 81 MG chewable tablet Take 1 tablet (81 mg) by mouth daily. 90 tablet 3     atorvastatin (LIPITOR) 80 MG tablet Take 1 tablet (80 mg) by mouth at bedtime. 90 tablet 3     cyclobenzaprine (FLEXERIL) 10 MG tablet Take 1 tablet (10 mg) by mouth 3 times daily as needed for muscle spasms 50 tablet 0     dapagliflozin (FARXIGA) 10 MG TABS tablet Take 1 tablet (10 mg) by mouth daily. 90 tablet 3     EPINEPHrine (ANY BX GENERIC EQUIV) 0.3 MG/0.3ML injection 2-pack Inject 0.3 mLs (0.3 mg) into the muscle once as needed for anaphylaxis 1 each 0     furosemide (LASIX) 20 MG tablet Take 1 tablet (20 mg) by mouth daily as needed (take for weight gain more than 3 lbs in 1 day or more than 5 lbs in 7 days OR for swelling in your legs or acute shortness of breath). 30 tablet 0     gabapentin (NEURONTIN) 100 MG capsule Take 100 mg by mouth 3 times daily as needed (Pain).       hydrOXYzine HCl (ATARAX) 25 MG tablet Take 1 tablet (25 mg) by mouth every 6 hours as needed for other or anxiety (adjuvant pain) 30 tablet 0     melatonin 5 MG tablet Take 5 mg by mouth nightly as needed for sleep.       metoprolol succinate ER (TOPROL XL) 50 MG 24 hr tablet Take 1 tablet (50 mg) by mouth daily. 90 tablet 3     nitroGLYcerin (NITROSTAT) 0.4 MG sublingual tablet For chest pain place 1 tablet under the tongue every 5 minutes for 3 doses. If symptoms persist 5 minutes after 1st dose call 911. 25 tablet 3     sacubitril-valsartan (ENTRESTO) 49-51 MG per tablet Take 1 tablet by mouth 2 times daily. 180 tablet 3     No current facility-administered medications for this visit.        Social Habits:    Tobacco Use      Smoking status:  Former        Packs/day: 0.25        Years: 0.3 packs/day for 15.5 years (3.9 ttl pk-yrs)        Types: Cigars, Cigarettes        Start date: 11/4/2009        Passive exposure: Current (per pt)      Smokeless tobacco: Never      Tobacco comments: Quit smoking: quit 6/20/17 (cigarettes); smoked cigars 2 puffs cigar week then stopped before surgery       Alcohol Use: Not on file       History   Drug Use     Types: Marijuana     Comment: Not daily 2x week        Family History:    Family History   Problem Relation Age of Onset     Diabetes Mother         Diabetes     Heart Disease Mother         Heart Disease     Other - See Comments Father         COPD, CD     Heart Disease Brother         Heart Disease     Other - See Comments Brother         killed by drunk  at age 19.     Substance Abuse Other         Alcohol/Drug, No hx of chemical dependency.     Diabetes Other         Diabetes     Heart Disease Other         Heart Disease     Anesthesia Reaction No family hx of      Clotting Disorder No family hx of      Bleeding Disorder No family hx of      Physical Examination:  There were no vitals taken for this visit.  Wt Readings from Last 1 Encounters:   12/10/24 78.9 kg (174 lb)     Body mass index is 25.52 kg/m .    Exam:  General: No apparent distress. Answers questions appropriately.  Neurologic: Focally intact, Alert and oriented x 3. 5/5 upper/lower extremity strength. SILT in all extremities. Facial motor function intact and symmetric, no lateral tongue deviation.   Eyes: Grossly normal.  HEENT: Left neck incision well-healed.  Cardiac:  RRR.  Pulmonary:  Non labored breathing with normal respiratory effort    Vascular Exam:   Carotid: No Bruit    Radial: Left: 2+   Right: 2+    Ulnar: Left: 2+   Right: 2+    Laboratory Findings:  Hemoglobin   Date Value Ref Range Status   08/11/2024 13.2 (L) 13.3 - 17.7 g/dL Final   08/08/2024 11.8 (L) 13.3 - 17.7 g/dL Final   07/31/2020 14.2 13.3 - 17.7 g/dL Final    07/13/2020 15.4 13.3 - 17.7 g/dL Final     WBC   Date Value Ref Range Status   07/31/2020 10.6 4.0 - 11.0 10e9/L Final   07/13/2020 14.7 (H) 4.0 - 11.0 10e9/L Final     WBC Count   Date Value Ref Range Status   08/11/2024 12.7 (H) 4.0 - 11.0 10e3/uL Final   08/08/2024 9.5 4.0 - 11.0 10e3/uL Final     Platelet Count   Date Value Ref Range Status   08/11/2024 434 150 - 450 10e3/uL Final   08/08/2024 455 (H) 150 - 450 10e3/uL Final   07/31/2020 250 150 - 450 10e9/L Final   07/13/2020 239 150 - 450 10e9/L Final     Creatinine   Date Value Ref Range Status   12/02/2024 0.94 0.67 - 1.17 mg/dL Final   07/31/2020 0.80 0.70 - 1.30 mg/dL Final     Sodium   Date Value Ref Range Status   12/02/2024 134 (L) 135 - 145 mmol/L Final   07/31/2020 140 134 - 144 mmol/L Final     Glucose   Date Value Ref Range Status   12/02/2024 120 (H) 70 - 99 mg/dL Final   10/18/2024 123 (H) 70 - 99 mg/dL Final   12/01/2021 87 70 - 105 mg/dL Final   07/31/2020 165 (H) 70 - 105 mg/dL Final   07/13/2020 106 (H) 70 - 105 mg/dL Final     GLUCOSE BY METER POCT   Date Value Ref Range Status   07/31/2024 102 (H) 70 - 99 mg/dL Final   07/30/2024 113 (H) 70 - 99 mg/dL Final     Hemoglobin A1C   Date Value Ref Range Status   05/01/2024 5.9 (H) <5.7 % Final     Comment:     Normal <5.7%   Prediabetes 5.7-6.4%    Diabetes 6.5% or higher     Note: Adopted from ADA consensus guidelines.     INR   Date Value Ref Range Status   07/25/2024 1.24 (H) 0.85 - 1.15 Final     Imaging:  I personally reviewed the bilateral carotid arteries on duplex from 04/28/2025 and compared to that from 10/14/2024. CUS today shows stable right ICA occlusion. Left ICA velocities <180 cm/s and overall similar to 6 months ago. Good ICA waveforms with minimal turbulence. L CEA repair patent.      Impression/Shared Medical Decision Making:    #1- Asymptomatic high grade left carotid stenosis, status post left carotid endarterectomy 6/25/2024  #2- Asymptomatic chronic occlusion of the right  internal carotid artery  #3- Coronary artery disease status post coronary bypass x3, Dr. Mason 7/2024  #4- Hyperlipidemia  #5- Peripheral arterial disease status post bilateral bypasses at OSH  #6- Heart Failure with reduced EF, 30-35%  #7- Former nicotine dependence  #8- Cataracts, s/p bilateral cataract surgery but with new bilateral nocturnal vision changes - suspect recurrent cataracts.     6 follow up of carotid endarterectomy.  He is doing well from the neurologic standpoint.  His reconstruction is widely patent.  We will see him back at the 12 month anniversary with repeat duplex. He also has PAD with history of bilateral lower extremity bypasses and does not appear to be following with anyone for bypass surveillance, so we will manage that as well. He has some mild bilateral lower extremity claudication after extensive activity, but is able to keep ambulating through it. I encouraged him to continue to stay active and ambulate daily.     Mr. Lehman and his wife are in agreement with this plan as outlined.    Recommendations/Plan:  Recommended patient follow-up with his ophthalmologist about his visual abnormalities.  OMT: continue ASA, statin  RTC for 1 year virtual visit with repeat carotid US as well as BLE LISA US/duplex US in May 2026 - will be able to do appt on same day as his wife, who is also a patient of Dr Sam's.       Chantal Duarte MD  PGY-6  Vascular Surgery  Pager: [phone]    Please page me directly with any questions/concerns during regular weekday hours before 5PM. If there is no response, if it is a weekend, or if it is during evening hours, then please use the [employer] system to page the first-call resident on call for vascular surgery.      Attestation signed by Rasheed Sam MD at 4/28/2025 10:44 AM:  Patient seen and examined with Dr. Duarte, my vascular surgery fellow.  I corroborated the history and reperformed physical examination.  Agree with findings as  documented in their note with the exception as documented below.    Mr. Lehman is doing well clinically with no new neurologic deficits.  He does have longstanding vision changes that started after cataract surgery many years ago.  On exam he has no focal neurologic issues. I personally reviewed his carotid duplex which shows no signs of left CEA restenosis with his chronic right sided occlusion.  We will plan to see him in 1 year with repeat carotid duplex as well as ABIs and lower extremity duplex for his bypasses performed elsewhere.  We will try to coordinate his wife's care in back to back appointments if her visit next month is ok.     Rasheed Sam MD  Vascular and Endovascular Surgery       10 minutes spent on the day of encounter doing chart review from our system and care everywhere, history and exam, documentation, coordinating care, and further activities as noted with over half spent counseling.       Again, thank you for allowing me to participate in the care of your patient.      Sincerely,    Rasheed Sam MD

## 2025-05-11 ENCOUNTER — HEALTH MAINTENANCE LETTER (OUTPATIENT)
Age: 70
End: 2025-05-11

## 2025-05-13 ENCOUNTER — VIRTUAL VISIT (OUTPATIENT)
Facility: CLINIC | Age: 70
End: 2025-05-13
Attending: NURSE PRACTITIONER
Payer: COMMERCIAL

## 2025-05-13 DIAGNOSIS — I50.9 CHRONIC CONGESTIVE HEART FAILURE, UNSPECIFIED HEART FAILURE TYPE (H): Primary | ICD-10-CM

## 2025-05-13 NOTE — PATIENT INSTRUCTIONS
"Recommendations from today's MTM visit:                                                      Continue your current medications.     Follow-up: 11/11/25 at 9 am via phone     It was great speaking with you today.  I value your experience and would be very thankful for your time in providing feedback in our clinic survey. In the next few days, you may receive an email or text message from [facility] [employer] with a link to a survey related to your  clinical pharmacist.\"     To schedule another MTM appointment, please call the clinic directly or you may call the MTM scheduling line at [phone].    My Clinical Pharmacist's contact information:                                                      Please feel free to contact me with any questions or concerns you have.      Maira Webster, PharmD  Medication Therapy Management Pharmacist  [facility]   "

## 2025-05-13 NOTE — PROGRESS NOTES
Medication Therapy Management (MTM) Encounter    ASSESSMENT:                            Medication Adherence/Access: No issues identified.    HFrEF   Stable. Clinic blood pressure at goal of <130/80. No home blood pressure readings to assess today. Patient was unable to tolerate higher doses of Entresto due to hypotension. No medication changes recommended today    PLAN:                            Continue your current medications.     Follow-up: 11/11/25 at 9 am via phone     SUBJECTIVE/OBJECTIVE:                          Modesto Lehman is a 69 year old male seen for a follow-up visit.       Reason for visit: MTM follow up .    Allergies/ADRs: Reviewed in chart  Past Medical History: Reviewed in chart  Tobacco: He reports that he has been smoking cigars and cigarettes. He started smoking about 15 years ago. He has a 3.9 pack-year smoking history. He has been exposed to tobacco smoke. He has never used smokeless tobacco.  Alcohol: 1-3 beverages / week     Medication Adherence/Access: no issues reported.    Heart Failure   HFrEF (</=40%)   Beta Blocker: metoprolol succinate 50 mg daily   ACEi/ARB/ARNI: Entresto 49-51 mg twice a day - hypotension with higher dose   SGLT2i: Farxiga 10 mg daily   MRA: holding off on this as sodium is low  Diuretics: Furosemide 20 mg as needed -- not used     Patient reports no current medication side effects.     Reports he has not been checking his weight at home recently.   Patient reports these HF symptoms: none. SOB with exertion only.   Patient has not been self-monitoring blood pressure.      BP Readings from Last 3 Encounters:   04/28/25 100/62   12/10/24 100/60   10/08/24 124/80       Today's Vitals: There were no vitals taken for this visit.  ----------------      I spent 7 minutes with this patient today. All changes were made via collaborative practice agreement with Brittany Leonard APRN CNP.     A summary of these recommendations was sent via clinic portal.    Maira Webster,  PharmD  Medication Therapy Management Pharmacist  [facility]    Telemedicine Visit Details  The patient's medications can be safely assessed via a telemedicine encounter.  Type of service:  Telephone visit  Originating Location (pt. Location): Home    Distant Location (provider location):  Off-site  Start Time: 8:30 AM  End Time: 8:37 AM     Medication Therapy Recommendations  No medication therapy recommendations to display

## 2025-07-01 ENCOUNTER — TELEPHONE (OUTPATIENT)
Dept: CARDIOLOGY | Facility: CLINIC | Age: 70
End: 2025-07-01
Payer: COMMERCIAL

## 2025-07-01 NOTE — TELEPHONE ENCOUNTER
Spoke with patient and offered him a sooner CORE opening this Monday 7/7 with labs prior. He will be calling me back when he gets back home.

## 2025-07-02 ENCOUNTER — PRE VISIT (OUTPATIENT)
Dept: CARDIOLOGY | Facility: CLINIC | Age: 70
End: 2025-07-02
Payer: COMMERCIAL

## 2025-07-02 DIAGNOSIS — I50.9 CHRONIC CONGESTIVE HEART FAILURE, UNSPECIFIED HEART FAILURE TYPE (H): Primary | ICD-10-CM

## 2025-07-06 NOTE — PROGRESS NOTES
[facility]    HPI:   Mr. Modesto Lehman is a 69 year old male with a history including CAD s/p CABG x3 (LIMA-LAD, SVG-diag, SVG- OM1, 7/2024) , HFrEF 2/2 ICM (LVEF 35-40%), PAD s/p bilateral popliteal bypass, tobacco use ,and carotid artery stenosis s/p L carotid endarterectomy (8/2024). He presents to clinic for follow-up CORE visit.    Mr. Lehman lives up [place] and follows with Dr. Chandler, most recently seen 12/2024 with no changes to medication.     Today in clinic he feels well overall. Denies new chest pain, palpitations, SOB at rest, or LE edema. He occasionally feels winded when going up and down stairs, or doing strenuous yardwork. Doesn't check BP at home regularly. Has never needed to use PRN Lasix. He continues to smoke cigars, at least 1/2 cigar daily.    PAST MEDICAL HISTORY:  Past Medical History:   Diagnosis Date    Allergy status to analgesic agent     No reported medication allergies    Anesthesia of skin     No  problems with anaesthesia    CAD (coronary artery disease)     Elevated coronary artery calcium score     History of recurrent pneumonia     No Comments Provided    Hyperlipidemia     No Comments Provided    Low back pain     pain with bilateral leg and disc injuries.    Personal history of nicotine dependence     Personal history of other medical treatment (CODE)     No blood transfusions    PVD (peripheral vascular disease)        FAMILY HISTORY:  Family History   Problem Relation Age of Onset    Diabetes Mother         Diabetes    Heart Disease Mother         Heart Disease    Other - See Comments Father         COPD, CD    Heart Disease Brother         Heart Disease    Other - See Comments Brother         killed by drunk  at age 19.    Substance Abuse Other         Alcohol/Drug, No hx of chemical dependency.    Diabetes Other         Diabetes    Heart Disease Other         Heart Disease    Anesthesia Reaction No family hx of     Clotting Disorder No family hx of     Bleeding  Disorder No family hx of        SOCIAL HISTORY:  Social History     Socioeconomic History    Marital status:    Tobacco Use    Smoking status: Every Day     Current packs/day: 0.25     Average packs/day: 0.3 packs/day for 15.7 years (3.9 ttl pk-yrs)     Types: Cigars, Cigarettes     Start date: 11/4/2009     Passive exposure: Current (per pt)    Smokeless tobacco: Never    Tobacco comments:     Quit smoking: quit 6/20/17 (cigarettes); smoked cigars 2 puffs cigar week then stopped before surgery   Vaping Use    Vaping status: Never Used   Substance and Sexual Activity    Alcohol use: Not Currently     Comment: 2-3 times a week    Drug use: Yes     Types: Marijuana     Comment: Not daily 2x week    Sexual activity: Not Currently   Social History Narrative    Self employed as .   with 3 adult children.    No hx of chemical dependency.  Patient is a former smoker.   Alcohol Use - yes occ    Wife - Imelda Lehman     Social Drivers of Health     Financial Resource Strain: Low Risk  (2/16/2024)    Financial Resource Strain     Within the past 12 months, have you or your family members you live with been unable to get utilities (heat, electricity) when it was really needed?: No   Food Insecurity: Low Risk  (2/16/2024)    Food Insecurity     Within the past 12 months, did you worry that your food would run out before you got money to buy more?: No     Within the past 12 months, did the food you bought just not last and you didn t have money to get more?: No   Transportation Needs: Low Risk  (2/16/2024)    Transportation Needs     Within the past 12 months, has lack of transportation kept you from medical appointments, getting your medicines, non-medical meetings or appointments, work, or from getting things that you need?: No   Physical Activity: Sufficiently Active (2/16/2024)    Exercise Vital Sign     Days of Exercise per Week: 7 days     Minutes of Exercise per Session: 60 min   Stress: Stress  Concern Present (2/16/2024)    [employer] - Occupational Stress Questionnaire     Feeling of Stress : Very much   Interpersonal Safety: Low Risk  (12/10/2024)    Interpersonal Safety     Do you feel physically and emotionally safe where you currently live?: Yes     Within the past 12 months, have you been hit, slapped, kicked or otherwise physically hurt by someone?: No     Within the past 12 months, have you been humiliated or emotionally abused in other ways by your partner or ex-partner?: No   Housing Stability: Low Risk  (2/16/2024)    Housing Stability     Do you have housing? : Yes     Are you worried about losing your housing?: No       CURRENT MEDICATIONS:  Current Outpatient Medications   Medication Sig Dispense Refill    acetaminophen (TYLENOL) 325 MG tablet Take 2 tablets (650 mg) by mouth every 4 hours (while awake) 50 tablet 0    aspirin (ASA) 81 MG chewable tablet Take 1 tablet (81 mg) by mouth daily. 90 tablet 3    atorvastatin (LIPITOR) 80 MG tablet Take 1 tablet (80 mg) by mouth at bedtime. 90 tablet 3    dapagliflozin (FARXIGA) 10 MG TABS tablet Take 1 tablet (10 mg) by mouth daily. 90 tablet 3    EPINEPHrine (ANY BX GENERIC EQUIV) 0.3 MG/0.3ML injection 2-pack Inject 0.3 mLs (0.3 mg) into the muscle once as needed for anaphylaxis 1 each 0    melatonin 5 MG tablet Take 5 mg by mouth nightly as needed for sleep.      nitroGLYcerin (NITROSTAT) 0.4 MG sublingual tablet For chest pain place 1 tablet under the tongue every 5 minutes for 3 doses. If symptoms persist 5 minutes after 1st dose call 911. 25 tablet 3    sacubitril-valsartan (ENTRESTO) 49-51 MG per tablet Take 1 tablet by mouth 2 times daily. 180 tablet 3    cyclobenzaprine (FLEXERIL) 10 MG tablet Take 1 tablet (10 mg) by mouth 3 times daily as needed for muscle spasms (Patient not taking: Reported on 7/7/2025) 50 tablet 0    furosemide (LASIX) 20 MG tablet Take 1 tablet (20 mg) by mouth daily as needed (take for  weight gain more than 3 lbs in 1 day or more than 5 lbs in 7 days OR for swelling in your legs or acute shortness of breath). (Patient not taking: Reported on 7/7/2025) 30 tablet 0    gabapentin (NEURONTIN) 100 MG capsule Take 100 mg by mouth 3 times daily as needed (Pain). (Patient not taking: Reported on 7/7/2025)      hydrOXYzine HCl (ATARAX) 25 MG tablet Take 1 tablet (25 mg) by mouth every 6 hours as needed for other or anxiety (adjuvant pain) (Patient not taking: Reported on 7/7/2025) 30 tablet 0    metoprolol succinate ER (TOPROL XL) 50 MG 24 hr tablet Take 1 tablet (50 mg) by mouth daily. 90 tablet 3     No current facility-administered medications for this visit.       ROS:   Refer to HPI    EXAM:  /83 (BP Location: Right arm, Patient Position: Chair, Cuff Size: Adult Regular)   Pulse 84   Wt 77.2 kg (170 lb 1.6 oz)   SpO2 98%   BMI 25.19 kg/m    GENERAL: Appears comfortable, in no acute distress.   HEENT: Eye symmetrical, no discharge or icterus bilaterally. Mucous membranes moist and without lesions.  CV: RRR, +S1S2, no murmur, rub, or gallop.   RESPIRATORY: Respirations regular, even, and unlabored. Lungs CTA throughout.   GI: Soft and non distended. No tenderness, rebound, guarding. No hepatomegaly.   EXTREMITIES: no peripheral edema. 2+ bilateral pedal pulses.   NEUROLOGIC: Alert and oriented x 3. No focal deficits.   MUSCULOSKELETAL: No joint swelling or tenderness.   SKIN: No jaundice. No rashes or lesions.     Labs, reviewed with patient in clinic today:  CBC RESULTS:  Lab Results   Component Value Date    WBC 12.7 (H) 08/11/2024    WBC 10.6 07/31/2020    RBC 4.19 (L) 08/11/2024    RBC 4.61 07/31/2020    HGB 13.2 (L) 08/11/2024    HGB 14.2 07/31/2020    HCT 38.0 (L) 08/11/2024    HCT 41.4 07/31/2020    MCV 91 08/11/2024    MCV 90 07/31/2020    MCH 31.5 08/11/2024    MCH 30.8 07/31/2020    MCHC 34.7 08/11/2024    MCHC 34.3 07/31/2020    RDW 14.1 08/11/2024    RDW 14.5 07/31/2020      "08/11/2024     07/31/2020       CMP RESULTS:  Lab Results   Component Value Date     07/07/2025     07/31/2020    POTASSIUM 4.2 07/07/2025    POTASSIUM 3.7 07/25/2024    POTASSIUM 4.1 12/01/2021    POTASSIUM 3.4 (L) 07/31/2020    CHLORIDE 102 07/07/2025    CHLORIDE 100 12/01/2021    CHLORIDE 109 (H) 07/31/2020    CO2 23 07/07/2025    CO2 31 12/01/2021    CO2 23 07/31/2020    ANIONGAP 11 07/07/2025    ANIONGAP 6 12/01/2021    ANIONGAP 8 07/31/2020     (H) 07/07/2025     (H) 07/31/2024    GLC 87 12/01/2021     (H) 07/31/2020    BUN 14.2 07/07/2025    BUN 14 12/01/2021    BUN 9 07/31/2020    CR 0.83 07/07/2025    CR 0.80 07/31/2020    GFRESTIMATED >90 07/07/2025    GFRESTIMATED >90 07/31/2020    GFRESTBLACK >90 07/31/2020    DANE 8.7 (L) 07/07/2025    DANE 8.2 (L) 07/31/2020    BILITOTAL 0.8 08/11/2024    BILITOTAL 0.4 07/03/2020    ALBUMIN 4.0 08/11/2024    ALBUMIN 4.4 12/01/2021    ALBUMIN 4.1 07/03/2020    ALKPHOS 156 (H) 08/11/2024    ALKPHOS 96 07/03/2020    ALT 40 08/11/2024    ALT 19 07/03/2020    AST 25 08/11/2024    AST 17 07/03/2020        INR RESULTS:  Lab Results   Component Value Date    INR 1.24 (H) 07/25/2024       Lab Results   Component Value Date    MAG 2.1 07/31/2024     Lab Results   Component Value Date    NTBNPI 2,676 (H) 08/11/2024     No results found for: \"NTBNP\"    Diagnostics:    Echocardiogram 10/2024:  Interpretation Summary  Left ventricular wall thickness is normal. Left ventricular size is normal.  The visual ejection fraction is 35-40%. Mild to moderate diffuse hypokinesis  is present.  Right ventricular function, chamber size, wall motion, and thickness are  normal.  Moderate aortic valve calcification is present.  The inferior vena cava was normal in size with preserved respiratory  variability. No pericardial effusion is present.     No significant changes noted. LV function grossly similar.    Assessment and Plan:   Mr. Lehman is a 69 year old " male with a PMH of CAD s/p CABG x3 (LIMA-LAD, SVG-diag, SVG- OM1, 7/2024) , HFrEF 2/2 ICM (LVEF 35-40%), PAD s/p bilateral popliteal bypass, tobacco use ,and carotid artery stenosis s/p L carotid endarterectomy (8/2024).     # Chronic systolic heart failure/HFrEF secondary to ICM (LVEF 35-40%)    Stage C. NYHA Class II.    Primary Cardiologist: Dr. Chandler ; Last seen: 12/2024  Fluid status: euvolemic -  furosemide 20mg PRN (has never needed to use)  ACEi/ARB/ARNi/afterload reduction: Entresto 49-51mg BID  BB: Toprol XL 50mg daily  Aldosterone antagonist: deferred while other medical therapy is prioritized  SGLT2i: Dapagliflozin (Farxiga) 10mg daily  Ischemia evaluation: known CAD s/p bypass  SCD prophylaxis: does not meet criteria for implant  NSAID use: contraindicated  Sleep apnea evaluation: denies s/sx     # Coronary artery disease s/p CABG x3 (LIMA-LAD, SVG-diag, SVG- OM1, 7/2024)  # PAD s/p bilateral popliteal bypass  # Carotid artery stenosis s/p L carotid endarterectomy (8/2024)   # HLD  - continue asa 81 mg daily  - continue atorvastatin 80mg daily  - follows with vascular surgery       Follow up: 6 months with Dr Chandler. As pt lives up [place] and has maintained euvolemia, he does not need to come down to the [facility] just for CORE visits anymore.   Chart review time: 10 min  Patient visit time: 15 min  Total time: 25 min    Henny Rodriguez CNP  [facility]  July 7, 2025

## 2025-07-07 ENCOUNTER — LAB (OUTPATIENT)
Dept: LAB | Facility: CLINIC | Age: 70
End: 2025-07-07
Attending: NURSE PRACTITIONER
Payer: COMMERCIAL

## 2025-07-07 ENCOUNTER — OFFICE VISIT (OUTPATIENT)
Dept: CARDIOLOGY | Facility: CLINIC | Age: 70
End: 2025-07-07
Attending: NURSE PRACTITIONER
Payer: MEDICARE

## 2025-07-07 VITALS
DIASTOLIC BLOOD PRESSURE: 83 MMHG | HEART RATE: 84 BPM | OXYGEN SATURATION: 98 % | BODY MASS INDEX: 25.19 KG/M2 | WEIGHT: 170.1 LBS | SYSTOLIC BLOOD PRESSURE: 123 MMHG

## 2025-07-07 DIAGNOSIS — I25.810 CORONARY ARTERY DISEASE INVOLVING CORONARY BYPASS GRAFT OF NATIVE HEART WITHOUT ANGINA PECTORIS: ICD-10-CM

## 2025-07-07 DIAGNOSIS — I50.9 CHRONIC CONGESTIVE HEART FAILURE, UNSPECIFIED HEART FAILURE TYPE (H): ICD-10-CM

## 2025-07-07 DIAGNOSIS — Z95.1 S/P CABG (CORONARY ARTERY BYPASS GRAFT): ICD-10-CM

## 2025-07-07 DIAGNOSIS — I50.22 CHRONIC SYSTOLIC HEART FAILURE (H): Primary | ICD-10-CM

## 2025-07-07 LAB
ANION GAP SERPL CALCULATED.3IONS-SCNC: 11 MMOL/L (ref 7–15)
BUN SERPL-MCNC: 14.2 MG/DL (ref 8–23)
CALCIUM SERPL-MCNC: 8.7 MG/DL (ref 8.8–10.4)
CHLORIDE SERPL-SCNC: 102 MMOL/L (ref 98–107)
CREAT SERPL-MCNC: 0.83 MG/DL (ref 0.67–1.17)
EGFRCR SERPLBLD CKD-EPI 2021: >90 ML/MIN/1.73M2
GLUCOSE SERPL-MCNC: 122 MG/DL (ref 70–99)
HCO3 SERPL-SCNC: 23 MMOL/L (ref 22–29)
POTASSIUM SERPL-SCNC: 4.2 MMOL/L (ref 3.4–5.3)
SODIUM SERPL-SCNC: 136 MMOL/L (ref 135–145)

## 2025-07-07 PROCEDURE — G0463 HOSPITAL OUTPT CLINIC VISIT: HCPCS | Performed by: CASE MANAGER/CARE COORDINATOR

## 2025-07-07 PROCEDURE — 36415 COLL VENOUS BLD VENIPUNCTURE: CPT | Performed by: PATHOLOGY

## 2025-07-07 PROCEDURE — 80048 BASIC METABOLIC PNL TOTAL CA: CPT | Performed by: PATHOLOGY

## 2025-07-07 PROCEDURE — 1126F AMNT PAIN NOTED NONE PRSNT: CPT | Performed by: CASE MANAGER/CARE COORDINATOR

## 2025-07-07 PROCEDURE — 99213 OFFICE O/P EST LOW 20 MIN: CPT | Performed by: CASE MANAGER/CARE COORDINATOR

## 2025-07-07 PROCEDURE — 3074F SYST BP LT 130 MM HG: CPT | Performed by: CASE MANAGER/CARE COORDINATOR

## 2025-07-07 PROCEDURE — 3079F DIAST BP 80-89 MM HG: CPT | Performed by: CASE MANAGER/CARE COORDINATOR

## 2025-07-07 ASSESSMENT — PAIN SCALES - GENERAL: PAINLEVEL_OUTOF10: NO PAIN (0)

## 2025-07-07 NOTE — LETTER
7/7/2025      RE: Modesto Lehman  [phone]  [facility] [id number]       Dear Colleague,    Thank you for the opportunity to participate in the care of your patient, Modesto Lehman, at the [facility]. Please see a copy of my visit note below.    [facility]    HPI:   Mr. Modesto Lehman is a 69 year old male with a history including CAD s/p CABG x3 (LIMA-LAD, SVG-diag, SVG- OM1, 7/2024) , HFrEF 2/2 ICM (LVEF 35-40%), PAD s/p bilateral popliteal bypass, tobacco use ,and carotid artery stenosis s/p L carotid endarterectomy (8/2024). He presents to clinic for follow-up CORE visit.    Mr. Lehman lives up [place] and follows with Dr. Chandler, most recently seen 12/2024 with no changes to medication.     Today in clinic he feels well overall. Denies new chest pain, palpitations, SOB at rest, or LE edema. He occasionally feels winded when going up and down stairs, or doing strenuous yardwork. Doesn't check BP at home regularly. Has never needed to use PRN Lasix. He continues to smoke cigars, at least 1/2 cigar daily.    PAST MEDICAL HISTORY:  Past Medical History:   Diagnosis Date     Allergy status to analgesic agent     No reported medication allergies     Anesthesia of skin     No  problems with anaesthesia     CAD (coronary artery disease)      Elevated coronary artery calcium score      History of recurrent pneumonia     No Comments Provided     Hyperlipidemia     No Comments Provided     Low back pain     pain with bilateral leg and disc injuries.     Personal history of nicotine dependence      Personal history of other medical treatment (CODE)     No blood transfusions     PVD (peripheral vascular disease)        FAMILY HISTORY:  Family History   Problem Relation Age of Onset     Diabetes Mother         Diabetes     Heart Disease Mother         Heart Disease     Other - See Comments Father         COPD, CD     Heart  Disease Brother         Heart Disease     Other - See Comments Brother         killed by drunk  at age 19.     Substance Abuse Other         Alcohol/Drug, No hx of chemical dependency.     Diabetes Other         Diabetes     Heart Disease Other         Heart Disease     Anesthesia Reaction No family hx of      Clotting Disorder No family hx of      Bleeding Disorder No family hx of        SOCIAL HISTORY:  Social History     Socioeconomic History     Marital status:    Tobacco Use     Smoking status: Every Day     Current packs/day: 0.25     Average packs/day: 0.3 packs/day for 15.7 years (3.9 ttl pk-yrs)     Types: Cigars, Cigarettes     Start date: 11/4/2009     Passive exposure: Current (per pt)     Smokeless tobacco: Never     Tobacco comments:     Quit smoking: quit 6/20/17 (cigarettes); smoked cigars 2 puffs cigar week then stopped before surgery   Vaping Use     Vaping status: Never Used   Substance and Sexual Activity     Alcohol use: Not Currently     Comment: 2-3 times a week     Drug use: Yes     Types: Marijuana     Comment: Not daily 2x week     Sexual activity: Not Currently   Social History Narrative    Self employed as .   with 3 adult children.    No hx of chemical dependency.  Patient is a former smoker.   Alcohol Use - yes occ    Wife - Imelda Lehman     Social Drivers of Health     Financial Resource Strain: Low Risk  (2/16/2024)    Financial Resource Strain      Within the past 12 months, have you or your family members you live with been unable to get utilities (heat, electricity) when it was really needed?: No   Food Insecurity: Low Risk  (2/16/2024)    Food Insecurity      Within the past 12 months, did you worry that your food would run out before you got money to buy more?: No      Within the past 12 months, did the food you bought just not last and you didn t have money to get more?: No   Transportation Needs: Low Risk  (2/16/2024)    Transportation Needs       Within the past 12 months, has lack of transportation kept you from medical appointments, getting your medicines, non-medical meetings or appointments, work, or from getting things that you need?: No   Physical Activity: Sufficiently Active (2/16/2024)    Exercise Vital Sign      Days of Exercise per Week: 7 days      Minutes of Exercise per Session: 60 min   Stress: Stress Concern Present (2/16/2024)    [employer] - Occupational Stress Questionnaire      Feeling of Stress : Very much   Interpersonal Safety: Low Risk  (12/10/2024)    Interpersonal Safety      Do you feel physically and emotionally safe where you currently live?: Yes      Within the past 12 months, have you been hit, slapped, kicked or otherwise physically hurt by someone?: No      Within the past 12 months, have you been humiliated or emotionally abused in other ways by your partner or ex-partner?: No   Housing Stability: Low Risk  (2/16/2024)    Housing Stability      Do you have housing? : Yes      Are you worried about losing your housing?: No       CURRENT MEDICATIONS:  Current Outpatient Medications   Medication Sig Dispense Refill     acetaminophen (TYLENOL) 325 MG tablet Take 2 tablets (650 mg) by mouth every 4 hours (while awake) 50 tablet 0     aspirin (ASA) 81 MG chewable tablet Take 1 tablet (81 mg) by mouth daily. 90 tablet 3     atorvastatin (LIPITOR) 80 MG tablet Take 1 tablet (80 mg) by mouth at bedtime. 90 tablet 3     dapagliflozin (FARXIGA) 10 MG TABS tablet Take 1 tablet (10 mg) by mouth daily. 90 tablet 3     EPINEPHrine (ANY BX GENERIC EQUIV) 0.3 MG/0.3ML injection 2-pack Inject 0.3 mLs (0.3 mg) into the muscle once as needed for anaphylaxis 1 each 0     melatonin 5 MG tablet Take 5 mg by mouth nightly as needed for sleep.       nitroGLYcerin (NITROSTAT) 0.4 MG sublingual tablet For chest pain place 1 tablet under the tongue every 5 minutes for 3 doses. If symptoms persist 5 minutes after 1st dose  call 911. 25 tablet 3     sacubitril-valsartan (ENTRESTO) 49-51 MG per tablet Take 1 tablet by mouth 2 times daily. 180 tablet 3     cyclobenzaprine (FLEXERIL) 10 MG tablet Take 1 tablet (10 mg) by mouth 3 times daily as needed for muscle spasms (Patient not taking: Reported on 7/7/2025) 50 tablet 0     furosemide (LASIX) 20 MG tablet Take 1 tablet (20 mg) by mouth daily as needed (take for weight gain more than 3 lbs in 1 day or more than 5 lbs in 7 days OR for swelling in your legs or acute shortness of breath). (Patient not taking: Reported on 7/7/2025) 30 tablet 0     gabapentin (NEURONTIN) 100 MG capsule Take 100 mg by mouth 3 times daily as needed (Pain). (Patient not taking: Reported on 7/7/2025)       hydrOXYzine HCl (ATARAX) 25 MG tablet Take 1 tablet (25 mg) by mouth every 6 hours as needed for other or anxiety (adjuvant pain) (Patient not taking: Reported on 7/7/2025) 30 tablet 0     metoprolol succinate ER (TOPROL XL) 50 MG 24 hr tablet Take 1 tablet (50 mg) by mouth daily. 90 tablet 3     No current facility-administered medications for this visit.       ROS:   Refer to HPI    EXAM:  /83 (BP Location: Right arm, Patient Position: Chair, Cuff Size: Adult Regular)   Pulse 84   Wt 77.2 kg (170 lb 1.6 oz)   SpO2 98%   BMI 25.19 kg/m    GENERAL: Appears comfortable, in no acute distress.   HEENT: Eye symmetrical, no discharge or icterus bilaterally. Mucous membranes moist and without lesions.  CV: RRR, +S1S2, no murmur, rub, or gallop.   RESPIRATORY: Respirations regular, even, and unlabored. Lungs CTA throughout.   GI: Soft and non distended. No tenderness, rebound, guarding. No hepatomegaly.   EXTREMITIES: no peripheral edema. 2+ bilateral pedal pulses.   NEUROLOGIC: Alert and oriented x 3. No focal deficits.   MUSCULOSKELETAL: No joint swelling or tenderness.   SKIN: No jaundice. No rashes or lesions.     Labs, reviewed with patient in clinic today:  CBC RESULTS:  Lab Results   Component Value  "Date    WBC 12.7 (H) 08/11/2024    WBC 10.6 07/31/2020    RBC 4.19 (L) 08/11/2024    RBC 4.61 07/31/2020    HGB 13.2 (L) 08/11/2024    HGB 14.2 07/31/2020    HCT 38.0 (L) 08/11/2024    HCT 41.4 07/31/2020    MCV 91 08/11/2024    MCV 90 07/31/2020    MCH 31.5 08/11/2024    MCH 30.8 07/31/2020    MCHC 34.7 08/11/2024    MCHC 34.3 07/31/2020    RDW 14.1 08/11/2024    RDW 14.5 07/31/2020     08/11/2024     07/31/2020       CMP RESULTS:  Lab Results   Component Value Date     07/07/2025     07/31/2020    POTASSIUM 4.2 07/07/2025    POTASSIUM 3.7 07/25/2024    POTASSIUM 4.1 12/01/2021    POTASSIUM 3.4 (L) 07/31/2020    CHLORIDE 102 07/07/2025    CHLORIDE 100 12/01/2021    CHLORIDE 109 (H) 07/31/2020    CO2 23 07/07/2025    CO2 31 12/01/2021    CO2 23 07/31/2020    ANIONGAP 11 07/07/2025    ANIONGAP 6 12/01/2021    ANIONGAP 8 07/31/2020     (H) 07/07/2025     (H) 07/31/2024    GLC 87 12/01/2021     (H) 07/31/2020    BUN 14.2 07/07/2025    BUN 14 12/01/2021    BUN 9 07/31/2020    CR 0.83 07/07/2025    CR 0.80 07/31/2020    GFRESTIMATED >90 07/07/2025    GFRESTIMATED >90 07/31/2020    GFRESTBLACK >90 07/31/2020    DANE 8.7 (L) 07/07/2025    DANE 8.2 (L) 07/31/2020    BILITOTAL 0.8 08/11/2024    BILITOTAL 0.4 07/03/2020    ALBUMIN 4.0 08/11/2024    ALBUMIN 4.4 12/01/2021    ALBUMIN 4.1 07/03/2020    ALKPHOS 156 (H) 08/11/2024    ALKPHOS 96 07/03/2020    ALT 40 08/11/2024    ALT 19 07/03/2020    AST 25 08/11/2024    AST 17 07/03/2020        INR RESULTS:  Lab Results   Component Value Date    INR 1.24 (H) 07/25/2024       Lab Results   Component Value Date    MAG 2.1 07/31/2024     Lab Results   Component Value Date    NTBNPI 2,676 (H) 08/11/2024     No results found for: \"NTBNP\"    Diagnostics:    Echocardiogram 10/2024:  Interpretation Summary  Left ventricular wall thickness is normal. Left ventricular size is normal.  The visual ejection fraction is 35-40%. Mild to moderate " diffuse hypokinesis  is present.  Right ventricular function, chamber size, wall motion, and thickness are  normal.  Moderate aortic valve calcification is present.  The inferior vena cava was normal in size with preserved respiratory  variability. No pericardial effusion is present.     No significant changes noted. LV function grossly similar.    Assessment and Plan:   Mr. Lehman is a 69 year old male with a PMH of CAD s/p CABG x3 (LIMA-LAD, SVG-diag, SVG- OM1, 7/2024) , HFrEF 2/2 ICM (LVEF 35-40%), PAD s/p bilateral popliteal bypass, tobacco use ,and carotid artery stenosis s/p L carotid endarterectomy (8/2024).     # Chronic systolic heart failure/HFrEF secondary to ICM (LVEF 35-40%)    Stage C. NYHA Class II.    Primary Cardiologist: Dr. Chandler ; Last seen: 12/2024  Fluid status: euvolemic -  furosemide 20mg PRN (has never needed to use)  ACEi/ARB/ARNi/afterload reduction: Entresto 49-51mg BID  BB: Toprol XL 50mg daily  Aldosterone antagonist: deferred while other medical therapy is prioritized  SGLT2i: Dapagliflozin (Farxiga) 10mg daily  Ischemia evaluation: known CAD s/p bypass  SCD prophylaxis: does not meet criteria for implant  NSAID use: contraindicated  Sleep apnea evaluation: denies s/sx     # Coronary artery disease s/p CABG x3 (LIMA-LAD, SVG-diag, SVG- OM1, 7/2024)  # PAD s/p bilateral popliteal bypass  # Carotid artery stenosis s/p L carotid endarterectomy (8/2024)   # HLD  - continue asa 81 mg daily  - continue atorvastatin 80mg daily  - follows with vascular surgery       Follow up: 6 months with Dr Chandler. As pt lives up [place] and has maintained euvolemia, he does not need to come down to the [facility] just for CORE visits anymore.   Chart review time: 10 min  Patient visit time: 15 min  Total time: 25 min    Henny Rodriguez CNP  [facility]  July 7, 2025        Please do not hesitate to contact me if you have any questions/concerns.     Sincerely,     CHIN DYER CNP

## 2025-07-07 NOTE — PATIENT INSTRUCTIONS
Take your medicines every day, as directed     Changes made today:    - No changes. You can follow for further cardiology care with Dr. Chandler.     Monitor Your Weight and Symptoms     Contact us if you:     Gain 2 pounds in one day or 5 pounds in one week  Feel more short of breath  Notice more leg swelling  Feel lightheadeded    Change your lifestyle     Limit Salt or Sodium:  2000 mg  Limit Fluids:  2000 mL or approximately 64 ounces  Eat a Heart Healthy Diet  Low in saturated fats  Stay Active:  Aim to move at least 150 minutes every  week            To Contact us     During Business Hours:  [phone], option # 1      After hours, weekends or holidays:   [phone], Option #4  Ask to speak to the On-Call Cardiologist. Inform them you are a CORE/heart failure patient at the [facility].       [employer] allows you to communicate directly with your heart team through secure messaging.  [employer] can be accessed any time on your phone, computer, or tablet.  If you need assistance, we'd be happy to help!             Keep your Heart Appointments:     Dr. Chandler in 6 months      Please consider attending our virtual support group which is held monthly. Please reach out to Eddie at [phone] for more information if you are interested in attending.

## 2025-07-07 NOTE — NURSING NOTE
Chief Complaint   Patient presents with    Follow Up     CORE Return , HFrEF, hx of CA with labs prior       Vitals were taken, medications reconciled.    Dinora Bray, EMT    9:49 AM

## (undated) DEVICE — SU DERMABOND ADVANCED .7ML DNX12

## (undated) DEVICE — ESU GROUND PAD ADULT W/CORD E7507

## (undated) DEVICE — SU SILK 0 TIE 6X30" A306H

## (undated) DEVICE — DRAPE FLUID WARMING 52 X 60" ORS-321

## (undated) DEVICE — DRSG TEGADERM 4X4 3/4" 1626W

## (undated) DEVICE — PACK HEART LEFT CUSTOM

## (undated) DEVICE — SU SILK 4-0 TIE 12X30" A303H

## (undated) DEVICE — COVER NEOPROBE SOFTFLEX 5X96" W/BANDS 20-PC596

## (undated) DEVICE — SU VICRYL 0 CTX 36" J370H

## (undated) DEVICE — SYR 01ML 27GA 0.5" NDL TBC 309623

## (undated) DEVICE — ESU ELEC BLADE E-SEP INSULATED NEPTUNE 70MM 0703-070-002

## (undated) DEVICE — LINEN GOWN XLG 5407

## (undated) DEVICE — ANTIFOG SOLUTION W/FOAM PAD 31142527

## (undated) DEVICE — SU PROLENE 6-0 BV-1 DA 24" 8805H

## (undated) DEVICE — SU ETHIBOND 0 CT-1 CR 8X18" CX21D

## (undated) DEVICE — SURGICEL HEMOSTAT 4X8" 1952

## (undated) DEVICE — VESSEL LOOPS YELLOW MAXI 31145694

## (undated) DEVICE — Device

## (undated) DEVICE — BLADE KNIFE BEAVER MICROSHARP GREEN 377515

## (undated) DEVICE — DEVICE TISSUE STABILIZATION OCTOBASE 28707

## (undated) DEVICE — WAND SUCTION LP SOFT 15.2CM SU-22702

## (undated) DEVICE — SOL NACL 0.9% IRRIG 1000ML BOTTLE 2F7124

## (undated) DEVICE — SU VICRYL 3-0 SH CR 8X18" J774

## (undated) DEVICE — TOURNIQUET VASCULAR KIT ARGYLE 8888-585000

## (undated) DEVICE — TUBING PRESSURE 30"

## (undated) DEVICE — BLADE KNIFE BEAVER MINI STR BEAVER6900

## (undated) DEVICE — KIT ENDO VASOVIEW HEMOPRO 2 VH-4000

## (undated) DEVICE — SU UMBILICAL TAPE .125X30" U11T

## (undated) DEVICE — DEFIB PRO-PADZ LVP LQD GEL ADULT 8900-2105-01

## (undated) DEVICE — SU SILK 3-0 TIE 12X30" A304H

## (undated) DEVICE — BNDG ELASTIC 6"X5YDS STERILE 6611-6S

## (undated) DEVICE — CLIP HORIZON MED BLUE 002200

## (undated) DEVICE — DRAPE IOBAN INCISE 23X17" 6650EZ

## (undated) DEVICE — SU STRATAFIX MONOCRYL 3-0 SPIRAL PS-2 45CM SXMP1B107

## (undated) DEVICE — SU ETHIBOND 3-0 BBDA 36" X588H

## (undated) DEVICE — GLOVE BIOGEL PI MICRO SZ 8.0 48580

## (undated) DEVICE — TIES BANDING T50R

## (undated) DEVICE — PACK ADULT HEART UMMC PV15CG92D

## (undated) DEVICE — FOGARTY

## (undated) DEVICE — SU VICRYL+ 3-0 FS1 27IN UND VCP442H

## (undated) DEVICE — BLADE CLIPPER SGL USE 9680

## (undated) DEVICE — SU PROLENE 4-0 SHDA 36" 8521H

## (undated) DEVICE — LINEN TOWEL PACK X30 5481

## (undated) DEVICE — SUCTION MANIFOLD NEPTUNE 2 SYS 4 PORT 0702-020-000

## (undated) DEVICE — INSERT FOGARTY 33MM TRACTION HYDRAJAW HYDRA33

## (undated) DEVICE — SU SILK 2-0 SH CR 5X18" C0125

## (undated) DEVICE — IOM SUPPLIES/CASE FEE

## (undated) DEVICE — SOL WATER 1500ML

## (undated) DEVICE — PUNCH AORTIC 4.0MMX8" RCB40

## (undated) DEVICE — CLIP HORIZON LG ORANGE 004200

## (undated) DEVICE — GEL ULTRASOUND AQUASONIC 20GM 01-01

## (undated) DEVICE — SLEEVE TR BAND RADIAL COMPRESSION DEVICE 24CM TRB24-REG

## (undated) DEVICE — ESU ELEC BLADE E-SEP INSULATED NEPTUNE 165MM 0703-165-002

## (undated) DEVICE — PROTECTOR ARM ONE-STEP TRENDELENBURG 40418

## (undated) DEVICE — SU VICRYL 3-0 SH 27" UND J416H

## (undated) DEVICE — BLADE SAW STRK STERNAL 6207-97-101

## (undated) DEVICE — VESSEL LOOPS RED MINI 24000-01R

## (undated) DEVICE — PREP CHLORAPREP 26ML TINTED HI-LITE ORANGE 930815

## (undated) DEVICE — SPONGE SURGIFOAM 100 1974

## (undated) DEVICE — SOL NACL 0.9% IRRIG 3000ML BAG 2B7477

## (undated) DEVICE — SUCTION CATH AIRLIFE TRI-FLO W/CONTROL PORT 14FR  T60C

## (undated) DEVICE — SU PROLENE 6-0 C-1DA 4X24" M8726

## (undated) DEVICE — SU SILK 2-0 TIE 12X30" A305H

## (undated) DEVICE — SYR 30ML LL W/O NDL 302832

## (undated) DEVICE — TAPE MEDIPORE 4"X2YD 2864

## (undated) DEVICE — SUCTION DRY CHEST DRAIN OASIS 3600-100

## (undated) DEVICE — SU PROLENE 4-0 RB-1DA 36" 8557H

## (undated) DEVICE — LINEN TOWEL PACK X6 WHITE 5487

## (undated) DEVICE — SU PROLENE 4-0 RB-1DA 18" 8757H

## (undated) DEVICE — SU PROLENE 8-0 BV130-5DA 24" 8732H

## (undated) DEVICE — SU PROLENE 7-0 BV-1DA 4X24" M8702

## (undated) DEVICE — BNDG ELASTIC 4"X5YDS STERILE 6611-4S

## (undated) DEVICE — GLOVE BIOGEL PI MICRO SZ 7.5 48575

## (undated) DEVICE — SU ETHIBOND 2-0 SHDA 30" X563H

## (undated) DEVICE — WIPES FOLEY CARE SURESTEP PROVON DFC100

## (undated) DEVICE — ENDO KIT COMPLIANCE DYKENDOCMPLY

## (undated) DEVICE — CLIP HORIZON SM RED WIDE SLOT 001201

## (undated) DEVICE — TUBING SUCTION DRAINAGE PLEURAL DUAL 8884714200

## (undated) DEVICE — STRAP UNIVERSAL POSITIONING 2-PIECE 4X47X76" 91-287

## (undated) DEVICE — SOL NACL 0.9% 10ML VIAL 0409-4888-02

## (undated) DEVICE — DRAPE STERI TOWEL LG 1010

## (undated) DEVICE — PATCH SURGICAL EVARREST FIBRIN SEALANT 4X2IN EVT5024

## (undated) DEVICE — SU VICRYL 2-0 CT-1 27" UND J259H

## (undated) DEVICE — SU PROLENE 7-0 BV-1DA 24" 8702H

## (undated) DEVICE — POSITIONER ASSIST ESSTECH 3S T401210S

## (undated) DEVICE — SU STEEL MYO/WIRE II STERNOTOMY 8 BE-1 3X14" 048-217

## (undated) DEVICE — SU STRATAFIX PDS PLUS 2-0 SPIRAL CT-1 45CM SXPP1B411

## (undated) DEVICE — SU PROLENE 6-0 BV-1DA 18" 8709H

## (undated) DEVICE — TUBING SUCTION 10'X3/16" N510

## (undated) DEVICE — ENDO BRUSH CHANNEL MASTER CLEANING 2-4.2MM BW-412T

## (undated) DEVICE — SU PROLENE 6-0 C-1DA 30" 8706H

## (undated) DEVICE — HEMOCLIP QUICKCLIP PRO OLYMPUS 230CM HX-202UR.B

## (undated) DEVICE — SU MONOCRYL 4-0 PS-2 27" UND Y426H

## (undated) DEVICE — KIT HAND CONTROL ACIST 014644 AR-P54

## (undated) DEVICE — COVER EQUIPMENT 36X40" BANDED ELAS OPN LF 01-3640

## (undated) DEVICE — DRAIN CHEST TUBE 28FR STR 8028

## (undated) DEVICE — SUTURE BOOTS 051003PBX

## (undated) DEVICE — SU STEEL 6 CCS 4X18" M654G

## (undated) DEVICE — SPECIMEN CONTAINER 5OZ STERILE 2600SA

## (undated) DEVICE — LINEN TOWEL PACK X5 5464

## (undated) DEVICE — NDL 25GA 5/8" 305122

## (undated) DEVICE — SU STRATAFIX PDS PLUS 0 CT 45CM SXPP1A406

## (undated) DEVICE — CABLE MYO/LEAD PACING WHITE DISP 019-530

## (undated) DEVICE — ESU ELEC BLADE 2.75" COATED/INSULATED E1455

## (undated) DEVICE — ENDO SNARE POLYPECTOMY OVAL 10MM LOOP SD-240U-10

## (undated) DEVICE — MANIFOLD KIT ANGIO AUTOMATED 014613

## (undated) DEVICE — DECANTER BAG 2002S

## (undated) DEVICE — TUBING INSUFFLATION PNEUMOCLEAR 0620050100

## (undated) DEVICE — ENDO SNARE EXACTO COLD 9MM LOOP 2.4MMX230CM 00711115

## (undated) DEVICE — SU PLEDGET SOFT TFE 3/8"X3/26"X1/16" PCP40

## (undated) DEVICE — SHTH INTRO 0.021IN ID 6FR DIA

## (undated) DEVICE — RX SURGIFLO HEMOSTATIC MATRIX W/THROMBIN 8ML 2994

## (undated) DEVICE — CANNULA VESSEL 3ML BEVELED DPL 30000

## (undated) DEVICE — ESU CORD BIPOLAR GREEN 10-4000

## (undated) DEVICE — SOL WATER IRRIG 1000ML BOTTLE 2F7114

## (undated) RX ORDER — EPINEPHRINE 1 MG/ML
INJECTION, SOLUTION, CONCENTRATE INTRAVENOUS
Status: DISPENSED
Start: 2020-07-13

## (undated) RX ORDER — GABAPENTIN 100 MG/1
CAPSULE ORAL
Status: DISPENSED
Start: 2024-07-25

## (undated) RX ORDER — ACETAMINOPHEN 325 MG/1
TABLET ORAL
Status: DISPENSED
Start: 2024-07-25

## (undated) RX ORDER — HEPARIN SODIUM 1000 [USP'U]/ML
INJECTION, SOLUTION INTRAVENOUS; SUBCUTANEOUS
Status: DISPENSED
Start: 2024-06-25

## (undated) RX ORDER — HEPARIN SODIUM 1000 [USP'U]/ML
INJECTION, SOLUTION INTRAVENOUS; SUBCUTANEOUS
Status: DISPENSED
Start: 2024-07-25

## (undated) RX ORDER — FENTANYL CITRATE 50 UG/ML
INJECTION, SOLUTION INTRAMUSCULAR; INTRAVENOUS
Status: DISPENSED
Start: 2024-04-15

## (undated) RX ORDER — ASPIRIN 81 MG/1
TABLET ORAL
Status: DISPENSED
Start: 2024-07-25

## (undated) RX ORDER — OXYCODONE HYDROCHLORIDE 5 MG/1
TABLET ORAL
Status: DISPENSED
Start: 2024-06-25

## (undated) RX ORDER — SODIUM CHLORIDE, SODIUM LACTATE, POTASSIUM CHLORIDE, CALCIUM CHLORIDE 600; 310; 30; 20 MG/100ML; MG/100ML; MG/100ML; MG/100ML
INJECTION, SOLUTION INTRAVENOUS
Status: DISPENSED
Start: 2024-06-25

## (undated) RX ORDER — PROPOFOL 10 MG/ML
INJECTION, EMULSION INTRAVENOUS
Status: DISPENSED
Start: 2024-06-25

## (undated) RX ORDER — SODIUM CHLORIDE 9 MG/ML
INJECTION, SOLUTION INTRAVENOUS
Status: DISPENSED
Start: 2020-07-13

## (undated) RX ORDER — PROPOFOL 10 MG/ML
INJECTION, EMULSION INTRAVENOUS
Status: DISPENSED
Start: 2024-07-25

## (undated) RX ORDER — ACETAMINOPHEN 325 MG/1
TABLET ORAL
Status: DISPENSED
Start: 2024-06-25

## (undated) RX ORDER — SODIUM CHLORIDE 9 MG/ML
INJECTION, SOLUTION INTRAVENOUS
Status: DISPENSED
Start: 2024-04-15

## (undated) RX ORDER — FENTANYL CITRATE 50 UG/ML
INJECTION, SOLUTION INTRAMUSCULAR; INTRAVENOUS
Status: DISPENSED
Start: 2024-06-25

## (undated) RX ORDER — FENTANYL CITRATE-0.9 % NACL/PF 10 MCG/ML
PLASTIC BAG, INJECTION (ML) INTRAVENOUS
Status: DISPENSED
Start: 2024-06-25

## (undated) RX ORDER — CHLORHEXIDINE GLUCONATE ORAL RINSE 1.2 MG/ML
SOLUTION DENTAL
Status: DISPENSED
Start: 2024-07-25

## (undated) RX ORDER — HEPARIN SODIUM,PORCINE 10 UNIT/ML
VIAL (ML) INTRAVENOUS
Status: DISPENSED
Start: 2024-07-25

## (undated) RX ORDER — HYDROMORPHONE HCL IN WATER/PF 6 MG/30 ML
PATIENT CONTROLLED ANALGESIA SYRINGE INTRAVENOUS
Status: DISPENSED
Start: 2024-06-25

## (undated) RX ORDER — ONDANSETRON 2 MG/ML
INJECTION INTRAMUSCULAR; INTRAVENOUS
Status: DISPENSED
Start: 2024-06-25

## (undated) RX ORDER — CEFTRIAXONE SODIUM 2 G/50ML
INJECTION, SOLUTION INTRAVENOUS
Status: DISPENSED
Start: 2020-07-31

## (undated) RX ORDER — FENTANYL CITRATE 50 UG/ML
INJECTION, SOLUTION INTRAMUSCULAR; INTRAVENOUS
Status: DISPENSED
Start: 2024-07-25

## (undated) RX ORDER — LIDOCAINE HYDROCHLORIDE 20 MG/ML
INJECTION, SOLUTION EPIDURAL; INFILTRATION; INTRACAUDAL; PERINEURAL
Status: DISPENSED
Start: 2022-02-24

## (undated) RX ORDER — ONDANSETRON 2 MG/ML
INJECTION INTRAMUSCULAR; INTRAVENOUS
Status: DISPENSED
Start: 2024-07-25

## (undated) RX ORDER — EPINEPHRINE IN SOD CHLOR,ISO 1 MG/10 ML
SYRINGE (ML) INTRAVENOUS
Status: DISPENSED
Start: 2020-07-13

## (undated) RX ORDER — METOPROLOL TARTRATE 25 MG/1
TABLET, FILM COATED ORAL
Status: DISPENSED
Start: 2024-07-25

## (undated) RX ORDER — METHYLPREDNISOLONE SODIUM SUCCINATE 125 MG/2ML
INJECTION, POWDER, LYOPHILIZED, FOR SOLUTION INTRAMUSCULAR; INTRAVENOUS
Status: DISPENSED
Start: 2020-07-13

## (undated) RX ORDER — DIPHENHYDRAMINE HYDROCHLORIDE 50 MG/ML
INJECTION INTRAMUSCULAR; INTRAVENOUS
Status: DISPENSED
Start: 2020-07-31

## (undated) RX ORDER — LIDOCAINE HYDROCHLORIDE 10 MG/ML
INJECTION, SOLUTION EPIDURAL; INFILTRATION; INTRACAUDAL; PERINEURAL
Status: DISPENSED
Start: 2024-04-15

## (undated) RX ORDER — DIPHENHYDRAMINE HYDROCHLORIDE 50 MG/ML
INJECTION INTRAMUSCULAR; INTRAVENOUS
Status: DISPENSED
Start: 2020-07-13

## (undated) RX ORDER — PAPAVERINE HYDROCHLORIDE 30 MG/ML
INJECTION INTRAMUSCULAR; INTRAVENOUS
Status: DISPENSED
Start: 2024-07-25

## (undated) RX ORDER — HEPARIN SODIUM 200 [USP'U]/100ML
INJECTION, SOLUTION INTRAVENOUS
Status: DISPENSED
Start: 2024-04-15

## (undated) RX ORDER — SODIUM CHLORIDE 9 MG/ML
INJECTION, SOLUTION INTRAVENOUS
Status: DISPENSED
Start: 2020-07-31

## (undated) RX ORDER — LIDOCAINE HYDROCHLORIDE 10 MG/ML
INJECTION, SOLUTION EPIDURAL; INFILTRATION; INTRACAUDAL; PERINEURAL
Status: DISPENSED
Start: 2024-06-25

## (undated) RX ORDER — METOPROLOL TARTRATE 100 MG
TABLET ORAL
Status: DISPENSED
Start: 2024-03-05

## (undated) RX ORDER — EPINEPHRINE 1 MG/ML
INJECTION, SOLUTION, CONCENTRATE INTRAVENOUS
Status: DISPENSED
Start: 2020-07-31

## (undated) RX ORDER — FAMOTIDINE 20 MG/1
TABLET, FILM COATED ORAL
Status: DISPENSED
Start: 2024-07-25

## (undated) RX ORDER — ASPIRIN 81 MG/1
TABLET, CHEWABLE ORAL
Status: DISPENSED
Start: 2024-04-15

## (undated) RX ORDER — HYDROMORPHONE HYDROCHLORIDE 1 MG/ML
INJECTION, SOLUTION INTRAMUSCULAR; INTRAVENOUS; SUBCUTANEOUS
Status: DISPENSED
Start: 2024-06-25

## (undated) RX ORDER — ESMOLOL HYDROCHLORIDE 10 MG/ML
INJECTION INTRAVENOUS
Status: DISPENSED
Start: 2024-06-25

## (undated) RX ORDER — PROPOFOL 10 MG/ML
INJECTION, EMULSION INTRAVENOUS
Status: DISPENSED
Start: 2022-02-24

## (undated) RX ORDER — PHENYLEPHRINE HCL IN 0.9% NACL 50MG/250ML
PLASTIC BAG, INJECTION (ML) INTRAVENOUS
Status: DISPENSED
Start: 2024-06-25

## (undated) RX ORDER — METOPROLOL TARTRATE 1 MG/ML
INJECTION, SOLUTION INTRAVENOUS
Status: DISPENSED
Start: 2024-03-05

## (undated) RX ORDER — AZITHROMYCIN 500 MG/5ML
INJECTION, POWDER, LYOPHILIZED, FOR SOLUTION INTRAVENOUS
Status: DISPENSED
Start: 2020-07-13

## (undated) RX ORDER — NITROGLYCERIN 0.4 MG/1
TABLET SUBLINGUAL
Status: DISPENSED
Start: 2024-03-05

## (undated) RX ORDER — CEFAZOLIN SODIUM 1 G/3ML
INJECTION, POWDER, FOR SOLUTION INTRAMUSCULAR; INTRAVENOUS
Status: DISPENSED
Start: 2024-07-25

## (undated) RX ORDER — CEFAZOLIN SODIUM/WATER 2 G/20 ML
SYRINGE (ML) INTRAVENOUS
Status: DISPENSED
Start: 2024-07-25

## (undated) RX ORDER — DEXAMETHASONE SODIUM PHOSPHATE 4 MG/ML
INJECTION, SOLUTION INTRA-ARTICULAR; INTRALESIONAL; INTRAMUSCULAR; INTRAVENOUS; SOFT TISSUE
Status: DISPENSED
Start: 2024-06-25

## (undated) RX ORDER — METHYLPREDNISOLONE SODIUM SUCCINATE 125 MG/2ML
INJECTION, POWDER, LYOPHILIZED, FOR SOLUTION INTRAMUSCULAR; INTRAVENOUS
Status: DISPENSED
Start: 2020-07-31

## (undated) RX ORDER — HEPARIN SODIUM 1000 [USP'U]/ML
INJECTION, SOLUTION INTRAVENOUS; SUBCUTANEOUS
Status: DISPENSED
Start: 2024-04-15

## (undated) RX ORDER — BUPIVACAINE HYDROCHLORIDE 2.5 MG/ML
INJECTION, SOLUTION EPIDURAL; INFILTRATION; INTRACAUDAL
Status: DISPENSED
Start: 2024-06-25